# Patient Record
Sex: FEMALE | Race: OTHER | HISPANIC OR LATINO
[De-identification: names, ages, dates, MRNs, and addresses within clinical notes are randomized per-mention and may not be internally consistent; named-entity substitution may affect disease eponyms.]

---

## 2017-01-10 ENCOUNTER — APPOINTMENT (OUTPATIENT)
Dept: OBGYN | Facility: HOSPITAL | Age: 42
End: 2017-01-10

## 2017-01-31 ENCOUNTER — APPOINTMENT (OUTPATIENT)
Dept: INTERNAL MEDICINE | Facility: HOSPITAL | Age: 42
End: 2017-01-31

## 2017-02-15 ENCOUNTER — LABORATORY RESULT (OUTPATIENT)
Age: 42
End: 2017-02-15

## 2017-02-15 ENCOUNTER — APPOINTMENT (OUTPATIENT)
Dept: INTERNAL MEDICINE | Facility: HOSPITAL | Age: 42
End: 2017-02-15

## 2017-02-17 ENCOUNTER — APPOINTMENT (OUTPATIENT)
Dept: DERMATOLOGY | Facility: HOSPITAL | Age: 42
End: 2017-02-17

## 2017-03-01 ENCOUNTER — OUTPATIENT (OUTPATIENT)
Dept: OUTPATIENT SERVICES | Facility: HOSPITAL | Age: 42
LOS: 1 days | Discharge: HOME | End: 2017-03-01

## 2017-03-01 DIAGNOSIS — D64.9 ANEMIA, UNSPECIFIED: ICD-10-CM

## 2017-03-02 ENCOUNTER — APPOINTMENT (OUTPATIENT)
Dept: HEMATOLOGY ONCOLOGY | Facility: CLINIC | Age: 42
End: 2017-03-02

## 2017-03-02 VITALS
OXYGEN SATURATION: 100 % | TEMPERATURE: 98.4 F | RESPIRATION RATE: 16 BRPM | BODY MASS INDEX: 30.27 KG/M2 | WEIGHT: 170.86 LBS | DIASTOLIC BLOOD PRESSURE: 81 MMHG | SYSTOLIC BLOOD PRESSURE: 119 MMHG | HEART RATE: 83 BPM

## 2017-03-03 ENCOUNTER — TRANSCRIPTION ENCOUNTER (OUTPATIENT)
Age: 42
End: 2017-03-03

## 2017-03-07 ENCOUNTER — OUTPATIENT (OUTPATIENT)
Dept: OUTPATIENT SERVICES | Facility: HOSPITAL | Age: 42
LOS: 1 days | End: 2017-03-07

## 2017-03-07 ENCOUNTER — LABORATORY RESULT (OUTPATIENT)
Age: 42
End: 2017-03-07

## 2017-03-07 ENCOUNTER — APPOINTMENT (OUTPATIENT)
Dept: INTERNAL MEDICINE | Facility: HOSPITAL | Age: 42
End: 2017-03-07

## 2017-03-07 VITALS — BODY MASS INDEX: 30.12 KG/M2 | HEIGHT: 63 IN | WEIGHT: 170 LBS

## 2017-03-07 VITALS — DIASTOLIC BLOOD PRESSURE: 68 MMHG | SYSTOLIC BLOOD PRESSURE: 112 MMHG | HEART RATE: 74 BPM

## 2017-03-07 LAB
BASOPHILS # BLD AUTO: 0.04 K/UL — SIGNIFICANT CHANGE UP (ref 0–0.2)
BASOPHILS NFR BLD AUTO: 0.8 % — SIGNIFICANT CHANGE UP (ref 0–2)
EOSINOPHIL # BLD AUTO: 0.18 K/UL — SIGNIFICANT CHANGE UP (ref 0–0.5)
EOSINOPHIL NFR BLD AUTO: 3.5 % — SIGNIFICANT CHANGE UP (ref 0–6)
HCT VFR BLD CALC: 30 % — LOW (ref 34.5–45)
HGB BLD-MCNC: 9 G/DL — LOW (ref 11.5–15.5)
IMM GRANULOCYTES NFR BLD AUTO: 0 % — SIGNIFICANT CHANGE UP (ref 0–1.5)
LYMPHOCYTES # BLD AUTO: 1.94 K/UL — SIGNIFICANT CHANGE UP (ref 1–3.3)
LYMPHOCYTES # BLD AUTO: 37.6 % — SIGNIFICANT CHANGE UP (ref 13–44)
MCHC RBC-ENTMCNC: 20.8 PG — LOW (ref 27–34)
MCHC RBC-ENTMCNC: 30 % — LOW (ref 32–36)
MCV RBC AUTO: 69.4 FL — LOW (ref 80–100)
MONOCYTES # BLD AUTO: 0.38 K/UL — SIGNIFICANT CHANGE UP (ref 0–0.9)
MONOCYTES NFR BLD AUTO: 7.4 % — SIGNIFICANT CHANGE UP (ref 2–14)
NEUTROPHILS # BLD AUTO: 2.62 K/UL — SIGNIFICANT CHANGE UP (ref 1.8–7.4)
NEUTROPHILS NFR BLD AUTO: 50.7 % — SIGNIFICANT CHANGE UP (ref 43–77)
PLATELET # BLD AUTO: 429 K/UL — HIGH (ref 150–400)
PMV BLD: 10.3 FL — SIGNIFICANT CHANGE UP (ref 7–13)
RBC # BLD: 4.32 M/UL — SIGNIFICANT CHANGE UP (ref 3.8–5.2)
RBC # FLD: 19.1 % — HIGH (ref 10.3–14.5)
RETICS #: 63.7 10X3/UL — SIGNIFICANT CHANGE UP (ref 17–73)
RETICS/RBC NFR: 1.5 % — SIGNIFICANT CHANGE UP (ref 0.5–2.5)
WBC # BLD: 5.16 K/UL — SIGNIFICANT CHANGE UP (ref 3.8–10.5)
WBC # FLD AUTO: 5.16 K/UL — SIGNIFICANT CHANGE UP (ref 3.8–10.5)

## 2017-03-09 ENCOUNTER — APPOINTMENT (OUTPATIENT)
Dept: INFUSION THERAPY | Facility: HOSPITAL | Age: 42
End: 2017-03-09

## 2017-03-10 DIAGNOSIS — R11.2 NAUSEA WITH VOMITING, UNSPECIFIED: ICD-10-CM

## 2017-03-10 DIAGNOSIS — E86.0 DEHYDRATION: ICD-10-CM

## 2017-03-10 DIAGNOSIS — C92.90 MYELOID LEUKEMIA, UNSPECIFIED, NOT HAVING ACHIEVED REMISSION: ICD-10-CM

## 2017-03-10 DIAGNOSIS — Z51.11 ENCOUNTER FOR ANTINEOPLASTIC CHEMOTHERAPY: ICD-10-CM

## 2017-03-13 DIAGNOSIS — G56.03 CARPAL TUNNEL SYNDROME, BILATERAL UPPER LIMBS: ICD-10-CM

## 2017-03-16 ENCOUNTER — APPOINTMENT (OUTPATIENT)
Dept: INFUSION THERAPY | Facility: HOSPITAL | Age: 42
End: 2017-03-16

## 2017-03-17 ENCOUNTER — APPOINTMENT (OUTPATIENT)
Dept: DERMATOLOGY | Facility: HOSPITAL | Age: 42
End: 2017-03-17

## 2017-03-17 ENCOUNTER — OUTPATIENT (OUTPATIENT)
Dept: OUTPATIENT SERVICES | Facility: HOSPITAL | Age: 42
LOS: 1 days | End: 2017-03-17

## 2017-03-17 VITALS
HEART RATE: 73 BPM | WEIGHT: 167 LBS | HEIGHT: 63 IN | BODY MASS INDEX: 29.59 KG/M2 | DIASTOLIC BLOOD PRESSURE: 72 MMHG | SYSTOLIC BLOOD PRESSURE: 108 MMHG

## 2017-03-20 ENCOUNTER — APPOINTMENT (OUTPATIENT)
Dept: OBGYN | Facility: HOSPITAL | Age: 42
End: 2017-03-20

## 2017-03-20 ENCOUNTER — OUTPATIENT (OUTPATIENT)
Dept: OUTPATIENT SERVICES | Facility: HOSPITAL | Age: 42
LOS: 1 days | End: 2017-03-20

## 2017-03-20 VITALS
DIASTOLIC BLOOD PRESSURE: 70 MMHG | BODY MASS INDEX: 30.08 KG/M2 | WEIGHT: 169.76 LBS | HEIGHT: 63 IN | HEART RATE: 95 BPM | SYSTOLIC BLOOD PRESSURE: 113 MMHG

## 2017-03-20 DIAGNOSIS — L70.0 ACNE VULGARIS: ICD-10-CM

## 2017-03-20 DIAGNOSIS — B07.9 VIRAL WART, UNSPECIFIED: ICD-10-CM

## 2017-03-20 RX ORDER — ACETAMINOPHEN 325 MG/1
325 TABLET ORAL
Qty: 0 | Refills: 0 | Status: COMPLETED | OUTPATIENT
Start: 2017-03-20

## 2017-03-20 RX ADMIN — GUAIFENESIN 1 MG: 1200 TABLET, EXTENDED RELEASE ORAL at 00:00

## 2017-03-20 RX ADMIN — ACETAMINOPHEN 2 MG: 325 TABLET, FILM COATED ORAL at 00:00

## 2017-03-21 DIAGNOSIS — D50.9 IRON DEFICIENCY ANEMIA, UNSPECIFIED: ICD-10-CM

## 2017-03-21 DIAGNOSIS — K90.9 INTESTINAL MALABSORPTION, UNSPECIFIED: ICD-10-CM

## 2017-03-21 DIAGNOSIS — N92.4 EXCESSIVE BLEEDING IN THE PREMENOPAUSAL PERIOD: ICD-10-CM

## 2017-03-21 DIAGNOSIS — R19.8 OTHER SPECIFIED SYMPTOMS AND SIGNS INVOLVING THE DIGESTIVE SYSTEM AND ABDOMEN: ICD-10-CM

## 2017-03-21 DIAGNOSIS — Z30.430 ENCOUNTER FOR INSERTION OF INTRAUTERINE CONTRACEPTIVE DEVICE: ICD-10-CM

## 2017-04-13 ENCOUNTER — LABORATORY RESULT (OUTPATIENT)
Age: 42
End: 2017-04-13

## 2017-04-13 ENCOUNTER — OUTPATIENT (OUTPATIENT)
Dept: OUTPATIENT SERVICES | Facility: HOSPITAL | Age: 42
LOS: 1 days | End: 2017-04-13

## 2017-04-13 ENCOUNTER — APPOINTMENT (OUTPATIENT)
Dept: INTERNAL MEDICINE | Facility: HOSPITAL | Age: 42
End: 2017-04-13

## 2017-04-13 VITALS — WEIGHT: 170 LBS | BODY MASS INDEX: 30.11 KG/M2

## 2017-04-13 LAB
BASOPHILS # BLD AUTO: 0.02 K/UL — SIGNIFICANT CHANGE UP (ref 0–0.2)
BASOPHILS NFR BLD AUTO: 0.4 % — SIGNIFICANT CHANGE UP (ref 0–2)
EOSINOPHIL # BLD AUTO: 0.13 K/UL — SIGNIFICANT CHANGE UP (ref 0–0.5)
EOSINOPHIL NFR BLD AUTO: 2.5 % — SIGNIFICANT CHANGE UP (ref 0–6)
FERRITIN SERPL-MCNC: 135.6 NG/ML — SIGNIFICANT CHANGE UP (ref 15–150)
HCT VFR BLD CALC: 42.8 % — SIGNIFICANT CHANGE UP (ref 34.5–45)
HGB BLD-MCNC: 13.9 G/DL — SIGNIFICANT CHANGE UP (ref 11.5–15.5)
IMM GRANULOCYTES NFR BLD AUTO: 0.2 % — SIGNIFICANT CHANGE UP (ref 0–1.5)
IRON SATN MFR SERPL: 304 UG/DL — SIGNIFICANT CHANGE UP (ref 140–530)
IRON SATN MFR SERPL: 75 UG/DL — SIGNIFICANT CHANGE UP (ref 30–160)
LYMPHOCYTES # BLD AUTO: 2.12 K/UL — SIGNIFICANT CHANGE UP (ref 1–3.3)
LYMPHOCYTES # BLD AUTO: 40.8 % — SIGNIFICANT CHANGE UP (ref 13–44)
MCHC RBC-ENTMCNC: 27.1 PG — SIGNIFICANT CHANGE UP (ref 27–34)
MCHC RBC-ENTMCNC: 32.5 % — SIGNIFICANT CHANGE UP (ref 32–36)
MCV RBC AUTO: 83.4 FL — SIGNIFICANT CHANGE UP (ref 80–100)
MONOCYTES # BLD AUTO: 0.43 K/UL — SIGNIFICANT CHANGE UP (ref 0–0.9)
MONOCYTES NFR BLD AUTO: 8.3 % — SIGNIFICANT CHANGE UP (ref 2–14)
NEUTROPHILS # BLD AUTO: 2.48 K/UL — SIGNIFICANT CHANGE UP (ref 1.8–7.4)
NEUTROPHILS NFR BLD AUTO: 47.8 % — SIGNIFICANT CHANGE UP (ref 43–77)
PLATELET # BLD AUTO: 251 K/UL — SIGNIFICANT CHANGE UP (ref 150–400)
PMV BLD: SIGNIFICANT CHANGE UP FL (ref 7–13)
RBC # BLD: 5.13 M/UL — SIGNIFICANT CHANGE UP (ref 3.8–5.2)
RBC # FLD: SIGNIFICANT CHANGE UP % (ref 10.3–14.5)
UIBC SERPL-MCNC: 229 UG/DL — SIGNIFICANT CHANGE UP (ref 110–370)
WBC # BLD: 5.19 K/UL — SIGNIFICANT CHANGE UP (ref 3.8–10.5)
WBC # FLD AUTO: 5.19 K/UL — SIGNIFICANT CHANGE UP (ref 3.8–10.5)

## 2017-04-13 RX ORDER — CLINDAMYCIN PHOSPHATE 10 MG/ML
1 LOTION TOPICAL
Qty: 1 | Refills: 3 | Status: DISCONTINUED | COMMUNITY
Start: 2017-03-17 | End: 2017-04-13

## 2017-04-14 LAB — TRANSFERRIN SERPL-MCNC: 254 MG/DL — SIGNIFICANT CHANGE UP (ref 200–360)

## 2017-04-19 DIAGNOSIS — K29.70 GASTRITIS, UNSPECIFIED, WITHOUT BLEEDING: ICD-10-CM

## 2017-04-19 DIAGNOSIS — N92.0 EXCESSIVE AND FREQUENT MENSTRUATION WITH REGULAR CYCLE: ICD-10-CM

## 2017-04-19 DIAGNOSIS — L70.0 ACNE VULGARIS: ICD-10-CM

## 2017-04-19 DIAGNOSIS — R10.13 EPIGASTRIC PAIN: ICD-10-CM

## 2017-04-19 DIAGNOSIS — D50.9 IRON DEFICIENCY ANEMIA, UNSPECIFIED: ICD-10-CM

## 2017-05-04 ENCOUNTER — APPOINTMENT (OUTPATIENT)
Dept: OBGYN | Facility: HOSPITAL | Age: 42
End: 2017-05-04

## 2017-05-04 ENCOUNTER — OUTPATIENT (OUTPATIENT)
Dept: OUTPATIENT SERVICES | Facility: HOSPITAL | Age: 42
LOS: 1 days | Discharge: HOME | End: 2017-05-04

## 2017-05-04 ENCOUNTER — LABORATORY RESULT (OUTPATIENT)
Age: 42
End: 2017-05-04

## 2017-05-04 ENCOUNTER — OUTPATIENT (OUTPATIENT)
Dept: OUTPATIENT SERVICES | Facility: HOSPITAL | Age: 42
LOS: 1 days | End: 2017-05-04

## 2017-05-04 VITALS
DIASTOLIC BLOOD PRESSURE: 88 MMHG | BODY MASS INDEX: 30.47 KG/M2 | HEART RATE: 81 BPM | WEIGHT: 172 LBS | SYSTOLIC BLOOD PRESSURE: 126 MMHG

## 2017-05-04 DIAGNOSIS — D64.9 ANEMIA, UNSPECIFIED: ICD-10-CM

## 2017-05-04 DIAGNOSIS — Z30.431 ENCOUNTER FOR ROUTINE CHECKING OF INTRAUTERINE CONTRACEPTIVE DEVICE: ICD-10-CM

## 2017-05-04 DIAGNOSIS — R42 DIZZINESS AND GIDDINESS: ICD-10-CM

## 2017-05-04 DIAGNOSIS — R30.0 DYSURIA: ICD-10-CM

## 2017-05-04 LAB
HIV1 AG SER QL: SIGNIFICANT CHANGE UP
HIV1+2 AB SPEC QL: SIGNIFICANT CHANGE UP

## 2017-05-05 ENCOUNTER — OUTPATIENT (OUTPATIENT)
Dept: OUTPATIENT SERVICES | Facility: HOSPITAL | Age: 42
LOS: 1 days | End: 2017-05-05

## 2017-05-05 ENCOUNTER — APPOINTMENT (OUTPATIENT)
Dept: DERMATOLOGY | Facility: HOSPITAL | Age: 42
End: 2017-05-05

## 2017-05-05 VITALS
SYSTOLIC BLOOD PRESSURE: 118 MMHG | HEART RATE: 89 BPM | BODY MASS INDEX: 30.65 KG/M2 | DIASTOLIC BLOOD PRESSURE: 73 MMHG | HEIGHT: 63 IN | WEIGHT: 173 LBS

## 2017-05-05 DIAGNOSIS — B07.9 VIRAL WART, UNSPECIFIED: ICD-10-CM

## 2017-05-05 DIAGNOSIS — L70.0 ACNE VULGARIS: ICD-10-CM

## 2017-05-05 LAB — TSH RECEP AB FLD-ACNC: <1 IU/L — SIGNIFICANT CHANGE UP (ref 0–1.75)

## 2017-05-06 ENCOUNTER — EMERGENCY (EMERGENCY)
Facility: HOSPITAL | Age: 42
LOS: 1 days | Discharge: HOME | End: 2017-05-06
Attending: EMERGENCY MEDICINE | Admitting: EMERGENCY MEDICINE
Payer: MEDICAID

## 2017-05-06 VITALS
HEART RATE: 74 BPM | RESPIRATION RATE: 16 BRPM | TEMPERATURE: 98 F | OXYGEN SATURATION: 98 % | DIASTOLIC BLOOD PRESSURE: 67 MMHG | SYSTOLIC BLOOD PRESSURE: 96 MMHG

## 2017-05-06 VITALS
OXYGEN SATURATION: 100 % | DIASTOLIC BLOOD PRESSURE: 94 MMHG | SYSTOLIC BLOOD PRESSURE: 129 MMHG | TEMPERATURE: 98 F | RESPIRATION RATE: 14 BRPM | HEART RATE: 82 BPM

## 2017-05-06 DIAGNOSIS — Z98.891 HISTORY OF UTERINE SCAR FROM PREVIOUS SURGERY: Chronic | ICD-10-CM

## 2017-05-06 LAB
ALBUMIN SERPL ELPH-MCNC: 4 G/DL — SIGNIFICANT CHANGE UP (ref 3.3–5)
ALP SERPL-CCNC: 40 U/L — SIGNIFICANT CHANGE UP (ref 40–120)
ALT FLD-CCNC: 19 U/L — SIGNIFICANT CHANGE UP (ref 4–33)
APPEARANCE UR: CLEAR — SIGNIFICANT CHANGE UP
AST SERPL-CCNC: 36 U/L — HIGH (ref 4–32)
BACTERIA # UR AUTO: SIGNIFICANT CHANGE UP
BACTERIA UR CULT: SIGNIFICANT CHANGE UP
BASOPHILS # BLD AUTO: 0.02 K/UL — SIGNIFICANT CHANGE UP (ref 0–0.2)
BASOPHILS NFR BLD AUTO: 0.2 % — SIGNIFICANT CHANGE UP (ref 0–2)
BASOPHILS NFR SPEC: 1 % — SIGNIFICANT CHANGE UP (ref 0–2)
BILIRUB SERPL-MCNC: 0.2 MG/DL — SIGNIFICANT CHANGE UP (ref 0.2–1.2)
BILIRUB UR-MCNC: NEGATIVE — SIGNIFICANT CHANGE UP
BLOOD UR QL VISUAL: HIGH
BUN SERPL-MCNC: 19 MG/DL — SIGNIFICANT CHANGE UP (ref 7–23)
CALCIUM SERPL-MCNC: 9.2 MG/DL — SIGNIFICANT CHANGE UP (ref 8.4–10.5)
CHLORIDE SERPL-SCNC: 102 MMOL/L — SIGNIFICANT CHANGE UP (ref 98–107)
CO2 SERPL-SCNC: 21 MMOL/L — LOW (ref 22–31)
COLOR SPEC: YELLOW — SIGNIFICANT CHANGE UP
CREAT SERPL-MCNC: 0.87 MG/DL — SIGNIFICANT CHANGE UP (ref 0.5–1.3)
ELLIPTOCYTES BLD QL SMEAR: SLIGHT — SIGNIFICANT CHANGE UP
EOSINOPHIL # BLD AUTO: 0.05 K/UL — SIGNIFICANT CHANGE UP (ref 0–0.5)
EOSINOPHIL NFR BLD AUTO: 0.6 % — SIGNIFICANT CHANGE UP (ref 0–6)
EOSINOPHIL NFR FLD: 2 % — SIGNIFICANT CHANGE UP (ref 0–6)
GLUCOSE SERPL-MCNC: 103 MG/DL — HIGH (ref 70–99)
GLUCOSE UR-MCNC: NEGATIVE — SIGNIFICANT CHANGE UP
HCG UR-SCNC: NEGATIVE — SIGNIFICANT CHANGE UP
HCT VFR BLD CALC: 41.5 % — SIGNIFICANT CHANGE UP (ref 34.5–45)
HGB BLD-MCNC: 13.4 G/DL — SIGNIFICANT CHANGE UP (ref 11.5–15.5)
IMM GRANULOCYTES NFR BLD AUTO: 0.2 % — SIGNIFICANT CHANGE UP (ref 0–1.5)
KETONES UR-MCNC: NEGATIVE — SIGNIFICANT CHANGE UP
LEUKOCYTE ESTERASE UR-ACNC: NEGATIVE — SIGNIFICANT CHANGE UP
LG PLATELETS BLD QL AUTO: SLIGHT — SIGNIFICANT CHANGE UP
LIDOCAIN IGE QN: 46.8 U/L — SIGNIFICANT CHANGE UP (ref 7–60)
LYMPHOCYTES # BLD AUTO: 1.99 K/UL — SIGNIFICANT CHANGE UP (ref 1–3.3)
LYMPHOCYTES # BLD AUTO: 22.8 % — SIGNIFICANT CHANGE UP (ref 13–44)
LYMPHOCYTES NFR SPEC AUTO: 12 % — LOW (ref 13–44)
MACROCYTES BLD QL: SLIGHT — SIGNIFICANT CHANGE UP
MANUAL SMEAR VERIFICATION: SIGNIFICANT CHANGE UP
MCHC RBC-ENTMCNC: 27.7 PG — SIGNIFICANT CHANGE UP (ref 27–34)
MCHC RBC-ENTMCNC: 32.3 % — SIGNIFICANT CHANGE UP (ref 32–36)
MCV RBC AUTO: 85.9 FL — SIGNIFICANT CHANGE UP (ref 80–100)
MICROCYTES BLD QL: SLIGHT — SIGNIFICANT CHANGE UP
MONOCYTES # BLD AUTO: 0.44 K/UL — SIGNIFICANT CHANGE UP (ref 0–0.9)
MONOCYTES NFR BLD AUTO: 5 % — SIGNIFICANT CHANGE UP (ref 2–14)
MONOCYTES NFR BLD: 4 % — SIGNIFICANT CHANGE UP (ref 2–9)
MUCOUS THREADS # UR AUTO: SIGNIFICANT CHANGE UP
NEUTROPHIL AB SER-ACNC: 76 % — SIGNIFICANT CHANGE UP (ref 43–77)
NEUTROPHILS # BLD AUTO: 6.2 K/UL — SIGNIFICANT CHANGE UP (ref 1.8–7.4)
NEUTROPHILS NFR BLD AUTO: 71.2 % — SIGNIFICANT CHANGE UP (ref 43–77)
NEUTS BAND # BLD: 1 % — SIGNIFICANT CHANGE UP (ref 0–6)
NITRITE UR-MCNC: NEGATIVE — SIGNIFICANT CHANGE UP
NON-SQ EPI CELLS # UR AUTO: <1 — SIGNIFICANT CHANGE UP
PH UR: 5.5 — SIGNIFICANT CHANGE UP (ref 4.6–8)
PLATELET # BLD AUTO: 248 K/UL — SIGNIFICANT CHANGE UP (ref 150–400)
PLATELET COUNT - ESTIMATE: NORMAL — SIGNIFICANT CHANGE UP
PMV BLD: SIGNIFICANT CHANGE UP FL (ref 7–13)
POTASSIUM SERPL-MCNC: 4 MMOL/L — SIGNIFICANT CHANGE UP (ref 3.5–5.3)
POTASSIUM SERPL-MCNC: SIGNIFICANT CHANGE UP MMOL/L (ref 3.5–5.3)
POTASSIUM SERPL-SCNC: 4 MMOL/L — SIGNIFICANT CHANGE UP (ref 3.5–5.3)
POTASSIUM SERPL-SCNC: SIGNIFICANT CHANGE UP MMOL/L (ref 3.5–5.3)
PROT SERPL-MCNC: 7.2 G/DL — SIGNIFICANT CHANGE UP (ref 6–8.3)
PROT UR-MCNC: 20 — SIGNIFICANT CHANGE UP
RBC # BLD: 4.83 M/UL — SIGNIFICANT CHANGE UP (ref 3.8–5.2)
RBC # FLD: SIGNIFICANT CHANGE UP % (ref 10.3–14.5)
RBC CASTS # UR COMP ASSIST: SIGNIFICANT CHANGE UP (ref 0–?)
SODIUM SERPL-SCNC: 137 MMOL/L — SIGNIFICANT CHANGE UP (ref 135–145)
SP GR SPEC: 1.03 — HIGH (ref 1–1.03)
SP GR UR: 1.03 — HIGH (ref 1–1.03)
SPECIMEN SOURCE: SIGNIFICANT CHANGE UP
SQUAMOUS # UR AUTO: SIGNIFICANT CHANGE UP
UROBILINOGEN FLD QL: NORMAL E.U. — SIGNIFICANT CHANGE UP (ref 0.1–0.2)
VARIANT LYMPHS # BLD: 4 % — SIGNIFICANT CHANGE UP
WBC # BLD: 8.72 K/UL — SIGNIFICANT CHANGE UP (ref 3.8–10.5)
WBC # FLD AUTO: 8.72 K/UL — SIGNIFICANT CHANGE UP (ref 3.8–10.5)
WBC UR QL: SIGNIFICANT CHANGE UP (ref 0–?)

## 2017-05-06 PROCEDURE — 74177 CT ABD & PELVIS W/CONTRAST: CPT | Mod: 26

## 2017-05-06 PROCEDURE — 71010: CPT | Mod: 26

## 2017-05-06 PROCEDURE — 74010: CPT | Mod: 26

## 2017-05-06 PROCEDURE — 99284 EMERGENCY DEPT VISIT MOD MDM: CPT | Mod: 25

## 2017-05-06 RX ORDER — FAMOTIDINE 10 MG/ML
20 INJECTION INTRAVENOUS ONCE
Qty: 0 | Refills: 0 | Status: COMPLETED | OUTPATIENT
Start: 2017-05-06 | End: 2017-05-06

## 2017-05-06 RX ORDER — ONDANSETRON 8 MG/1
4 TABLET, FILM COATED ORAL ONCE
Qty: 0 | Refills: 0 | Status: COMPLETED | OUTPATIENT
Start: 2017-05-06 | End: 2017-05-06

## 2017-05-06 RX ORDER — SODIUM CHLORIDE 9 MG/ML
1000 INJECTION INTRAMUSCULAR; INTRAVENOUS; SUBCUTANEOUS ONCE
Qty: 0 | Refills: 0 | Status: COMPLETED | OUTPATIENT
Start: 2017-05-06 | End: 2017-05-06

## 2017-05-06 RX ADMIN — Medication 30 MILLILITER(S): at 03:03

## 2017-05-06 RX ADMIN — ONDANSETRON 4 MILLIGRAM(S): 8 TABLET, FILM COATED ORAL at 03:02

## 2017-05-06 RX ADMIN — FAMOTIDINE 20 MILLIGRAM(S): 10 INJECTION INTRAVENOUS at 03:03

## 2017-05-06 RX ADMIN — SODIUM CHLORIDE 1000 MILLILITER(S): 9 INJECTION INTRAMUSCULAR; INTRAVENOUS; SUBCUTANEOUS at 03:02

## 2017-05-06 NOTE — ED PROVIDER NOTE - ATTENDING CONTRIBUTION TO CARE
Dr. Rueda: This H&P has been written by myself in its entirety  Dr. Rueda: I have personally performed a face to face bedside history and physical examination of this patient. I have discussed the history, examination, review of systems, assessment and plan of management with the resident. I have reviewed the electronic medical record and amended it to reflect my history, review of systems, physical exam, assessment and plan.

## 2017-05-06 NOTE — ED ADULT TRIAGE NOTE - CHIEF COMPLAINT QUOTE
Pt arrives to triage c/o Abd pain, nausea/vomiting x1 day. Epigastric rad to LUQ abd, vomited x2 at home then saw blood tinged saliva in emesis so came to ED. Hx Gastritis, diagnosed w/ peptic ulcer x3 years ago but hasn't had pain this severe previously. Takes Omeprazole daily.  Pt guarding abd in triage, resps/even unlabored, appearing uncomfortable but in NAD.

## 2017-05-06 NOTE — ED PROVIDER NOTE - OBJECTIVE STATEMENT
Dr. Rueda: 41F h/o gastric ulcers on TUMS and omeprazole, h/o C sections and cystectomy in the past, p/w LUQ. L flank and epigastric pain at 9:30pm tonight, before eating, with 2 episodes of non bilious emesis with streaks of blood. Diarrhea earlier today, normal flatus. No fevers or chills. Pain is not related to meals. Took TUMS with no relief. States pain is different from usual episodes of gastritis pain. Ate homemade chicken today, which several family members at as well with no sx. No travel, sick contacts, dysuria, hematuria, recent abx. Pt has no GI doc and has not had an EGD. LMP . States father recently  of "kidney problems".

## 2017-05-06 NOTE — ED PROVIDER NOTE - MEDICAL DECISION MAKING DETAILS
pain and nausea control, labs, ucg, possible gastritis however given diffuse nature of pain and surgical hx, will get CT a/p r/o partial sbo.

## 2017-05-06 NOTE — ED PROVIDER NOTE - PROGRESS NOTE DETAILS
Pt mentioned to RN that she recently had IUD placed. Pt in NAD. Labs/CT pending. Pt feels much better, results explained, will f/u with GI.

## 2017-05-06 NOTE — ED ADULT NURSE NOTE - OBJECTIVE STATEMENT
41 year old female, A&OX3, ambulatory c/o upper abdominal pain/epigastric pain that radiates to the left side of abdomen. Admits to nausea, two episodes of vomiting with small amount of blood and nonbloody diarrhea. Reports hx of gastritis but states none of her usual medications have relieved pain. Denies fever, chills, SOB, chest pain, dysuria. Abdomen rigid, nondistended, tender to palpation of LUQ. Respirations even, unlabored. IV placed, labs sent, medicated as ordered.

## 2017-05-08 ENCOUNTER — APPOINTMENT (OUTPATIENT)
Dept: HEMATOLOGY ONCOLOGY | Facility: CLINIC | Age: 42
End: 2017-05-08

## 2017-05-11 ENCOUNTER — APPOINTMENT (OUTPATIENT)
Dept: HEMATOLOGY ONCOLOGY | Facility: CLINIC | Age: 42
End: 2017-05-11

## 2017-05-18 ENCOUNTER — LABORATORY RESULT (OUTPATIENT)
Age: 42
End: 2017-05-18

## 2017-05-18 ENCOUNTER — APPOINTMENT (OUTPATIENT)
Dept: GASTROENTEROLOGY | Facility: HOSPITAL | Age: 42
End: 2017-05-18

## 2017-05-18 ENCOUNTER — APPOINTMENT (OUTPATIENT)
Dept: INTERNAL MEDICINE | Facility: HOSPITAL | Age: 42
End: 2017-05-18

## 2017-05-18 ENCOUNTER — OUTPATIENT (OUTPATIENT)
Dept: OUTPATIENT SERVICES | Facility: HOSPITAL | Age: 42
LOS: 1 days | End: 2017-05-18

## 2017-05-18 VITALS — BODY MASS INDEX: 30.12 KG/M2 | WEIGHT: 170 LBS | HEIGHT: 63 IN

## 2017-05-18 VITALS
SYSTOLIC BLOOD PRESSURE: 125 MMHG | WEIGHT: 170 LBS | HEART RATE: 87 BPM | DIASTOLIC BLOOD PRESSURE: 79 MMHG | BODY MASS INDEX: 30.12 KG/M2 | HEIGHT: 63 IN

## 2017-05-18 DIAGNOSIS — Z83.79 FAMILY HISTORY OF OTHER DISEASES OF THE DIGESTIVE SYSTEM: ICD-10-CM

## 2017-05-18 DIAGNOSIS — Z98.891 HISTORY OF UTERINE SCAR FROM PREVIOUS SURGERY: Chronic | ICD-10-CM

## 2017-05-18 DIAGNOSIS — R10.13 EPIGASTRIC PAIN: ICD-10-CM

## 2017-05-18 DIAGNOSIS — D50.9 IRON DEFICIENCY ANEMIA, UNSPECIFIED: ICD-10-CM

## 2017-05-18 DIAGNOSIS — E66.9 OBESITY, UNSPECIFIED: ICD-10-CM

## 2017-05-18 LAB
ALBUMIN SERPL ELPH-MCNC: 4 G/DL — SIGNIFICANT CHANGE UP (ref 3.3–5)
ALP SERPL-CCNC: 52 U/L — SIGNIFICANT CHANGE UP (ref 40–120)
ALT FLD-CCNC: 15 U/L — SIGNIFICANT CHANGE UP (ref 4–33)
ANISOCYTOSIS BLD QL: SLIGHT — SIGNIFICANT CHANGE UP
AST SERPL-CCNC: 15 U/L — SIGNIFICANT CHANGE UP (ref 4–32)
BASOPHILS # BLD AUTO: 0.02 K/UL — SIGNIFICANT CHANGE UP (ref 0–0.2)
BASOPHILS NFR BLD AUTO: 0.4 % — SIGNIFICANT CHANGE UP (ref 0–2)
BILIRUB SERPL-MCNC: < 0.2 MG/DL — LOW (ref 0.2–1.2)
BUN SERPL-MCNC: 20 MG/DL — SIGNIFICANT CHANGE UP (ref 7–23)
CALCIUM SERPL-MCNC: 9.7 MG/DL — SIGNIFICANT CHANGE UP (ref 8.4–10.5)
CHLORIDE SERPL-SCNC: 101 MMOL/L — SIGNIFICANT CHANGE UP (ref 98–107)
CO2 SERPL-SCNC: 30 MMOL/L — SIGNIFICANT CHANGE UP (ref 22–31)
CREAT SERPL-MCNC: 0.9 MG/DL — SIGNIFICANT CHANGE UP (ref 0.5–1.3)
ELLIPTOCYTES BLD QL SMEAR: SLIGHT — SIGNIFICANT CHANGE UP
EOSINOPHIL # BLD AUTO: 0.04 K/UL — SIGNIFICANT CHANGE UP (ref 0–0.5)
EOSINOPHIL NFR BLD AUTO: 0.9 % — SIGNIFICANT CHANGE UP (ref 0–6)
FERRITIN SERPL-MCNC: 69.22 NG/ML — SIGNIFICANT CHANGE UP (ref 15–150)
GLUCOSE SERPL-MCNC: 82 MG/DL — SIGNIFICANT CHANGE UP (ref 70–99)
HCT VFR BLD CALC: 39.8 % — SIGNIFICANT CHANGE UP (ref 34.5–45)
HGB BLD-MCNC: 12.9 G/DL — SIGNIFICANT CHANGE UP (ref 11.5–15.5)
HYPOCHROMIA BLD QL: SLIGHT — SIGNIFICANT CHANGE UP
IMM GRANULOCYTES NFR BLD AUTO: 0.2 % — SIGNIFICANT CHANGE UP (ref 0–1.5)
LG PLATELETS BLD QL AUTO: SLIGHT — SIGNIFICANT CHANGE UP
LYMPHOCYTES # BLD AUTO: 1.71 K/UL — SIGNIFICANT CHANGE UP (ref 1–3.3)
LYMPHOCYTES # BLD AUTO: 37.3 % — SIGNIFICANT CHANGE UP (ref 13–44)
MACROCYTES BLD QL: SLIGHT — SIGNIFICANT CHANGE UP
MCHC RBC-ENTMCNC: 28.7 PG — SIGNIFICANT CHANGE UP (ref 27–34)
MCHC RBC-ENTMCNC: 32.4 % — SIGNIFICANT CHANGE UP (ref 32–36)
MCV RBC AUTO: 88.4 FL — SIGNIFICANT CHANGE UP (ref 80–100)
MICROCYTES BLD QL: SLIGHT — SIGNIFICANT CHANGE UP
MONOCYTES # BLD AUTO: 0.27 K/UL — SIGNIFICANT CHANGE UP (ref 0–0.9)
MONOCYTES NFR BLD AUTO: 5.9 % — SIGNIFICANT CHANGE UP (ref 2–14)
NEUTROPHILS # BLD AUTO: 2.53 K/UL — SIGNIFICANT CHANGE UP (ref 1.8–7.4)
NEUTROPHILS NFR BLD AUTO: 55.3 % — SIGNIFICANT CHANGE UP (ref 43–77)
PLATELET # BLD AUTO: 275 K/UL — SIGNIFICANT CHANGE UP (ref 150–400)
PLATELET COUNT - ESTIMATE: NORMAL — SIGNIFICANT CHANGE UP
PMV BLD: 11.2 FL — SIGNIFICANT CHANGE UP (ref 7–13)
POTASSIUM SERPL-MCNC: 4.1 MMOL/L — SIGNIFICANT CHANGE UP (ref 3.5–5.3)
POTASSIUM SERPL-SCNC: 4.1 MMOL/L — SIGNIFICANT CHANGE UP (ref 3.5–5.3)
PROT SERPL-MCNC: 7.3 G/DL — SIGNIFICANT CHANGE UP (ref 6–8.3)
RBC # BLD: 4.5 M/UL — SIGNIFICANT CHANGE UP (ref 3.8–5.2)
RBC # FLD: SIGNIFICANT CHANGE UP % (ref 10.3–14.5)
SODIUM SERPL-SCNC: 141 MMOL/L — SIGNIFICANT CHANGE UP (ref 135–145)
TARGETS BLD QL SMEAR: SLIGHT — SIGNIFICANT CHANGE UP
WBC # BLD: 4.58 K/UL — SIGNIFICANT CHANGE UP (ref 3.8–10.5)
WBC # FLD AUTO: 4.58 K/UL — SIGNIFICANT CHANGE UP (ref 3.8–10.5)

## 2017-05-18 RX ORDER — AMOXICILLIN 250 MG/1
250 CAPSULE ORAL
Qty: 1 | Refills: 0 | Status: DISCONTINUED | COMMUNITY
Start: 2017-05-05 | End: 2017-05-18

## 2017-05-18 RX ORDER — CLINDAMYCIN PHOSPHATE 10 MG/ML
1 LOTION TOPICAL TWICE DAILY
Qty: 1 | Refills: 3 | Status: DISCONTINUED | COMMUNITY
Start: 2017-05-05 | End: 2017-05-18

## 2017-05-18 RX ORDER — SPIRONOLACTONE 50 MG/1
50 TABLET ORAL
Qty: 1 | Refills: 0 | Status: DISCONTINUED | COMMUNITY
Start: 2017-04-05 | End: 2017-05-18

## 2017-05-19 DIAGNOSIS — J35.8 OTHER CHRONIC DISEASES OF TONSILS AND ADENOIDS: ICD-10-CM

## 2017-05-23 ENCOUNTER — OTHER (OUTPATIENT)
Age: 42
End: 2017-05-23

## 2017-05-23 ENCOUNTER — OUTPATIENT (OUTPATIENT)
Dept: OUTPATIENT SERVICES | Facility: HOSPITAL | Age: 42
LOS: 1 days | End: 2017-05-23

## 2017-05-23 ENCOUNTER — APPOINTMENT (OUTPATIENT)
Dept: INTERNAL MEDICINE | Facility: HOSPITAL | Age: 42
End: 2017-05-23

## 2017-05-23 VITALS — DIASTOLIC BLOOD PRESSURE: 78 MMHG | SYSTOLIC BLOOD PRESSURE: 120 MMHG | HEART RATE: 72 BPM

## 2017-05-23 VITALS — WEIGHT: 170 LBS | HEIGHT: 63 IN | BODY MASS INDEX: 30.12 KG/M2

## 2017-05-23 DIAGNOSIS — Z98.891 HISTORY OF UTERINE SCAR FROM PREVIOUS SURGERY: Chronic | ICD-10-CM

## 2017-05-23 DIAGNOSIS — L70.9 ACNE, UNSPECIFIED: ICD-10-CM

## 2017-05-24 DIAGNOSIS — L90.5 SCAR CONDITIONS AND FIBROSIS OF SKIN: ICD-10-CM

## 2017-05-24 DIAGNOSIS — Z01.818 ENCOUNTER FOR OTHER PREPROCEDURAL EXAMINATION: ICD-10-CM

## 2017-06-20 ENCOUNTER — LABORATORY RESULT (OUTPATIENT)
Age: 42
End: 2017-06-20

## 2017-06-20 ENCOUNTER — OUTPATIENT (OUTPATIENT)
Dept: OUTPATIENT SERVICES | Facility: HOSPITAL | Age: 42
LOS: 1 days | End: 2017-06-20

## 2017-06-20 ENCOUNTER — APPOINTMENT (OUTPATIENT)
Dept: INTERNAL MEDICINE | Facility: HOSPITAL | Age: 42
End: 2017-06-20

## 2017-06-20 VITALS — BODY MASS INDEX: 30.12 KG/M2 | HEIGHT: 63 IN | WEIGHT: 170 LBS

## 2017-06-20 DIAGNOSIS — Z98.891 HISTORY OF UTERINE SCAR FROM PREVIOUS SURGERY: Chronic | ICD-10-CM

## 2017-06-20 LAB
BASOPHILS # BLD AUTO: 0.02 K/UL — SIGNIFICANT CHANGE UP (ref 0–0.2)
BASOPHILS NFR BLD AUTO: 0.4 % — SIGNIFICANT CHANGE UP (ref 0–2)
EOSINOPHIL # BLD AUTO: 0.08 K/UL — SIGNIFICANT CHANGE UP (ref 0–0.5)
EOSINOPHIL NFR BLD AUTO: 1.7 % — SIGNIFICANT CHANGE UP (ref 0–6)
HCT VFR BLD CALC: 40.1 % — SIGNIFICANT CHANGE UP (ref 34.5–45)
HGB BLD-MCNC: 12.7 G/DL — SIGNIFICANT CHANGE UP (ref 11.5–15.5)
IMM GRANULOCYTES NFR BLD AUTO: 0.2 % — SIGNIFICANT CHANGE UP (ref 0–1.5)
LYMPHOCYTES # BLD AUTO: 1.66 K/UL — SIGNIFICANT CHANGE UP (ref 1–3.3)
LYMPHOCYTES # BLD AUTO: 34.7 % — SIGNIFICANT CHANGE UP (ref 13–44)
MCHC RBC-ENTMCNC: 29.8 PG — SIGNIFICANT CHANGE UP (ref 27–34)
MCHC RBC-ENTMCNC: 31.7 % — LOW (ref 32–36)
MCV RBC AUTO: 94.1 FL — SIGNIFICANT CHANGE UP (ref 80–100)
MONOCYTES # BLD AUTO: 0.33 K/UL — SIGNIFICANT CHANGE UP (ref 0–0.9)
MONOCYTES NFR BLD AUTO: 6.9 % — SIGNIFICANT CHANGE UP (ref 2–14)
NEUTROPHILS # BLD AUTO: 2.69 K/UL — SIGNIFICANT CHANGE UP (ref 1.8–7.4)
NEUTROPHILS NFR BLD AUTO: 56.1 % — SIGNIFICANT CHANGE UP (ref 43–77)
PLATELET # BLD AUTO: 409 K/UL — HIGH (ref 150–400)
PMV BLD: 10.8 FL — SIGNIFICANT CHANGE UP (ref 7–13)
RBC # BLD: 4.26 M/UL — SIGNIFICANT CHANGE UP (ref 3.8–5.2)
RBC # FLD: 15.8 % — HIGH (ref 10.3–14.5)
WBC # BLD: 4.79 K/UL — SIGNIFICANT CHANGE UP (ref 3.8–10.5)
WBC # FLD AUTO: 4.79 K/UL — SIGNIFICANT CHANGE UP (ref 3.8–10.5)

## 2017-06-21 DIAGNOSIS — L70.0 ACNE VULGARIS: ICD-10-CM

## 2017-06-21 DIAGNOSIS — L90.5 SCAR CONDITIONS AND FIBROSIS OF SKIN: ICD-10-CM

## 2017-06-21 DIAGNOSIS — D64.9 ANEMIA, UNSPECIFIED: ICD-10-CM

## 2017-06-21 DIAGNOSIS — N92.0 EXCESSIVE AND FREQUENT MENSTRUATION WITH REGULAR CYCLE: ICD-10-CM

## 2017-06-21 DIAGNOSIS — D50.9 IRON DEFICIENCY ANEMIA, UNSPECIFIED: ICD-10-CM

## 2017-06-21 DIAGNOSIS — J35.8 OTHER CHRONIC DISEASES OF TONSILS AND ADENOIDS: ICD-10-CM

## 2017-06-27 ENCOUNTER — RESULT REVIEW (OUTPATIENT)
Age: 42
End: 2017-06-27

## 2017-06-27 ENCOUNTER — OUTPATIENT (OUTPATIENT)
Dept: OUTPATIENT SERVICES | Facility: HOSPITAL | Age: 42
LOS: 1 days | Discharge: HOME | End: 2017-06-27
Payer: COMMERCIAL

## 2017-06-27 DIAGNOSIS — R10.13 EPIGASTRIC PAIN: ICD-10-CM

## 2017-06-27 DIAGNOSIS — Z98.891 HISTORY OF UTERINE SCAR FROM PREVIOUS SURGERY: Chronic | ICD-10-CM

## 2017-06-27 LAB — HCG UR QL: NEGATIVE — SIGNIFICANT CHANGE UP

## 2017-06-27 PROCEDURE — 88305 TISSUE EXAM BY PATHOLOGIST: CPT | Mod: 26

## 2017-06-27 PROCEDURE — 43239 EGD BIOPSY SINGLE/MULTIPLE: CPT | Mod: GC

## 2017-06-27 PROCEDURE — 88312 SPECIAL STAINS GROUP 1: CPT | Mod: 26

## 2017-06-29 LAB — SURGICAL PATHOLOGY STUDY: SIGNIFICANT CHANGE UP

## 2017-07-09 ENCOUNTER — FORM ENCOUNTER (OUTPATIENT)
Age: 42
End: 2017-07-09

## 2017-07-10 ENCOUNTER — APPOINTMENT (OUTPATIENT)
Dept: ULTRASOUND IMAGING | Facility: IMAGING CENTER | Age: 42
End: 2017-07-10

## 2017-07-10 ENCOUNTER — OUTPATIENT (OUTPATIENT)
Dept: OUTPATIENT SERVICES | Facility: HOSPITAL | Age: 42
LOS: 1 days | End: 2017-07-10
Payer: COMMERCIAL

## 2017-07-10 DIAGNOSIS — R10.13 EPIGASTRIC PAIN: ICD-10-CM

## 2017-07-10 DIAGNOSIS — Z98.891 HISTORY OF UTERINE SCAR FROM PREVIOUS SURGERY: Chronic | ICD-10-CM

## 2017-07-10 PROCEDURE — 76705 ECHO EXAM OF ABDOMEN: CPT

## 2017-07-13 ENCOUNTER — OUTPATIENT (OUTPATIENT)
Dept: OUTPATIENT SERVICES | Facility: HOSPITAL | Age: 42
LOS: 1 days | End: 2017-07-13

## 2017-07-13 ENCOUNTER — APPOINTMENT (OUTPATIENT)
Dept: GASTROENTEROLOGY | Facility: HOSPITAL | Age: 42
End: 2017-07-13

## 2017-07-13 VITALS
SYSTOLIC BLOOD PRESSURE: 121 MMHG | DIASTOLIC BLOOD PRESSURE: 80 MMHG | WEIGHT: 173.1 LBS | BODY MASS INDEX: 30.67 KG/M2 | HEART RATE: 81 BPM | HEIGHT: 63 IN

## 2017-07-13 DIAGNOSIS — Z80.0 FAMILY HISTORY OF MALIGNANT NEOPLASM OF DIGESTIVE ORGANS: ICD-10-CM

## 2017-07-13 DIAGNOSIS — Z98.891 HISTORY OF UTERINE SCAR FROM PREVIOUS SURGERY: Chronic | ICD-10-CM

## 2017-07-13 DIAGNOSIS — R11.2 NAUSEA WITH VOMITING, UNSPECIFIED: ICD-10-CM

## 2017-07-13 DIAGNOSIS — R10.13 EPIGASTRIC PAIN: ICD-10-CM

## 2017-07-13 DIAGNOSIS — E66.9 OBESITY, UNSPECIFIED: ICD-10-CM

## 2017-07-13 DIAGNOSIS — R10.9 UNSPECIFIED ABDOMINAL PAIN: ICD-10-CM

## 2017-07-25 ENCOUNTER — APPOINTMENT (OUTPATIENT)
Dept: NUCLEAR MEDICINE | Facility: HOSPITAL | Age: 42
End: 2017-07-25

## 2017-08-04 ENCOUNTER — APPOINTMENT (OUTPATIENT)
Dept: DERMATOLOGY | Facility: HOSPITAL | Age: 42
End: 2017-08-04
Payer: COMMERCIAL

## 2017-08-04 ENCOUNTER — OUTPATIENT (OUTPATIENT)
Dept: OUTPATIENT SERVICES | Facility: HOSPITAL | Age: 42
LOS: 1 days | End: 2017-08-04

## 2017-08-04 VITALS
BODY MASS INDEX: 30.83 KG/M2 | DIASTOLIC BLOOD PRESSURE: 81 MMHG | WEIGHT: 174 LBS | SYSTOLIC BLOOD PRESSURE: 120 MMHG | HEART RATE: 80 BPM | HEIGHT: 63 IN

## 2017-08-04 DIAGNOSIS — Z98.891 HISTORY OF UTERINE SCAR FROM PREVIOUS SURGERY: Chronic | ICD-10-CM

## 2017-08-04 PROCEDURE — 17110 DESTRUCTION B9 LES UP TO 14: CPT | Mod: GC,NC

## 2017-08-04 PROCEDURE — 99214 OFFICE O/P EST MOD 30 MIN: CPT | Mod: 25,GC,NC

## 2017-08-07 ENCOUNTER — APPOINTMENT (OUTPATIENT)
Dept: OTOLARYNGOLOGY | Facility: CLINIC | Age: 42
End: 2017-08-07
Payer: COMMERCIAL

## 2017-08-07 ENCOUNTER — OUTPATIENT (OUTPATIENT)
Dept: OUTPATIENT SERVICES | Facility: HOSPITAL | Age: 42
LOS: 1 days | Discharge: HOME | End: 2017-08-07

## 2017-08-07 VITALS
BODY MASS INDEX: 30.12 KG/M2 | SYSTOLIC BLOOD PRESSURE: 126 MMHG | WEIGHT: 170 LBS | HEIGHT: 63 IN | HEART RATE: 87 BPM | DIASTOLIC BLOOD PRESSURE: 89 MMHG

## 2017-08-07 DIAGNOSIS — L81.9 DISORDER OF PIGMENTATION, UNSPECIFIED: ICD-10-CM

## 2017-08-07 DIAGNOSIS — B07.9 VIRAL WART, UNSPECIFIED: ICD-10-CM

## 2017-08-07 DIAGNOSIS — Z98.891 HISTORY OF UTERINE SCAR FROM PREVIOUS SURGERY: Chronic | ICD-10-CM

## 2017-08-07 DIAGNOSIS — L70.0 ACNE VULGARIS: ICD-10-CM

## 2017-08-07 PROCEDURE — 31575 DIAGNOSTIC LARYNGOSCOPY: CPT

## 2017-08-07 PROCEDURE — 99204 OFFICE O/P NEW MOD 45 MIN: CPT | Mod: 25

## 2017-08-07 RX ORDER — TRANEXAMIC ACID 650 MG/1
650 TABLET ORAL
Qty: 30 | Refills: 5 | Status: DISCONTINUED | COMMUNITY
Start: 2017-05-18 | End: 2017-08-07

## 2017-08-07 RX ORDER — CLINDAMYCIN PHOSPHATE 10 MG/ML
1 LOTION TOPICAL
Qty: 1 | Refills: 5 | Status: DISCONTINUED | COMMUNITY
Start: 2017-08-04 | End: 2017-08-07

## 2017-08-09 DIAGNOSIS — J35.8 OTHER CHRONIC DISEASES OF TONSILS AND ADENOIDS: ICD-10-CM

## 2017-08-09 DIAGNOSIS — K21.9 GASTRO-ESOPHAGEAL REFLUX DISEASE WITHOUT ESOPHAGITIS: ICD-10-CM

## 2017-08-10 ENCOUNTER — APPOINTMENT (OUTPATIENT)
Dept: GASTROENTEROLOGY | Facility: HOSPITAL | Age: 42
End: 2017-08-10

## 2017-08-16 ENCOUNTER — APPOINTMENT (OUTPATIENT)
Dept: OBGYN | Facility: HOSPITAL | Age: 42
End: 2017-08-16

## 2017-08-24 ENCOUNTER — APPOINTMENT (OUTPATIENT)
Dept: OBGYN | Facility: HOSPITAL | Age: 42
End: 2017-08-24

## 2017-08-24 ENCOUNTER — LABORATORY RESULT (OUTPATIENT)
Age: 42
End: 2017-08-24

## 2017-08-24 ENCOUNTER — OUTPATIENT (OUTPATIENT)
Dept: OUTPATIENT SERVICES | Facility: HOSPITAL | Age: 42
LOS: 1 days | End: 2017-08-24

## 2017-08-24 VITALS
BODY MASS INDEX: 31.35 KG/M2 | DIASTOLIC BLOOD PRESSURE: 80 MMHG | WEIGHT: 177 LBS | SYSTOLIC BLOOD PRESSURE: 120 MMHG | HEART RATE: 86 BPM

## 2017-08-24 DIAGNOSIS — Z98.891 HISTORY OF UTERINE SCAR FROM PREVIOUS SURGERY: Chronic | ICD-10-CM

## 2017-08-24 LAB
BASOPHILS # BLD AUTO: 0.05 K/UL — SIGNIFICANT CHANGE UP (ref 0–0.2)
BASOPHILS NFR BLD AUTO: 1 % — SIGNIFICANT CHANGE UP (ref 0–2)
EOSINOPHIL # BLD AUTO: 0.07 K/UL — SIGNIFICANT CHANGE UP (ref 0–0.5)
EOSINOPHIL NFR BLD AUTO: 1.4 % — SIGNIFICANT CHANGE UP (ref 0–6)
HCT VFR BLD CALC: 35.7 % — SIGNIFICANT CHANGE UP (ref 34.5–45)
HGB BLD-MCNC: 11.6 G/DL — SIGNIFICANT CHANGE UP (ref 11.5–15.5)
IMM GRANULOCYTES # BLD AUTO: 0.01 # — SIGNIFICANT CHANGE UP
IMM GRANULOCYTES NFR BLD AUTO: 0.2 % — SIGNIFICANT CHANGE UP (ref 0–1.5)
LYMPHOCYTES # BLD AUTO: 1.73 K/UL — SIGNIFICANT CHANGE UP (ref 1–3.3)
LYMPHOCYTES # BLD AUTO: 33.7 % — SIGNIFICANT CHANGE UP (ref 13–44)
MCHC RBC-ENTMCNC: 29.2 PG — SIGNIFICANT CHANGE UP (ref 27–34)
MCHC RBC-ENTMCNC: 32.5 % — SIGNIFICANT CHANGE UP (ref 32–36)
MCV RBC AUTO: 89.9 FL — SIGNIFICANT CHANGE UP (ref 80–100)
MONOCYTES # BLD AUTO: 0.45 K/UL — SIGNIFICANT CHANGE UP (ref 0–0.9)
MONOCYTES NFR BLD AUTO: 8.8 % — SIGNIFICANT CHANGE UP (ref 2–14)
NEUTROPHILS # BLD AUTO: 2.83 K/UL — SIGNIFICANT CHANGE UP (ref 1.8–7.4)
NEUTROPHILS NFR BLD AUTO: 54.9 % — SIGNIFICANT CHANGE UP (ref 43–77)
NRBC # FLD: 0 — SIGNIFICANT CHANGE UP
PLATELET # BLD AUTO: 308 K/UL — SIGNIFICANT CHANGE UP (ref 150–400)
PMV BLD: 12.1 FL — SIGNIFICANT CHANGE UP (ref 7–13)
RBC # BLD: 3.97 M/UL — SIGNIFICANT CHANGE UP (ref 3.8–5.2)
RBC # FLD: 13.8 % — SIGNIFICANT CHANGE UP (ref 10.3–14.5)
WBC # BLD: 5.14 K/UL — SIGNIFICANT CHANGE UP (ref 3.8–10.5)
WBC # FLD AUTO: 5.14 K/UL — SIGNIFICANT CHANGE UP (ref 3.8–10.5)

## 2017-08-25 DIAGNOSIS — N94.9 UNSPECIFIED CONDITION ASSOCIATED WITH FEMALE GENITAL ORGANS AND MENSTRUAL CYCLE: ICD-10-CM

## 2017-08-25 DIAGNOSIS — T83.89XA OTHER SPECIFIED COMPLICATION OF GENITOURINARY PROSTHETIC DEVICES, IMPLANTS AND GRAFTS, INITIAL ENCOUNTER: ICD-10-CM

## 2017-08-25 DIAGNOSIS — Z97.5 PRESENCE OF (INTRAUTERINE) CONTRACEPTIVE DEVICE: ICD-10-CM

## 2017-08-25 LAB
CANDIDA AB TITR SER: NOT DETECTED — SIGNIFICANT CHANGE UP
G VAGINALIS DNA SPEC QL NAA+PROBE: DETECTED — SIGNIFICANT CHANGE UP
T VAGINALIS SPEC QL WET PREP: NOT DETECTED — SIGNIFICANT CHANGE UP

## 2017-08-26 LAB
BACTERIA UR CULT: SIGNIFICANT CHANGE UP
C TRACH RRNA SPEC QL NAA+PROBE: SIGNIFICANT CHANGE UP
N GONORRHOEA RRNA SPEC QL NAA+PROBE: SIGNIFICANT CHANGE UP
SPECIMEN SOURCE: SIGNIFICANT CHANGE UP
SPECIMEN SOURCE: SIGNIFICANT CHANGE UP

## 2017-09-13 ENCOUNTER — APPOINTMENT (OUTPATIENT)
Dept: OBGYN | Facility: HOSPITAL | Age: 42
End: 2017-09-13

## 2017-09-14 ENCOUNTER — APPOINTMENT (OUTPATIENT)
Dept: GASTROENTEROLOGY | Facility: HOSPITAL | Age: 42
End: 2017-09-14

## 2017-11-02 ENCOUNTER — APPOINTMENT (OUTPATIENT)
Dept: OBGYN | Facility: HOSPITAL | Age: 42
End: 2017-11-02

## 2017-11-07 ENCOUNTER — LABORATORY RESULT (OUTPATIENT)
Age: 42
End: 2017-11-07

## 2017-11-07 ENCOUNTER — APPOINTMENT (OUTPATIENT)
Dept: OBGYN | Facility: HOSPITAL | Age: 42
End: 2017-11-07

## 2017-11-07 ENCOUNTER — OUTPATIENT (OUTPATIENT)
Dept: OUTPATIENT SERVICES | Facility: HOSPITAL | Age: 42
LOS: 1 days | End: 2017-11-07

## 2017-11-07 VITALS
SYSTOLIC BLOOD PRESSURE: 123 MMHG | WEIGHT: 176 LBS | BODY MASS INDEX: 31.18 KG/M2 | HEIGHT: 63 IN | HEART RATE: 80 BPM | DIASTOLIC BLOOD PRESSURE: 86 MMHG

## 2017-11-07 DIAGNOSIS — Z01.419 ENCOUNTER FOR GYNECOLOGICAL EXAMINATION (GENERAL) (ROUTINE) WITHOUT ABNORMAL FINDINGS: ICD-10-CM

## 2017-11-07 DIAGNOSIS — Z98.891 HISTORY OF UTERINE SCAR FROM PREVIOUS SURGERY: Chronic | ICD-10-CM

## 2017-11-07 DIAGNOSIS — R52 PAIN, UNSPECIFIED: ICD-10-CM

## 2017-11-07 DIAGNOSIS — Z97.5 PRESENCE OF (INTRAUTERINE) CONTRACEPTIVE DEVICE: ICD-10-CM

## 2017-11-07 LAB
ALBUMIN SERPL ELPH-MCNC: 4.1 G/DL — SIGNIFICANT CHANGE UP (ref 3.3–5)
ALP SERPL-CCNC: 54 U/L — SIGNIFICANT CHANGE UP (ref 40–120)
ALT FLD-CCNC: 12 U/L — SIGNIFICANT CHANGE UP (ref 4–33)
AST SERPL-CCNC: 14 U/L — SIGNIFICANT CHANGE UP (ref 4–32)
BASOPHILS # BLD AUTO: 0.05 K/UL — SIGNIFICANT CHANGE UP (ref 0–0.2)
BASOPHILS NFR BLD AUTO: 1 % — SIGNIFICANT CHANGE UP (ref 0–2)
BILIRUB SERPL-MCNC: 0.3 MG/DL — SIGNIFICANT CHANGE UP (ref 0.2–1.2)
BUN SERPL-MCNC: 19 MG/DL — SIGNIFICANT CHANGE UP (ref 7–23)
CALCIUM SERPL-MCNC: 9.2 MG/DL — SIGNIFICANT CHANGE UP (ref 8.4–10.5)
CHLORIDE SERPL-SCNC: 102 MMOL/L — SIGNIFICANT CHANGE UP (ref 98–107)
CO2 SERPL-SCNC: 29 MMOL/L — SIGNIFICANT CHANGE UP (ref 22–31)
CREAT SERPL-MCNC: 0.85 MG/DL — SIGNIFICANT CHANGE UP (ref 0.5–1.3)
EOSINOPHIL # BLD AUTO: 0.07 K/UL — SIGNIFICANT CHANGE UP (ref 0–0.5)
EOSINOPHIL NFR BLD AUTO: 1.4 % — SIGNIFICANT CHANGE UP (ref 0–6)
GLUCOSE SERPL-MCNC: 90 MG/DL — SIGNIFICANT CHANGE UP (ref 70–99)
HBV SURFACE AG SER-ACNC: NONREACTIVE — SIGNIFICANT CHANGE UP
HCT VFR BLD CALC: 34.4 % — LOW (ref 34.5–45)
HCV AB S/CO SERPL IA: 0.77 S/CO — SIGNIFICANT CHANGE UP
HCV AB SERPL-IMP: SIGNIFICANT CHANGE UP
HGB BLD-MCNC: 10.8 G/DL — LOW (ref 11.5–15.5)
IMM GRANULOCYTES # BLD AUTO: 0.01 # — SIGNIFICANT CHANGE UP
IMM GRANULOCYTES NFR BLD AUTO: 0.2 % — SIGNIFICANT CHANGE UP (ref 0–1.5)
LYMPHOCYTES # BLD AUTO: 1.67 K/UL — SIGNIFICANT CHANGE UP (ref 1–3.3)
LYMPHOCYTES # BLD AUTO: 34.4 % — SIGNIFICANT CHANGE UP (ref 13–44)
MCHC RBC-ENTMCNC: 25.4 PG — LOW (ref 27–34)
MCHC RBC-ENTMCNC: 31.4 % — LOW (ref 32–36)
MCV RBC AUTO: 80.9 FL — SIGNIFICANT CHANGE UP (ref 80–100)
MONOCYTES # BLD AUTO: 0.31 K/UL — SIGNIFICANT CHANGE UP (ref 0–0.9)
MONOCYTES NFR BLD AUTO: 6.4 % — SIGNIFICANT CHANGE UP (ref 2–14)
NEUTROPHILS # BLD AUTO: 2.75 K/UL — SIGNIFICANT CHANGE UP (ref 1.8–7.4)
NEUTROPHILS NFR BLD AUTO: 56.6 % — SIGNIFICANT CHANGE UP (ref 43–77)
NRBC # FLD: 0 — SIGNIFICANT CHANGE UP
PLATELET # BLD AUTO: 395 K/UL — SIGNIFICANT CHANGE UP (ref 150–400)
PMV BLD: 11 FL — SIGNIFICANT CHANGE UP (ref 7–13)
POTASSIUM SERPL-MCNC: 4.5 MMOL/L — SIGNIFICANT CHANGE UP (ref 3.5–5.3)
POTASSIUM SERPL-SCNC: 4.5 MMOL/L — SIGNIFICANT CHANGE UP (ref 3.5–5.3)
PROT SERPL-MCNC: 7.2 G/DL — SIGNIFICANT CHANGE UP (ref 6–8.3)
RBC # BLD: 4.25 M/UL — SIGNIFICANT CHANGE UP (ref 3.8–5.2)
RBC # FLD: 15.5 % — HIGH (ref 10.3–14.5)
SODIUM SERPL-SCNC: 139 MMOL/L — SIGNIFICANT CHANGE UP (ref 135–145)
WBC # BLD: 4.86 K/UL — SIGNIFICANT CHANGE UP (ref 3.8–10.5)
WBC # FLD AUTO: 4.86 K/UL — SIGNIFICANT CHANGE UP (ref 3.8–10.5)

## 2017-11-08 LAB
C TRACH RRNA SPEC QL NAA+PROBE: SIGNIFICANT CHANGE UP
CANDIDA AB TITR SER: NOT DETECTED — SIGNIFICANT CHANGE UP
G VAGINALIS DNA SPEC QL NAA+PROBE: DETECTED — SIGNIFICANT CHANGE UP
HIV 1+2 AB+HIV1 P24 AG SERPL QL IA: SIGNIFICANT CHANGE UP
N GONORRHOEA RRNA SPEC QL NAA+PROBE: SIGNIFICANT CHANGE UP
SPECIMEN SOURCE: SIGNIFICANT CHANGE UP
T PALLIDUM AB TITR SER: NEGATIVE — SIGNIFICANT CHANGE UP
T VAGINALIS SPEC QL WET PREP: NOT DETECTED — SIGNIFICANT CHANGE UP

## 2017-11-09 RX ORDER — METRONIDAZOLE 500 MG/1
500 TABLET ORAL TWICE DAILY
Qty: 14 | Refills: 0 | Status: DISCONTINUED | COMMUNITY
Start: 2017-08-28 | End: 2017-11-09

## 2017-11-09 RX ORDER — METRONIDAZOLE 7.5 MG/G
0.75 GEL VAGINAL
Qty: 45 | Refills: 1 | Status: DISCONTINUED | COMMUNITY
Start: 2017-08-31 | End: 2017-11-09

## 2017-11-09 RX ORDER — METRONIDAZOLE 7.5 MG/G
0.75 GEL VAGINAL
Qty: 1 | Refills: 0 | Status: DISCONTINUED | COMMUNITY
Start: 2017-11-09 | End: 2017-11-09

## 2017-11-20 ENCOUNTER — APPOINTMENT (OUTPATIENT)
Dept: OTOLARYNGOLOGY | Facility: CLINIC | Age: 42
End: 2017-11-20
Payer: COMMERCIAL

## 2017-11-20 ENCOUNTER — OUTPATIENT (OUTPATIENT)
Dept: OUTPATIENT SERVICES | Facility: HOSPITAL | Age: 42
LOS: 1 days | Discharge: HOME | End: 2017-11-20

## 2017-11-20 VITALS
HEART RATE: 93 BPM | WEIGHT: 170 LBS | DIASTOLIC BLOOD PRESSURE: 88 MMHG | HEIGHT: 63 IN | SYSTOLIC BLOOD PRESSURE: 122 MMHG | BODY MASS INDEX: 30.12 KG/M2

## 2017-11-20 DIAGNOSIS — Z98.891 HISTORY OF UTERINE SCAR FROM PREVIOUS SURGERY: Chronic | ICD-10-CM

## 2017-11-20 PROCEDURE — 99213 OFFICE O/P EST LOW 20 MIN: CPT | Mod: 25

## 2017-11-21 ENCOUNTER — OUTPATIENT (OUTPATIENT)
Dept: OUTPATIENT SERVICES | Facility: HOSPITAL | Age: 42
LOS: 1 days | End: 2017-11-21

## 2017-11-21 ENCOUNTER — OUTPATIENT (OUTPATIENT)
Dept: OUTPATIENT SERVICES | Facility: HOSPITAL | Age: 42
LOS: 1 days | End: 2017-11-21
Payer: COMMERCIAL

## 2017-11-21 ENCOUNTER — APPOINTMENT (OUTPATIENT)
Dept: ULTRASOUND IMAGING | Facility: IMAGING CENTER | Age: 42
End: 2017-11-21
Payer: COMMERCIAL

## 2017-11-21 ENCOUNTER — APPOINTMENT (OUTPATIENT)
Dept: PLASTIC SURGERY | Facility: HOSPITAL | Age: 42
End: 2017-11-21
Payer: COMMERCIAL

## 2017-11-21 ENCOUNTER — APPOINTMENT (OUTPATIENT)
Dept: MAMMOGRAPHY | Facility: IMAGING CENTER | Age: 42
End: 2017-11-21
Payer: COMMERCIAL

## 2017-11-21 DIAGNOSIS — Z98.891 HISTORY OF UTERINE SCAR FROM PREVIOUS SURGERY: Chronic | ICD-10-CM

## 2017-11-21 DIAGNOSIS — Z00.8 ENCOUNTER FOR OTHER GENERAL EXAMINATION: ICD-10-CM

## 2017-11-21 PROCEDURE — 77063 BREAST TOMOSYNTHESIS BI: CPT | Mod: 26

## 2017-11-21 PROCEDURE — 76830 TRANSVAGINAL US NON-OB: CPT | Mod: 26

## 2017-11-21 PROCEDURE — 77063 BREAST TOMOSYNTHESIS BI: CPT

## 2017-11-21 PROCEDURE — 77067 SCR MAMMO BI INCL CAD: CPT

## 2017-11-21 PROCEDURE — 99202 OFFICE O/P NEW SF 15 MIN: CPT

## 2017-11-21 PROCEDURE — 76830 TRANSVAGINAL US NON-OB: CPT

## 2017-11-21 PROCEDURE — G0202: CPT | Mod: 26

## 2017-11-28 DIAGNOSIS — K21.9 GASTRO-ESOPHAGEAL REFLUX DISEASE WITHOUT ESOPHAGITIS: ICD-10-CM

## 2017-11-28 DIAGNOSIS — R10.13 EPIGASTRIC PAIN: ICD-10-CM

## 2017-12-01 ENCOUNTER — APPOINTMENT (OUTPATIENT)
Dept: DERMATOLOGY | Facility: HOSPITAL | Age: 42
End: 2017-12-01
Payer: MEDICAID

## 2017-12-01 ENCOUNTER — OUTPATIENT (OUTPATIENT)
Dept: OUTPATIENT SERVICES | Facility: HOSPITAL | Age: 42
LOS: 1 days | End: 2017-12-01

## 2017-12-01 VITALS
DIASTOLIC BLOOD PRESSURE: 87 MMHG | SYSTOLIC BLOOD PRESSURE: 132 MMHG | WEIGHT: 179 LBS | BODY MASS INDEX: 31.71 KG/M2 | HEART RATE: 94 BPM | HEIGHT: 63 IN

## 2017-12-01 DIAGNOSIS — L81.9 DISORDER OF PIGMENTATION, UNSPECIFIED: ICD-10-CM

## 2017-12-01 DIAGNOSIS — L81.8 OTHER SPECIFIED DISORDERS OF PIGMENTATION: ICD-10-CM

## 2017-12-01 DIAGNOSIS — Z98.891 HISTORY OF UTERINE SCAR FROM PREVIOUS SURGERY: Chronic | ICD-10-CM

## 2017-12-01 PROCEDURE — 99213 OFFICE O/P EST LOW 20 MIN: CPT | Mod: 25

## 2017-12-01 PROCEDURE — 17110 DESTRUCTION B9 LES UP TO 14: CPT

## 2017-12-05 ENCOUNTER — APPOINTMENT (OUTPATIENT)
Dept: ULTRASOUND IMAGING | Facility: IMAGING CENTER | Age: 42
End: 2017-12-05
Payer: COMMERCIAL

## 2017-12-05 ENCOUNTER — APPOINTMENT (OUTPATIENT)
Dept: MAMMOGRAPHY | Facility: IMAGING CENTER | Age: 42
End: 2017-12-05
Payer: COMMERCIAL

## 2017-12-05 ENCOUNTER — OUTPATIENT (OUTPATIENT)
Dept: OUTPATIENT SERVICES | Facility: HOSPITAL | Age: 42
LOS: 1 days | End: 2017-12-05
Payer: SELF-PAY

## 2017-12-05 DIAGNOSIS — Z00.00 ENCOUNTER FOR GENERAL ADULT MEDICAL EXAMINATION WITHOUT ABNORMAL FINDINGS: ICD-10-CM

## 2017-12-05 DIAGNOSIS — Z98.891 HISTORY OF UTERINE SCAR FROM PREVIOUS SURGERY: Chronic | ICD-10-CM

## 2017-12-05 PROCEDURE — 76641 ULTRASOUND BREAST COMPLETE: CPT | Mod: 26,50

## 2017-12-05 PROCEDURE — G0279: CPT | Mod: 26

## 2017-12-05 PROCEDURE — 76641 ULTRASOUND BREAST COMPLETE: CPT

## 2017-12-05 PROCEDURE — G0279: CPT

## 2017-12-05 PROCEDURE — 77065 DX MAMMO INCL CAD UNI: CPT

## 2017-12-05 PROCEDURE — G0206: CPT | Mod: 26,RT

## 2017-12-07 ENCOUNTER — APPOINTMENT (OUTPATIENT)
Dept: OBGYN | Facility: HOSPITAL | Age: 42
End: 2017-12-07

## 2017-12-07 ENCOUNTER — LABORATORY RESULT (OUTPATIENT)
Age: 42
End: 2017-12-07

## 2017-12-07 ENCOUNTER — APPOINTMENT (OUTPATIENT)
Dept: RADIOLOGY | Facility: HOSPITAL | Age: 42
End: 2017-12-07

## 2017-12-07 ENCOUNTER — OUTPATIENT (OUTPATIENT)
Dept: OUTPATIENT SERVICES | Facility: HOSPITAL | Age: 42
LOS: 1 days | End: 2017-12-07
Payer: COMMERCIAL

## 2017-12-07 VITALS
WEIGHT: 178 LBS | SYSTOLIC BLOOD PRESSURE: 115 MMHG | BODY MASS INDEX: 31.54 KG/M2 | DIASTOLIC BLOOD PRESSURE: 78 MMHG | HEART RATE: 97 BPM | HEIGHT: 63 IN

## 2017-12-07 DIAGNOSIS — L81.9 DISORDER OF PIGMENTATION, UNSPECIFIED: ICD-10-CM

## 2017-12-07 DIAGNOSIS — L81.8 OTHER SPECIFIED DISORDERS OF PIGMENTATION: ICD-10-CM

## 2017-12-07 DIAGNOSIS — L70.0 ACNE VULGARIS: ICD-10-CM

## 2017-12-07 DIAGNOSIS — B07.9 VIRAL WART, UNSPECIFIED: ICD-10-CM

## 2017-12-07 DIAGNOSIS — Z98.891 HISTORY OF UTERINE SCAR FROM PREVIOUS SURGERY: Chronic | ICD-10-CM

## 2017-12-07 PROCEDURE — 74000: CPT | Mod: 26

## 2017-12-08 DIAGNOSIS — T83.89XA OTHER SPECIFIED COMPLICATION OF GENITOURINARY PROSTHETIC DEVICES, IMPLANTS AND GRAFTS, INITIAL ENCOUNTER: ICD-10-CM

## 2017-12-09 LAB
BACTERIA UR CULT: SIGNIFICANT CHANGE UP
SPECIMEN SOURCE: SIGNIFICANT CHANGE UP

## 2018-02-08 ENCOUNTER — APPOINTMENT (OUTPATIENT)
Dept: INTERNAL MEDICINE | Facility: HOSPITAL | Age: 43
End: 2018-02-08

## 2018-02-08 ENCOUNTER — OUTPATIENT (OUTPATIENT)
Dept: OUTPATIENT SERVICES | Facility: HOSPITAL | Age: 43
LOS: 1 days | End: 2018-02-08

## 2018-02-08 VITALS
BODY MASS INDEX: 30.83 KG/M2 | HEIGHT: 63 IN | WEIGHT: 174 LBS | DIASTOLIC BLOOD PRESSURE: 60 MMHG | HEART RATE: 85 BPM | SYSTOLIC BLOOD PRESSURE: 107 MMHG

## 2018-02-08 DIAGNOSIS — Z11.3 ENCOUNTER FOR SCREENING FOR INFECTIONS WITH A PREDOMINANTLY SEXUAL MODE OF TRANSMISSION: ICD-10-CM

## 2018-02-08 DIAGNOSIS — N76.0 ACUTE VAGINITIS: ICD-10-CM

## 2018-02-08 DIAGNOSIS — Z30.431 ENCOUNTER FOR ROUTINE CHECKING OF INTRAUTERINE CONTRACEPTIVE DEVICE: ICD-10-CM

## 2018-02-08 DIAGNOSIS — Z87.898 PERSONAL HISTORY OF OTHER SPECIFIED CONDITIONS: ICD-10-CM

## 2018-02-08 DIAGNOSIS — Z98.891 HISTORY OF UTERINE SCAR FROM PREVIOUS SURGERY: Chronic | ICD-10-CM

## 2018-02-08 DIAGNOSIS — Z86.2 PERSONAL HISTORY OF DISEASES OF THE BLOOD AND BLOOD-FORMING ORGANS AND CERTAIN DISORDERS INVOLVING THE IMMUNE MECHANISM: ICD-10-CM

## 2018-02-08 DIAGNOSIS — Z01.419 ENCOUNTER FOR GYNECOLOGICAL EXAMINATION (GENERAL) (ROUTINE) W/OUT ABNORMAL FINDINGS: ICD-10-CM

## 2018-02-08 DIAGNOSIS — B96.89 ACUTE VAGINITIS: ICD-10-CM

## 2018-02-08 DIAGNOSIS — R42 DIZZINESS AND GIDDINESS: ICD-10-CM

## 2018-02-08 DIAGNOSIS — Z30.430 ENCOUNTER FOR INSERTION OF INTRAUTERINE CONTRACEPTIVE DEVICE: ICD-10-CM

## 2018-02-08 RX ORDER — FLUCONAZOLE 150 MG/1
150 TABLET ORAL
Qty: 1 | Refills: 0 | Status: COMPLETED | OUTPATIENT
Start: 2017-11-07 | End: 2018-02-08

## 2018-02-08 RX ORDER — OMEPRAZOLE 20 MG/1
20 CAPSULE, DELAYED RELEASE ORAL DAILY
Qty: 60 | Refills: 3 | Status: COMPLETED | COMMUNITY
Start: 2017-05-18 | End: 2018-02-08

## 2018-02-08 RX ORDER — TRETINOIN 1 MG/G
CREAM TOPICAL
Refills: 0 | Status: COMPLETED | COMMUNITY
End: 2018-02-08

## 2018-02-08 RX ORDER — METRONIDAZOLE 500 MG/1
500 TABLET ORAL TWICE DAILY
Qty: 14 | Refills: 0 | Status: COMPLETED | COMMUNITY
Start: 2017-11-09 | End: 2018-02-08

## 2018-02-09 DIAGNOSIS — K29.70 GASTRITIS, UNSPECIFIED, WITHOUT BLEEDING: ICD-10-CM

## 2018-02-09 DIAGNOSIS — N92.0 EXCESSIVE AND FREQUENT MENSTRUATION WITH REGULAR CYCLE: ICD-10-CM

## 2018-02-09 DIAGNOSIS — D50.9 IRON DEFICIENCY ANEMIA, UNSPECIFIED: ICD-10-CM

## 2018-02-09 DIAGNOSIS — K21.9 GASTRO-ESOPHAGEAL REFLUX DISEASE WITHOUT ESOPHAGITIS: ICD-10-CM

## 2018-02-09 DIAGNOSIS — M77.11 LATERAL EPICONDYLITIS, RIGHT ELBOW: ICD-10-CM

## 2018-02-14 ENCOUNTER — OUTPATIENT (OUTPATIENT)
Dept: OUTPATIENT SERVICES | Facility: HOSPITAL | Age: 43
LOS: 1 days | End: 2018-02-14

## 2018-02-14 ENCOUNTER — OTHER (OUTPATIENT)
Age: 43
End: 2018-02-14

## 2018-02-14 ENCOUNTER — APPOINTMENT (OUTPATIENT)
Dept: INTERNAL MEDICINE | Facility: HOSPITAL | Age: 43
End: 2018-02-14

## 2018-02-14 DIAGNOSIS — Z23 ENCOUNTER FOR IMMUNIZATION: ICD-10-CM

## 2018-02-14 DIAGNOSIS — Z98.891 HISTORY OF UTERINE SCAR FROM PREVIOUS SURGERY: Chronic | ICD-10-CM

## 2018-02-22 ENCOUNTER — APPOINTMENT (OUTPATIENT)
Dept: INTERNAL MEDICINE | Facility: HOSPITAL | Age: 43
End: 2018-02-22

## 2018-02-27 ENCOUNTER — APPOINTMENT (OUTPATIENT)
Dept: NEUROLOGY | Facility: HOSPITAL | Age: 43
End: 2018-02-27

## 2018-02-27 ENCOUNTER — APPOINTMENT (OUTPATIENT)
Dept: INTERNAL MEDICINE | Facility: HOSPITAL | Age: 43
End: 2018-02-27

## 2018-02-27 ENCOUNTER — OUTPATIENT (OUTPATIENT)
Dept: OUTPATIENT SERVICES | Facility: HOSPITAL | Age: 43
LOS: 1 days | End: 2018-02-27

## 2018-02-27 VITALS
SYSTOLIC BLOOD PRESSURE: 113 MMHG | HEART RATE: 91 BPM | BODY MASS INDEX: 30.83 KG/M2 | HEIGHT: 63 IN | WEIGHT: 174 LBS | DIASTOLIC BLOOD PRESSURE: 80 MMHG

## 2018-02-27 VITALS — HEIGHT: 63 IN | TEMPERATURE: 98.8 F | BODY MASS INDEX: 31.01 KG/M2 | WEIGHT: 175 LBS

## 2018-02-27 VITALS — SYSTOLIC BLOOD PRESSURE: 116 MMHG | DIASTOLIC BLOOD PRESSURE: 68 MMHG

## 2018-02-27 DIAGNOSIS — Z98.891 HISTORY OF UTERINE SCAR FROM PREVIOUS SURGERY: Chronic | ICD-10-CM

## 2018-02-27 DIAGNOSIS — R51 HEADACHE: ICD-10-CM

## 2018-02-28 VITALS — OXYGEN SATURATION: 100 % | HEART RATE: 88 BPM

## 2018-02-28 VITALS — SYSTOLIC BLOOD PRESSURE: 116 MMHG | DIASTOLIC BLOOD PRESSURE: 68 MMHG

## 2018-02-28 DIAGNOSIS — R21 RASH AND OTHER NONSPECIFIC SKIN ERUPTION: ICD-10-CM

## 2018-02-28 DIAGNOSIS — T78.40XA ALLERGY, UNSPECIFIED, INITIAL ENCOUNTER: ICD-10-CM

## 2018-02-28 DIAGNOSIS — R59.1 GENERALIZED ENLARGED LYMPH NODES: ICD-10-CM

## 2018-02-28 DIAGNOSIS — R51 HEADACHE: ICD-10-CM

## 2018-03-01 ENCOUNTER — APPOINTMENT (OUTPATIENT)
Dept: MRI IMAGING | Facility: IMAGING CENTER | Age: 43
End: 2018-03-01
Payer: COMMERCIAL

## 2018-03-01 ENCOUNTER — OUTPATIENT (OUTPATIENT)
Dept: OUTPATIENT SERVICES | Facility: HOSPITAL | Age: 43
LOS: 1 days | End: 2018-03-01
Payer: COMMERCIAL

## 2018-03-01 DIAGNOSIS — Z98.891 HISTORY OF UTERINE SCAR FROM PREVIOUS SURGERY: Chronic | ICD-10-CM

## 2018-03-01 DIAGNOSIS — R51 HEADACHE: ICD-10-CM

## 2018-03-01 PROCEDURE — 70551 MRI BRAIN STEM W/O DYE: CPT

## 2018-03-01 PROCEDURE — 70551 MRI BRAIN STEM W/O DYE: CPT | Mod: 26

## 2018-03-13 ENCOUNTER — OUTPATIENT (OUTPATIENT)
Dept: OUTPATIENT SERVICES | Facility: HOSPITAL | Age: 43
LOS: 1 days | End: 2018-03-13

## 2018-03-13 ENCOUNTER — APPOINTMENT (OUTPATIENT)
Dept: NEUROLOGY | Facility: HOSPITAL | Age: 43
End: 2018-03-13

## 2018-03-13 VITALS
SYSTOLIC BLOOD PRESSURE: 133 MMHG | HEIGHT: 63 IN | WEIGHT: 178 LBS | HEART RATE: 100 BPM | BODY MASS INDEX: 31.54 KG/M2 | DIASTOLIC BLOOD PRESSURE: 90 MMHG

## 2018-03-13 DIAGNOSIS — Z98.891 HISTORY OF UTERINE SCAR FROM PREVIOUS SURGERY: Chronic | ICD-10-CM

## 2018-03-14 ENCOUNTER — APPOINTMENT (OUTPATIENT)
Dept: INTERNAL MEDICINE | Facility: HOSPITAL | Age: 43
End: 2018-03-14
Payer: COMMERCIAL

## 2018-03-14 ENCOUNTER — LABORATORY RESULT (OUTPATIENT)
Age: 43
End: 2018-03-14

## 2018-03-14 ENCOUNTER — OUTPATIENT (OUTPATIENT)
Dept: OUTPATIENT SERVICES | Facility: HOSPITAL | Age: 43
LOS: 1 days | End: 2018-03-14

## 2018-03-14 VITALS
WEIGHT: 177 LBS | HEART RATE: 66 BPM | DIASTOLIC BLOOD PRESSURE: 80 MMHG | SYSTOLIC BLOOD PRESSURE: 125 MMHG | HEIGHT: 63 IN | BODY MASS INDEX: 31.36 KG/M2

## 2018-03-14 DIAGNOSIS — Z98.891 HISTORY OF UTERINE SCAR FROM PREVIOUS SURGERY: Chronic | ICD-10-CM

## 2018-03-14 LAB
HCT VFR BLD CALC: 31.5 % — LOW (ref 34.5–45)
HGB BLD-MCNC: 9.4 G/DL — LOW (ref 11.5–15.5)
MCHC RBC-ENTMCNC: 21 PG — LOW (ref 27–34)
MCHC RBC-ENTMCNC: 29.8 % — LOW (ref 32–36)
MCV RBC AUTO: 70.5 FL — LOW (ref 80–100)
NRBC # FLD: 0 — SIGNIFICANT CHANGE UP
PLATELET # BLD AUTO: 462 K/UL — HIGH (ref 150–400)
PMV BLD: 11.2 FL — SIGNIFICANT CHANGE UP (ref 7–13)
RBC # BLD: 4.47 M/UL — SIGNIFICANT CHANGE UP (ref 3.8–5.2)
RBC # FLD: 19.2 % — HIGH (ref 10.3–14.5)
WBC # BLD: 4.8 K/UL — SIGNIFICANT CHANGE UP (ref 3.8–10.5)
WBC # FLD AUTO: 4.8 K/UL — SIGNIFICANT CHANGE UP (ref 3.8–10.5)

## 2018-03-14 PROCEDURE — 99213 OFFICE O/P EST LOW 20 MIN: CPT | Mod: GE

## 2018-03-16 DIAGNOSIS — G44.329 CHRONIC POST-TRAUMATIC HEADACHE, NOT INTRACTABLE: ICD-10-CM

## 2018-03-16 DIAGNOSIS — R51 HEADACHE: ICD-10-CM

## 2018-03-19 DIAGNOSIS — Z01.818 ENCOUNTER FOR OTHER PREPROCEDURAL EXAMINATION: ICD-10-CM

## 2018-04-13 ENCOUNTER — APPOINTMENT (OUTPATIENT)
Dept: OBGYN | Facility: HOSPITAL | Age: 43
End: 2018-04-13

## 2018-06-04 ENCOUNTER — FORM ENCOUNTER (OUTPATIENT)
Age: 43
End: 2018-06-04

## 2018-06-05 ENCOUNTER — APPOINTMENT (OUTPATIENT)
Dept: RADIOLOGY | Facility: HOSPITAL | Age: 43
End: 2018-06-05

## 2018-06-05 ENCOUNTER — LABORATORY RESULT (OUTPATIENT)
Age: 43
End: 2018-06-05

## 2018-06-05 ENCOUNTER — OUTPATIENT (OUTPATIENT)
Dept: OUTPATIENT SERVICES | Facility: HOSPITAL | Age: 43
LOS: 1 days | End: 2018-06-05
Payer: COMMERCIAL

## 2018-06-05 ENCOUNTER — APPOINTMENT (OUTPATIENT)
Dept: INTERNAL MEDICINE | Facility: HOSPITAL | Age: 43
End: 2018-06-05

## 2018-06-05 VITALS — HEIGHT: 63 IN | WEIGHT: 174 LBS | BODY MASS INDEX: 30.83 KG/M2

## 2018-06-05 DIAGNOSIS — Z98.891 HISTORY OF UTERINE SCAR FROM PREVIOUS SURGERY: Chronic | ICD-10-CM

## 2018-06-05 LAB
ANISOCYTOSIS BLD QL: SLIGHT — SIGNIFICANT CHANGE UP
BASOPHILS # BLD AUTO: 0.04 K/UL — SIGNIFICANT CHANGE UP (ref 0–0.2)
BASOPHILS NFR BLD AUTO: 0.7 % — SIGNIFICANT CHANGE UP (ref 0–2)
BASOPHILS NFR SPEC: 0 % — SIGNIFICANT CHANGE UP (ref 0–2)
CHOLEST SERPL-MCNC: 205 MG/DL — HIGH (ref 120–199)
EOSINOPHIL # BLD AUTO: 0.06 K/UL — SIGNIFICANT CHANGE UP (ref 0–0.5)
EOSINOPHIL NFR BLD AUTO: 1.1 % — SIGNIFICANT CHANGE UP (ref 0–6)
EOSINOPHIL NFR FLD: 3.5 % — SIGNIFICANT CHANGE UP (ref 0–6)
FERRITIN SERPL-MCNC: 5.32 NG/ML — LOW (ref 15–150)
GIANT PLATELETS BLD QL SMEAR: PRESENT — SIGNIFICANT CHANGE UP
HBA1C BLD-MCNC: 5.2 % — SIGNIFICANT CHANGE UP (ref 4–5.6)
HCT VFR BLD CALC: 31.7 % — LOW (ref 34.5–45)
HDLC SERPL-MCNC: 55 MG/DL — SIGNIFICANT CHANGE UP (ref 45–65)
HGB BLD-MCNC: 9.3 G/DL — LOW (ref 11.5–15.5)
HYPOCHROMIA BLD QL: SIGNIFICANT CHANGE UP
IMM GRANULOCYTES # BLD AUTO: 0.01 # — SIGNIFICANT CHANGE UP
IMM GRANULOCYTES NFR BLD AUTO: 0.2 % — SIGNIFICANT CHANGE UP (ref 0–1.5)
LIPID PNL WITH DIRECT LDL SERPL: 142 MG/DL — SIGNIFICANT CHANGE UP
LYMPHOCYTES # BLD AUTO: 2.03 K/UL — SIGNIFICANT CHANGE UP (ref 1–3.3)
LYMPHOCYTES # BLD AUTO: 37.5 % — SIGNIFICANT CHANGE UP (ref 13–44)
LYMPHOCYTES NFR SPEC AUTO: 24.1 % — SIGNIFICANT CHANGE UP (ref 13–44)
MACROCYTES BLD QL: SLIGHT — SIGNIFICANT CHANGE UP
MCHC RBC-ENTMCNC: 20.5 PG — LOW (ref 27–34)
MCHC RBC-ENTMCNC: 29.3 % — LOW (ref 32–36)
MCV RBC AUTO: 69.8 FL — LOW (ref 80–100)
MICROCYTES BLD QL: SIGNIFICANT CHANGE UP
MONOCYTES # BLD AUTO: 0.44 K/UL — SIGNIFICANT CHANGE UP (ref 0–0.9)
MONOCYTES NFR BLD AUTO: 8.1 % — SIGNIFICANT CHANGE UP (ref 2–14)
MONOCYTES NFR BLD: 6.9 % — SIGNIFICANT CHANGE UP (ref 2–9)
NEUTROPHIL AB SER-ACNC: 63.8 % — SIGNIFICANT CHANGE UP (ref 43–77)
NEUTROPHILS # BLD AUTO: 2.84 K/UL — SIGNIFICANT CHANGE UP (ref 1.8–7.4)
NEUTROPHILS NFR BLD AUTO: 52.4 % — SIGNIFICANT CHANGE UP (ref 43–77)
NRBC # FLD: 0 — SIGNIFICANT CHANGE UP
PLATELET # BLD AUTO: 450 K/UL — HIGH (ref 150–400)
PLATELET COUNT - ESTIMATE: NORMAL — SIGNIFICANT CHANGE UP
PMV BLD: 10.7 FL — SIGNIFICANT CHANGE UP (ref 7–13)
POIKILOCYTOSIS BLD QL AUTO: SLIGHT — SIGNIFICANT CHANGE UP
POLYCHROMASIA BLD QL SMEAR: SLIGHT — SIGNIFICANT CHANGE UP
RBC # BLD: 4.54 M/UL — SIGNIFICANT CHANGE UP (ref 3.8–5.2)
RBC # FLD: 18.9 % — HIGH (ref 10.3–14.5)
RETICS #: 76 K/UL — SIGNIFICANT CHANGE UP (ref 25–125)
RETICS/RBC NFR: 1.7 % — SIGNIFICANT CHANGE UP (ref 0.5–2.5)
TRIGL SERPL-MCNC: 126 MG/DL — SIGNIFICANT CHANGE UP (ref 10–149)
URATE SERPL-MCNC: 4.1 MG/DL — SIGNIFICANT CHANGE UP (ref 2.5–7)
VARIANT LYMPHS # BLD: 1.7 % — SIGNIFICANT CHANGE UP
WBC # BLD: 5.42 K/UL — SIGNIFICANT CHANGE UP (ref 3.8–10.5)
WBC # FLD AUTO: 5.42 K/UL — SIGNIFICANT CHANGE UP (ref 3.8–10.5)

## 2018-06-05 PROCEDURE — 73130 X-RAY EXAM OF HAND: CPT | Mod: 26,RT

## 2018-06-05 NOTE — REVIEW OF SYSTEMS
[Joint Pain] : joint pain [Fever] : no fever [Night Sweats] : no night sweats [Chest Pain] : no chest pain [Shortness Of Breath] : no shortness of breath [Muscle Pain] : no muscle pain [Skin Rash] : no skin rash [Headache] : no headache [FreeTextEntry9] : see HPI

## 2018-06-05 NOTE — HISTORY OF PRESENT ILLNESS
[de-identified] : 42 y.o. female PMH iron-deficiency anemia p/w left thumb pain.  She went to an orthopedist a month ago with her entire right thumb red, swollen, and painful.  They did Xrays which showed calcium deposits, and was given a medrol dose elmer, which lasted 6 days.  Her thumb improved with the steroids, but then last week her thumb started bothering her again, this time just the ulnar aspect of the distal interphalangeal joint with slight swelling and erythema.  No numbness, tingling, or fevers.  No other joint involvement currently, but says she occasionally get various joint pain.  She had carpal tunnel syndrome in the past b/l, which was treated with steroid injections.  She had surgery 2 months ago for tennis elbow on the right.\par \par She is also requesting to get her hemoglobin checked again.  She stopped taking iron supplements because they made her vomit.  She is still having heavy periods.

## 2018-06-05 NOTE — ASSESSMENT
[FreeTextEntry1] : 42 y.o. female PMH iron-deficiency anemia p/w left thumb pain.\par \par 1.  Thumb pain.  Pseudogout possible, vs. other inflammatory arthritis.  \par - Uric Acid\par - Xrays today\par - Hand clinic referral\par - tylenol PRN for pain.\par \par 2.  Iron-deficiency anemia.  Hgb in march 9.4, microcytic.\par - CBC\par - Ferritin\par - Retic\par - Ferrous Gluconate today\par \par RTC 2 months.

## 2018-06-05 NOTE — PHYSICAL EXAM
[No Acute Distress] : no acute distress [Well Nourished] : well nourished [Well Developed] : well developed [Well-Appearing] : well-appearing [Normal Sclera/Conjunctiva] : normal sclera/conjunctiva [Normal Outer Ear/Nose] : the outer ears and nose were normal in appearance [Normal Oropharynx] : the oropharynx was normal [Supple] : supple [No Respiratory Distress] : no respiratory distress  [Clear to Auscultation] : lungs were clear to auscultation bilaterally [No Accessory Muscle Use] : no accessory muscle use [Normal Rate] : normal rate  [Regular Rhythm] : with a regular rhythm [Normal S1, S2] : normal S1 and S2 [No Murmur] : no murmur heard [No Edema] : there was no peripheral edema [Soft] : abdomen soft [Non Tender] : non-tender [Non-distended] : non-distended [No HSM] : no HSM [No Rash] : no rash [Normal Gait] : normal gait [de-identified] : Slight erythema of ulnar aspect of right 1st finger DIP with associated erythema and mild tenderness.  Full ROM.  no other joint swelling or tenderness. [de-identified] : slight light touch sensation defect palmar aspect of right 5th finger.  Tapping on ulnar groove of right elbow elicits electric-like pain down wrist.  negative Phalen's and Tinnel's test b/l.

## 2018-06-06 DIAGNOSIS — D50.9 IRON DEFICIENCY ANEMIA, UNSPECIFIED: ICD-10-CM

## 2018-06-06 DIAGNOSIS — M79.644 PAIN IN RIGHT FINGER(S): ICD-10-CM

## 2018-06-25 ENCOUNTER — APPOINTMENT (OUTPATIENT)
Dept: PLASTIC SURGERY | Facility: HOSPITAL | Age: 43
End: 2018-06-25

## 2018-07-02 ENCOUNTER — EMERGENCY (EMERGENCY)
Facility: HOSPITAL | Age: 43
LOS: 1 days | Discharge: HOME | End: 2018-07-02
Attending: EMERGENCY MEDICINE | Admitting: EMERGENCY MEDICINE
Payer: MEDICAID

## 2018-07-02 VITALS
RESPIRATION RATE: 18 BRPM | DIASTOLIC BLOOD PRESSURE: 80 MMHG | OXYGEN SATURATION: 98 % | HEART RATE: 92 BPM | SYSTOLIC BLOOD PRESSURE: 127 MMHG | TEMPERATURE: 98 F

## 2018-07-02 VITALS — DIASTOLIC BLOOD PRESSURE: 75 MMHG | OXYGEN SATURATION: 99 % | HEART RATE: 80 BPM | SYSTOLIC BLOOD PRESSURE: 121 MMHG

## 2018-07-02 DIAGNOSIS — Z98.890 OTHER SPECIFIED POSTPROCEDURAL STATES: Chronic | ICD-10-CM

## 2018-07-02 DIAGNOSIS — Z98.891 HISTORY OF UTERINE SCAR FROM PREVIOUS SURGERY: Chronic | ICD-10-CM

## 2018-07-02 DIAGNOSIS — Z98.51 TUBAL LIGATION STATUS: Chronic | ICD-10-CM

## 2018-07-02 LAB
ALBUMIN SERPL ELPH-MCNC: 4 G/DL — SIGNIFICANT CHANGE UP (ref 3.3–5)
ALP SERPL-CCNC: 53 U/L — SIGNIFICANT CHANGE UP (ref 40–120)
ALT FLD-CCNC: 11 U/L — SIGNIFICANT CHANGE UP (ref 4–33)
ANISOCYTOSIS BLD QL: SLIGHT — SIGNIFICANT CHANGE UP
APPEARANCE UR: SIGNIFICANT CHANGE UP
APTT BLD: 25.5 SEC — LOW (ref 27.5–37.4)
AST SERPL-CCNC: 14 U/L — SIGNIFICANT CHANGE UP (ref 4–32)
BASOPHILS # BLD AUTO: 0.04 K/UL — SIGNIFICANT CHANGE UP (ref 0–0.2)
BASOPHILS NFR BLD AUTO: 0.8 % — SIGNIFICANT CHANGE UP (ref 0–2)
BASOPHILS NFR SPEC: 1.8 % — SIGNIFICANT CHANGE UP (ref 0–2)
BILIRUB SERPL-MCNC: < 0.2 MG/DL — LOW (ref 0.2–1.2)
BILIRUB UR-MCNC: NEGATIVE — SIGNIFICANT CHANGE UP
BLASTS # FLD: 0 % — SIGNIFICANT CHANGE UP (ref 0–0)
BLD GP AB SCN SERPL QL: NEGATIVE — SIGNIFICANT CHANGE UP
BLOOD UR QL VISUAL: HIGH
BUN SERPL-MCNC: 19 MG/DL — SIGNIFICANT CHANGE UP (ref 7–23)
CALCIUM SERPL-MCNC: 9 MG/DL — SIGNIFICANT CHANGE UP (ref 8.4–10.5)
CHLORIDE SERPL-SCNC: 99 MMOL/L — SIGNIFICANT CHANGE UP (ref 98–107)
CO2 SERPL-SCNC: 26 MMOL/L — SIGNIFICANT CHANGE UP (ref 22–31)
COLOR SPEC: YELLOW — SIGNIFICANT CHANGE UP
CREAT SERPL-MCNC: 0.87 MG/DL — SIGNIFICANT CHANGE UP (ref 0.5–1.3)
DACRYOCYTES BLD QL SMEAR: SLIGHT — SIGNIFICANT CHANGE UP
EOSINOPHIL # BLD AUTO: 0.08 K/UL — SIGNIFICANT CHANGE UP (ref 0–0.5)
EOSINOPHIL NFR BLD AUTO: 1.6 % — SIGNIFICANT CHANGE UP (ref 0–6)
EOSINOPHIL NFR FLD: 0.9 % — SIGNIFICANT CHANGE UP (ref 0–6)
GIANT PLATELETS BLD QL SMEAR: PRESENT — SIGNIFICANT CHANGE UP
GLUCOSE SERPL-MCNC: 92 MG/DL — SIGNIFICANT CHANGE UP (ref 70–99)
GLUCOSE UR-MCNC: NEGATIVE — SIGNIFICANT CHANGE UP
HCG SERPL-ACNC: < 5 MIU/ML — SIGNIFICANT CHANGE UP
HCT VFR BLD CALC: 29.8 % — LOW (ref 34.5–45)
HGB BLD-MCNC: 8.6 G/DL — LOW (ref 11.5–15.5)
HYPOCHROMIA BLD QL: SIGNIFICANT CHANGE UP
IMM GRANULOCYTES # BLD AUTO: 0.01 # — SIGNIFICANT CHANGE UP
IMM GRANULOCYTES NFR BLD AUTO: 0.2 % — SIGNIFICANT CHANGE UP (ref 0–1.5)
INR BLD: 0.97 — SIGNIFICANT CHANGE UP (ref 0.88–1.17)
KETONES UR-MCNC: NEGATIVE — SIGNIFICANT CHANGE UP
LEUKOCYTE ESTERASE UR-ACNC: NEGATIVE — SIGNIFICANT CHANGE UP
LYMPHOCYTES # BLD AUTO: 1.71 K/UL — SIGNIFICANT CHANGE UP (ref 1–3.3)
LYMPHOCYTES # BLD AUTO: 34.8 % — SIGNIFICANT CHANGE UP (ref 13–44)
LYMPHOCYTES NFR SPEC AUTO: 19.1 % — SIGNIFICANT CHANGE UP (ref 13–44)
MCHC RBC-ENTMCNC: 20.2 PG — LOW (ref 27–34)
MCHC RBC-ENTMCNC: 28.9 % — LOW (ref 32–36)
MCV RBC AUTO: 70 FL — LOW (ref 80–100)
METAMYELOCYTES # FLD: 0 % — SIGNIFICANT CHANGE UP (ref 0–1)
MICROCYTES BLD QL: SIGNIFICANT CHANGE UP
MONOCYTES # BLD AUTO: 0.32 K/UL — SIGNIFICANT CHANGE UP (ref 0–0.9)
MONOCYTES NFR BLD AUTO: 6.5 % — SIGNIFICANT CHANGE UP (ref 2–14)
MONOCYTES NFR BLD: 1.7 % — LOW (ref 2–9)
MYELOCYTES NFR BLD: 0 % — SIGNIFICANT CHANGE UP (ref 0–0)
NEUTROPHIL AB SER-ACNC: 70.4 % — SIGNIFICANT CHANGE UP (ref 43–77)
NEUTROPHILS # BLD AUTO: 2.76 K/UL — SIGNIFICANT CHANGE UP (ref 1.8–7.4)
NEUTROPHILS NFR BLD AUTO: 56.1 % — SIGNIFICANT CHANGE UP (ref 43–77)
NEUTS BAND # BLD: 0 % — SIGNIFICANT CHANGE UP (ref 0–6)
NITRITE UR-MCNC: NEGATIVE — SIGNIFICANT CHANGE UP
NRBC # FLD: 0 — SIGNIFICANT CHANGE UP
OTHER - HEMATOLOGY %: 0 — SIGNIFICANT CHANGE UP
PH UR: 5.5 — SIGNIFICANT CHANGE UP (ref 4.6–8)
PLATELET # BLD AUTO: 496 K/UL — HIGH (ref 150–400)
PLATELET COUNT - ESTIMATE: NORMAL — SIGNIFICANT CHANGE UP
PMV BLD: 10.9 FL — SIGNIFICANT CHANGE UP (ref 7–13)
POIKILOCYTOSIS BLD QL AUTO: SLIGHT — SIGNIFICANT CHANGE UP
POLYCHROMASIA BLD QL SMEAR: SLIGHT — SIGNIFICANT CHANGE UP
POTASSIUM SERPL-MCNC: 4.4 MMOL/L — SIGNIFICANT CHANGE UP (ref 3.5–5.3)
POTASSIUM SERPL-SCNC: 4.4 MMOL/L — SIGNIFICANT CHANGE UP (ref 3.5–5.3)
PROMYELOCYTES # FLD: 0 % — SIGNIFICANT CHANGE UP (ref 0–0)
PROT SERPL-MCNC: 6.9 G/DL — SIGNIFICANT CHANGE UP (ref 6–8.3)
PROT UR-MCNC: NEGATIVE MG/DL — SIGNIFICANT CHANGE UP
PROTHROM AB SERPL-ACNC: 11.2 SEC — SIGNIFICANT CHANGE UP (ref 9.8–13.1)
RBC # BLD: 4.26 M/UL — SIGNIFICANT CHANGE UP (ref 3.8–5.2)
RBC # FLD: 19.6 % — HIGH (ref 10.3–14.5)
RBC CASTS # UR COMP ASSIST: >50 — HIGH (ref 0–?)
RH IG SCN BLD-IMP: POSITIVE — SIGNIFICANT CHANGE UP
SODIUM SERPL-SCNC: 136 MMOL/L — SIGNIFICANT CHANGE UP (ref 135–145)
SP GR SPEC: 1.01 — SIGNIFICANT CHANGE UP (ref 1–1.04)
SQUAMOUS # UR AUTO: SIGNIFICANT CHANGE UP
TARGETS BLD QL SMEAR: SLIGHT — SIGNIFICANT CHANGE UP
UROBILINOGEN FLD QL: NORMAL MG/DL — SIGNIFICANT CHANGE UP
VARIANT LYMPHS # BLD: 6.1 % — SIGNIFICANT CHANGE UP
WBC # BLD: 4.92 K/UL — SIGNIFICANT CHANGE UP (ref 3.8–10.5)
WBC # FLD AUTO: 4.92 K/UL — SIGNIFICANT CHANGE UP (ref 3.8–10.5)
WBC UR QL: SIGNIFICANT CHANGE UP (ref 0–?)

## 2018-07-02 PROCEDURE — 76830 TRANSVAGINAL US NON-OB: CPT | Mod: 26

## 2018-07-02 PROCEDURE — 99284 EMERGENCY DEPT VISIT MOD MDM: CPT

## 2018-07-02 RX ORDER — SODIUM CHLORIDE 9 MG/ML
1000 INJECTION INTRAMUSCULAR; INTRAVENOUS; SUBCUTANEOUS ONCE
Qty: 0 | Refills: 0 | Status: CANCELLED | OUTPATIENT
Start: 2018-07-02 | End: 2018-07-06

## 2018-07-02 RX ORDER — SODIUM CHLORIDE 9 MG/ML
1000 INJECTION INTRAMUSCULAR; INTRAVENOUS; SUBCUTANEOUS ONCE
Qty: 0 | Refills: 0 | Status: COMPLETED | OUTPATIENT
Start: 2018-07-02 | End: 2018-07-02

## 2018-07-02 RX ADMIN — SODIUM CHLORIDE 1000 MILLILITER(S): 9 INJECTION INTRAMUSCULAR; INTRAVENOUS; SUBCUTANEOUS at 15:49

## 2018-07-02 NOTE — ED SUB INTERN NOTE - MEDICAL DECISION MAKING DETAILS
43 yo female with prior history of vaginal bleeding who presents with heavy period. VSS at this time with no CP or dyspnea. Given prior history of fibroids, this episode likely is related to fibroids. Other possibilities include endometrial cancer, polyps, dysmenorrhea. Will obtain BHcG, vaginal US, CBC.

## 2018-07-02 NOTE — ED PROVIDER NOTE - OBJECTIVE STATEMENT
43 yo female with PMHx of fibroids, tubal ligation, endometrial thickening, cystectomy, polypectomy, anemia (Hb typically 8-9) who presents with vaginal bleeding. Patient reports that period began on Friday and flow has been worse than normal with increased clots. She notes this is when her usual period was expected. Period is associated with pelvic cramping, worse than normal and nonresponsive to Tylenol. Periods normally 4-5 days, typically uses 3-4 pads/day. Has prior history of multiple episodes of vaginal bleeding, which required iron transfusions. No history of blood transfusion. Endorses dizziness and weakness, worse with standing. Denies dyspnea and CP. Sees a [facility] gynecologist (last visit in Feb 2018). Had a Mirena placed last year for heavy vaginal bleeding, but Gyn was unable to locate it during last visit. No urinary complaints. No n/v/d/constipation. FHx of endometrial cancer in cousin.

## 2018-07-02 NOTE — ED SUB INTERN NOTE - PMH
Cyst of left ovary    Endometrial polyp    Iron deficiency anemia, unspecified iron deficiency anemia type    PUD (peptic ulcer disease)    Uterine leiomyoma, unspecified location

## 2018-07-02 NOTE — ED SUB INTERN NOTE - GASTROINTESTINAL NEGATIVE STATEMENT, MLM
no abdominal pain, no bloating, no constipation, no diarrhea, no nausea and no vomiting. no bloating, no constipation, no diarrhea, no nausea and no vomiting.

## 2018-07-02 NOTE — ED ADULT TRIAGE NOTE - CHIEF COMPLAINT QUOTE
p/t c/o of lower abd pain and vag bleed for one month on and off p/t seen by pmd and had Mirena inserted to control the bleeding

## 2018-07-02 NOTE — ED PROVIDER NOTE - PROGRESS NOTE DETAILS
AJM: + fibroids, neg ua. no need or transfusion. pt feeling imporved. dc home with obgyn follow up. All results discussed with the patient, and a copy of results has been provided. Patient is comfortable with dc plan for home. Opportunity for questions was provided and all questions have been adressed.

## 2018-07-02 NOTE — ED SUB INTERN NOTE - CADM PMH OBGYN CONDITIONS
Ovarian cyst(s)/Vaginal bleeding

## 2018-07-02 NOTE — ED SUB INTERN NOTE - OBJECTIVE STATEMENT FT
41 yo female with PMHx of fibroids, tubal ligation, endometrial thickening, cystectomy, polypectomy, anemia (Hb typically 8-9) who presents with vaginal bleeding. Patient reports that period began on Friday and flow has been worse than normal with increased clots. Period is associated with pelvic cramping, worse than normal and nonresponsive to Tylenol. Periods normally 4-5 days, typically uses 3-4 pads/day. Has prior history of multiple episodes of vaginal bleeding, which required iron transfusions. Endorses dizziness and weakness, worse with standing. Denies dyspnea and CP. Sees a [facility] gynecologist (last visit in Feb 2018). Had a Mirena placed last year for heavy vaginal bleeding, but Gyn was unable to locate it during last visit. FHx of endometrial cancer in cousin.

## 2018-07-04 LAB
BACTERIA UR CULT: SIGNIFICANT CHANGE UP
SPECIMEN SOURCE: SIGNIFICANT CHANGE UP

## 2018-07-11 ENCOUNTER — OUTPATIENT (OUTPATIENT)
Dept: OUTPATIENT SERVICES | Facility: HOSPITAL | Age: 43
LOS: 1 days | End: 2018-07-11

## 2018-07-11 ENCOUNTER — APPOINTMENT (OUTPATIENT)
Dept: OBGYN | Facility: HOSPITAL | Age: 43
End: 2018-07-11
Payer: COMMERCIAL

## 2018-07-11 VITALS
BODY MASS INDEX: 30.83 KG/M2 | SYSTOLIC BLOOD PRESSURE: 129 MMHG | HEART RATE: 104 BPM | WEIGHT: 174 LBS | DIASTOLIC BLOOD PRESSURE: 90 MMHG | HEIGHT: 63 IN

## 2018-07-11 DIAGNOSIS — Z98.51 TUBAL LIGATION STATUS: Chronic | ICD-10-CM

## 2018-07-11 DIAGNOSIS — Z98.890 OTHER SPECIFIED POSTPROCEDURAL STATES: Chronic | ICD-10-CM

## 2018-07-11 DIAGNOSIS — Z98.891 HISTORY OF UTERINE SCAR FROM PREVIOUS SURGERY: Chronic | ICD-10-CM

## 2018-07-11 DIAGNOSIS — N80.0 ENDOMETRIOSIS OF UTERUS: ICD-10-CM

## 2018-07-11 PROCEDURE — 99213 OFFICE O/P EST LOW 20 MIN: CPT | Mod: GE

## 2018-07-13 ENCOUNTER — OUTPATIENT (OUTPATIENT)
Dept: OUTPATIENT SERVICES | Facility: HOSPITAL | Age: 43
LOS: 1 days | Discharge: HOME | End: 2018-07-13

## 2018-07-13 DIAGNOSIS — D64.9 ANEMIA, UNSPECIFIED: ICD-10-CM

## 2018-07-13 DIAGNOSIS — Z98.890 OTHER SPECIFIED POSTPROCEDURAL STATES: Chronic | ICD-10-CM

## 2018-07-13 DIAGNOSIS — Z98.891 HISTORY OF UTERINE SCAR FROM PREVIOUS SURGERY: Chronic | ICD-10-CM

## 2018-07-13 DIAGNOSIS — Z98.51 TUBAL LIGATION STATUS: Chronic | ICD-10-CM

## 2018-07-16 ENCOUNTER — RESULT REVIEW (OUTPATIENT)
Age: 43
End: 2018-07-16

## 2018-07-16 ENCOUNTER — APPOINTMENT (OUTPATIENT)
Dept: HEMATOLOGY ONCOLOGY | Facility: CLINIC | Age: 43
End: 2018-07-16

## 2018-07-16 VITALS
HEIGHT: 63.58 IN | TEMPERATURE: 98.9 F | OXYGEN SATURATION: 100 % | SYSTOLIC BLOOD PRESSURE: 117 MMHG | HEART RATE: 102 BPM | WEIGHT: 175.49 LBS | RESPIRATION RATE: 16 BRPM | BODY MASS INDEX: 30.71 KG/M2 | DIASTOLIC BLOOD PRESSURE: 83 MMHG

## 2018-07-16 LAB
HCT VFR BLD CALC: 29 % — LOW (ref 34.5–45)
HGB BLD-MCNC: 9.3 G/DL — LOW (ref 11.5–15.5)
MCHC RBC-ENTMCNC: 22 PG — LOW (ref 27–34)
MCHC RBC-ENTMCNC: 32.2 G/DL — SIGNIFICANT CHANGE UP (ref 32–36)
MCV RBC AUTO: 68.2 FL — LOW (ref 80–100)
PLATELET # BLD AUTO: 343 K/UL — SIGNIFICANT CHANGE UP (ref 150–400)
RBC # BLD: 4.25 M/UL — SIGNIFICANT CHANGE UP (ref 3.8–5.2)
RBC # FLD: 17.5 % — HIGH (ref 10.3–14.5)
WBC # BLD: 4.5 K/UL — SIGNIFICANT CHANGE UP (ref 3.8–10.5)
WBC # FLD AUTO: 4.5 K/UL — SIGNIFICANT CHANGE UP (ref 3.8–10.5)

## 2018-07-16 RX ORDER — TRANEXAMIC ACID 650 MG/1
650 TABLET ORAL EVERY 6 HOURS
Qty: 8 | Refills: 6 | Status: DISCONTINUED | COMMUNITY
Start: 2018-07-11 | End: 2018-07-16

## 2018-07-16 RX ORDER — FERROUS GLUCONATE 324(38)MG
324 (38 FE) TABLET ORAL DAILY
Qty: 90 | Refills: 0 | Status: DISCONTINUED | COMMUNITY
Start: 2018-06-05 | End: 2018-07-16

## 2018-07-16 RX ORDER — FLUCONAZOLE 200 MG/1
200 TABLET ORAL
Qty: 1 | Refills: 0 | Status: DISCONTINUED | COMMUNITY
Start: 2018-07-11 | End: 2018-07-16

## 2018-07-16 RX ORDER — OMEPRAZOLE 20 MG/1
20 CAPSULE, DELAYED RELEASE ORAL DAILY
Qty: 30 | Refills: 3 | Status: DISCONTINUED | COMMUNITY
Start: 2017-08-07 | End: 2018-07-16

## 2018-07-16 RX ORDER — NAPROXEN 500 MG/1
500 TABLET ORAL
Qty: 60 | Refills: 3 | Status: DISCONTINUED | COMMUNITY
Start: 2018-03-13 | End: 2018-07-16

## 2018-07-17 ENCOUNTER — APPOINTMENT (OUTPATIENT)
Dept: INFUSION THERAPY | Facility: HOSPITAL | Age: 43
End: 2018-07-17

## 2018-07-17 LAB
FERRITIN SERPL-MCNC: 5 NG/ML — LOW (ref 15–150)
IRON SATN MFR SERPL: 13 UG/DL — LOW (ref 30–160)
IRON SATN MFR SERPL: 3 % — LOW (ref 14–50)
TIBC SERPL-MCNC: 463 UG/DL — HIGH (ref 220–430)
TRANSFERRIN SERPL-MCNC: 347 MG/DL — SIGNIFICANT CHANGE UP (ref 200–360)
UIBC SERPL-MCNC: 450 UG/DL — HIGH (ref 110–370)

## 2018-07-19 DIAGNOSIS — D50.9 IRON DEFICIENCY ANEMIA, UNSPECIFIED: ICD-10-CM

## 2018-07-26 PROBLEM — Z80.0 FAMILY HISTORY OF COLON CANCER: Status: INACTIVE | Noted: 2017-07-13

## 2018-08-16 PROBLEM — N83.202 UNSPECIFIED OVARIAN CYST, LEFT SIDE: Chronic | Status: ACTIVE | Noted: 2018-07-02

## 2018-08-16 PROBLEM — D25.9 LEIOMYOMA OF UTERUS, UNSPECIFIED: Chronic | Status: ACTIVE | Noted: 2018-07-02

## 2018-08-16 PROBLEM — N84.0 POLYP OF CORPUS UTERI: Chronic | Status: ACTIVE | Noted: 2018-07-02

## 2018-08-16 PROBLEM — K27.9 PEPTIC ULCER, SITE UNSPECIFIED, UNSPECIFIED AS ACUTE OR CHRONIC, WITHOUT HEMORRHAGE OR PERFORATION: Chronic | Status: ACTIVE | Noted: 2018-07-02

## 2018-08-17 ENCOUNTER — OUTPATIENT (OUTPATIENT)
Dept: OUTPATIENT SERVICES | Facility: HOSPITAL | Age: 43
LOS: 1 days | Discharge: HOME | End: 2018-08-17

## 2018-08-17 DIAGNOSIS — D64.9 ANEMIA, UNSPECIFIED: ICD-10-CM

## 2018-08-17 DIAGNOSIS — Z98.51 TUBAL LIGATION STATUS: Chronic | ICD-10-CM

## 2018-08-17 DIAGNOSIS — Z98.891 HISTORY OF UTERINE SCAR FROM PREVIOUS SURGERY: Chronic | ICD-10-CM

## 2018-08-17 DIAGNOSIS — Z98.890 OTHER SPECIFIED POSTPROCEDURAL STATES: Chronic | ICD-10-CM

## 2018-08-20 ENCOUNTER — APPOINTMENT (OUTPATIENT)
Dept: INFUSION THERAPY | Facility: HOSPITAL | Age: 43
End: 2018-08-20

## 2018-08-21 DIAGNOSIS — D50.9 IRON DEFICIENCY ANEMIA, UNSPECIFIED: ICD-10-CM

## 2018-08-27 ENCOUNTER — APPOINTMENT (OUTPATIENT)
Dept: INFUSION THERAPY | Facility: HOSPITAL | Age: 43
End: 2018-08-27

## 2018-09-04 NOTE — ED PROVIDER NOTE - CONDITION AT DISCHARGE:
In bed eyes closed appears to be sleeping will continue to monitor body shifting and breath sounds noted, SAFE/Fall precautions maintained, will encourage fluids with routine am medications Improved

## 2018-11-20 ENCOUNTER — APPOINTMENT (OUTPATIENT)
Dept: OBGYN | Facility: HOSPITAL | Age: 43
End: 2018-11-20

## 2018-12-04 ENCOUNTER — OUTPATIENT (OUTPATIENT)
Dept: OUTPATIENT SERVICES | Facility: HOSPITAL | Age: 43
LOS: 1 days | End: 2018-12-04

## 2018-12-04 ENCOUNTER — LABORATORY RESULT (OUTPATIENT)
Age: 43
End: 2018-12-04

## 2018-12-04 ENCOUNTER — APPOINTMENT (OUTPATIENT)
Dept: OBGYN | Facility: HOSPITAL | Age: 43
End: 2018-12-04
Payer: COMMERCIAL

## 2018-12-04 VITALS
HEART RATE: 89 BPM | BODY MASS INDEX: 29.33 KG/M2 | DIASTOLIC BLOOD PRESSURE: 82 MMHG | WEIGHT: 171.8 LBS | HEIGHT: 64 IN | SYSTOLIC BLOOD PRESSURE: 123 MMHG

## 2018-12-04 DIAGNOSIS — Z98.891 HISTORY OF UTERINE SCAR FROM PREVIOUS SURGERY: Chronic | ICD-10-CM

## 2018-12-04 DIAGNOSIS — Z98.890 OTHER SPECIFIED POSTPROCEDURAL STATES: Chronic | ICD-10-CM

## 2018-12-04 DIAGNOSIS — Z98.51 TUBAL LIGATION STATUS: Chronic | ICD-10-CM

## 2018-12-04 LAB
BASOPHILS # BLD AUTO: 0.03 K/UL — SIGNIFICANT CHANGE UP (ref 0–0.2)
BASOPHILS NFR BLD AUTO: 0.7 % — SIGNIFICANT CHANGE UP (ref 0–2)
EOSINOPHIL # BLD AUTO: 0.08 K/UL — SIGNIFICANT CHANGE UP (ref 0–0.5)
EOSINOPHIL NFR BLD AUTO: 1.8 % — SIGNIFICANT CHANGE UP (ref 0–6)
HCT VFR BLD CALC: 42 % — SIGNIFICANT CHANGE UP (ref 34.5–45)
HGB BLD-MCNC: 13.5 G/DL — SIGNIFICANT CHANGE UP (ref 11.5–15.5)
IMM GRANULOCYTES # BLD AUTO: 0.01 # — SIGNIFICANT CHANGE UP
IMM GRANULOCYTES NFR BLD AUTO: 0.2 % — SIGNIFICANT CHANGE UP (ref 0–1.5)
LYMPHOCYTES # BLD AUTO: 1.73 K/UL — SIGNIFICANT CHANGE UP (ref 1–3.3)
LYMPHOCYTES # BLD AUTO: 38.2 % — SIGNIFICANT CHANGE UP (ref 13–44)
MCHC RBC-ENTMCNC: 29.6 PG — SIGNIFICANT CHANGE UP (ref 27–34)
MCHC RBC-ENTMCNC: 32.1 % — SIGNIFICANT CHANGE UP (ref 32–36)
MCV RBC AUTO: 92.1 FL — SIGNIFICANT CHANGE UP (ref 80–100)
MONOCYTES # BLD AUTO: 0.34 K/UL — SIGNIFICANT CHANGE UP (ref 0–0.9)
MONOCYTES NFR BLD AUTO: 7.5 % — SIGNIFICANT CHANGE UP (ref 2–14)
NEUTROPHILS # BLD AUTO: 2.34 K/UL — SIGNIFICANT CHANGE UP (ref 1.8–7.4)
NEUTROPHILS NFR BLD AUTO: 51.6 % — SIGNIFICANT CHANGE UP (ref 43–77)
NRBC # FLD: 0 — SIGNIFICANT CHANGE UP
PLATELET # BLD AUTO: 285 K/UL — SIGNIFICANT CHANGE UP (ref 150–400)
PMV BLD: 11.1 FL — SIGNIFICANT CHANGE UP (ref 7–13)
RBC # BLD: 4.56 M/UL — SIGNIFICANT CHANGE UP (ref 3.8–5.2)
RBC # FLD: 13.4 % — SIGNIFICANT CHANGE UP (ref 10.3–14.5)
WBC # BLD: 4.53 K/UL — SIGNIFICANT CHANGE UP (ref 3.8–10.5)
WBC # FLD AUTO: 4.53 K/UL — SIGNIFICANT CHANGE UP (ref 3.8–10.5)

## 2018-12-04 PROCEDURE — 99213 OFFICE O/P EST LOW 20 MIN: CPT | Mod: GE

## 2018-12-05 DIAGNOSIS — N80.0 ENDOMETRIOSIS OF UTERUS: ICD-10-CM

## 2018-12-05 DIAGNOSIS — K64.9 UNSPECIFIED HEMORRHOIDS: ICD-10-CM

## 2018-12-05 DIAGNOSIS — Z01.419 ENCOUNTER FOR GYNECOLOGICAL EXAMINATION (GENERAL) (ROUTINE) WITHOUT ABNORMAL FINDINGS: ICD-10-CM

## 2018-12-05 LAB
C TRACH RRNA SPEC QL NAA+PROBE: SIGNIFICANT CHANGE UP
CANDIDA AB TITR SER: NOT DETECTED — SIGNIFICANT CHANGE UP
G VAGINALIS DNA SPEC QL NAA+PROBE: NOT DETECTED — SIGNIFICANT CHANGE UP
N GONORRHOEA RRNA SPEC QL NAA+PROBE: SIGNIFICANT CHANGE UP
SPECIMEN SOURCE: SIGNIFICANT CHANGE UP
T VAGINALIS SPEC QL WET PREP: NOT DETECTED — SIGNIFICANT CHANGE UP

## 2018-12-20 ENCOUNTER — APPOINTMENT (OUTPATIENT)
Dept: INTERNAL MEDICINE | Facility: HOSPITAL | Age: 43
End: 2018-12-20

## 2019-01-04 ENCOUNTER — APPOINTMENT (OUTPATIENT)
Dept: DERMATOLOGY | Facility: HOSPITAL | Age: 44
End: 2019-01-04

## 2019-01-08 ENCOUNTER — OUTPATIENT (OUTPATIENT)
Dept: OUTPATIENT SERVICES | Facility: HOSPITAL | Age: 44
LOS: 1 days | End: 2019-01-08
Payer: COMMERCIAL

## 2019-01-08 ENCOUNTER — APPOINTMENT (OUTPATIENT)
Dept: SURGERY | Facility: HOSPITAL | Age: 44
End: 2019-01-08
Payer: SELF-PAY

## 2019-01-08 VITALS
SYSTOLIC BLOOD PRESSURE: 130 MMHG | BODY MASS INDEX: 29.77 KG/M2 | HEART RATE: 88 BPM | DIASTOLIC BLOOD PRESSURE: 88 MMHG | HEIGHT: 64 IN | WEIGHT: 174.38 LBS

## 2019-01-08 DIAGNOSIS — Z98.890 OTHER SPECIFIED POSTPROCEDURAL STATES: Chronic | ICD-10-CM

## 2019-01-08 DIAGNOSIS — Z98.891 HISTORY OF UTERINE SCAR FROM PREVIOUS SURGERY: Chronic | ICD-10-CM

## 2019-01-08 DIAGNOSIS — K64.4 RESIDUAL HEMORRHOIDAL SKIN TAGS: ICD-10-CM

## 2019-01-08 DIAGNOSIS — Z98.51 TUBAL LIGATION STATUS: Chronic | ICD-10-CM

## 2019-01-08 PROCEDURE — 99202 OFFICE O/P NEW SF 15 MIN: CPT

## 2019-02-08 ENCOUNTER — APPOINTMENT (OUTPATIENT)
Dept: MAMMOGRAPHY | Facility: IMAGING CENTER | Age: 44
End: 2019-02-08

## 2019-02-08 ENCOUNTER — APPOINTMENT (OUTPATIENT)
Dept: ULTRASOUND IMAGING | Facility: IMAGING CENTER | Age: 44
End: 2019-02-08

## 2019-02-11 ENCOUNTER — OUTPATIENT (OUTPATIENT)
Dept: OUTPATIENT SERVICES | Facility: HOSPITAL | Age: 44
LOS: 1 days | End: 2019-02-11
Payer: COMMERCIAL

## 2019-02-11 ENCOUNTER — APPOINTMENT (OUTPATIENT)
Dept: MAMMOGRAPHY | Facility: IMAGING CENTER | Age: 44
End: 2019-02-11

## 2019-02-11 DIAGNOSIS — Z98.51 TUBAL LIGATION STATUS: Chronic | ICD-10-CM

## 2019-02-11 DIAGNOSIS — Z98.891 HISTORY OF UTERINE SCAR FROM PREVIOUS SURGERY: Chronic | ICD-10-CM

## 2019-02-11 DIAGNOSIS — Z98.890 OTHER SPECIFIED POSTPROCEDURAL STATES: Chronic | ICD-10-CM

## 2019-02-11 DIAGNOSIS — Z01.419 ENCOUNTER FOR GYNECOLOGICAL EXAMINATION (GENERAL) (ROUTINE) WITHOUT ABNORMAL FINDINGS: ICD-10-CM

## 2019-02-11 PROCEDURE — 77067 SCR MAMMO BI INCL CAD: CPT

## 2019-02-11 PROCEDURE — 77063 BREAST TOMOSYNTHESIS BI: CPT

## 2019-02-11 PROCEDURE — 77067 SCR MAMMO BI INCL CAD: CPT | Mod: 26

## 2019-02-11 PROCEDURE — 77063 BREAST TOMOSYNTHESIS BI: CPT | Mod: 26

## 2019-02-15 ENCOUNTER — LABORATORY RESULT (OUTPATIENT)
Age: 44
End: 2019-02-15

## 2019-02-15 ENCOUNTER — APPOINTMENT (OUTPATIENT)
Dept: DERMATOLOGY | Facility: HOSPITAL | Age: 44
End: 2019-02-15
Payer: COMMERCIAL

## 2019-02-15 ENCOUNTER — OUTPATIENT (OUTPATIENT)
Dept: OUTPATIENT SERVICES | Facility: HOSPITAL | Age: 44
LOS: 1 days | End: 2019-02-15

## 2019-02-15 DIAGNOSIS — L21.9 SEBORRHEIC DERMATITIS, UNSPECIFIED: ICD-10-CM

## 2019-02-15 DIAGNOSIS — Z98.51 TUBAL LIGATION STATUS: Chronic | ICD-10-CM

## 2019-02-15 DIAGNOSIS — Z98.891 HISTORY OF UTERINE SCAR FROM PREVIOUS SURGERY: Chronic | ICD-10-CM

## 2019-02-15 DIAGNOSIS — Z98.890 OTHER SPECIFIED POSTPROCEDURAL STATES: Chronic | ICD-10-CM

## 2019-02-15 LAB
FERRITIN SERPL-MCNC: 7.66 NG/ML — LOW (ref 15–150)
TSH SERPL-MCNC: 3.77 UIU/ML — SIGNIFICANT CHANGE UP (ref 0.27–4.2)

## 2019-02-15 PROCEDURE — 99213 OFFICE O/P EST LOW 20 MIN: CPT | Mod: GC

## 2019-02-19 NOTE — PHYSICAL EXAM
[Alert] : alert [Oriented x 3] : ~L oriented x 3 [Well Nourished] : well nourished [Conjunctiva Non-injected] : conjunctiva non-injected [No Visual Lymphadenopathy] : no visual  lymphadenopathy [No Clubbing] : no clubbing [No Edema] : no edema [No Bromhidrosis] : no bromhidrosis [No Chromhidrosis] : no chromhidrosis [FreeTextEntry3] : open comedones and several scattered inflammatory pink papules on the face\par \par scalp with mild erythema and scale, +hair pull test positive

## 2019-02-19 NOTE — ASSESSMENT
[FreeTextEntry1] : 1. Acne vulgaris \par -Start spironolactone 50mg QHS, SED\par -Will have blood pressure checked with PMD at next appt in 1 mo \par -F/u 6 wks \par \par 2. Seborrheic dermatitis\par -Start ketoconazole shampoo BIW\par -Start Lidex soln to itchy areas on scalp BID PRN for itch, SED\par \par 3. Hair loss, may be due to iron deficiency 2/2 heavy menses\par -Will check TFTs, ferritin level today

## 2019-02-19 NOTE — HISTORY OF PRESENT ILLNESS
[FreeTextEntry1] : FUV: acne, VV [de-identified] : 43 yo F here for f/u acne. Currently using  Differin gel daily but it is drying out her skin.  Notes that acne has been flaring recently with menstrual cycle.  Per patient, periods are regular.  Not using anything else on her skin.\par \par Also c/o scalp itch and hair loss for the past few months.  Notes that she is anemic because her periods are heavy.  Gets iron infusions.\par \par HX: \par She saw a dermatologist in July 2016, who prescribed Minocycline and Tretinoin. She takes the Minocycline for about 5 days once a month when she has a flare and uses the Tretinoin cream once daily where she has acne bumps. Also prescribed on doxycycline, which she d/c'd due to gastritis. Also given spironolactone, which she states did not help. She states that she did not have acne as a teenager, and developed it ~4-5 yrs ago around the same time that she was diagnosed with an ovarian cyst, which was subsequently removed. She states that her acne flares 1-2 weeks before menstruation. Has the Mirena IUD, which was placed in 2017 for menorrhagia. \par \par

## 2019-02-20 DIAGNOSIS — L21.9 SEBORRHEIC DERMATITIS, UNSPECIFIED: ICD-10-CM

## 2019-02-20 DIAGNOSIS — L65.9 NONSCARRING HAIR LOSS, UNSPECIFIED: ICD-10-CM

## 2019-02-20 DIAGNOSIS — L70.0 ACNE VULGARIS: ICD-10-CM

## 2019-02-25 ENCOUNTER — OUTPATIENT (OUTPATIENT)
Dept: OUTPATIENT SERVICES | Facility: HOSPITAL | Age: 44
LOS: 1 days | Discharge: HOME | End: 2019-02-25

## 2019-02-25 DIAGNOSIS — Z98.891 HISTORY OF UTERINE SCAR FROM PREVIOUS SURGERY: Chronic | ICD-10-CM

## 2019-02-25 DIAGNOSIS — D64.9 ANEMIA, UNSPECIFIED: ICD-10-CM

## 2019-02-25 DIAGNOSIS — Z98.51 TUBAL LIGATION STATUS: Chronic | ICD-10-CM

## 2019-02-25 DIAGNOSIS — Z98.890 OTHER SPECIFIED POSTPROCEDURAL STATES: Chronic | ICD-10-CM

## 2019-02-28 ENCOUNTER — RESULT REVIEW (OUTPATIENT)
Age: 44
End: 2019-02-28

## 2019-02-28 ENCOUNTER — APPOINTMENT (OUTPATIENT)
Dept: HEMATOLOGY ONCOLOGY | Facility: CLINIC | Age: 44
End: 2019-02-28

## 2019-02-28 VITALS
RESPIRATION RATE: 16 BRPM | HEART RATE: 76 BPM | SYSTOLIC BLOOD PRESSURE: 105 MMHG | DIASTOLIC BLOOD PRESSURE: 70 MMHG | WEIGHT: 176.48 LBS | BODY MASS INDEX: 30.29 KG/M2 | TEMPERATURE: 98.6 F | OXYGEN SATURATION: 100 %

## 2019-02-28 LAB
BASOPHILS # BLD AUTO: 0.1 K/UL — SIGNIFICANT CHANGE UP (ref 0–0.2)
BASOPHILS NFR BLD AUTO: 1.6 % — SIGNIFICANT CHANGE UP (ref 0–2)
EOSINOPHIL # BLD AUTO: 0.1 K/UL — SIGNIFICANT CHANGE UP (ref 0–0.5)
EOSINOPHIL NFR BLD AUTO: 1.3 % — SIGNIFICANT CHANGE UP (ref 0–6)
HCT VFR BLD CALC: 34.2 % — LOW (ref 34.5–45)
HGB BLD-MCNC: 10.6 G/DL — LOW (ref 11.5–15.5)
LYMPHOCYTES # BLD AUTO: 1.6 K/UL — SIGNIFICANT CHANGE UP (ref 1–3.3)
LYMPHOCYTES # BLD AUTO: 38.9 % — SIGNIFICANT CHANGE UP (ref 13–44)
MCHC RBC-ENTMCNC: 25.4 PG — LOW (ref 27–34)
MCHC RBC-ENTMCNC: 31 G/DL — LOW (ref 32–36)
MCV RBC AUTO: 81.9 FL — SIGNIFICANT CHANGE UP (ref 80–100)
MONOCYTES # BLD AUTO: 0.3 K/UL — SIGNIFICANT CHANGE UP (ref 0–0.9)
MONOCYTES NFR BLD AUTO: 6.7 % — SIGNIFICANT CHANGE UP (ref 2–14)
NEUTROPHILS # BLD AUTO: 2.2 K/UL — SIGNIFICANT CHANGE UP (ref 1.8–7.4)
NEUTROPHILS NFR BLD AUTO: 51.5 % — SIGNIFICANT CHANGE UP (ref 43–77)
PLATELET # BLD AUTO: 318 K/UL — SIGNIFICANT CHANGE UP (ref 150–400)
RBC # BLD: 4.17 M/UL — SIGNIFICANT CHANGE UP (ref 3.8–5.2)
RBC # FLD: 13.5 % — SIGNIFICANT CHANGE UP (ref 10.3–14.5)
WBC # BLD: 4.2 K/UL — SIGNIFICANT CHANGE UP (ref 3.8–10.5)
WBC # FLD AUTO: 4.2 K/UL — SIGNIFICANT CHANGE UP (ref 3.8–10.5)

## 2019-02-28 NOTE — REVIEW OF SYSTEMS
[Fatigue] : fatigue [Dysmenorrhea/Abn Vaginal Bleeding] : dysmenorrhea/abnormal vaginal bleeding [Dizziness] : dizziness [Fever] : no fever [Chills] : no chills [Vision Problems] : no vision problems [Chest Pain] : no chest pain [Palpitations] : no palpitations [Leg Claudication] : no intermittent leg claudication [Lower Ext Edema] : no lower extremity edema [Shortness Of Breath] : no shortness of breath [Wheezing] : no wheezing [Cough] : no cough [SOB on Exertion] : no shortness of breath during exertion [Abdominal Pain] : no abdominal pain [Vomiting] : no vomiting [Constipation] : no constipation [Diarrhea] : no diarrhea [Joint Stiffness] : no joint stiffness [Muscle Pain] : no muscle pain [Muscle Weakness] : no muscle weakness [Skin Rash] : no skin rash [Confused] : no confusion [Fainting] : no fainting [Easy Bleeding] : no tendency for easy bleeding [Easy Bruising] : no tendency for easy bruising

## 2019-02-28 NOTE — PHYSICAL EXAM
[Fully active, able to carry on all pre-disease performance without restriction] : Status 0 - Fully active, able to carry on all pre-disease performance without restriction [Normal] : affect appropriate [de-identified] : Right anterior cervical tender lymph node x 1 [de-identified] : as above

## 2019-02-28 NOTE — ASSESSMENT
[FreeTextEntry1] : 43 year old female with Iron deficiency anemia due to chronic blood loss from menorrhagia. Treated with IV injectafer in August 2018 with adequate response. Unable to tolerate oral iron therapy.

## 2019-02-28 NOTE — HISTORY OF PRESENT ILLNESS
[de-identified] : 44 yo female with PMHx of gastritis, left benign ovarian cyst (s/p ovarian conservation resection), adenomyosis, uterine fibroids, menorrhagia presents for evaluation of chronic blood loss anemia. Patient has had heavy menses for years with intolerance to oral iron (nausea, vomiting, constipation) undergoing IV iron formulation.  She first had IV iron 2013 in [place] with appropriate Hb response. Additionally she presented in 2017 again for IV iron with Hb ~7 that responded to 13.9.  LMP July 1, 2018. On July 2 2018 she had severe menstrual bleeding that caused her to be dizzy and presented to [facility] ER. There her Hb was 8.6, down from 9.3 in June. She did not receive any iron or blood transfusion in the ED. Denies adverse reactions to iron infusion.\par \par Has close follow up with her GYN and failed multiple OCPs and IUD in an attempt control the bleeding. OCPs gave her acne so she does not want to try that again. Currently, goes through 6-7 pads a day for the first 3 days, and period lasts for about 6 days. She was offered a hysterectomy, currently patient is considering.\par \par Visit Hx:\par 7/16/19: Patient presents today for follow-up from ED visit for menstrual cycle related chronic blood loss anemia with symptoms of fatigue, intermittent dizziness and mild SOB on exertion. She denies chest pain on exertion, or other sources of bleeding including GI. She denies issues with bleeding when she gets cuts and scrapes, and denies bruising.\par She is requesting IV iron as she is unable to tolerate oral supplementation. She is planning a trip to [place] tomorrow for 2 weeks with her daughters that she cannot change. She is requesting IV Iron today.  [de-identified] : 2/28/2019: Patient presents after about 7 months since her IV iron infusion. She continues to have heavy periods and has been experiencing weakness and dizziness again with intermittent headaches similar to when she was anemic in the past. She denies SOB with exertion. She did not want a hysterectomy at this time as she may be looking into fertility options since she was recently remarried.

## 2019-03-01 ENCOUNTER — APPOINTMENT (OUTPATIENT)
Dept: INFUSION THERAPY | Facility: HOSPITAL | Age: 44
End: 2019-03-01

## 2019-03-01 ENCOUNTER — OTHER (OUTPATIENT)
Age: 44
End: 2019-03-01

## 2019-03-01 DIAGNOSIS — D50.9 IRON DEFICIENCY ANEMIA, UNSPECIFIED: ICD-10-CM

## 2019-03-08 ENCOUNTER — APPOINTMENT (OUTPATIENT)
Dept: INFUSION THERAPY | Facility: HOSPITAL | Age: 44
End: 2019-03-08

## 2019-03-12 ENCOUNTER — OUTPATIENT (OUTPATIENT)
Dept: OUTPATIENT SERVICES | Facility: HOSPITAL | Age: 44
LOS: 1 days | End: 2019-03-12

## 2019-03-12 ENCOUNTER — LABORATORY RESULT (OUTPATIENT)
Age: 44
End: 2019-03-12

## 2019-03-12 ENCOUNTER — APPOINTMENT (OUTPATIENT)
Dept: INTERNAL MEDICINE | Facility: HOSPITAL | Age: 44
End: 2019-03-12

## 2019-03-12 VITALS — HEIGHT: 64 IN | BODY MASS INDEX: 29.88 KG/M2 | WEIGHT: 175 LBS

## 2019-03-12 DIAGNOSIS — M79.644 PAIN IN RIGHT FINGER(S): ICD-10-CM

## 2019-03-12 DIAGNOSIS — G47.30 SLEEP APNEA, UNSPECIFIED: ICD-10-CM

## 2019-03-12 DIAGNOSIS — D50.9 IRON DEFICIENCY ANEMIA, UNSPECIFIED: ICD-10-CM

## 2019-03-12 DIAGNOSIS — Z98.890 OTHER SPECIFIED POSTPROCEDURAL STATES: Chronic | ICD-10-CM

## 2019-03-12 DIAGNOSIS — Z86.19 PERSONAL HISTORY OF OTHER INFECTIOUS AND PARASITIC DISEASES: ICD-10-CM

## 2019-03-12 DIAGNOSIS — Z98.891 HISTORY OF UTERINE SCAR FROM PREVIOUS SURGERY: Chronic | ICD-10-CM

## 2019-03-12 DIAGNOSIS — L90.5 PAIN, UNSPECIFIED: ICD-10-CM

## 2019-03-12 DIAGNOSIS — R19.8 OTHER SPECIFIED SYMPTOMS AND SIGNS INVOLVING THE DIGESTIVE SYSTEM AND ABDOMEN: ICD-10-CM

## 2019-03-12 DIAGNOSIS — R52 PAIN, UNSPECIFIED: ICD-10-CM

## 2019-03-12 DIAGNOSIS — R10.13 EPIGASTRIC PAIN: ICD-10-CM

## 2019-03-12 DIAGNOSIS — Z98.51 TUBAL LIGATION STATUS: Chronic | ICD-10-CM

## 2019-03-12 DIAGNOSIS — G56.03 CARPAL TUNNEL SYNDROM,BILATERAL UPPER LIMBS: ICD-10-CM

## 2019-03-12 LAB
ANISOCYTOSIS BLD QL: SLIGHT — SIGNIFICANT CHANGE UP
BASOPHILS # BLD AUTO: 0.03 K/UL — SIGNIFICANT CHANGE UP (ref 0–0.2)
BASOPHILS NFR BLD AUTO: 0.6 % — SIGNIFICANT CHANGE UP (ref 0–2)
BASOPHILS NFR SPEC: 1.7 % — SIGNIFICANT CHANGE UP (ref 0–2)
BLASTS # FLD: 0 % — SIGNIFICANT CHANGE UP (ref 0–0)
EOSINOPHIL # BLD AUTO: 0.15 K/UL — SIGNIFICANT CHANGE UP (ref 0–0.5)
EOSINOPHIL NFR BLD AUTO: 3 % — SIGNIFICANT CHANGE UP (ref 0–6)
EOSINOPHIL NFR FLD: 0.9 % — SIGNIFICANT CHANGE UP (ref 0–6)
FERRITIN SERPL-MCNC: 729.3 NG/ML — HIGH (ref 15–150)
GIANT PLATELETS BLD QL SMEAR: PRESENT — SIGNIFICANT CHANGE UP
HCT VFR BLD CALC: 39.8 % — SIGNIFICANT CHANGE UP (ref 34.5–45)
HGB BLD-MCNC: 12.1 G/DL — SIGNIFICANT CHANGE UP (ref 11.5–15.5)
IMM GRANULOCYTES NFR BLD AUTO: 0.2 % — SIGNIFICANT CHANGE UP (ref 0–1.5)
LYMPHOCYTES # BLD AUTO: 1.36 K/UL — SIGNIFICANT CHANGE UP (ref 1–3.3)
LYMPHOCYTES # BLD AUTO: 27.3 % — SIGNIFICANT CHANGE UP (ref 13–44)
LYMPHOCYTES NFR SPEC AUTO: 16.5 % — SIGNIFICANT CHANGE UP (ref 13–44)
MACROCYTES BLD QL: SLIGHT — SIGNIFICANT CHANGE UP
MCHC RBC-ENTMCNC: 27.2 PG — SIGNIFICANT CHANGE UP (ref 27–34)
MCHC RBC-ENTMCNC: 30.4 % — LOW (ref 32–36)
MCV RBC AUTO: 89.4 FL — SIGNIFICANT CHANGE UP (ref 80–100)
METAMYELOCYTES # FLD: 0 % — SIGNIFICANT CHANGE UP (ref 0–1)
MONOCYTES # BLD AUTO: 0.42 K/UL — SIGNIFICANT CHANGE UP (ref 0–0.9)
MONOCYTES NFR BLD AUTO: 8.4 % — SIGNIFICANT CHANGE UP (ref 2–14)
MONOCYTES NFR BLD: 0.9 % — LOW (ref 2–9)
MYELOCYTES NFR BLD: 0 % — SIGNIFICANT CHANGE UP (ref 0–0)
NEUTROPHIL AB SER-ACNC: 76.5 % — SIGNIFICANT CHANGE UP (ref 43–77)
NEUTROPHILS # BLD AUTO: 3.02 K/UL — SIGNIFICANT CHANGE UP (ref 1.8–7.4)
NEUTROPHILS NFR BLD AUTO: 60.5 % — SIGNIFICANT CHANGE UP (ref 43–77)
NEUTS BAND # BLD: 0 % — SIGNIFICANT CHANGE UP (ref 0–6)
NRBC # FLD: 0 K/UL — LOW (ref 25–125)
OTHER - HEMATOLOGY %: 0 — SIGNIFICANT CHANGE UP
PLATELET # BLD AUTO: 294 K/UL — SIGNIFICANT CHANGE UP (ref 150–400)
PLATELET COUNT - ESTIMATE: NORMAL — SIGNIFICANT CHANGE UP
PMV BLD: 11 FL — SIGNIFICANT CHANGE UP (ref 7–13)
POLYCHROMASIA BLD QL SMEAR: SLIGHT — SIGNIFICANT CHANGE UP
PROMYELOCYTES # FLD: 0 % — SIGNIFICANT CHANGE UP (ref 0–0)
RBC # BLD: 4.45 M/UL — SIGNIFICANT CHANGE UP (ref 3.8–5.2)
RBC # FLD: 21.2 % — HIGH (ref 10.3–14.5)
RETICS #: 195 K/UL — HIGH (ref 25–125)
RETICS/RBC NFR: 4.4 % — HIGH (ref 0.5–2.5)
SMUDGE CELLS # BLD: PRESENT — SIGNIFICANT CHANGE UP
VARIANT LYMPHS # BLD: 3.5 % — SIGNIFICANT CHANGE UP
WBC # BLD: 4.99 K/UL — SIGNIFICANT CHANGE UP (ref 3.8–10.5)
WBC # FLD AUTO: 4.99 K/UL — SIGNIFICANT CHANGE UP (ref 3.8–10.5)

## 2019-03-12 RX ORDER — SPIRONOLACTONE 50 MG/1
50 TABLET ORAL
Qty: 30 | Refills: 0 | Status: DISCONTINUED | COMMUNITY
Start: 2019-02-15 | End: 2019-03-12

## 2019-03-15 VITALS — SYSTOLIC BLOOD PRESSURE: 125 MMHG | DIASTOLIC BLOOD PRESSURE: 82 MMHG | HEART RATE: 97 BPM

## 2019-03-15 PROBLEM — R52 PAIN IN SCAR: Status: RESOLVED | Noted: 2017-11-07 | Resolved: 2019-03-15

## 2019-03-15 PROBLEM — M79.644 PAIN OF RIGHT THUMB: Status: RESOLVED | Noted: 2018-06-05 | Resolved: 2019-03-15

## 2019-03-15 PROBLEM — Z86.19 HISTORY OF CANDIDIASIS: Status: RESOLVED | Noted: 2018-07-11 | Resolved: 2019-03-15

## 2019-03-15 PROBLEM — R10.13 DYSPEPSIA: Status: RESOLVED | Noted: 2017-04-13 | Resolved: 2019-03-15

## 2019-03-15 PROBLEM — G47.30 SLEEP DISORDER BREATHING: Status: RESOLVED | Noted: 2017-11-20 | Resolved: 2019-03-15

## 2019-03-15 NOTE — HISTORY OF PRESENT ILLNESS
[FreeTextEntry1] : f/u w/ anemia, acne, and dysmenorrhea  [de-identified] : 43F pmh adenomyosis (c/b dysmenorrhea and iron deficiency anemia), L benign ovarian cyst (s/p surgical resection), acne coming in for follow up of anemia and worsening acne.\par \par #Iron Def Anemia 2/2 dysmenorrhea in setting of adenomyosis\par Pt reports that her bleeding continues and that after speaking with GYN, they recommended that she have a hysterectomy performed. Pt expressed concern about not being able to have children after the procedure and is requesting to speak to Fertility clinic. Pt reports that she felt substantially better after her last IV iron infusion on 2/2019 (managed by hematology).\par \par #Acne \par Pt reports having bad acne which worsens with each menstruation ever since she had her ovarian cyst removed. She reports that she once received Minocycline for this, however, dermatology here just put her on Spirolactone, which has not helped her. \par \par #Hemorrhoids\par Pt reports that she has been having chronic hemorrhoids for a while now. She visited surgery who referred her to Colorectal Surgery, however, has not had that chance to pass by.

## 2019-03-15 NOTE — ASSESSMENT
[FreeTextEntry1] : 43F pmh adenomyosis (c/b dysmenorrhea and iron deficiency anemia), L benign ovarian cyst (s/p surgical resection), acne coming in for follow up of anemia and worsening acne.\par \par #Iron Def Anemia 2/2 dysmenorrhea in setting of adenomyosis\par Pt periodically receiving iron infusions by heme, pt last given Injectafer on visit to [facility] in 2/2019. \par -CBC, Ferritin, and Retic count today showing increased Ferritin, appropriately elevated Retic count, and normal hemoglobin with elevated RBC distribution indicative of appropriate RBC generation\par -pt will to follow up with heme and GYN for further recommendations\par \par #Acne\par Pt w/ acne treated multiple times in the past by dermatology with multiple abx and tretinoin. Pt reports that she no longer has any treatments left and that Spironolactone did not help. She requested Minocycline for treatment. It was explained to pt that long term use of abx is detrimental to health, which she understood. She wanted short term treatment as making an appointment with dermatology would take a very long time. \par -Minocycline was prescribed for 1 month to allow pt to get appointment with derm, pt will not be given more Minocycline by IM\par -pt to follow up with Derm\par \par #Hemorrhoids\par Pt w/ hst of internal hemorrhoids. No active bleed at this time. Pt informed to please follow up with colorectal surgery. \par -f/u colorectal surgery\par \par #HCM:\par -pt permanently deferred flu vaccination, up to date on other vaccines\par -pt regularly scheduled for GYN visits, no need for mammograms at this time\par \par \par Pt discussed with Dr Villegas. All risks and benefits were discussed with pt. Pt and attending has agreed with above assessment and plan. \par \par Ariadne Rivas MD PGY2\par Internal Medicine\par [facility]\par [phone] \par

## 2019-03-15 NOTE — PHYSICAL EXAM

## 2019-03-15 NOTE — REVIEW OF SYSTEMS
[Fatigue] : fatigue [Negative] : Heme/Lymph [Fever] : no fever [Chills] : no chills [Hot Flashes] : no hot flashes [Night Sweats] : no night sweats [Recent Change In Weight] : ~T no recent weight change [FreeTextEntry2] : Pt with fatigue that has improved after infusion of iron. [FreeTextEntry7] : Hemorrhoids as per HPI [FreeTextEntry8] : Vaginal bleed as per HPI [de-identified] : Acne as per HPI

## 2019-03-15 NOTE — COUNSELING
[Weight management counseling provided] : Weight management [Healthy eating counseling provided] : healthy eating [Good understanding] : Patient has a good understanding of disease, goals and obesity follow-up plan

## 2019-03-22 DIAGNOSIS — L70.0 ACNE VULGARIS: ICD-10-CM

## 2019-03-22 DIAGNOSIS — D50.0 IRON DEFICIENCY ANEMIA SECONDARY TO BLOOD LOSS (CHRONIC): ICD-10-CM

## 2019-03-22 DIAGNOSIS — R51 HEADACHE: ICD-10-CM

## 2019-03-22 DIAGNOSIS — N80.0 ENDOMETRIOSIS OF UTERUS: ICD-10-CM

## 2019-03-22 DIAGNOSIS — K64.9 UNSPECIFIED HEMORRHOIDS: ICD-10-CM

## 2019-04-01 ENCOUNTER — OUTPATIENT (OUTPATIENT)
Dept: OUTPATIENT SERVICES | Facility: HOSPITAL | Age: 44
LOS: 1 days | Discharge: HOME | End: 2019-04-01

## 2019-04-01 DIAGNOSIS — Z98.51 TUBAL LIGATION STATUS: Chronic | ICD-10-CM

## 2019-04-01 DIAGNOSIS — D64.9 ANEMIA, UNSPECIFIED: ICD-10-CM

## 2019-04-01 DIAGNOSIS — Z98.891 HISTORY OF UTERINE SCAR FROM PREVIOUS SURGERY: Chronic | ICD-10-CM

## 2019-04-01 DIAGNOSIS — Z98.890 OTHER SPECIFIED POSTPROCEDURAL STATES: Chronic | ICD-10-CM

## 2019-04-05 ENCOUNTER — APPOINTMENT (OUTPATIENT)
Dept: DERMATOLOGY | Facility: HOSPITAL | Age: 44
End: 2019-04-05

## 2019-04-11 ENCOUNTER — RESULT REVIEW (OUTPATIENT)
Age: 44
End: 2019-04-11

## 2019-04-11 ENCOUNTER — APPOINTMENT (OUTPATIENT)
Dept: HEMATOLOGY ONCOLOGY | Facility: CLINIC | Age: 44
End: 2019-04-11

## 2019-04-11 VITALS
TEMPERATURE: 99.2 F | HEART RATE: 78 BPM | DIASTOLIC BLOOD PRESSURE: 74 MMHG | SYSTOLIC BLOOD PRESSURE: 106 MMHG | WEIGHT: 171.96 LBS | BODY MASS INDEX: 29.52 KG/M2 | RESPIRATION RATE: 16 BRPM | OXYGEN SATURATION: 98 %

## 2019-04-11 LAB
FERRITIN SERPL-MCNC: 136 NG/ML — SIGNIFICANT CHANGE UP (ref 15–150)
HCT VFR BLD CALC: 40.7 % — SIGNIFICANT CHANGE UP (ref 34.5–45)
HGB BLD-MCNC: 13.8 G/DL — SIGNIFICANT CHANGE UP (ref 11.5–15.5)
MCHC RBC-ENTMCNC: 30.7 PG — SIGNIFICANT CHANGE UP (ref 27–34)
MCHC RBC-ENTMCNC: 33.9 G/DL — SIGNIFICANT CHANGE UP (ref 32–36)
MCV RBC AUTO: 90.5 FL — SIGNIFICANT CHANGE UP (ref 80–100)
PLATELET # BLD AUTO: 254 K/UL — SIGNIFICANT CHANGE UP (ref 150–400)
RBC # BLD: 4.5 M/UL — SIGNIFICANT CHANGE UP (ref 3.8–5.2)
RBC # FLD: 20.3 % — HIGH (ref 10.3–14.5)
WBC # BLD: 3.7 K/UL — LOW (ref 3.8–10.5)
WBC # FLD AUTO: 3.7 K/UL — LOW (ref 3.8–10.5)

## 2019-04-12 NOTE — PHYSICAL EXAM
[Fully active, able to carry on all pre-disease performance without restriction] : Status 0 - Fully active, able to carry on all pre-disease performance without restriction [Normal] : affect appropriate [de-identified] : as above

## 2019-04-12 NOTE — HISTORY OF PRESENT ILLNESS
[de-identified] : 44 yo female with PMHx of gastritis, left benign ovarian cyst (s/p ovarian conservation resection), adenomyosis, uterine fibroids, menorrhagia presents for evaluation of chronic blood loss anemia. Patient has had heavy menses for years with intolerance to oral iron (nausea, vomiting, constipation) undergoing IV iron formulation.  She first had IV iron 2013 in [place] with appropriate Hb response. Additionally she presented in 2017 again for IV iron with Hb ~7 that responded to 13.9.  LMP July 1, 2018. On July 2 2018 she had severe menstrual bleeding that caused her to be dizzy and presented to [facility] ER. There her Hb was 8.6, down from 9.3 in June. She did not receive any iron or blood transfusion in the ED. Denies adverse reactions to iron infusion.\par \par Has close follow up with her GYN and failed multiple OCPs and IUD in an attempt control the bleeding. OCPs gave her acne so she does not want to try that again. Currently, goes through 6-7 pads a day for the first 3 days, and period lasts for about 6 days. She was offered a hysterectomy, currently patient is considering.\par \par Visit Hx:\par 7/16/19: Patient presents today for follow-up from ED visit for menstrual cycle related chronic blood loss anemia with symptoms of fatigue, intermittent dizziness and mild SOB on exertion. She denies chest pain on exertion, or other sources of bleeding including GI. She denies issues with bleeding when she gets cuts and scrapes, and denies bruising.\par She is requesting IV iron as she is unable to tolerate oral supplementation. She is planning a trip to [place] tomorrow for 2 weeks with her daughters that she cannot change. She is requesting IV Iron today. \par \par 2/28/2019: Patient presents after about 7 months since her IV iron infusion. She continues to have heavy periods and has been experiencing weakness and dizziness again with intermittent headaches similar to when she was anemic in the past. She denies SOB with exertion. She did not want a hysterectomy at this time as she may be looking into fertility options since she was recently remarried.\par \par 4/11/19: She is feeling well today and back to her healthy baseline. Her counts have fully recovered. She continues to have heavy periods and will consider further gyn eval and possible hysterectomy in the future.

## 2019-04-12 NOTE — ASSESSMENT
[FreeTextEntry1] : 43 year old female with Iron deficiency anemia due to chronic blood loss from menorrhagia. Treated with IV injectafer in August 2018 and Feb 2019 with adequate response. Unable to tolerate oral iron therapy. Recommended women multivitamin with iron as a supplement.

## 2019-04-15 DIAGNOSIS — D50.0 IRON DEFICIENCY ANEMIA SECONDARY TO BLOOD LOSS (CHRONIC): ICD-10-CM

## 2019-06-24 ENCOUNTER — APPOINTMENT (OUTPATIENT)
Dept: PULMONOLOGY | Facility: CLINIC | Age: 44
End: 2019-06-24
Payer: COMMERCIAL

## 2019-06-24 VITALS
WEIGHT: 174.13 LBS | SYSTOLIC BLOOD PRESSURE: 117 MMHG | BODY MASS INDEX: 31.24 KG/M2 | HEIGHT: 62.56 IN | DIASTOLIC BLOOD PRESSURE: 78 MMHG | TEMPERATURE: 98.3 F | OXYGEN SATURATION: 96 % | HEART RATE: 82 BPM | RESPIRATION RATE: 16 BRPM

## 2019-06-24 DIAGNOSIS — Z78.9 OTHER SPECIFIED HEALTH STATUS: ICD-10-CM

## 2019-06-24 PROCEDURE — 99204 OFFICE O/P NEW MOD 45 MIN: CPT

## 2019-06-27 NOTE — HISTORY OF PRESENT ILLNESS
[Snoring] : snoring [Witnessed Apneas] : witnessed sleep apnea [Frequent Nocturnal Awakening] : frequent nocturnal awakening [ESS: ___] : ESS score [unfilled] [Awakes Unrefreshed] : awakening unrefreshed [Awakes with Headache] : headache upon awakening [Awakening With Dry Mouth] : awakening with dry mouth [Recent  Weight Gain] : recent weight gain [DIS] : DIS [Arises: ___] : arises at [unfilled] [To Bed: ___] : ~he/she~ goes to bed at [unfilled] [Sleep Onset Latency: ___ minutes] : sleep onset latency of [unfilled] minutes reported [Nocturnal Awakenings: ___] : ~he/she~ typically has [unfilled] nocturnal awakenings [WASO: ___] : Wake time after sleep onset is [unfilled] [TST: ___] : Total sleep time is [unfilled] [Daytime Sleep: ___] : daytime sleep: [unfilled] [FreeTextEntry1] : This 43 year old female presents with chief complaint of fatigue and difficulty falling asleep for one year.\par \par COMORBID:  Obesity, iron deficiency anemia, gastric ulcer.\par \par The patient reports fatigue, hypersomnolence, frequent awakenings for unclear reasons, nonrestorative sleep, awakening with headaches and dry mouth, witnessed apneas, nocturnal gasping, and snoring.  She has a son on CPAP for DWIGHT.\par \par She experiences drowsy driving, but has not fallen asleep driving.  She denies head injury or prolonged viral illness at onset of hypersomnia.  No narcolepsy symptoms.  She gained 6 lbs weight over 3 months.  She consumes 1 coffee and 1-2 iced teas daily. \par \par She typically sleeps 6.5 hours between 10:30PM-8:30AM, taking 2 hours to fall asleep, awakening 3 times for nocturia, without daytime sleep.  She works as  9AM-2PM.  [Unintentional Sleep while Active] : no unintentional sleep while active [Unintentional Sleep While Inactive] : no unintentional sleep while inactive [DMS] : no DMS [Cataplexy] : no cataplexy [Unusual Sleep Behavior] : no unusual sleep behavior [Sleep Paralysis] : no sleep paralysis [Hypnopompic Hallucinations] : no hallucinations when awakening [Hypnagogic Hallucinations] : no hallucinations when falling asleep [Lower Extremity Discomfort] : no lower extremity discomfort in evening or at bedtime

## 2019-06-27 NOTE — PHYSICAL EXAM
[Normal Appearance] : normal appearance [General Appearance - In No Acute Distress] : no acute distress [Well Groomed] : well groomed [Normal Conjunctiva] : the conjunctiva exhibited no abnormalities [I] : I [Neck Appearance] : the appearance of the neck was normal [Heart Rate And Rhythm] : heart rate was normal and rhythm regular [Murmurs] : no murmurs [] : no respiratory distress [Auscultation Breath Sounds / Voice Sounds] : lungs were clear to auscultation bilaterally [Involuntary Movements] : no involuntary movements were seen [1+ Pitting] : 1+  pitting [Affect] : the affect was normal [No Focal Deficits] : no focal deficits [Mood] : the mood was normal [Oriented To Time, Place, And Person] : oriented to person, place, and time [Skin Color & Pigmentation] : normal skin color and pigmentation [FreeTextEntry2] : ankles

## 2019-06-27 NOTE — REVIEW OF SYSTEMS
[EDS: ESS=____] : daytime somnolence: ESS=[unfilled] [Fatigue] : fatigue [Recent Wt Gain (___ Lbs)] : recent [unfilled] ~Ulb weight gain [Edema] : ~T edema was present [Obesity] : obesity [History of Iron Deficiency] : history of iron deficiency [Nocturia] : nocturia [Arthralgias] : arthralgias [Negative] : Neurologic [Nasal Congestion] : no nasal congestion [Shortness Of Breath] : no shortness of breath [Chest Pain] : no chest pain [Postnasal Drip] : no postnasal drip [Palpitations] : no palpitations [Thyroid Disease] : no thyroid disease [Diabetes] : no diabetes  [Depression] : no depression [Heartburn] : no heartburn [Anxious] : not anxious [Late day/ Evening symptoms] : no late day/evening symptoms [Unusual Sleep Behavior] : no unusual sleep behavior [Difficulty Initiating Sleep] : no difficulty falling asleep [de-identified] : menorrhagia with uterine myoma [Cataplexy] :  no cataplexy

## 2019-06-27 NOTE — CONSULT LETTER
[Dear  ___] : Dear  [unfilled], [Please see my note below.] : Please see my note below. [Consult Letter:] : I had the pleasure of evaluating your patient, [unfilled]. [Consult Closing:] : Thank you very much for allowing me to participate in the care of this patient.  If you have any questions, please do not hesitate to contact me. [Sincerely,] : Sincerely, [FreeTextEntry3] : Alesha Montenegro MD

## 2019-06-27 NOTE — ASSESSMENT
[FreeTextEntry1] : This 43 year old female presents with chief complaint of fatigue and difficulty falling asleep for one year.\par \par COMORBID:  Obesity, iron deficiency anemia, gastric ulcer.\par \par The patient reports fatigue, hypersomnolence, frequent awakenings for unclear reasons, nonrestorative sleep, awakening with headaches and dry mouth, witnessed apneas, nocturnal gasping, and snoring.  She has a son on CPAP for DWIGHT.\par \par She experiences drowsy driving, but has not fallen asleep driving.  She denies head injury or prolonged viral illness at onset of hypersomnia.  No narcolepsy symptoms.  She gained 6 lbs weight over 3 months.  She consumes 1 coffee and 1-2 iced teas daily. \par \par She typically sleeps 6.5 hours between 10:30PM-8:30AM, taking 2 hours to fall asleep, awakening 3 times for nocturia, without daytime sleep.  She works as  9AM-2PM. \par \par Will order diagnostic PSG\par I explained the rationale for treatment of DWIGHT -- to improve quality of life, daytime function and to decrease the cardiometabolic and other medical risks that are associated with untreated DWIGHT. The patient verbalized understanding.\par I also explained that the patient can expect a follow up call once results of the above study becomes available.\par I explained the relationship between obesity and obstructive sleep apnea and the role of weight loss in improving severity of DWIGHT.\par

## 2019-08-15 ENCOUNTER — LABORATORY RESULT (OUTPATIENT)
Age: 44
End: 2019-08-15

## 2019-08-15 ENCOUNTER — OUTPATIENT (OUTPATIENT)
Dept: OUTPATIENT SERVICES | Facility: HOSPITAL | Age: 44
LOS: 1 days | End: 2019-08-15

## 2019-08-15 ENCOUNTER — APPOINTMENT (OUTPATIENT)
Dept: OBGYN | Facility: HOSPITAL | Age: 44
End: 2019-08-15
Payer: COMMERCIAL

## 2019-08-15 VITALS
DIASTOLIC BLOOD PRESSURE: 74 MMHG | BODY MASS INDEX: 32.2 KG/M2 | SYSTOLIC BLOOD PRESSURE: 116 MMHG | HEART RATE: 77 BPM | HEIGHT: 62 IN | WEIGHT: 175 LBS

## 2019-08-15 DIAGNOSIS — Z98.891 HISTORY OF UTERINE SCAR FROM PREVIOUS SURGERY: Chronic | ICD-10-CM

## 2019-08-15 DIAGNOSIS — Z98.51 TUBAL LIGATION STATUS: Chronic | ICD-10-CM

## 2019-08-15 DIAGNOSIS — Z98.890 OTHER SPECIFIED POSTPROCEDURAL STATES: Chronic | ICD-10-CM

## 2019-08-15 LAB
BASOPHILS # BLD AUTO: 0.05 K/UL — SIGNIFICANT CHANGE UP (ref 0–0.2)
BASOPHILS NFR BLD AUTO: 1 % — SIGNIFICANT CHANGE UP (ref 0–2)
EOSINOPHIL # BLD AUTO: 0.07 K/UL — SIGNIFICANT CHANGE UP (ref 0–0.5)
EOSINOPHIL NFR BLD AUTO: 1.4 % — SIGNIFICANT CHANGE UP (ref 0–6)
HBV SURFACE AG SER-ACNC: NEGATIVE — SIGNIFICANT CHANGE UP
HCT VFR BLD CALC: 34 % — LOW (ref 34.5–45)
HGB BLD-MCNC: 10.7 G/DL — LOW (ref 11.5–15.5)
IMM GRANULOCYTES NFR BLD AUTO: 0.2 % — SIGNIFICANT CHANGE UP (ref 0–1.5)
LYMPHOCYTES # BLD AUTO: 2.17 K/UL — SIGNIFICANT CHANGE UP (ref 1–3.3)
LYMPHOCYTES # BLD AUTO: 42.5 % — SIGNIFICANT CHANGE UP (ref 13–44)
MCHC RBC-ENTMCNC: 26.7 PG — LOW (ref 27–34)
MCHC RBC-ENTMCNC: 31.5 % — LOW (ref 32–36)
MCV RBC AUTO: 84.8 FL — SIGNIFICANT CHANGE UP (ref 80–100)
MONOCYTES # BLD AUTO: 0.37 K/UL — SIGNIFICANT CHANGE UP (ref 0–0.9)
MONOCYTES NFR BLD AUTO: 7.2 % — SIGNIFICANT CHANGE UP (ref 2–14)
NEUTROPHILS # BLD AUTO: 2.44 K/UL — SIGNIFICANT CHANGE UP (ref 1.8–7.4)
NEUTROPHILS NFR BLD AUTO: 47.7 % — SIGNIFICANT CHANGE UP (ref 43–77)
NRBC # FLD: 0 K/UL — SIGNIFICANT CHANGE UP (ref 0–0)
PLATELET # BLD AUTO: 382 K/UL — SIGNIFICANT CHANGE UP (ref 150–400)
PMV BLD: 10.9 FL — SIGNIFICANT CHANGE UP (ref 7–13)
RBC # BLD: 4.01 M/UL — SIGNIFICANT CHANGE UP (ref 3.8–5.2)
RBC # FLD: 13.9 % — SIGNIFICANT CHANGE UP (ref 10.3–14.5)
WBC # BLD: 5.11 K/UL — SIGNIFICANT CHANGE UP (ref 3.8–10.5)
WBC # FLD AUTO: 5.11 K/UL — SIGNIFICANT CHANGE UP (ref 3.8–10.5)

## 2019-08-15 PROCEDURE — 99213 OFFICE O/P EST LOW 20 MIN: CPT | Mod: GC

## 2019-08-16 DIAGNOSIS — N80.9 ENDOMETRIOSIS, UNSPECIFIED: ICD-10-CM

## 2019-08-16 DIAGNOSIS — L70.0 ACNE VULGARIS: ICD-10-CM

## 2019-08-16 LAB
C TRACH RRNA SPEC QL NAA+PROBE: SIGNIFICANT CHANGE UP
N GONORRHOEA RRNA SPEC QL NAA+PROBE: SIGNIFICANT CHANGE UP
SPECIMEN SOURCE: SIGNIFICANT CHANGE UP
T PALLIDUM AB TITR SER: NEGATIVE — SIGNIFICANT CHANGE UP

## 2019-08-16 NOTE — PHYSICAL EXAM
[Awake] : awake [Alert] : alert [Acute Distress] : acute distress [Soft] : soft [Oriented x3] : oriented to person, place, and time [No Lesions] : no genitalia lesions [Labia Majora] : labia major [Labia Minora] : labia minora [Normal] : clitoris [Pink Rugae] : pink rugae [No Bleeding] : there was no active vaginal bleeding [Anteversion] : anteverted [Enlarged ___ wks] : enlarged [unfilled] ~Uweeks [Tender] : non tender [Labia Majora Erythema] : no erythema of the labia majora [H/Smegaly] : no hepatosplenomegaly [Distended] : not distended [Labia Minora Erythema] : no erythema of the labia minora

## 2019-08-20 ENCOUNTER — OUTPATIENT (OUTPATIENT)
Dept: OUTPATIENT SERVICES | Facility: HOSPITAL | Age: 44
LOS: 1 days | End: 2019-08-20
Payer: COMMERCIAL

## 2019-08-20 ENCOUNTER — APPOINTMENT (OUTPATIENT)
Dept: ULTRASOUND IMAGING | Facility: CLINIC | Age: 44
End: 2019-08-20
Payer: SELF-PAY

## 2019-08-20 DIAGNOSIS — Z98.890 OTHER SPECIFIED POSTPROCEDURAL STATES: Chronic | ICD-10-CM

## 2019-08-20 DIAGNOSIS — N80.9 ENDOMETRIOSIS, UNSPECIFIED: ICD-10-CM

## 2019-08-20 DIAGNOSIS — Z98.891 HISTORY OF UTERINE SCAR FROM PREVIOUS SURGERY: Chronic | ICD-10-CM

## 2019-08-20 DIAGNOSIS — Z98.51 TUBAL LIGATION STATUS: Chronic | ICD-10-CM

## 2019-08-20 PROCEDURE — 76856 US EXAM PELVIC COMPLETE: CPT

## 2019-08-20 PROCEDURE — 76830 TRANSVAGINAL US NON-OB: CPT | Mod: 26

## 2019-08-20 PROCEDURE — 76856 US EXAM PELVIC COMPLETE: CPT | Mod: 26

## 2019-08-20 PROCEDURE — 76830 TRANSVAGINAL US NON-OB: CPT

## 2019-08-21 ENCOUNTER — OUTPATIENT (OUTPATIENT)
Dept: OUTPATIENT SERVICES | Facility: HOSPITAL | Age: 44
LOS: 1 days | Discharge: HOME | End: 2019-08-21

## 2019-08-21 DIAGNOSIS — D50.0 IRON DEFICIENCY ANEMIA SECONDARY TO BLOOD LOSS (CHRONIC): ICD-10-CM

## 2019-08-21 DIAGNOSIS — Z98.890 OTHER SPECIFIED POSTPROCEDURAL STATES: Chronic | ICD-10-CM

## 2019-08-21 DIAGNOSIS — Z98.891 HISTORY OF UTERINE SCAR FROM PREVIOUS SURGERY: Chronic | ICD-10-CM

## 2019-08-21 DIAGNOSIS — Z98.51 TUBAL LIGATION STATUS: Chronic | ICD-10-CM

## 2019-08-22 ENCOUNTER — RESULT REVIEW (OUTPATIENT)
Age: 44
End: 2019-08-22

## 2019-08-22 ENCOUNTER — APPOINTMENT (OUTPATIENT)
Dept: HEMATOLOGY ONCOLOGY | Facility: CLINIC | Age: 44
End: 2019-08-22

## 2019-08-22 VITALS
BODY MASS INDEX: 32.26 KG/M2 | HEART RATE: 90 BPM | OXYGEN SATURATION: 99 % | RESPIRATION RATE: 18 BRPM | WEIGHT: 176.37 LBS | DIASTOLIC BLOOD PRESSURE: 75 MMHG | SYSTOLIC BLOOD PRESSURE: 110 MMHG | TEMPERATURE: 98.8 F

## 2019-08-22 LAB
ALBUMIN SERPL ELPH-MCNC: 4.1 G/DL — SIGNIFICANT CHANGE UP (ref 3.3–5)
ALP SERPL-CCNC: 54 U/L — SIGNIFICANT CHANGE UP (ref 40–120)
ALT FLD-CCNC: 12 U/L — SIGNIFICANT CHANGE UP (ref 10–45)
ANION GAP SERPL CALC-SCNC: 10 MMOL/L — SIGNIFICANT CHANGE UP (ref 5–17)
AST SERPL-CCNC: 14 U/L — SIGNIFICANT CHANGE UP (ref 10–40)
BILIRUB SERPL-MCNC: 0.4 MG/DL — SIGNIFICANT CHANGE UP (ref 0.2–1.2)
BUN SERPL-MCNC: 18 MG/DL — SIGNIFICANT CHANGE UP (ref 7–23)
CALCIUM SERPL-MCNC: 9.5 MG/DL — SIGNIFICANT CHANGE UP (ref 8.4–10.5)
CHLORIDE SERPL-SCNC: 101 MMOL/L — SIGNIFICANT CHANGE UP (ref 96–108)
CO2 SERPL-SCNC: 25 MMOL/L — SIGNIFICANT CHANGE UP (ref 22–31)
CREAT SERPL-MCNC: 0.88 MG/DL — SIGNIFICANT CHANGE UP (ref 0.5–1.3)
FERRITIN SERPL-MCNC: 6 NG/ML — LOW (ref 15–150)
GLUCOSE SERPL-MCNC: 87 MG/DL — SIGNIFICANT CHANGE UP (ref 70–99)
HCT VFR BLD CALC: 35.5 % — SIGNIFICANT CHANGE UP (ref 34.5–45)
HGB BLD-MCNC: 11.4 G/DL — LOW (ref 11.5–15.5)
IRON SATN MFR SERPL: 26 UG/DL — LOW (ref 30–160)
IRON SATN MFR SERPL: 6 % — LOW (ref 14–50)
MCHC RBC-ENTMCNC: 27.1 PG — SIGNIFICANT CHANGE UP (ref 27–34)
MCHC RBC-ENTMCNC: 32 G/DL — SIGNIFICANT CHANGE UP (ref 32–36)
MCV RBC AUTO: 84.7 FL — SIGNIFICANT CHANGE UP (ref 80–100)
PLATELET # BLD AUTO: 353 K/UL — SIGNIFICANT CHANGE UP (ref 150–400)
POTASSIUM SERPL-MCNC: 4.4 MMOL/L — SIGNIFICANT CHANGE UP (ref 3.5–5.3)
POTASSIUM SERPL-SCNC: 4.4 MMOL/L — SIGNIFICANT CHANGE UP (ref 3.5–5.3)
PROT SERPL-MCNC: 6.5 G/DL — SIGNIFICANT CHANGE UP (ref 6–8.3)
RBC # BLD: 4.19 M/UL — SIGNIFICANT CHANGE UP (ref 3.8–5.2)
RBC # FLD: 14.3 % — SIGNIFICANT CHANGE UP (ref 10.3–14.5)
SODIUM SERPL-SCNC: 136 MMOL/L — SIGNIFICANT CHANGE UP (ref 135–145)
TIBC SERPL-MCNC: 416 UG/DL — SIGNIFICANT CHANGE UP (ref 220–430)
UIBC SERPL-MCNC: 390 UG/DL — HIGH (ref 110–370)
WBC # BLD: 6.1 K/UL — SIGNIFICANT CHANGE UP (ref 3.8–10.5)
WBC # FLD AUTO: 6.1 K/UL — SIGNIFICANT CHANGE UP (ref 3.8–10.5)

## 2019-08-22 RX ORDER — FLUOCINONIDE 0.5 MG/ML
0.05 SOLUTION TOPICAL
Qty: 1 | Refills: 2 | Status: DISCONTINUED | COMMUNITY
Start: 2019-02-15 | End: 2019-08-22

## 2019-08-22 RX ORDER — MINOCYCLINE HYDROCHLORIDE 50 MG/1
50 TABLET ORAL TWICE DAILY
Qty: 60 | Refills: 0 | Status: DISCONTINUED | COMMUNITY
Start: 2019-03-12 | End: 2019-08-22

## 2019-08-22 RX ORDER — KETOCONAZOLE 20.5 MG/ML
2 SHAMPOO, SUSPENSION TOPICAL
Qty: 1 | Refills: 11 | Status: DISCONTINUED | COMMUNITY
Start: 2019-02-15 | End: 2019-08-22

## 2019-08-27 NOTE — HISTORY OF PRESENT ILLNESS
[de-identified] : 43 yo female with PMHx of gastritis, left benign ovarian cyst (s/p ovarian conservation resection), adenomyosis, uterine fibroids, menorrhagia presents for evaluation of chronic blood loss anemia. Patient has had heavy menses for years with intolerance to oral iron (nausea, vomiting, constipation) undergoing IV iron formulation.  She first had IV iron 2013 in [place] with appropriate Hgb response. Additionally she presented in 2017 again for IV iron with Hgb ~7 that responded to 13.9.  LMP July 1, 2018. On July 2 2018 she had severe menstrual bleeding that caused her to be dizzy and presented to [facility] ER. There her Hgb was 8.6, down from 9.3 in June. She did not receive any iron or blood transfusion in the ED. Denies adverse reactions to iron infusion.\par \par Has close follow up with her GYN and failed multiple OCPs and IUD in an attempt control the bleeding. OCPs gave her acne so she does not want to try that again. Currently, goes through 6-7 pads a day for the first 3 days, and period lasts for about 6 days. She was offered a hysterectomy, currently patient is considering.\par \par Visit Hx:\par 7/16/19: Patient presents today for follow-up from ED visit for menstrual cycle related chronic blood loss anemia with symptoms of fatigue, intermittent dizziness and mild SOB on exertion. She denies chest pain on exertion, or other sources of bleeding including GI. She denies issues with bleeding when she gets cuts and scrapes, and denies bruising.\par She is requesting IV iron as she is unable to tolerate oral supplementation. She is planning a trip to [place] tomorrow for 2 weeks with her daughters that she cannot change. She is requesting IV Iron today. \par \par 2/28/2019: Patient presents after about 7 months since her IV iron infusion. She continues to have heavy periods and has been experiencing weakness and dizziness again with intermittent headaches similar to when she was anemic in the past. She denies SOB with exertion. She did not want a hysterectomy at this time as she may be looking into fertility options since she was recently remarried.\par \par 4/11/19: She is feeling well today and back to her healthy baseline. Her counts have fully recovered. She continues to have heavy periods and will consider further gyn eval and possible hysterectomy in the future.\par \par 8/22/2019: Patient complains of recurrent dizziness and losing her hair again. She also reports fatigue / weakness. She denies pica or shortness of breath. LMP 8/11/19 (heavy bleeding experienced x 5 days). She also complains of tenderness in her right neck where she feels a right swollen node. This was present last visit and has not significantly changed in size. Patient will be evaluated for an additional treatment with Injectafer. She is also being evaluated for endometrial thickening and myomatous uterus and hopes to have a D&C procedure in the near future. She is still discussion fertility options and has not yet decided on hysterectomy.

## 2019-08-27 NOTE — ASSESSMENT
[Palliative Care Plan] : not applicable at this time [Curative] : Goals of care discussed with patient: Curative [FreeTextEntry1] : 44 year old female with Iron deficiency anemia due to chronic blood loss from menorrhagia. Treated with IV Injectafer in August 2018 and Feb 2019 with adequate response. Unable to tolerate oral iron therapy. Presenting today with repeat DIANA symptoms.\par \par Plan:\par - Counts previously recovered with improvement in ferritin > 100 last visit in May 2019, however patient now with fatigue and hair loss again. Similar Sx with prior anemia presentations.\par - Planning a D&C gyn follow-up for continued menorrhagia\par - CBC today: Hgb 11.4, Ferritin 6\par - Will setup for Injectafer treatment x 2 (first dose Aug 29)\par - Will follow-up in 7 weeks / after Injectafer treatment\par \par Discussed with Dr Condon

## 2019-08-27 NOTE — PHYSICAL EXAM
[Fully active, able to carry on all pre-disease performance without restriction] : Status 0 - Fully active, able to carry on all pre-disease performance without restriction [Normal] : grossly intact [de-identified] : Posterior to anterior cervical lymph node (1 node) measuring about 5 mm, freely mobile, tender [de-identified] : No axillary or inguinal adenopathy

## 2019-08-27 NOTE — REVIEW OF SYSTEMS
[Fatigue] : fatigue [Dysmenorrhea/Abn Vaginal Bleeding] : dysmenorrhea/abnormal vaginal bleeding [Swollen Glands] : swollen glands [Chills] : no chills [Fever] : no fever [Night Sweats] : no night sweats [Dysphagia] : no dysphagia [Nosebleeds] : no nosebleeds [Odynophagia] : no odynophagia [Chest Pain] : no chest pain [Palpitations] : no palpitations [Shortness Of Breath] : no shortness of breath [Wheezing] : no wheezing [SOB on Exertion] : no shortness of breath during exertion [Cough] : no cough [Abdominal Pain] : no abdominal pain [Constipation] : no constipation [Vomiting] : no vomiting [Dysuria] : no dysuria [Diarrhea] : no diarrhea [Incontinence] : no incontinence [Vaginal Discharge] : no vaginal discharge [Joint Pain] : no joint pain [Muscle Pain] : no muscle pain [Skin Rash] : no skin rash [Depression] : no depression [Insomnia] : no insomnia [Easy Bruising] : no tendency for easy bruising [Easy Bleeding] : no tendency for easy bleeding [FreeTextEntry4] : hairloss

## 2019-08-29 ENCOUNTER — APPOINTMENT (OUTPATIENT)
Dept: INFUSION THERAPY | Facility: HOSPITAL | Age: 44
End: 2019-08-29

## 2019-09-05 ENCOUNTER — APPOINTMENT (OUTPATIENT)
Dept: INFUSION THERAPY | Facility: HOSPITAL | Age: 44
End: 2019-09-05

## 2019-09-10 ENCOUNTER — APPOINTMENT (OUTPATIENT)
Dept: INFUSION THERAPY | Facility: HOSPITAL | Age: 44
End: 2019-09-10

## 2019-09-11 ENCOUNTER — APPOINTMENT (OUTPATIENT)
Dept: OBGYN | Facility: HOSPITAL | Age: 44
End: 2019-09-11
Payer: COMMERCIAL

## 2019-09-11 ENCOUNTER — OUTPATIENT (OUTPATIENT)
Dept: OUTPATIENT SERVICES | Facility: HOSPITAL | Age: 44
LOS: 1 days | End: 2019-09-11

## 2019-09-11 VITALS
HEART RATE: 100 BPM | BODY MASS INDEX: 32.74 KG/M2 | SYSTOLIC BLOOD PRESSURE: 127 MMHG | DIASTOLIC BLOOD PRESSURE: 82 MMHG | WEIGHT: 179 LBS

## 2019-09-11 DIAGNOSIS — Z98.890 OTHER SPECIFIED POSTPROCEDURAL STATES: Chronic | ICD-10-CM

## 2019-09-11 DIAGNOSIS — Z98.51 TUBAL LIGATION STATUS: Chronic | ICD-10-CM

## 2019-09-11 DIAGNOSIS — Z98.891 HISTORY OF UTERINE SCAR FROM PREVIOUS SURGERY: Chronic | ICD-10-CM

## 2019-09-11 PROCEDURE — 99213 OFFICE O/P EST LOW 20 MIN: CPT | Mod: GC

## 2019-09-12 DIAGNOSIS — Z01.818 ENCOUNTER FOR OTHER PREPROCEDURAL EXAMINATION: ICD-10-CM

## 2019-09-13 ENCOUNTER — APPOINTMENT (OUTPATIENT)
Dept: PULMONOLOGY | Facility: CLINIC | Age: 44
End: 2019-09-13
Payer: COMMERCIAL

## 2019-09-13 PROCEDURE — 94010 BREATHING CAPACITY TEST: CPT

## 2019-09-13 PROCEDURE — 94726 PLETHYSMOGRAPHY LUNG VOLUMES: CPT

## 2019-09-13 PROCEDURE — 94729 DIFFUSING CAPACITY: CPT

## 2019-09-16 NOTE — END OF VISIT
[] : Resident [FreeTextEntry3] : Patient seen at bedside with resident, known adenomyosis with AUB and Sono showing potiential endometrial polyps, patient does not wish for hysterectomy and prefers to have D&c hysteroscopy polypectomy, explained risks of procedure at length including but not limited to uterine perforation, patient with hx of CPAP/ does not use a machine ,will need pulm clearance before surgery can be scheduled, patient aware and all questions answerd\par \par \christine Burns MD

## 2019-09-17 ENCOUNTER — APPOINTMENT (OUTPATIENT)
Dept: INFUSION THERAPY | Facility: HOSPITAL | Age: 44
End: 2019-09-17

## 2019-09-18 ENCOUNTER — APPOINTMENT (OUTPATIENT)
Dept: PULMONOLOGY | Facility: CLINIC | Age: 44
End: 2019-09-18
Payer: COMMERCIAL

## 2019-09-18 VITALS
OXYGEN SATURATION: 99 % | BODY MASS INDEX: 32.2 KG/M2 | SYSTOLIC BLOOD PRESSURE: 122 MMHG | HEART RATE: 89 BPM | HEIGHT: 62 IN | TEMPERATURE: 97.9 F | RESPIRATION RATE: 18 BRPM | WEIGHT: 175 LBS | DIASTOLIC BLOOD PRESSURE: 84 MMHG

## 2019-09-18 PROCEDURE — 99214 OFFICE O/P EST MOD 30 MIN: CPT

## 2019-09-18 NOTE — END OF VISIT
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 I agree with his history, physical and assessment.\par

## 2019-09-18 NOTE — ASSESSMENT
[FreeTextEntry1] : 1. Surgical clearance: Patient is cleared from a pulmonary standpoint for proposed gynecologic procedure. For patient’s upcoming surgery, the anesthesiologist should be made aware of her history of suspected ?obstructive sleep apnea, and take appropriate perioperative sleep apnea-related precautions.  These include extubation only when fully awake and alert and in a non-supine position with close post-operative respiratory monitoring until effects of medications with respiratory depressant properties are resolved.  The patient should use CPAP perioperatively as clinically indicated.  With these precautions the patient should tolerate the proposed surgery from a sleep apnea perspective. \par \par 2. Patient will schedule sleep study and f/u with Dr. Montenegro as directed in June.

## 2019-09-18 NOTE — HISTORY OF PRESENT ILLNESS
[FreeTextEntry1] : 43yo F with PMH ?DWIGHT presents for pulmonary clearance for proposed D&C.\par \par She denies history of pulmonary disease. Denies fevers, chills, dyspnea, chest pain/tightness, cough, wheeze. Was seen in our office by Dr. Montenegro for DWIGHT consultation in June, sent for sleep study but was not performed due to insurance difficulties.\par \par Patient does snore, but denies hypersomnolence, waking with headaches or dry mouth. Mallampati score of 2.

## 2019-09-18 NOTE — PHYSICAL EXAM
[General Appearance - Well Developed] : well developed [General Appearance - Well Nourished] : well nourished [Well Groomed] : well groomed [Normal Appearance] : normal appearance [No Deformities] : no deformities [General Appearance - In No Acute Distress] : no acute distress [Normal Conjunctiva] : the conjunctiva exhibited no abnormalities [Eyelids - No Xanthelasma] : the eyelids demonstrated no xanthelasmas [Normal Oropharynx] : normal oropharynx [Neck Appearance] : the appearance of the neck was normal [Thyroid Diffuse Enlargement] : the thyroid was not enlarged [Jugular Venous Distention Increased] : there was no jugular-venous distention [Neck Cervical Mass (___cm)] : no neck mass was observed [Heart Rate And Rhythm] : heart rate and rhythm were normal [Thyroid Nodule] : there were no palpable thyroid nodules [Heart Sounds] : normal S1 and S2 [Murmurs] : no murmurs present [Respiration, Rhythm And Depth] : normal respiratory rhythm and effort [Auscultation Breath Sounds / Voice Sounds] : lungs were clear to auscultation bilaterally [Exaggerated Use Of Accessory Muscles For Inspiration] : no accessory muscle use [II] : II [Abnormal Walk] : normal gait [Nail Clubbing] : no clubbing of the fingernails [Gait - Sufficient For Exercise Testing] : the gait was sufficient for exercise testing [Petechial Hemorrhages (___cm)] : no petechial hemorrhages [Cyanosis, Localized] : no localized cyanosis [Skin Color & Pigmentation] : normal skin color and pigmentation [] : no rash [No Venous Stasis] : no venous stasis [Skin Lesions] : no skin lesions [No Skin Ulcers] : no skin ulcer [No Xanthoma] : no  xanthoma was observed [Deep Tendon Reflexes (DTR)] : deep tendon reflexes were 2+ and symmetric [Sensation] : the sensory exam was normal to light touch and pinprick [No Focal Deficits] : no focal deficits [Oriented To Time, Place, And Person] : oriented to person, place, and time [Impaired Insight] : insight and judgment were intact [Affect] : the affect was normal

## 2019-09-24 ENCOUNTER — APPOINTMENT (OUTPATIENT)
Dept: INFUSION THERAPY | Facility: HOSPITAL | Age: 44
End: 2019-09-24

## 2019-09-24 ENCOUNTER — OUTPATIENT (OUTPATIENT)
Dept: OUTPATIENT SERVICES | Facility: HOSPITAL | Age: 44
LOS: 1 days | Discharge: HOME | End: 2019-09-24

## 2019-09-24 DIAGNOSIS — Z98.891 HISTORY OF UTERINE SCAR FROM PREVIOUS SURGERY: Chronic | ICD-10-CM

## 2019-09-24 DIAGNOSIS — Z98.51 TUBAL LIGATION STATUS: Chronic | ICD-10-CM

## 2019-09-24 DIAGNOSIS — Z98.890 OTHER SPECIFIED POSTPROCEDURAL STATES: Chronic | ICD-10-CM

## 2019-09-24 DIAGNOSIS — D50.0 IRON DEFICIENCY ANEMIA SECONDARY TO BLOOD LOSS (CHRONIC): ICD-10-CM

## 2019-09-25 ENCOUNTER — OUTPATIENT (OUTPATIENT)
Dept: OUTPATIENT SERVICES | Facility: HOSPITAL | Age: 44
LOS: 1 days | End: 2019-09-25

## 2019-09-25 VITALS
HEART RATE: 89 BPM | HEIGHT: 64 IN | OXYGEN SATURATION: 99 % | SYSTOLIC BLOOD PRESSURE: 110 MMHG | RESPIRATION RATE: 16 BRPM | DIASTOLIC BLOOD PRESSURE: 80 MMHG | WEIGHT: 175.93 LBS | TEMPERATURE: 99 F

## 2019-09-25 DIAGNOSIS — Z98.891 HISTORY OF UTERINE SCAR FROM PREVIOUS SURGERY: Chronic | ICD-10-CM

## 2019-09-25 DIAGNOSIS — Z98.890 OTHER SPECIFIED POSTPROCEDURAL STATES: Chronic | ICD-10-CM

## 2019-09-25 DIAGNOSIS — N93.9 ABNORMAL UTERINE AND VAGINAL BLEEDING, UNSPECIFIED: ICD-10-CM

## 2019-09-25 DIAGNOSIS — Z98.51 TUBAL LIGATION STATUS: Chronic | ICD-10-CM

## 2019-09-25 LAB
HCG SERPL-ACNC: < 5 MIU/ML — SIGNIFICANT CHANGE UP
HCT VFR BLD CALC: 36 % — SIGNIFICANT CHANGE UP (ref 34.5–45)
HGB BLD-MCNC: 11.1 G/DL — LOW (ref 11.5–15.5)
MCHC RBC-ENTMCNC: 25.6 PG — LOW (ref 27–34)
MCHC RBC-ENTMCNC: 30.8 % — LOW (ref 32–36)
MCV RBC AUTO: 83.1 FL — SIGNIFICANT CHANGE UP (ref 80–100)
NRBC # FLD: 0 K/UL — SIGNIFICANT CHANGE UP (ref 0–0)
PLATELET # BLD AUTO: 362 K/UL — SIGNIFICANT CHANGE UP (ref 150–400)
PMV BLD: 12.7 FL — SIGNIFICANT CHANGE UP (ref 7–13)
RBC # BLD: 4.33 M/UL — SIGNIFICANT CHANGE UP (ref 3.8–5.2)
RBC # FLD: 19.4 % — HIGH (ref 10.3–14.5)
WBC # BLD: 6.35 K/UL — SIGNIFICANT CHANGE UP (ref 3.8–10.5)
WBC # FLD AUTO: 6.35 K/UL — SIGNIFICANT CHANGE UP (ref 3.8–10.5)

## 2019-09-25 NOTE — H&P PST ADULT - HISTORY OF PRESENT ILLNESS
45 y/o  female presents to PST for pre op evaluation with long term hx of cervical polyp. Now scheduled for Dilation Curettage

## 2019-09-25 NOTE — H&P PST ADULT - MEDICATION HERBAL TYPE, PROFILE
After Visit Summary   3/29/2018    Supriya Herr    MRN: [id number]           Patient Information     Date Of Birth          1947        Visit Information        Provider Department      3/29/2018 11:00 AM Alicia Canada, KAITLYNN [facility] Solid Organ Transplant        Today's Diagnoses     Organ transplant candidate    -  1       Follow-ups after your visit        Your next 10 appointments already scheduled     Apr 03, 2018 12:30 PM CDT   (Arrive by 12:15 PM)   NEW FOOT/ANKLE with Alvaro Gautam MD   [facility] Orthopaedic Clinic ([facility])    [address]  4th Floor  [facility] [address]   [phone]            Apr 11, 2018  9:45 AM CDT   Lab with UC LAB   [facility] Lab [facility])    [address] [place] [address]  1st Floor  [facility] [address]   [phone]            Apr 11, 2018 10:45 AM CDT   (Arrive by 10:15 AM)   Return Visit with Kathryn Basilio MD   [facility] Nephrology ([facility])    [address] [place] [address]  [address]  [facility] [address]   [phone]            Apr 23, 2018  8:30 AM CDT   US AORTIC IMAGING with UCUSV2   [facility] Imaging Center US ([facility])    [address] [place] [address]  1st Floor  [facility] [address]   [phone]           Please bring a list of your medicines (including vitamins, minerals and over-the-counter drugs). Also, tell your doctor about any allergies you may have. Wear comfortable clothes and leave your valuables at home.  Adults: No eating or drinking for 8 hours before the exam. You may take medicine with a small sip of water.  Children: - Children 6+ years: No food or drink for 6 hours before exam. - Children 1-5 years: No food or drink for 4 hours before exam. - Infants, breast-fed: may have breast milk up to 2 hours before exam. - Infants, formula: may have bottle until 4 hours before exam.  Please call the Imaging  Department at your exam site with any questions.            Apr 23, 2018  9:00 AM CDT   US AORTA/IVC/ILIAC DUPLEX COMPLETE with UCUSV2   [facility] Imaging Center US ([facility])    [address] [id number] [phone]           Please bring a list of your medicines (including vitamins, minerals and over-the-counter drugs). Also, tell your doctor about any allergies you may have. Wear comfortable clothes and leave your valuables at home.  Adults: No eating or drinking for 8 hours before the exam. You may take medicine with a small sip of water.  Children: - Children 6+ years: No food or drink for 6 hours before exam. - Children 1-5 years: No food or drink for 4 hours before exam. - Infants, breast-fed: may have breast milk up to 2 hours before exam. - Infants, formula: may have bottle until 4 hours before exam.  Please call the Imaging Department at your exam site with any questions.            Apr 23, 2018 10:30 AM CDT   FULL PULMONARY FUNCTION with  PFL D   [facility] Pulmonary Function Testing ([facility])    [address] [id number] [phone]            Apr 23, 2018  2:30 PM CDT   (Arrive by 2:15 PM)   NEW PANCREAS/KIDNEY TRANSPLANT WORK-UP with Milton Guadarrama MD   [facility] Heart Care ([facility])    [address] [place] [address]  [address] [place] [id number] [phone]            Jun 27, 2018  2:30 PM CDT   (Arrive by 2:15 PM)   New Patient Visit with ABIMAEL Shepard MD   [facility] Dermatology ([facility])    [address] [place] [id number] [phone]              Future tests that were ordered for you today     Open Future Orders        Priority Expected Expires Ordered    MA SCREENING DIGITAL BILAT - Future  (s+30) Routine  3/30/2019 3/30/2018            Who to contact     If you have questions or need  "follow up information about today's clinic visit or your schedule please contact [facility] directly at [phone].  Normal or non-critical lab and imaging results will be communicated to you by [employer], letter or phone within 4 business days after the clinic has received the results. If you do not hear from us within 7 days, please contact the clinic through [employer] or phone. If you have a critical or abnormal lab result, we will notify you by phone as soon as possible.  Submit refill requests through [employer] or call your pharmacy and they will forward the refill request to us. Please allow 3 business days for your refill to be completed.          Additional Information About Your Visit        [employer] Information     [employer] lets you send messages to your doctor, view your test results, renew your prescriptions, schedule appointments and more. To sign up, go to [email or web address] . Click on \"Log in\" on the left side of the screen, which will take you to the Welcome page. Then click on \"Sign up Now\" on the right side of the page.     You will be asked to enter the access code listed below, as well as some personal information. Please follow the directions to create your username and password.     Your access code is: [email or web address]  Expires: 2018  4:04 PM     Your access code will  in 90 days. If you need help or a new code, please call your [facility] clinic or [phone].        Care EveryWhere ID     This is your Care EveryWhere ID. This could be used by other organizations to access your [facility] medical records  [id number]         Blood Pressure from Last 3 Encounters:   18 136/48   18 159/52   18 137/70    Weight from Last 3 Encounters:   18 85.3 kg (188 lb)   18 84.4 kg (186 lb)   18 86.6 kg (191 lb)              Today, you had the following     No orders found for display       Primary Care Provider Office Phone # Fax #    Noam " Luis Jackson [phone]       [address]  [facility] [address]        Equal Access to Services     KALYANI WARREN : Hadraya danisha jacobson xin Mancusoali, waulyssesda deshawnqaxel, lowellta kamarcusda ailin, madeline bond gristere marin lajosé antonioheather chaudhari. So [facility] [phone].    ATENCIÓN: Si habla español, tiene a santoro disposición servicios gratuitos de asistencia lingüística. Llame al [phone].    We comply with applicable federal civil rights laws and [place] laws. We do not discriminate on the basis of race, color, national origin, age, disability, sex, sexual orientation, or gender identity.            Thank you!     Thank you for choosing [facility]  for your care. Our goal is always to provide you with excellent care. Hearing back from our patients is one way we can continue to improve our services. Please take a few minutes to complete the written survey that you may receive in the mail after your visit with us. Thank you!             Your Updated Medication List - Protect others around you: Learn how to safely use, store and throw away your medicines at www.disposemymeds.org.          This list is accurate as of 3/29/18 11:59 PM.  Always use your most recent med list.                   Brand Name Dispense Instructions for use Diagnosis    albuterol 108 (90 BASE) MCG/ACT Inhaler    PROAIR HFA/PROVENTIL HFA/VENTOLIN HFA    3 Inhaler    Inhale 2 puffs into the lungs every 6 hours as needed for shortness of breath / dyspnea or wheezing    Cough       ASPIRIN NOT PRESCRIBED    INTENTIONAL    0 each    1 each continuous prn Antiplatelet medication not prescribed intentionally due to current nosebleeds    Anemia due to blood loss, acute       atorvastatin 20 MG tablet    LIPITOR    90 tablet    Take 1 tablet (20 mg) by mouth daily    Hyperlipidemia LDL goal <100       * blood glucose monitoring test strip    ONETOUCH ULTRA    200 strip    Use to test blood sugars 2 times daily or  as directed.    Type 2 diabetes mellitus with stage 3 chronic kidney disease, without long-term current use of insulin (H)       * blood glucose monitoring test strip    ONETOUCH ULTRA    200 strip    Test your blood sugar 3-4 times per day.    Type 2 diabetes mellitus with hyperglycemia, with long-term current use of insulin (H)       CALCITRIOL PO      Take by mouth daily Unsure of dosage        carvedilol 12.5 MG tablet    COREG    60 tablet    Take 1 tablet (12.5 mg) by mouth 2 times daily (with meals)    Essential hypertension with goal blood pressure less than 140/90       ferrous sulfate 325 (65 FE) MG tablet    IRON    180 tablet    Take 1 tablet (325 mg) by mouth 2 times daily    Iron deficiency       * furosemide 40 MG tablet    LASIX    30 tablet    Take 1 tablet (40 mg) by mouth daily    Lymphedema of both lower extremities       * furosemide 20 MG tablet    LASIX    30 tablet    Take 1 tablet (20 mg) by mouth At Bedtime    Lymphedema of both lower extremities, Essential hypertension with goal blood pressure less than 140/90       insulin glargine 100 UNIT/ML injection    LANTUS    3 mL    Inject 20 Units Subcutaneous At Bedtime    Type 2 diabetes mellitus with diabetic neuropathy, with long-term current use of insulin (H)       insulin pen needle 31G X 6 MM    ULTICARE MINI    100 each    Use daily or as directed.    Type 2 diabetes, HbA1c goal < 7% (H)       lactobacillus rhamnosus (GG) capsule     60 capsule    Take 1 capsule by mouth 2 times daily    Cellulitis of right leg       miconazole 2 % powder    MICATIN; MICRO GUARD    71 g    Apply topically 2 times daily    Fungal dermatitis       MULTIVITAMIN PO      1 tablet by mouth daily        NovoLOG FLEXPEN 100 UNIT/ML injection   Generic drug:  insulin aspart     15 mL    Inject 4 units SQ with breakfast, lunch and dinner.    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    Edema of lower extremity       Urea 20 % Crea cream     85 g    Apply topically daily    Xerosis cutis, Tyloma       * Notice:  This list has 7 medication(s) that are the same as other medications prescribed for you. Read the directions carefully, and ask your doctor or other care provider to review them with you.       Chamomile tea

## 2019-09-25 NOTE — H&P PST ADULT - NEGATIVE OPHTHALMOLOGIC SYMPTOMS
no discharge L/no diplopia/no photophobia/no lacrimation L/no lacrimation R/no blurred vision R/no discharge R/no pain L/no blurred vision L/no pain R/no irritation L/no irritation R/no loss of vision L/no loss of vision R

## 2019-09-25 NOTE — H&P PST ADULT - NEGATIVE CARDIOVASCULAR SYMPTOMS
no palpitations/no dyspnea on exertion/no claudication/no chest pain/no orthopnea/no peripheral edema/no paroxysmal nocturnal dyspnea

## 2019-09-25 NOTE — H&P PST ADULT - NSICDXPASTSURGICALHX_GEN_ALL_CORE_FT
PAST SURGICAL HISTORY:  H/O ovarian cystectomy     History of bilateral tubal ligation     S/P  PAST SURGICAL HISTORY:  H/O abdominoplasty     H/O ovarian cystectomy     History of bilateral tubal ligation     History of elbow surgery right; 2018    S/P      S/P dilation and curettage

## 2019-09-25 NOTE — H&P PST ADULT - NSICDXPASTMEDICALHX_GEN_ALL_CORE_FT
PAST MEDICAL HISTORY:  Cyst of left ovary     Endometrial polyp     Iron deficiency anemia, unspecified iron deficiency anemia type     PUD (peptic ulcer disease)     Uterine leiomyoma, unspecified location PAST MEDICAL HISTORY:  Carpal tunnel syndrome, unspecified laterality     Cyst of left ovary     Endometrial polyp     Iron deficiency anemia, unspecified iron deficiency anemia type     PUD (peptic ulcer disease)     Uterine leiomyoma, unspecified location

## 2019-09-25 NOTE — H&P PST ADULT - NEGATIVE RESPIRATORY AND THORAX SYMPTOMS
no hemoptysis/no wheezing/no dyspnea/no pleuritic chest pain no wheezing/no dyspnea/no cough/no pleuritic chest pain/no hemoptysis

## 2019-09-25 NOTE — H&P PST ADULT - NSANTHOSAYNRD_GEN_A_CORE
No. DWIGHT screening performed.  STOP BANG Legend: 0-2 = LOW Risk; 3-4 = INTERMEDIATE Risk; 5-8 = HIGH Risk

## 2019-09-25 NOTE — H&P PST ADULT - SKIN/BREAST COMMENTS
" breast tenderness, muscle pain especially with bending" - pt instructed to f/u with PCP-  denies chest pain, palpitations or SOB

## 2019-09-25 NOTE — H&P PST ADULT - NSICDXPROBLEM_GEN_ALL_CORE_FT
PROBLEM DIAGNOSES  Problem: Abnormal uterine and vaginal bleeding, unspecified  Assessment and Plan: Scheduled for Dilation Curettage hysteroscopy with Symphion, polypectomy on 09/30/19. Pre op instructions, famotidine given and explained. Pt verbalized understanding.

## 2019-09-25 NOTE — H&P PST ADULT - NEGATIVE SKIN SYMPTOMS
no itching/no dryness/no brittle nails/no change in size/color of mole/no hair loss/no rash/no tumor

## 2019-09-25 NOTE — H&P PST ADULT - NEGATIVE GASTROINTESTINAL SYMPTOMS
no change in bowel habits/no abdominal pain/no melena/no jaundice/no nausea/no vomiting/no hiccoughs/no constipation/no diarrhea

## 2019-09-25 NOTE — H&P PST ADULT - NEGATIVE GENERAL GENITOURINARY SYMPTOMS
no renal colic/no hematuria/no flank pain R/no urine discoloration/normal urinary frequency/no flank pain L

## 2019-09-26 ENCOUNTER — APPOINTMENT (OUTPATIENT)
Dept: HEMATOLOGY ONCOLOGY | Facility: CLINIC | Age: 44
End: 2019-09-26

## 2019-09-26 ENCOUNTER — APPOINTMENT (OUTPATIENT)
Dept: INTERNAL MEDICINE | Facility: CLINIC | Age: 44
End: 2019-09-26

## 2019-09-26 VITALS
SYSTOLIC BLOOD PRESSURE: 110 MMHG | TEMPERATURE: 97.9 F | OXYGEN SATURATION: 98 % | RESPIRATION RATE: 16 BRPM | BODY MASS INDEX: 32.46 KG/M2 | HEART RATE: 74 BPM | WEIGHT: 177.47 LBS | DIASTOLIC BLOOD PRESSURE: 79 MMHG

## 2019-09-29 NOTE — ASU PATIENT PROFILE, ADULT - SITE
as per pt needs medicl clearance- unable to do so needs to be rescheduled -told pt to call clinic and I told gyn on call this info.

## 2019-09-29 NOTE — ASU PATIENT PROFILE, ADULT - NS PREOP MEDICATION GIVEN
call from gyn @1042- they have pulmonary clearance and will call pt to come in for scheduled OR as per gyn pt will try to get clearance tomorrow am and if not will have or rescheduled Agree with above H& P. She was stable after arrival in ER. In discussion with cardiology, she will follow up with them as outpatient. No

## 2019-09-30 PROBLEM — G56.00 CARPAL TUNNEL SYNDROME, UNSPECIFIED UPPER LIMB: Chronic | Status: ACTIVE | Noted: 2019-09-25

## 2019-10-01 ENCOUNTER — APPOINTMENT (OUTPATIENT)
Dept: INTERNAL MEDICINE | Facility: CLINIC | Age: 44
End: 2019-10-01

## 2019-10-01 ENCOUNTER — NON-APPOINTMENT (OUTPATIENT)
Age: 44
End: 2019-10-01

## 2019-10-01 ENCOUNTER — OUTPATIENT (OUTPATIENT)
Dept: OUTPATIENT SERVICES | Facility: HOSPITAL | Age: 44
LOS: 1 days | End: 2019-10-01

## 2019-10-01 VITALS
BODY MASS INDEX: 33.13 KG/M2 | DIASTOLIC BLOOD PRESSURE: 72 MMHG | HEART RATE: 108 BPM | WEIGHT: 180 LBS | HEIGHT: 62 IN | OXYGEN SATURATION: 99 % | SYSTOLIC BLOOD PRESSURE: 108 MMHG

## 2019-10-01 DIAGNOSIS — Z98.890 OTHER SPECIFIED POSTPROCEDURAL STATES: Chronic | ICD-10-CM

## 2019-10-01 DIAGNOSIS — Z98.51 TUBAL LIGATION STATUS: Chronic | ICD-10-CM

## 2019-10-01 DIAGNOSIS — Z98.891 HISTORY OF UTERINE SCAR FROM PREVIOUS SURGERY: Chronic | ICD-10-CM

## 2019-10-01 NOTE — REVIEW OF SYSTEMS
[Hoarseness] : hoarseness [Postnasal Drip] : postnasal drip [Muscle Pain] : muscle pain [Negative] : Heme/Lymph

## 2019-10-02 NOTE — PLAN
[FreeTextEntry1] : 44F with history of iron deficiency anemia 2/2 abnormal uterine bleeding here for pre-operative clearance\par \par #Pre-Op: pt is optimized for surgery\par - pulmonology also cleared pt\par \par #LAD- right sided post cerv LAD \par - check ultrasound of neck and pre-auricular region \par - likely benign in origin\par - instructed pt to return if worsens\par \par # Hemorrhoids\par - pt requesting colorectal referral, given\par \par # Iron deficiency anemia\par - follow up with gyn and heme \par \par # HCM\par - pt deferred flu shot\par \par pt seen and d/w Dr. Barrett\par \par Shi Kilpatrick MD PGY4 EMIM

## 2019-10-02 NOTE — HISTORY OF PRESENT ILLNESS
[No Pertinent Cardiac History] : no history of aortic stenosis, atrial fibrillation, coronary artery disease, recent myocardial infarction, or implantable device/pacemaker [Sleep Apnea] : sleep apnea [(Patient denies any chest pain, claudication, dyspnea on exertion, orthopnea, palpitations or syncope)] : Patient denies any chest pain, claudication, dyspnea on exertion, orthopnea, palpitations or syncope [Good (7-10 METs)] : Good (7-10 METs) [Aortic Stenosis] : no aortic stenosis [Atrial Fibrillation] : no atrial fibrillation [Coronary Artery Disease] : no coronary artery disease [Recent Myocardial Infarction] : no recent myocardial infarction [Implantable Device/Pacemaker] : no implantable device/pacemaker [Asthma] : no asthma [COPD] : no COPD [Smoker] : not a smoker [Family Member] : no family member with adverse anesthesia reaction/sudden death [Self] : no previous adverse anesthesia reaction [Chronic Anticoagulation] : no chronic anticoagulation [Chronic Kidney Disease] : no chronic kidney disease [Diabetes] : no diabetes [FreeTextEntry1] : D&C and hysteroscopy [FreeTextEntry2] : 10/2019 [FreeTextEntry3] : Dr. Chung/GYN [FreeTextEntry4] : Pt is 44F with history of iron deficiency anemia 2/2 abnormal uterine bleeding. Pt is here today for pre-op clearance. Per notes pt had an episode of costochondritis following IV iron administration and gyn requested pre-operative clearance prior to procedure.\par \par Pt reports that chest wall pain is improving. There is no associated shortness of breath, dyspnea on exertion, LE edema. \par \par Pt also had pulmonary clearance for procedure, pt cleared for surgery from pulm perspective. \par Additionally pt also reports right sided lymph node swelling for approx past 7 months. Pt noted it and since then feels that it is slightly larger. Heme evaluated nodule and notes it is 0.5-1cm in size. Recommended that pt have ultrasound as directed by our office. \par Pt also complaining of voice hoarseness and throat pain. No associated difficulty swallowing or sensation of throat swelling.\par \par Denies URI, fevers, chills. [FreeTextEntry7] : N/A

## 2019-10-02 NOTE — END OF VISIT
[] : Resident [FreeTextEntry3] : Here for preop clearance for D+C for abnormal uterine bleeding.\par Has chest pain only when presses on her chest, bends forward. Appears to be consistent with costochondritis. Does not have exertional dyspnea or other concerning cardiac symptoms.\par Saw Pulm on 9/18 for clearance as well due to suspected DWIGHT.\par Pt low risk for low risk procedure.\par Agree with plan as per Dr. Paiz.

## 2019-10-02 NOTE — PHYSICAL EXAM
[No Acute Distress] : no acute distress [Well Nourished] : well nourished [Well Developed] : well developed [Well-Appearing] : well-appearing [PERRL] : pupils equal round and reactive to light [Normal Sclera/Conjunctiva] : normal sclera/conjunctiva [Normal Outer Ear/Nose] : the outer ears and nose were normal in appearance [EOMI] : extraocular movements intact [No JVD] : no jugular venous distention [Supple] : supple [No Respiratory Distress] : no respiratory distress  [Thyroid Normal, No Nodules] : the thyroid was normal and there were no nodules present [Clear to Auscultation] : lungs were clear to auscultation bilaterally [No Accessory Muscle Use] : no accessory muscle use [Normal Rate] : normal rate  [Normal S1, S2] : normal S1 and S2 [Regular Rhythm] : with a regular rhythm [No Varicosities] : no varicosities [No Murmur] : no murmur heard [Pedal Pulses Present] : the pedal pulses are present [No Edema] : there was no peripheral edema [No Extremity Clubbing/Cyanosis] : no extremity clubbing/cyanosis [Non Tender] : non-tender [Soft] : abdomen soft [Non-distended] : non-distended [No Masses] : no abdominal mass palpated [No HSM] : no HSM [Normal Bowel Sounds] : normal bowel sounds [Normal Supraclavicular Nodes] : no supraclavicular lymphadenopathy [Normal Axillary Nodes] : no axillary lymphadenopathy [Normal Inguinal Nodes] : no inguinal lymphadenopathy [Normal Anterior Cervical Nodes] : no anterior cervical lymphadenopathy [Normal Femoral Nodes] : no femoral lymphadenopathy [No CVA Tenderness] : no CVA  tenderness [No Spinal Tenderness] : no spinal tenderness [No Joint Swelling] : no joint swelling [Grossly Normal Strength/Tone] : grossly normal strength/tone [No Rash] : no rash [Coordination Grossly Intact] : coordination grossly intact [No Focal Deficits] : no focal deficits [Normal Affect] : the affect was normal [Normal Gait] : normal gait [Normal Insight/Judgement] : insight and judgment were intact [de-identified] : oropharyngeal cobblestoning [de-identified] : right posterior cervical lymph nodes,1cm and 0.5 cm, non-tender, mobile, soft, right pre-auricular lymph node

## 2019-10-02 NOTE — ASSESSMENT
[Patient Optimized for Surgery] : Patient optimized for surgery [ECG] : ECG [As per surgery] : as per surgery [FreeTextEntry4] : Pt is low risk for low risk procedure

## 2019-10-03 DIAGNOSIS — D50.0 IRON DEFICIENCY ANEMIA SECONDARY TO BLOOD LOSS (CHRONIC): ICD-10-CM

## 2019-10-03 DIAGNOSIS — Z01.818 ENCOUNTER FOR OTHER PREPROCEDURAL EXAMINATION: ICD-10-CM

## 2019-10-03 DIAGNOSIS — N92.0 EXCESSIVE AND FREQUENT MENSTRUATION WITH REGULAR CYCLE: ICD-10-CM

## 2019-10-06 ENCOUNTER — FORM ENCOUNTER (OUTPATIENT)
Age: 44
End: 2019-10-06

## 2019-10-07 ENCOUNTER — APPOINTMENT (OUTPATIENT)
Dept: ULTRASOUND IMAGING | Facility: CLINIC | Age: 44
End: 2019-10-07
Payer: COMMERCIAL

## 2019-10-07 ENCOUNTER — OUTPATIENT (OUTPATIENT)
Dept: OUTPATIENT SERVICES | Facility: HOSPITAL | Age: 44
LOS: 1 days | End: 2019-10-07
Payer: COMMERCIAL

## 2019-10-07 DIAGNOSIS — Z98.890 OTHER SPECIFIED POSTPROCEDURAL STATES: Chronic | ICD-10-CM

## 2019-10-07 DIAGNOSIS — R59.0 LOCALIZED ENLARGED LYMPH NODES: ICD-10-CM

## 2019-10-07 DIAGNOSIS — Z98.891 HISTORY OF UTERINE SCAR FROM PREVIOUS SURGERY: Chronic | ICD-10-CM

## 2019-10-07 DIAGNOSIS — Z98.51 TUBAL LIGATION STATUS: Chronic | ICD-10-CM

## 2019-10-07 PROCEDURE — 76536 US EXAM OF HEAD AND NECK: CPT | Mod: 26

## 2019-10-07 PROCEDURE — 76536 US EXAM OF HEAD AND NECK: CPT

## 2019-10-13 ENCOUNTER — RESULT CHARGE (OUTPATIENT)
Age: 44
End: 2019-10-13

## 2019-10-14 ENCOUNTER — APPOINTMENT (OUTPATIENT)
Dept: INTERNAL MEDICINE | Facility: CLINIC | Age: 44
End: 2019-10-14

## 2019-10-14 ENCOUNTER — OUTPATIENT (OUTPATIENT)
Dept: OUTPATIENT SERVICES | Facility: HOSPITAL | Age: 44
LOS: 1 days | End: 2019-10-14

## 2019-10-14 VITALS
OXYGEN SATURATION: 98 % | RESPIRATION RATE: 16 BRPM | HEIGHT: 60 IN | TEMPERATURE: 99.4 F | WEIGHT: 175.13 LBS | HEART RATE: 111 BPM | BODY MASS INDEX: 34.38 KG/M2

## 2019-10-14 DIAGNOSIS — Z98.890 OTHER SPECIFIED POSTPROCEDURAL STATES: Chronic | ICD-10-CM

## 2019-10-14 DIAGNOSIS — Z98.51 TUBAL LIGATION STATUS: Chronic | ICD-10-CM

## 2019-10-14 DIAGNOSIS — Z98.891 HISTORY OF UTERINE SCAR FROM PREVIOUS SURGERY: Chronic | ICD-10-CM

## 2019-10-15 NOTE — HISTORY OF PRESENT ILLNESS
[FreeTextEntry8] : Ms. Falcon is a 44 year old female with a history of uterine fibroids, DIANA 2/2 AUB with planned D&C + hysteroscopy tomorrow. \par \par Medical clearance obtained for this low risk procedure over the last several weeks (patient had chest pain following IV iron infusion thought to be chostochondritis). Explains that yesterday she began to have a headache, throat pain, throat swelling, pain with swallowing, runny nose. Endorsed chills, no measured fevers at home but was using Tylenol for pain control. \par \par No known sick contacts. No eye redness or discharge, no ear pain. One episode of diarrhea, some nausea but no vomiting. Reduced appetite but able to keep down fluids. Denies chest pain, SOB. Also endorsed joint pain, specifically of the hands and feet. \par Today patient's temperature 99.4 F. Took tylenol prior to today's office visit.

## 2019-10-15 NOTE — ASSESSMENT
[FreeTextEntry1] : Ms. Falcon is a 44 year old female with a history of uterine fibroids, DIANA 2/2 AUB with planned D&C + hysteroscopy tomorrow. \par \par #URI: likely viral pharyngitis\par - Centor Criteria 2-3 (subjective fevers)\par - Rapid Strep negative today in the office\par - Counseled patient on symptom management with lozenges, soups, ibuprofen/tylenol, etc\par - Patient states she will be postponing her planned D&C/hysteroscopy until she feels better\par \par Patient seen and discussed with Dr. Harris,\par \par Carlos Askew, PGY-1\par

## 2019-10-15 NOTE — REVIEW OF SYSTEMS
[Fatigue] : fatigue [Chills] : chills [Sore Throat] : sore throat [Nasal Discharge] : nasal discharge [Postnasal Drip] : postnasal drip [Nausea] : nausea [Joint Pain] : joint pain [Headache] : headache [Fever] : no fever [Itching] : no itching [Pain] : no pain [Discharge] : no discharge [Hearing Loss] : no hearing loss [Earache] : no earache [Palpitations] : no palpitations [Chest Pain] : no chest pain [Orthopnea] : no orthopnea [Shortness Of Breath] : no shortness of breath [Wheezing] : no wheezing [Cough] : no cough [Abdominal Pain] : no abdominal pain [Constipation] : no constipation [Vomiting] : no vomiting [Heartburn] : no heartburn [Incontinence] : no incontinence [Dysuria] : no dysuria [Hematuria] : no hematuria [Muscle Pain] : no muscle pain [Joint Stiffness] : no joint stiffness [Frequency] : no frequency [Skin Rash] : no skin rash [Back Pain] : no back pain [Fainting] : no fainting [Easy Bleeding] : no easy bleeding [Dizziness] : no dizziness [Easy Bruising] : no easy bruising

## 2019-10-16 DIAGNOSIS — J06.9 ACUTE UPPER RESPIRATORY INFECTION, UNSPECIFIED: ICD-10-CM

## 2019-10-16 LAB — S PYO AG SPEC QL IA: NEGATIVE

## 2019-10-22 ENCOUNTER — APPOINTMENT (OUTPATIENT)
Dept: SURGERY | Facility: HOSPITAL | Age: 44
End: 2019-10-22
Payer: COMMERCIAL

## 2019-10-22 ENCOUNTER — OUTPATIENT (OUTPATIENT)
Dept: OUTPATIENT SERVICES | Facility: HOSPITAL | Age: 44
LOS: 1 days | End: 2019-10-22

## 2019-10-22 VITALS
SYSTOLIC BLOOD PRESSURE: 115 MMHG | HEIGHT: 60 IN | WEIGHT: 178 LBS | HEART RATE: 79 BPM | BODY MASS INDEX: 34.95 KG/M2 | DIASTOLIC BLOOD PRESSURE: 70 MMHG

## 2019-10-22 DIAGNOSIS — Z98.890 OTHER SPECIFIED POSTPROCEDURAL STATES: Chronic | ICD-10-CM

## 2019-10-22 DIAGNOSIS — Z98.51 TUBAL LIGATION STATUS: Chronic | ICD-10-CM

## 2019-10-22 DIAGNOSIS — Z98.891 HISTORY OF UTERINE SCAR FROM PREVIOUS SURGERY: Chronic | ICD-10-CM

## 2019-10-22 PROCEDURE — 99214 OFFICE O/P EST MOD 30 MIN: CPT

## 2019-10-23 DIAGNOSIS — K64.9 UNSPECIFIED HEMORRHOIDS: ICD-10-CM

## 2019-11-01 ENCOUNTER — RESULT REVIEW (OUTPATIENT)
Age: 44
End: 2019-11-01

## 2019-11-05 ENCOUNTER — APPOINTMENT (OUTPATIENT)
Dept: COLORECTAL SURGERY | Facility: CLINIC | Age: 44
End: 2019-11-05
Payer: SELF-PAY

## 2019-11-05 ENCOUNTER — OUTPATIENT (OUTPATIENT)
Dept: OUTPATIENT SERVICES | Facility: HOSPITAL | Age: 44
LOS: 1 days | End: 2019-11-05

## 2019-11-05 VITALS
TEMPERATURE: 99 F | RESPIRATION RATE: 16 BRPM | SYSTOLIC BLOOD PRESSURE: 128 MMHG | HEIGHT: 62.5 IN | DIASTOLIC BLOOD PRESSURE: 72 MMHG | WEIGHT: 177.03 LBS | HEART RATE: 90 BPM | OXYGEN SATURATION: 99 %

## 2019-11-05 VITALS
OXYGEN SATURATION: 97 % | HEART RATE: 96 BPM | RESPIRATION RATE: 15 BRPM | DIASTOLIC BLOOD PRESSURE: 79 MMHG | WEIGHT: 172 LBS | HEIGHT: 63 IN | BODY MASS INDEX: 30.48 KG/M2 | SYSTOLIC BLOOD PRESSURE: 113 MMHG

## 2019-11-05 DIAGNOSIS — N93.9 ABNORMAL UTERINE AND VAGINAL BLEEDING, UNSPECIFIED: ICD-10-CM

## 2019-11-05 DIAGNOSIS — Z98.51 TUBAL LIGATION STATUS: Chronic | ICD-10-CM

## 2019-11-05 DIAGNOSIS — Z98.890 OTHER SPECIFIED POSTPROCEDURAL STATES: Chronic | ICD-10-CM

## 2019-11-05 DIAGNOSIS — Z98.891 HISTORY OF UTERINE SCAR FROM PREVIOUS SURGERY: Chronic | ICD-10-CM

## 2019-11-05 LAB
ANION GAP SERPL CALC-SCNC: 13 MMO/L — SIGNIFICANT CHANGE UP (ref 7–14)
BUN SERPL-MCNC: 20 MG/DL — SIGNIFICANT CHANGE UP (ref 7–23)
CALCIUM SERPL-MCNC: 9.3 MG/DL — SIGNIFICANT CHANGE UP (ref 8.4–10.5)
CHLORIDE SERPL-SCNC: 99 MMOL/L — SIGNIFICANT CHANGE UP (ref 98–107)
CO2 SERPL-SCNC: 26 MMOL/L — SIGNIFICANT CHANGE UP (ref 22–31)
CREAT SERPL-MCNC: 0.84 MG/DL — SIGNIFICANT CHANGE UP (ref 0.5–1.3)
GLUCOSE SERPL-MCNC: 149 MG/DL — HIGH (ref 70–99)
HCT VFR BLD CALC: 31.7 % — LOW (ref 34.5–45)
HGB BLD-MCNC: 9.5 G/DL — LOW (ref 11.5–15.5)
MCHC RBC-ENTMCNC: 24.2 PG — LOW (ref 27–34)
MCHC RBC-ENTMCNC: 30 % — LOW (ref 32–36)
MCV RBC AUTO: 80.7 FL — SIGNIFICANT CHANGE UP (ref 80–100)
NRBC # FLD: 0 K/UL — SIGNIFICANT CHANGE UP (ref 0–0)
PLATELET # BLD AUTO: 477 K/UL — HIGH (ref 150–400)
PMV BLD: 11.2 FL — SIGNIFICANT CHANGE UP (ref 7–13)
POTASSIUM SERPL-MCNC: 3.5 MMOL/L — SIGNIFICANT CHANGE UP (ref 3.5–5.3)
POTASSIUM SERPL-SCNC: 3.5 MMOL/L — SIGNIFICANT CHANGE UP (ref 3.5–5.3)
RBC # BLD: 3.93 M/UL — SIGNIFICANT CHANGE UP (ref 3.8–5.2)
RBC # FLD: 19.1 % — HIGH (ref 10.3–14.5)
SODIUM SERPL-SCNC: 138 MMOL/L — SIGNIFICANT CHANGE UP (ref 135–145)
WBC # BLD: 5.45 K/UL — SIGNIFICANT CHANGE UP (ref 3.8–10.5)
WBC # FLD AUTO: 5.45 K/UL — SIGNIFICANT CHANGE UP (ref 3.8–10.5)

## 2019-11-05 PROCEDURE — 46600 DIAGNOSTIC ANOSCOPY SPX: CPT

## 2019-11-05 PROCEDURE — 99244 OFF/OP CNSLTJ NEW/EST MOD 40: CPT | Mod: 25

## 2019-11-05 NOTE — H&P PST ADULT - ATTENDING COMMENTS
45 yo female presenting today for external hemorrhoidectomy/skin tag removal  -Pt had the H&P performed for removal of uterine polyps which was performed last week.  This procedure was within 1 month of PST evaluation.  -There is no change to the above H&P except that the uterine polyps have now been removed.  -Pt is otherwise stable to proceed w/ procedure from colon and rectal surgery standpoint

## 2019-11-05 NOTE — H&P PST ADULT - NSICDXPASTSURGICALHX_GEN_ALL_CORE_FT
PAST SURGICAL HISTORY:  H/O abdominoplasty     H/O ovarian cystectomy     History of bilateral tubal ligation     History of elbow surgery right; 2018    S/P      S/P dilation and curettage PAST SURGICAL HISTORY:  H/O abdominoplasty     H/O ovarian cystectomy     History of bilateral tubal ligation     History of D&C Polypectomy    History of elbow surgery right; 2018    S/P  x2    S/P dilation and curettage

## 2019-11-05 NOTE — H&P PST ADULT - HISTORY OF PRESENT ILLNESS
45 y/o  female presents to PST for pre op evaluation with long term hx of cervical polyp. Now scheduled for Dilation Curettage Pt is a 45 y/o  female; pt reports h/o heavy menses ; pt s/p GYN evaluation ' there are polyps " Pt is  scheduled for Dilation Curettage Hysteroscopy Polypectomy with symphion    Pt was seen 9/25/19 for PST ; surgery was cancelled secondary to FLU Pt is a 45 y/o  female; pt reports h/o heavy menses ; pt s/p GYN evaluation ' there are polyps " Pt is  scheduled for Dilation Curettage Hysteroscopy Polypectomy with symphion    surgery was previously scheduled 9/19 canceled for clearance ; obtained 10/01/19    Pt was seen 9/25/19 for PST ; surgery was cancelled secondary to FLU Pt is a 45 y/o  female; pt reports h/o heavy menses ; pt s/p GYN evaluation ' there are polyps " Pt is  scheduled for Dilation Curettage Hysteroscopy Polypectomy with symphion    Surgery was previously scheduled 9/19 canceled for cardiac  clearance ; obtained 10/01/19    Pt was seen 9/25/19 for PST ; surgery was cancelled secondary to FLU

## 2019-11-05 NOTE — H&P PST ADULT - NEGATIVE GENERAL GENITOURINARY SYMPTOMS
no flank pain R/no urine discoloration/no renal colic/no hematuria/normal urinary frequency/no flank pain L

## 2019-11-05 NOTE — HISTORY OF PRESENT ILLNESS
[FreeTextEntry1] : Patient is a 43 yo female here with complaints of external skin (?polyp) around the anus.  Patient reports having intermittent hemorrhoidal symptoms for the past 4-5 years. Over the last year however, she is noted increased swelling in the perianal region. It largely is asymptomatic. Denies pain. Some bleeding noted with wiping of the perianal region that she attributes to irritation of this external hemorrhoid. No other aggravating factors. Patient does report difficulty with hygiene in the area. No fevers or chills. Bowel movements every 1-2 days. Denies straining. Patient has a history of uterine polyps for which she is having them excised. She has had excision in the past. Patient also had an ovarian cyst removed in the subsequent hernia repair port. Denies family history of colon cancer or inflammatory bowel disease

## 2019-11-05 NOTE — ASSESSMENT
[FreeTextEntry1] : Large external hemorrhoid/skin tag\par -We discussed treatment options at length. This included surgical excision versus observation. The patient expressed growing frustration with hygiene in the area and occasional irritation with wiping.\par -Patient wishes to have the lesion removed\par -Risks and benefits were discussed\par -All questions were answered\par -Patient will schedule at her earliest convenience.

## 2019-11-05 NOTE — H&P PST ADULT - NSICDXPROBLEM_GEN_ALL_CORE_FT
PROBLEM DIAGNOSES  Problem: Abnormal uterine and vaginal bleeding, unspecified  Assessment and Plan: Scheduled for Dilation Curettage hysteroscopy with Symphion, polypectomy on 09/30/19. Pre op instructions, famotidine given and explained. Pt verbalized understanding. PROBLEM DIAGNOSES  Problem: Abnormal uterine and vaginal bleeding  Assessment and Plan: Dilation Curettage Hysteroscopy , Polypectomy with Symphion  Pre op instructions reviewed with pt ;pt verbalized good understanding of pre op instructions   Pre op evaluation pending ;11/06/19  [facility]

## 2019-11-05 NOTE — H&P PST ADULT - NEGATIVE SKIN SYMPTOMS
no itching/no change in size/color of mole/no dryness/no rash/no brittle nails/no hair loss/no tumor

## 2019-11-05 NOTE — REVIEW OF SYSTEMS
[Cough] : cough [Joint Pain] : joint pain [Dizziness] : dizziness [Negative] : Heme/Lymph [FreeTextEntry7] : Daily DM to every other day [FreeTextEntry8] : urinary frequency [FreeTextEntry9] : back pain

## 2019-11-05 NOTE — H&P PST ADULT - SKIN/BREAST COMMENTS
" breast tenderness, muscle pain especially with bending" - pt instructed to f/u with PCP-  denies chest pain, palpitations or SOB 9/19 h/o  muscle pain " of chest especially with bending" - pts/p medical evaluation pcp 9/19-  denies chest pain, palpitations or SOB

## 2019-11-05 NOTE — H&P PST ADULT - ANESTHESIA, PREVIOUS REACTION, PROFILE
"I feel numb in different area on the face"/nausea/vomiting "I feel numb in different area on the face" pt unclear/nausea/vomiting

## 2019-11-05 NOTE — H&P PST ADULT - NSICDXPASTMEDICALHX_GEN_ALL_CORE_FT
PAST MEDICAL HISTORY:  Carpal tunnel syndrome, unspecified laterality     Cyst of left ovary     Endometrial polyp     Iron deficiency anemia, unspecified iron deficiency anemia type     PUD (peptic ulcer disease)     Uterine leiomyoma, unspecified location

## 2019-11-05 NOTE — CONSULT LETTER
[Dear  ___] : Dear  [unfilled], [Consult Letter:] : I had the pleasure of evaluating your patient, [unfilled]. [Please see my note below.] : Please see my note below. [Sincerely,] : Sincerely, [FreeTextEntry2] : Mario Parnell [FreeTextEntry3] : Jeffrey Nichols MD FACS\par Chief Colon and Rectal Surgery\par [facility]

## 2019-11-05 NOTE — H&P PST ADULT - NEGATIVE GASTROINTESTINAL SYMPTOMS
no nausea/no diarrhea/no change in bowel habits/no constipation/no abdominal pain/no melena/no vomiting/no jaundice/no hiccoughs

## 2019-11-05 NOTE — PHYSICAL EXAM
[Normal Breath Sounds] : Normal breath sounds [Normal Heart Sounds] : normal heart sounds [Normal Rate and Rhythm] : normal rate and rhythm [No Rash or Lesion] : No rash or lesion [Alert] : alert [Oriented to Person] : oriented to person [Oriented to Place] : oriented to place [Oriented to Time] : oriented to time [Calm] : calm [de-identified] : round, NT/ND, +BS [de-identified] : well nourished female [de-identified] : NC/AT [de-identified] : ELADIO/+ROM [de-identified] : Intact

## 2019-11-05 NOTE — H&P PST ADULT - NEGATIVE OPHTHALMOLOGIC SYMPTOMS
no diplopia/no pain L/no blurred vision R/no discharge R/no lacrimation L/no irritation R/no lacrimation R/no discharge L/no irritation L/no loss of vision R/no photophobia/no blurred vision L/no pain R/no loss of vision L

## 2019-11-06 LAB
BLD GP AB SCN SERPL QL: NEGATIVE — SIGNIFICANT CHANGE UP
RH IG SCN BLD-IMP: POSITIVE — SIGNIFICANT CHANGE UP

## 2019-11-07 ENCOUNTER — TRANSCRIPTION ENCOUNTER (OUTPATIENT)
Age: 44
End: 2019-11-07

## 2019-11-07 NOTE — ASU PATIENT PROFILE, ADULT - PSH
H/O abdominoplasty    H/O ovarian cystectomy    History of bilateral tubal ligation    History of D&C  Polypectomy  History of elbow surgery  right; 2018  S/P   x2  S/P dilation and curettage

## 2019-11-07 NOTE — ASU PATIENT PROFILE, ADULT - CENTRAL VENOUS CATHETER
"  History     Chief Complaint   Patient presents with     Rule out Urinary Tract Infection     HPI    History obtained from mother    Angelita is a 8 month old female with history of febrile UTI who presents at  3:29 PM with fever.  Patient has had intermittent fever, febrile 4/10-4/12 with T-max 101.7, afebrile on 4/13 and 4/14, febrile again today with T-max 101.4.  Family was planning to go on vacation today, so was seen in urgent care yesterday prior to fevers returning.  At that time there was concern for \"clearing up\" ear infection, prescription given for cefdinir but told not to start unless patient getting worse.  She has not been eating as much but still drinking, still making good wet diapers.  Today there was concern she had pain while urinating.  Patient has a history of febrile UTI with admission for pyelonephritis at age 3 months.  Had a renal ultrasound that showed mild enlargement of kidneys consistent with infection, no other concerns.  No vomiting, diarrhea, cough, runny nose, rash.    PMHx:  Past Medical History:   Diagnosis Date     Urinary tract infection      History reviewed. No pertinent surgical history.  These were reviewed with the patient/family.    MEDICATIONS were reviewed and are as follows:   No current facility-administered medications for this encounter.      No current outpatient prescriptions on file.     ALLERGIES:  Review of patient's allergies indicates no known allergies.    IMMUNIZATIONS:  UTD by report.    SOCIAL HISTORY: Angelita lives with mother, father, brother.  She does not attend .      I have reviewed the Medications, Allergies, Past Medical and Surgical History, and Social History in the Epic system.    Review of Systems  Please see HPI for pertinent positives and negatives.  All other systems reviewed and found to be negative.        Physical Exam   Pulse: 132  Temp: 97.7  F (36.5  C)  Resp: 26  Weight: 8.37 kg (18 lb 7.2 oz)  SpO2: 100 %    Physical Exam  The " infant was not examined fully undressed.  Appearance: Alert and age appropriate, well developed, nontoxic, with moist mucous membranes, fussy on exam, easily consolable by mother.  HEENT: Head: Normocephalic and atraumatic. Anterior fontanelle open, soft, and flat. Eyes: PERRL, EOM grossly intact, conjunctivae and sclerae clear.  Ears: Tympanic membranes clear bilaterally, without inflammation or effusion. Nose: Nares clear with no active discharge. Mouth/Throat: No oral lesions, pharynx clear with no erythema or exudate. No visible oral injuries.  Neck: Supple, no masses, no meningismus. No significant cervical lymphadenopathy.  Pulmonary: No grunting, flaring, retractions or stridor. Good air entry, clear to auscultation bilaterally with no rales, rhonchi, or wheezing.  Cardiovascular: Regular rate and rhythm, normal S1 and S2, with no murmurs. Normal symmetric femoral pulses and brisk cap refill.  Abdominal: Normal bowel sounds, soft, nontender, mild distension, with no masses and no hepatosplenomegaly.  Neurologic: Alert and interactive, cranial nerves II-XII grossly intact, age appropriate strength and tone, moving all extremities equally.  Extremities/Back: No deformity. No swelling, erythema, warmth or tenderness.  Skin: No rashes, ecchymoses, or lacerations.  Genitourinary: Normal external female genitalia, shahid 1, with no discharge, erythema or lesions.  Rectal: Deferred    ED Course     ED Course     Procedures    No results found for this or any previous visit (from the past 24 hour(s)).    Medications - No data to display    Old chart from [facility] reviewed, supported history as above.  Labs reviewed and revealed no signs of UTI on UA, UCx pending.  The patient was rechecked before leaving the Emergency Department, repeat exam is benign.    Critical care time:  none       Assessments & Plan (with Medical Decision Making)   8-month-old female with history of febrile UTI presents to the emergency  department with fever without a clear source.  Due to concern for another UTI, urine was collected.  Initial results from urinalysis without signs of infection, urine culture pending.  Will call family if culture returns positive.  Is well-appearing, low likelihood of another serious bacterial infection.  Most likely is a viral illness, perhaps multiple illnesses sequentially given her previous 3 days of fever  by a couple days of no fever.  Discussed with mom reasons to return to the emergency department.    I have reviewed the nursing notes.    I have reviewed the findings, diagnosis, plan and need for follow up with the patient.  New Prescriptions    No medications on file       Final diagnoses:   None     Patient seen and discussed with Dr. Fajardo.    Armen Frank MD MA  Pediatrics, PL-3  Pager [phone]      4/15/2018   [facility] EMERGENCY DEPARTMENT  This data was collected with the resident physician working in the Emergency Department.  I saw and evaluated the patient and repeated the key portions of the history and physical exam.  The plan of care has been discussed with the patient and family by me or by the resident under my supervision.  I have read and edited the entire note.  MD Tana Han, Judd Rodgers MD  04/16/18 [id number]     no

## 2019-11-07 NOTE — ASU PATIENT PROFILE, ADULT - PMH
Carpal tunnel syndrome, unspecified laterality    Cyst of left ovary    Endometrial polyp    Iron deficiency anemia, unspecified iron deficiency anemia type    PUD (peptic ulcer disease)    Uterine leiomyoma, unspecified location

## 2019-11-08 ENCOUNTER — OUTPATIENT (OUTPATIENT)
Dept: OUTPATIENT SERVICES | Facility: HOSPITAL | Age: 44
LOS: 1 days | Discharge: HOME | End: 2019-11-08
Payer: SUBSIDIZED

## 2019-11-08 ENCOUNTER — RESULT REVIEW (OUTPATIENT)
Age: 44
End: 2019-11-08

## 2019-11-08 VITALS
SYSTOLIC BLOOD PRESSURE: 113 MMHG | HEIGHT: 62.5 IN | HEART RATE: 89 BPM | TEMPERATURE: 98 F | WEIGHT: 177.03 LBS | DIASTOLIC BLOOD PRESSURE: 76 MMHG | RESPIRATION RATE: 14 BRPM | OXYGEN SATURATION: 98 %

## 2019-11-08 VITALS
DIASTOLIC BLOOD PRESSURE: 70 MMHG | RESPIRATION RATE: 15 BRPM | HEART RATE: 80 BPM | OXYGEN SATURATION: 100 % | SYSTOLIC BLOOD PRESSURE: 114 MMHG

## 2019-11-08 DIAGNOSIS — Z98.890 OTHER SPECIFIED POSTPROCEDURAL STATES: Chronic | ICD-10-CM

## 2019-11-08 DIAGNOSIS — N93.9 ABNORMAL UTERINE AND VAGINAL BLEEDING, UNSPECIFIED: ICD-10-CM

## 2019-11-08 DIAGNOSIS — Z98.891 HISTORY OF UTERINE SCAR FROM PREVIOUS SURGERY: Chronic | ICD-10-CM

## 2019-11-08 DIAGNOSIS — Z98.51 TUBAL LIGATION STATUS: Chronic | ICD-10-CM

## 2019-11-08 PROCEDURE — 58561 HYSTEROSCOPY REMOVE MYOMA: CPT

## 2019-11-08 PROCEDURE — 88305 TISSUE EXAM BY PATHOLOGIST: CPT | Mod: 26

## 2019-11-08 RX ORDER — SODIUM CHLORIDE 9 MG/ML
1000 INJECTION, SOLUTION INTRAVENOUS
Refills: 0 | Status: DISCONTINUED | OUTPATIENT
Start: 2019-11-08 | End: 2019-11-28

## 2019-11-08 NOTE — ASU DISCHARGE PLAN (ADULT/PEDIATRIC) - CARE PROVIDER_API CALL
[facility],   [facility]  Oncology building, 3rd floor  Phone: [phone]  Fax: (   )    -  Follow Up Time:

## 2019-11-08 NOTE — ASU DISCHARGE PLAN (ADULT/PEDIATRIC) - CALL YOUR DOCTOR IF YOU HAVE ANY OF THE FOLLOWING:
Bleeding that does not stop/Pain not relieved by Medications/Nausea and vomiting that does not stop/Inability to tolerate liquids or foods/Fever greater than (need to indicate Fahrenheit or Celsius)/Wound/Surgical Site with redness, or foul smelling discharge or pus

## 2019-11-08 NOTE — BRIEF OPERATIVE NOTE - NSICDXBRIEFPROCEDURE_GEN_ALL_CORE_FT
PROCEDURES:  Hysteroscopy with dilation and curettage of uterus and use of resectoscope 08-Nov-2019 12:23:03  Jackie Augustine

## 2019-11-08 NOTE — ASU DISCHARGE PLAN (ADULT/PEDIATRIC) - PROVIDER TOKENS
FREE:[LAST:[[facility]],PHONE:[phone]],FAX:[(   )    -],ADDRESS:[[facility]  Oncology building, 3rd floor]]

## 2019-11-08 NOTE — BRIEF OPERATIVE NOTE - OPERATION/FINDINGS
EUA revealed 11cm retroverted uterus with palpable fibroid. Adnexa nonpalpable b/l. External genitalia nl. Endometrial cavity noted to be smooth with small anterior endometrial polyp. No fibroids noted. Ostia nl b/l. No fluid deficit. EUA revealed 11cm retroverted uterus with palpable fibroid. Adnexa nonpalpable b/l. External genitalia nl. Endometrial cavity noted to be smooth with small anterior endometrial polyp. No fibroids noted. Ostia nl b/l. No fluid deficit.    Dictation #[id number]

## 2019-11-08 NOTE — ASU PREOP CHECKLIST - HAIR REMOVAL
[facility] Progress Note    Patient Name: Irish Post   YOB: 1937   Medical Record Number: [id number]   CSN: [id number]   Attending Physician: Lesia Cruz M.D.    Referring Physician: Haim Lee MD      Date of Visit: 6/26/2017 nebulization every 6 (six) hours as needed for Wheezing., Disp: 1 Box, Rfl: 3  •  torsemide 20 MG Oral Tab, Take 40 mg by mouth on Mondays, Wednesdays and Fridays, take 20 mg on all other days, Disp: 120 tablet, Rfl: 3  •  Febuxostat 80 MG Oral Tab, Take 1 Wheezing., Disp: 1 Inhaler, Rfl: 6    Allergies:    Amoxicillin             Rash  Contrast Dye [Gadol*    Other (See Comments)    Comment:PROTECTING KIDNEYS, NO ACTUAL REACTION  Cymbalta [Duloxetin*    Rash  Pcn [Bicillin L-A]      Hives     Review of Syst + LAT CHEST (CPT=71020), 3/21/2017, 17:02. INDICATIONS:  C34.82 Malignant neoplasm of overlapping sites of left bronchus and lung     TECHNIQUE:  CT images were created without intravenous contrast material. Dose reduction techniques were used.  Dose in peritracheal lymph nodes noted. 3. Multiple nonenlarged retroperitoneal lymph nodes are stable.            Dictated by: Keya Celestin MD on 6/08/2017 at 9:41       Approved by: Keya Celestin MD         PROCEDURE:  PET/CT SCAN - SKULL TO THIGH Pacemaker leads noted. ABDOMEN PELVIS: No abnormal FDG activity. SKELETON: No abnormal FDG activity.        =====  CONCLUSION:    1.  Compared to the previous CT PET scan of 8/4/16, there is development of a new abnormal right upper paratracheal lym right frontal lobe, not significantly changed since 6/4/16. There is persistent mass effect on the subjacent sulci. There is no evident fracture. The visualized paranasal sinuses and mastoid air cells are unremarkable.     =====  CONCLUSION:    1.  No These results are still pending. We also sent for next gen testing [facility]) which is still pending. I am hopeful we will have his PDL-1 testing back within the next few days.  If not back by next week (which I think is unlikely) I told them it would be hair removal not indicated

## 2019-11-11 ENCOUNTER — APPOINTMENT (OUTPATIENT)
Dept: INTERNAL MEDICINE | Facility: CLINIC | Age: 44
End: 2019-11-11

## 2019-11-11 ENCOUNTER — OUTPATIENT (OUTPATIENT)
Dept: OUTPATIENT SERVICES | Facility: HOSPITAL | Age: 44
LOS: 1 days | End: 2019-11-11

## 2019-11-11 VITALS
WEIGHT: 190 LBS | HEART RATE: 99 BPM | DIASTOLIC BLOOD PRESSURE: 64 MMHG | SYSTOLIC BLOOD PRESSURE: 108 MMHG | BODY MASS INDEX: 33.66 KG/M2 | HEIGHT: 63 IN | OXYGEN SATURATION: 89 %

## 2019-11-11 DIAGNOSIS — Z01.818 ENCOUNTER FOR OTHER PREPROCEDURAL EXAMINATION: ICD-10-CM

## 2019-11-11 DIAGNOSIS — Z98.890 OTHER SPECIFIED POSTPROCEDURAL STATES: Chronic | ICD-10-CM

## 2019-11-11 DIAGNOSIS — T83.89XA OTHER SPECIFIED COMPLICATION OF GENITOURINARY PROSTHETIC DEVICES, IMPLANTS AND GRAFTS, INITIAL ENCOUNTER: ICD-10-CM

## 2019-11-11 DIAGNOSIS — Z98.891 HISTORY OF UTERINE SCAR FROM PREVIOUS SURGERY: Chronic | ICD-10-CM

## 2019-11-11 DIAGNOSIS — J06.9 ACUTE UPPER RESPIRATORY INFECTION, UNSPECIFIED: ICD-10-CM

## 2019-11-11 DIAGNOSIS — G47.33 OBSTRUCTIVE SLEEP APNEA (ADULT) (PEDIATRIC): ICD-10-CM

## 2019-11-11 DIAGNOSIS — D50.0 IRON DEFICIENCY ANEMIA SECONDARY TO BLOOD LOSS (CHRONIC): ICD-10-CM

## 2019-11-11 DIAGNOSIS — Z98.51 TUBAL LIGATION STATUS: Chronic | ICD-10-CM

## 2019-11-11 DIAGNOSIS — Z87.39 PERSONAL HISTORY OF OTHER DISEASES OF THE MUSCULOSKELETAL SYSTEM AND CONNECTIVE TISSUE: ICD-10-CM

## 2019-11-11 DIAGNOSIS — K64.4 RESIDUAL HEMORRHOIDAL SKIN TAGS: ICD-10-CM

## 2019-11-11 DIAGNOSIS — E66.9 OBESITY, UNSPECIFIED: ICD-10-CM

## 2019-11-11 DIAGNOSIS — G44.329 CHRONIC POST-TRAUMATIC HEADACHE, NOT INTRACTABLE: ICD-10-CM

## 2019-11-11 NOTE — REVIEW OF SYSTEMS
[Chills] : no chills [Fatigue] : no fatigue [Fever] : no fever [Discharge] : no discharge [Pain] : no pain [Earache] : no earache [Postnasal Drip] : no postnasal drip [Hearing Loss] : no hearing loss [Palpitations] : no palpitations [Chest Pain] : no chest pain [Paroxysmal Nocturnal Dyspnea] : no paroxysmal nocturnal dyspnea [Shortness Of Breath] : no shortness of breath [Orthopnea] : no orthopnea [Wheezing] : no wheezing [Cough] : no cough [Abdominal Pain] : no abdominal pain [Nausea] : no nausea [Constipation] : no constipation [Heartburn] : no heartburn [Melena] : no melena [Dysuria] : no dysuria [Incontinence] : no incontinence [Nocturia] : no nocturia [Hematuria] : no hematuria [Frequency] : no frequency

## 2019-11-11 NOTE — ASSESSMENT
[High Risk Surgery - Intraperitoneal, Intrathoracic or Supringuinal Vascular Procedures] : High Risk Surgery - Intraperitoneal, Intrathoracic or Supringuinal Vascular Procedures - No (0) [Ischemic Heart Disease] : Ischemic Heart Disease - No (0) [Prior Cerebrovascular Accident or TIA] : Prior Cerebrovascular Accident or TIA - No (0) [Congestive Heart Failure] : Congestive Heart Failure - No (0) [Creatinine >= 2mg/dL (1 Point)] : Creatinine >= 2mg/dL - No (0) [Insulin-dependent Diabetic (1 Point)] : Insulin-dependent Diabetic - No (0) [1] : 1 , RCRI Class: II, Risk of Post-Op Cardiac Complications: 0.9%, Procedure Risk: Low-Risk [Patient Optimized for Surgery] : Patient optimized for surgery [No Further Testing Recommended] : no further testing recommended [As per surgery] : as per surgery

## 2019-11-11 NOTE — PHYSICAL EXAM
[Postauricular Lymph Nodes Enlarged Bilaterally] : nodes not enlarged [Preauricular Lymph Nodes Enlarged Bilaterally] : nodes not enlarged [Submandibular Lymph Nodes Enlarged Bilaterally] : nodes not enlarged [Submental Lymph Nodes Enlarged] : nodes not enlarged [Suboccipital Lymph Nodes Enlarged Bilaterally] : enlarged nodes bilaterally [Cervical Lymph Nodes Enlarged Posterior Right] : enlarged node(s) on the right [Cervical Lymph Nodes Enlarged Posterior Left] : nodes not enlarged on the left [Cervical Lymph Nodes Enlarged Anterior Right] : enlarged node(s) on the right [Cervical Lymph Nodes Enlarged Anterior Left] : nodes not enlarged on the left [Normal] : affect was normal and insight and judgment were intact

## 2019-11-11 NOTE — HISTORY OF PRESENT ILLNESS
[No Pertinent Cardiac History] : no history of aortic stenosis, atrial fibrillation, coronary artery disease, recent myocardial infarction, or implantable device/pacemaker [Sleep Apnea] : sleep apnea [No Adverse Anesthesia Reaction] : no adverse anesthesia reaction in self or family member [Self] : previous adverse anesthesia reaction [(Patient denies any chest pain, claudication, dyspnea on exertion, orthopnea, palpitations or syncope)] : Patient denies any chest pain, claudication, dyspnea on exertion, orthopnea, palpitations or syncope [Excellent (>10 METs)] : Excellent (>10 METs) [COPD] : no COPD [Asthma] : no asthma [Family Member] : no family member with adverse anesthesia reaction/sudden death [Smoker] : not a smoker [Chronic Anticoagulation] : no chronic anticoagulation [Chronic Kidney Disease] : no chronic kidney disease [Diabetes] : no diabetes [FreeTextEntry1] : external hemorroid/skin tag removal  [FreeTextEntry2] : 11/18/19 [FreeTextEntry3] : Dr. Nichols [FreeTextEntry4] : BRENDA FISCHER is a 44 year old female with iron deficiency anemia, hemorrhoids, gastritis, chronic cervical lymphadenopathy, DWIGHT (not on CPAP) here for pre-op evaluation. \par She had a D&C last week without any complications. She has no new concerns.

## 2019-11-11 NOTE — RESULTS/DATA
[] : results reviewed [ECG Reviewed] : reviewed [Normal] : The 12 - lead ECG is normal [de-identified] : normal except anemia Hb 9.5 [de-identified] : wnl

## 2019-11-12 LAB — SURGICAL PATHOLOGY STUDY: SIGNIFICANT CHANGE UP

## 2019-11-17 ENCOUNTER — TRANSCRIPTION ENCOUNTER (OUTPATIENT)
Age: 44
End: 2019-11-17

## 2019-11-18 ENCOUNTER — OUTPATIENT (OUTPATIENT)
Dept: OUTPATIENT SERVICES | Facility: HOSPITAL | Age: 44
LOS: 1 days | Discharge: HOME | End: 2019-11-18
Payer: COMMERCIAL

## 2019-11-18 ENCOUNTER — TRANSCRIPTION ENCOUNTER (OUTPATIENT)
Age: 44
End: 2019-11-18

## 2019-11-18 ENCOUNTER — RESULT REVIEW (OUTPATIENT)
Age: 44
End: 2019-11-18

## 2019-11-18 ENCOUNTER — APPOINTMENT (OUTPATIENT)
Dept: COLORECTAL SURGERY | Facility: HOSPITAL | Age: 44
End: 2019-11-18

## 2019-11-18 VITALS
WEIGHT: 177.03 LBS | DIASTOLIC BLOOD PRESSURE: 72 MMHG | TEMPERATURE: 99 F | RESPIRATION RATE: 16 BRPM | SYSTOLIC BLOOD PRESSURE: 128 MMHG | OXYGEN SATURATION: 99 % | HEART RATE: 90 BPM | HEIGHT: 62.5 IN

## 2019-11-18 VITALS
HEART RATE: 79 BPM | SYSTOLIC BLOOD PRESSURE: 110 MMHG | DIASTOLIC BLOOD PRESSURE: 71 MMHG | RESPIRATION RATE: 16 BRPM | TEMPERATURE: 98 F | OXYGEN SATURATION: 100 %

## 2019-11-18 DIAGNOSIS — Z98.890 OTHER SPECIFIED POSTPROCEDURAL STATES: Chronic | ICD-10-CM

## 2019-11-18 DIAGNOSIS — K64.9 UNSPECIFIED HEMORRHOIDS: ICD-10-CM

## 2019-11-18 DIAGNOSIS — Z98.51 TUBAL LIGATION STATUS: Chronic | ICD-10-CM

## 2019-11-18 DIAGNOSIS — Z98.891 HISTORY OF UTERINE SCAR FROM PREVIOUS SURGERY: Chronic | ICD-10-CM

## 2019-11-18 PROCEDURE — 46220 EXCISE ANAL EXT TAG/PAPILLA: CPT

## 2019-11-18 PROCEDURE — 88305 TISSUE EXAM BY PATHOLOGIST: CPT | Mod: 26

## 2019-11-18 RX ORDER — SODIUM CHLORIDE 9 MG/ML
1000 INJECTION, SOLUTION INTRAVENOUS
Refills: 0 | Status: DISCONTINUED | OUTPATIENT
Start: 2019-11-18 | End: 2019-12-30

## 2019-11-18 NOTE — ASU DISCHARGE PLAN (ADULT/PEDIATRIC) - BATHING
Do not submerge in water Sitz bath (specify frequency)/Do not submerge in water 3-4 cups/cans per day

## 2019-11-18 NOTE — ASU DISCHARGE PLAN (ADULT/PEDIATRIC) - ASU DC SPECIAL INSTRUCTIONSFT
Please follow up in 10-14 days   Allow rectal packing to fall out with bowel movement   Take Tylenol and Motrin for pain (alternating every 6 hrs)  You may shower 24 hours after surgery, allow soap and water to run over the incision, do no scrub. Pat dry. Please follow up in 10-14 days.  Allow rectal packing to fall out with bowel movement.  Alternate Tylenol and Motrin for pain.  You may shower 24 hours after surgery, allow soap and water to run over the incision, do no scrub. Pat dry.

## 2019-11-18 NOTE — BRIEF OPERATIVE NOTE - NSICDXBRIEFPROCEDURE_GEN_ALL_CORE_FT
PROCEDURES:  Exam under anesthesia, rectum, with fistulotomy, hemorrhoidectomy, or anal sphincterotomy, or any combination if indicated 18-Nov-2019 19:26:16  Sherice Gutierrez

## 2019-11-18 NOTE — ASU DISCHARGE PLAN (ADULT/PEDIATRIC) - CALL YOUR DOCTOR IF YOU HAVE ANY OF THE FOLLOWING:
Wound/Surgical Site with redness, or foul smelling discharge or pus/Swelling that gets worse/Pain not relieved by Medications/Fever greater than (need to indicate Fahrenheit or Celsius)/Bleeding that does not stop

## 2019-11-18 NOTE — ASU DISCHARGE PLAN (ADULT/PEDIATRIC) - CARE PROVIDER_API CALL
Jeffrey Nichols)  ColonRectal Surgery; Surgery  [facility], [address] [place] [address]  Phone: [phone]  Fax: [phone]  Follow Up Time: 2 weeks

## 2019-11-20 ENCOUNTER — OUTPATIENT (OUTPATIENT)
Dept: OUTPATIENT SERVICES | Facility: HOSPITAL | Age: 44
LOS: 1 days | End: 2019-11-20

## 2019-11-20 ENCOUNTER — APPOINTMENT (OUTPATIENT)
Dept: OBGYN | Facility: HOSPITAL | Age: 44
End: 2019-11-20
Payer: COMMERCIAL

## 2019-11-20 VITALS
BODY MASS INDEX: 31.71 KG/M2 | DIASTOLIC BLOOD PRESSURE: 73 MMHG | HEART RATE: 98 BPM | WEIGHT: 179 LBS | SYSTOLIC BLOOD PRESSURE: 121 MMHG

## 2019-11-20 DIAGNOSIS — Z98.890 OTHER SPECIFIED POSTPROCEDURAL STATES: Chronic | ICD-10-CM

## 2019-11-20 DIAGNOSIS — Z98.51 TUBAL LIGATION STATUS: Chronic | ICD-10-CM

## 2019-11-20 DIAGNOSIS — Z98.891 HISTORY OF UTERINE SCAR FROM PREVIOUS SURGERY: Chronic | ICD-10-CM

## 2019-11-20 PROCEDURE — ZZZZZ: CPT | Mod: GE

## 2019-11-20 NOTE — HISTORY OF PRESENT ILLNESS
[0/10] : no pain reported [Fever] : no fever [Chills] : no chills [Nausea] : no nausea [Vomiting] : no vomiting [Doing Well] : is doing well [No Sign of Infection] : is showing no signs of infection [Excellent Pain Control] : has excellent pain control [None] : None [de-identified] : Non-tender abdomen, pt refused pelvic exam today due to discomfort anal skin tag removal yesterday

## 2019-11-21 DIAGNOSIS — Z98.890 OTHER SPECIFIED POSTPROCEDURAL STATES: ICD-10-CM

## 2019-11-22 LAB — SURGICAL PATHOLOGY STUDY: SIGNIFICANT CHANGE UP

## 2019-11-26 ENCOUNTER — OUTPATIENT (OUTPATIENT)
Dept: OUTPATIENT SERVICES | Facility: HOSPITAL | Age: 44
LOS: 1 days | End: 2019-11-26

## 2019-11-26 ENCOUNTER — APPOINTMENT (OUTPATIENT)
Dept: SURGERY | Facility: HOSPITAL | Age: 44
End: 2019-11-26

## 2019-11-26 VITALS
HEIGHT: 63 IN | SYSTOLIC BLOOD PRESSURE: 129 MMHG | WEIGHT: 183 LBS | DIASTOLIC BLOOD PRESSURE: 78 MMHG | HEART RATE: 117 BPM | BODY MASS INDEX: 32.43 KG/M2

## 2019-11-26 DIAGNOSIS — Z98.890 OTHER SPECIFIED POSTPROCEDURAL STATES: Chronic | ICD-10-CM

## 2019-11-26 DIAGNOSIS — Z84.1 FAMILY HISTORY OF DISORDERS OF KIDNEY AND URETER: ICD-10-CM

## 2019-11-26 DIAGNOSIS — Z98.51 TUBAL LIGATION STATUS: Chronic | ICD-10-CM

## 2019-11-26 DIAGNOSIS — Z98.891 HISTORY OF UTERINE SCAR FROM PREVIOUS SURGERY: Chronic | ICD-10-CM

## 2019-11-26 DIAGNOSIS — Z80.49 FAMILY HISTORY OF MALIGNANT NEOPLASM OF OTHER GENITAL ORGANS: ICD-10-CM

## 2019-11-26 DIAGNOSIS — Z87.19 PERSONAL HISTORY OF OTHER DISEASES OF THE DIGESTIVE SYSTEM: ICD-10-CM

## 2019-11-27 DIAGNOSIS — Z87.19 PERSONAL HISTORY OF OTHER DISEASES OF THE DIGESTIVE SYSTEM: ICD-10-CM

## 2019-11-27 DIAGNOSIS — Z86.2 PERSONAL HISTORY OF DISEASES OF THE BLOOD AND BLOOD-FORMING ORGANS AND CERTAIN DISORDERS INVOLVING THE IMMUNE MECHANISM: ICD-10-CM

## 2019-11-27 DIAGNOSIS — Z80.49 FAMILY HISTORY OF MALIGNANT NEOPLASM OF OTHER GENITAL ORGANS: ICD-10-CM

## 2019-11-27 DIAGNOSIS — Z84.1 FAMILY HISTORY OF DISORDERS OF KIDNEY AND URETER: ICD-10-CM

## 2019-11-27 DIAGNOSIS — R59.1 GENERALIZED ENLARGED LYMPH NODES: ICD-10-CM

## 2019-12-02 ENCOUNTER — RESULT REVIEW (OUTPATIENT)
Age: 44
End: 2019-12-02

## 2019-12-02 ENCOUNTER — APPOINTMENT (OUTPATIENT)
Dept: HEMATOLOGY ONCOLOGY | Facility: CLINIC | Age: 44
End: 2019-12-02
Payer: COMMERCIAL

## 2019-12-02 ENCOUNTER — OUTPATIENT (OUTPATIENT)
Dept: OUTPATIENT SERVICES | Facility: HOSPITAL | Age: 44
LOS: 1 days | Discharge: HOME | End: 2019-12-02

## 2019-12-02 VITALS
HEIGHT: 63.11 IN | DIASTOLIC BLOOD PRESSURE: 85 MMHG | RESPIRATION RATE: 14 BRPM | OXYGEN SATURATION: 99 % | HEART RATE: 109 BPM | WEIGHT: 184.31 LBS | SYSTOLIC BLOOD PRESSURE: 125 MMHG | BODY MASS INDEX: 32.66 KG/M2

## 2019-12-02 DIAGNOSIS — Z98.890 OTHER SPECIFIED POSTPROCEDURAL STATES: Chronic | ICD-10-CM

## 2019-12-02 DIAGNOSIS — Z98.51 TUBAL LIGATION STATUS: Chronic | ICD-10-CM

## 2019-12-02 DIAGNOSIS — D50.0 IRON DEFICIENCY ANEMIA SECONDARY TO BLOOD LOSS (CHRONIC): ICD-10-CM

## 2019-12-02 DIAGNOSIS — Z98.891 HISTORY OF UTERINE SCAR FROM PREVIOUS SURGERY: Chronic | ICD-10-CM

## 2019-12-02 LAB
BASOPHILS # BLD AUTO: 0 K/UL — SIGNIFICANT CHANGE UP (ref 0–0.2)
BASOPHILS NFR BLD AUTO: 1 % — SIGNIFICANT CHANGE UP (ref 0–2)
EOSINOPHIL # BLD AUTO: 0.2 K/UL — SIGNIFICANT CHANGE UP (ref 0–0.5)
EOSINOPHIL NFR BLD AUTO: 4.4 % — SIGNIFICANT CHANGE UP (ref 0–6)
HCT VFR BLD CALC: 32.5 % — LOW (ref 34.5–45)
HGB BLD-MCNC: 10.4 G/DL — LOW (ref 11.5–15.5)
LYMPHOCYTES # BLD AUTO: 1.8 K/UL — SIGNIFICANT CHANGE UP (ref 1–3.3)
LYMPHOCYTES # BLD AUTO: 38.3 % — SIGNIFICANT CHANGE UP (ref 13–44)
MCHC RBC-ENTMCNC: 24.7 PG — LOW (ref 27–34)
MCHC RBC-ENTMCNC: 31.8 G/DL — LOW (ref 32–36)
MCV RBC AUTO: 77.6 FL — LOW (ref 80–100)
MONOCYTES # BLD AUTO: 0.3 K/UL — SIGNIFICANT CHANGE UP (ref 0–0.9)
MONOCYTES NFR BLD AUTO: 7.3 % — SIGNIFICANT CHANGE UP (ref 2–14)
NEUTROPHILS # BLD AUTO: 2.3 K/UL — SIGNIFICANT CHANGE UP (ref 1.8–7.4)
NEUTROPHILS NFR BLD AUTO: 49 % — SIGNIFICANT CHANGE UP (ref 43–77)
PLATELET # BLD AUTO: 442 K/UL — HIGH (ref 150–400)
RBC # BLD: 4.19 M/UL — SIGNIFICANT CHANGE UP (ref 3.8–5.2)
RBC # FLD: 16.8 % — HIGH (ref 10.3–14.5)
RETICS #: 105 K/UL — SIGNIFICANT CHANGE UP (ref 25–125)
RETICS/RBC NFR: 2.5 % — SIGNIFICANT CHANGE UP (ref 0.5–2.5)
WBC # BLD: 4.7 K/UL — SIGNIFICANT CHANGE UP (ref 3.8–10.5)
WBC # FLD AUTO: 4.7 K/UL — SIGNIFICANT CHANGE UP (ref 3.8–10.5)

## 2019-12-02 PROCEDURE — 99215 OFFICE O/P EST HI 40 MIN: CPT

## 2019-12-02 NOTE — REVIEW OF SYSTEMS
[Fever] : no fever [Chills] : no chills [Fatigue] : fatigue [Night Sweats] : no night sweats [Recent Change In Weight] : ~T no recent weight change [Vision Problems] : no vision problems [Eye Pain] : no eye pain [Palpitations] : no palpitations [Dysphagia] : no dysphagia [Chest Pain] : no chest pain [Shortness Of Breath] : no shortness of breath [Lower Ext Edema] : no lower extremity edema [Wheezing] : no wheezing [SOB on Exertion] : no shortness of breath during exertion [Abdominal Pain] : no abdominal pain [Joint Pain] : no joint pain [Vomiting] : no vomiting [Muscle Pain] : muscle pain [Muscle Weakness] : no muscle weakness [Skin Rash] : no skin rash [Confused] : no confusion [Skin Wound] : no skin wound [Easy Bleeding] : no tendency for easy bleeding [Fainting] : no fainting [Easy Bruising] : no tendency for easy bruising

## 2019-12-02 NOTE — ASSESSMENT
[FreeTextEntry1] : 44 year old female with Iron deficiency anemia due to chronic blood loss from menorrhagia. Treated with IV Injectafer in August 2018 and Feb 2019 with adequate response. Unable to tolerate oral iron therapy. Again presented in August 2019 with sx concerning for DIANA, shown to have low iron stores and set-up for venofer (unable to get injectafer again due to cost / no insurance). She completed 2 doses, missed her 3rd appointment and now wants to reassess prior to next therapy as she is paying out of pocket for each treatment at this time. Costochondritis likely related exercise and sleeping position, now resolved. Right cervical LN etiology unclear: inflammatory vs infectious vs malignant. No other Sx concerning for malignancy at this time.\par \par Plan:\par - Patient now with fatigue. Similar Sx with prior anemia presentations.\par - s/p definitive therapy with Gyn and awaiting to see if menorrhagia improves.\par - Will recheck CBC and ferritin, TIBC, iron\par -She had insurance issues in the past and could not get injectafer, only took 2 x venover and was due for an additional 3 more treatments but refused.\par - Pending IR evaluation for possible biopsy of LN, although does not seem alarming on exam, it is freely mobile. \par -RTC in 3 months

## 2019-12-02 NOTE — PHYSICAL EXAM
[Fully active, able to carry on all pre-disease performance without restriction] : Status 0 - Fully active, able to carry on all pre-disease performance without restriction [Normal] : affect appropriate [de-identified] : Posterior to anterior solitary cervical lymph node measuring about 8-10 mm, freely mobile, non-tender [de-identified] : No axillary or inguinal adenopathy [de-identified] : some mild pharyngeal erythema, no exudate

## 2019-12-02 NOTE — HISTORY OF PRESENT ILLNESS
[de-identified] : 12/2/19 - Patient new to me. She reports that the lymphadenopathy in her neck feels like it is not necessarily "growing" but it is "joining together". She reports that she feels fatigued but no shortness of breath or anything like that. No pain in the area of the lymph nodes. She feels frustrated that she has continued menstrual bleeding and she had a procedure recently. She cannot tolerate the PO iron according to her and she has been on IV iron in the past. Pending IR appointment Wednesday to evaluate for possible biopsy of lymph node. Of note, recently she overcame the flu but still has mild residual cough. She denies any weight loss, she said she is getting some mild night sweats.  [de-identified] : 45 yo female with PMHx of gastritis, left benign ovarian cyst (s/p ovarian conservation resection), adenomyosis, uterine fibroids, menorrhagia presents for evaluation of chronic blood loss anemia. Patient has had heavy menses for years with intolerance to oral iron (nausea, vomiting, constipation) undergoing IV iron formulation.  She first had IV iron 2013 in [place] with appropriate Hb response. Additionally she presented in 2017 again for IV iron with Hb ~7 that responded to 13.9.  LMP July 1, 2018. On July 2 2018 she had severe menstrual bleeding that caused her to be dizzy and presented to [facility] ER. There her Hb was 8.6, down from 9.3 in June. She did not receive any iron or blood transfusion in the ED. Denies adverse reactions to iron infusion.\par \par Has close follow up with her GYN and failed multiple OCPs and IUD in an attempt control the bleeding. OCPs gave her acne so she does not want to try that again. Currently, goes through 6-7 pads a day for the first 3 days, and period lasts for about 6 days. She was offered a hysterectomy, currently patient is considering.\par \par Visit Hx:\par 7/16/19: Patient presents today for follow-up from ED visit for menstrual cycle related chronic blood loss anemia with symptoms of fatigue, intermittent dizziness and mild SOB on exertion. She denies chest pain on exertion, or other sources of bleeding including GI. She denies issues with bleeding when she gets cuts and scrapes, and denies bruising.\par She is requesting IV iron as she is unable to tolerate oral supplementation. She is planning a trip to [place] tomorrow for 2 weeks with her daughters that she cannot change. She is requesting IV Iron today. \par \par 2/28/2019: Patient presents after about 7 months since her IV iron infusion. She continues to have heavy periods and has been experiencing weakness and dizziness again with intermittent headaches similar to when she was anemic in the past. She denies SOB with exertion. She did not want a hysterectomy at this time as she may be looking into fertility options since she was recently remarried.\par \par 4/11/19: She is feeling well today and back to her healthy baseline. Her counts have fully recovered. She continues to have heavy periods and will consider further gyn eval and possible hysterectomy in the future.\par \par 8/22/2019: Patient complains of recurrent dizziness and losing her hair again. She also reports fatigue / weakness. She denies pica or shortness of breath. LMP 8/11/19 (heavy bleeding experienced x 5 days). She also complains of tenderness in her right neck where she feels a right swollen node. This was present last visit and has not significantly changed in size. Patient will be evaluated for an additional treatment with Injectafer. She is also being evaluated for endometrial thickening and myomatous uterus and hopes to have a D&C procedure in the near future. She is still discussion fertility options and has not yet decided on hysterectomy.\par \par 9/26/2019: Ms Santos presents for follow-up today. She has completed 2/3 doses of venofer. Injectafer was too expensive due to insurance lapse so she agreed to venofer. After the 1st dose of venofer she had vomiting and retching for 24 hours after the infusion. After the 2nd dose (on 9/17/19) of venofer she started developing chest pain 7/10 around her sternum that was worse with touch. She did not call a doctor, felt it was not internal pain and has been feeling daily improvement of this pain, which she states is felt with bending over and touching her sternum now at 2/10. She skipped her 3rd dose. Today she denies nausea, and complains of enlargement of her right neck lump that was examined last visit. She is planning to go for endometrial polyp removal (hysteroscopy and D&C) after medical clearance, she has already seen pulm for clearance. LMP 9/7, lasted 5 days.

## 2019-12-03 ENCOUNTER — APPOINTMENT (OUTPATIENT)
Dept: COLORECTAL SURGERY | Facility: CLINIC | Age: 44
End: 2019-12-03
Payer: SELF-PAY

## 2019-12-03 VITALS — TEMPERATURE: 97.9 F

## 2019-12-03 PROCEDURE — 99024 POSTOP FOLLOW-UP VISIT: CPT

## 2019-12-03 NOTE — ASSESSMENT
[FreeTextEntry1] : Status post excision of anal skin tag\par -Pathology consistent with hidradenoma papilliferum\par -Patient to followup in 6 weeks for reevaluation\par -We'll perform serial physical exams to monitor skin tag removal site given pathology.

## 2019-12-03 NOTE — HISTORY OF PRESENT ILLNESS
[FreeTextEntry1] : 44-year-old female status post resection of anal skin tag. Patient reports significant pain immediate Postoperatively that has since resolved. Tolerating diet. Denies bleeding denies pain.

## 2019-12-04 ENCOUNTER — OUTPATIENT (OUTPATIENT)
Dept: OUTPATIENT SERVICES | Facility: HOSPITAL | Age: 44
LOS: 1 days | End: 2019-12-04
Payer: COMMERCIAL

## 2019-12-04 ENCOUNTER — APPOINTMENT (OUTPATIENT)
Dept: ULTRASOUND IMAGING | Facility: HOSPITAL | Age: 44
End: 2019-12-04
Payer: COMMERCIAL

## 2019-12-04 DIAGNOSIS — Z98.891 HISTORY OF UTERINE SCAR FROM PREVIOUS SURGERY: Chronic | ICD-10-CM

## 2019-12-04 DIAGNOSIS — Z98.51 TUBAL LIGATION STATUS: Chronic | ICD-10-CM

## 2019-12-04 DIAGNOSIS — Z98.890 OTHER SPECIFIED POSTPROCEDURAL STATES: Chronic | ICD-10-CM

## 2019-12-04 DIAGNOSIS — Z80.49 FAMILY HISTORY OF MALIGNANT NEOPLASM OF OTHER GENITAL ORGANS: ICD-10-CM

## 2019-12-04 DIAGNOSIS — Z00.00 ENCOUNTER FOR GENERAL ADULT MEDICAL EXAMINATION WITHOUT ABNORMAL FINDINGS: ICD-10-CM

## 2019-12-04 LAB
ALBUMIN MFR SERPL ELPH: 58.3 %
ALBUMIN SERPL ELPH-MCNC: 4.1 G/DL
ALBUMIN SERPL-MCNC: 4.1 G/DL
ALBUMIN/GLOB SERPL: 1.4 RATIO
ALP BLD-CCNC: 59 U/L
ALPHA1 GLOB MFR SERPL ELPH: 3.8 %
ALPHA1 GLOB SERPL ELPH-MCNC: 0.3 G/DL
ALPHA2 GLOB MFR SERPL ELPH: 9.1 %
ALPHA2 GLOB SERPL ELPH-MCNC: 0.6 G/DL
ALT SERPL-CCNC: 21 U/L
ANION GAP SERPL CALC-SCNC: 14 MMOL/L
AST SERPL-CCNC: 14 U/L
B-GLOBULIN MFR SERPL ELPH: 11.6 %
B-GLOBULIN SERPL ELPH-MCNC: 0.8 G/DL
BILIRUB SERPL-MCNC: <0.2 MG/DL
BUN SERPL-MCNC: 17 MG/DL
CALCIUM SERPL-MCNC: 9.5 MG/DL
CHLORIDE SERPL-SCNC: 101 MMOL/L
CO2 SERPL-SCNC: 26 MMOL/L
CREAT SERPL-MCNC: 0.76 MG/DL
DEPRECATED KAPPA LC FREE/LAMBDA SER: 0.84 RATIO
FERRITIN SERPL-MCNC: 4 NG/ML
GAMMA GLOB FLD ELPH-MCNC: 1.2 G/DL
GAMMA GLOB MFR SERPL ELPH: 17.2 %
GLUCOSE SERPL-MCNC: 105 MG/DL
IGA SER QL IEP: 154 MG/DL
IGG SER QL IEP: 1333 MG/DL
IGM SER QL IEP: 156 MG/DL
INTERPRETATION SERPL IEP-IMP: NORMAL
IRON SATN MFR SERPL: 4 %
IRON SERPL-MCNC: 17 UG/DL
KAPPA LC CSF-MCNC: 1.67 MG/DL
KAPPA LC SERPL-MCNC: 1.4 MG/DL
LDH SERPL-CCNC: 144 U/L
M PROTEIN SPEC IFE-MCNC: NORMAL
POTASSIUM SERPL-SCNC: 4.1 MMOL/L
PROT SERPL-MCNC: 7 G/DL
SODIUM SERPL-SCNC: 141 MMOL/L
TIBC SERPL-MCNC: 451 UG/DL
UIBC SERPL-MCNC: 434 UG/DL

## 2019-12-04 PROCEDURE — 76536 US EXAM OF HEAD AND NECK: CPT | Mod: 26

## 2019-12-04 PROCEDURE — 76536 US EXAM OF HEAD AND NECK: CPT

## 2020-01-21 ENCOUNTER — LABORATORY RESULT (OUTPATIENT)
Age: 45
End: 2020-01-21

## 2020-01-21 ENCOUNTER — APPOINTMENT (OUTPATIENT)
Dept: INTERNAL MEDICINE | Facility: CLINIC | Age: 45
End: 2020-01-21
Payer: COMMERCIAL

## 2020-01-21 ENCOUNTER — OUTPATIENT (OUTPATIENT)
Dept: OUTPATIENT SERVICES | Facility: HOSPITAL | Age: 45
LOS: 1 days | End: 2020-01-21

## 2020-01-21 VITALS
BODY MASS INDEX: 32.6 KG/M2 | SYSTOLIC BLOOD PRESSURE: 122 MMHG | DIASTOLIC BLOOD PRESSURE: 70 MMHG | WEIGHT: 184 LBS | HEIGHT: 63.11 IN | HEART RATE: 97 BPM | OXYGEN SATURATION: 99 %

## 2020-01-21 DIAGNOSIS — N92.0 OTHER SPECIFIED COMPLICATION OF GENITOURINARY PROSTHETIC DEVICES, IMPLANTS AND GRAFTS, INITIAL ENCOUNTER: ICD-10-CM

## 2020-01-21 DIAGNOSIS — Z98.890 OTHER SPECIFIED POSTPROCEDURAL STATES: ICD-10-CM

## 2020-01-21 DIAGNOSIS — T83.89XA OTHER SPECIFIED COMPLICATION OF GENITOURINARY PROSTHETIC DEVICES, IMPLANTS AND GRAFTS, INITIAL ENCOUNTER: ICD-10-CM

## 2020-01-21 DIAGNOSIS — Z98.890 OTHER SPECIFIED POSTPROCEDURAL STATES: Chronic | ICD-10-CM

## 2020-01-21 DIAGNOSIS — N80.0 ENDOMETRIOSIS OF UTERUS: ICD-10-CM

## 2020-01-21 DIAGNOSIS — Z98.51 TUBAL LIGATION STATUS: Chronic | ICD-10-CM

## 2020-01-21 DIAGNOSIS — Z98.891 HISTORY OF UTERINE SCAR FROM PREVIOUS SURGERY: Chronic | ICD-10-CM

## 2020-01-21 DIAGNOSIS — Z01.818 ENCOUNTER FOR OTHER PREPROCEDURAL EXAMINATION: ICD-10-CM

## 2020-01-21 DIAGNOSIS — Z87.898 PERSONAL HISTORY OF OTHER SPECIFIED CONDITIONS: ICD-10-CM

## 2020-01-21 DIAGNOSIS — L90.5 SCAR CONDITIONS AND FIBROSIS OF SKIN: ICD-10-CM

## 2020-01-21 LAB
ALBUMIN SERPL ELPH-MCNC: 4.4 G/DL — SIGNIFICANT CHANGE UP (ref 3.3–5)
ALP SERPL-CCNC: 50 U/L — SIGNIFICANT CHANGE UP (ref 40–120)
ALT FLD-CCNC: 17 U/L — SIGNIFICANT CHANGE UP (ref 4–33)
ANION GAP SERPL CALC-SCNC: 8 MMO/L — SIGNIFICANT CHANGE UP (ref 7–14)
AST SERPL-CCNC: 16 U/L — SIGNIFICANT CHANGE UP (ref 4–32)
BASOPHILS # BLD AUTO: 0.03 K/UL — SIGNIFICANT CHANGE UP (ref 0–0.2)
BASOPHILS NFR BLD AUTO: 0.7 % — SIGNIFICANT CHANGE UP (ref 0–2)
BILIRUB SERPL-MCNC: 0.3 MG/DL — SIGNIFICANT CHANGE UP (ref 0.2–1.2)
BUN SERPL-MCNC: 20 MG/DL — SIGNIFICANT CHANGE UP (ref 7–23)
CALCIUM SERPL-MCNC: 9.4 MG/DL — SIGNIFICANT CHANGE UP (ref 8.4–10.5)
CHLORIDE SERPL-SCNC: 103 MMOL/L — SIGNIFICANT CHANGE UP (ref 98–107)
CHOLEST SERPL-MCNC: 203 MG/DL — HIGH (ref 120–199)
CO2 SERPL-SCNC: 30 MMOL/L — SIGNIFICANT CHANGE UP (ref 22–31)
CREAT SERPL-MCNC: 0.87 MG/DL — SIGNIFICANT CHANGE UP (ref 0.5–1.3)
EOSINOPHIL # BLD AUTO: 0.09 K/UL — SIGNIFICANT CHANGE UP (ref 0–0.5)
EOSINOPHIL NFR BLD AUTO: 2 % — SIGNIFICANT CHANGE UP (ref 0–6)
GLUCOSE SERPL-MCNC: 94 MG/DL — SIGNIFICANT CHANGE UP (ref 70–99)
HCT VFR BLD CALC: 31 % — LOW (ref 34.5–45)
HDLC SERPL-MCNC: 48 MG/DL — SIGNIFICANT CHANGE UP (ref 45–65)
HGB BLD-MCNC: 9.1 G/DL — LOW (ref 11.5–15.5)
IMM GRANULOCYTES NFR BLD AUTO: 0 % — SIGNIFICANT CHANGE UP (ref 0–1.5)
LIPID PNL WITH DIRECT LDL SERPL: 136 MG/DL — SIGNIFICANT CHANGE UP
LYMPHOCYTES # BLD AUTO: 1.56 K/UL — SIGNIFICANT CHANGE UP (ref 1–3.3)
LYMPHOCYTES # BLD AUTO: 35.1 % — SIGNIFICANT CHANGE UP (ref 13–44)
MCHC RBC-ENTMCNC: 22.2 PG — LOW (ref 27–34)
MCHC RBC-ENTMCNC: 29.4 % — LOW (ref 32–36)
MCV RBC AUTO: 75.6 FL — LOW (ref 80–100)
MONOCYTES # BLD AUTO: 0.34 K/UL — SIGNIFICANT CHANGE UP (ref 0–0.9)
MONOCYTES NFR BLD AUTO: 7.6 % — SIGNIFICANT CHANGE UP (ref 2–14)
NEUTROPHILS # BLD AUTO: 2.43 K/UL — SIGNIFICANT CHANGE UP (ref 1.8–7.4)
NEUTROPHILS NFR BLD AUTO: 54.6 % — SIGNIFICANT CHANGE UP (ref 43–77)
NRBC # FLD: 0 K/UL — SIGNIFICANT CHANGE UP (ref 0–0)
PLATELET # BLD AUTO: 483 K/UL — HIGH (ref 150–400)
PMV BLD: 10.8 FL — SIGNIFICANT CHANGE UP (ref 7–13)
POTASSIUM SERPL-MCNC: 4.3 MMOL/L — SIGNIFICANT CHANGE UP (ref 3.5–5.3)
POTASSIUM SERPL-SCNC: 4.3 MMOL/L — SIGNIFICANT CHANGE UP (ref 3.5–5.3)
PROT SERPL-MCNC: 7.1 G/DL — SIGNIFICANT CHANGE UP (ref 6–8.3)
RBC # BLD: 4.1 M/UL — SIGNIFICANT CHANGE UP (ref 3.8–5.2)
RBC # FLD: 18.3 % — HIGH (ref 10.3–14.5)
SODIUM SERPL-SCNC: 141 MMOL/L — SIGNIFICANT CHANGE UP (ref 135–145)
T4 FREE SERPL-MCNC: 1.12 NG/DL — SIGNIFICANT CHANGE UP (ref 0.9–1.8)
TRIGL SERPL-MCNC: 118 MG/DL — SIGNIFICANT CHANGE UP (ref 10–149)
TSH SERPL-MCNC: 2.72 UIU/ML — SIGNIFICANT CHANGE UP (ref 0.27–4.2)
WBC # BLD: 4.45 K/UL — SIGNIFICANT CHANGE UP (ref 3.8–10.5)
WBC # FLD AUTO: 4.45 K/UL — SIGNIFICANT CHANGE UP (ref 3.8–10.5)

## 2020-01-22 VITALS — DIASTOLIC BLOOD PRESSURE: 75 MMHG | SYSTOLIC BLOOD PRESSURE: 125 MMHG

## 2020-01-22 DIAGNOSIS — D50.0 IRON DEFICIENCY ANEMIA SECONDARY TO BLOOD LOSS (CHRONIC): ICD-10-CM

## 2020-01-22 DIAGNOSIS — R59.0 LOCALIZED ENLARGED LYMPH NODES: ICD-10-CM

## 2020-01-22 DIAGNOSIS — K29.70 GASTRITIS, UNSPECIFIED, WITHOUT BLEEDING: ICD-10-CM

## 2020-01-22 DIAGNOSIS — K21.9 GASTRO-ESOPHAGEAL REFLUX DISEASE WITHOUT ESOPHAGITIS: ICD-10-CM

## 2020-01-22 DIAGNOSIS — R49.0 DYSPHONIA: ICD-10-CM

## 2020-01-22 DIAGNOSIS — L70.0 ACNE VULGARIS: ICD-10-CM

## 2020-01-22 PROBLEM — T83.89XA HEAVY MENSES DUE TO IUD: Status: RESOLVED | Noted: 2017-08-24 | Resolved: 2020-01-22

## 2020-01-22 PROBLEM — Z87.898 HISTORY OF LYMPHADENOPATHY: Status: RESOLVED | Noted: 2019-11-26 | Resolved: 2020-01-22

## 2020-01-22 PROBLEM — N80.0 ADENOMYOSIS: Status: RESOLVED | Noted: 2018-07-29 | Resolved: 2020-01-22

## 2020-01-22 PROBLEM — L90.5 SCAR TISSUE: Status: RESOLVED | Noted: 2017-05-23 | Resolved: 2020-01-22

## 2020-01-22 PROBLEM — Z98.890 POSTOPERATIVE STATE: Status: RESOLVED | Noted: 2019-11-20 | Resolved: 2020-01-22

## 2020-01-22 PROBLEM — Z01.818 PREOPERATIVE EXAMINATION: Status: RESOLVED | Noted: 2017-05-23 | Resolved: 2020-01-22

## 2020-01-22 RX ORDER — FERROUS GLUCONATE 324(38)MG
324 (38 FE) TABLET ORAL TWICE DAILY
Qty: 60 | Refills: 2 | Status: DISCONTINUED | COMMUNITY
Start: 2019-12-02 | End: 2020-01-22

## 2020-01-22 NOTE — HISTORY OF PRESENT ILLNESS
[FreeTextEntry1] : lymphadenopathy and anemia [de-identified] : 44F pmh iron def anemia (2/2 uterine bleeding), gastritis, ?DWIGHT (never received formal sleep study) , obesity presents to the clinic for anemia and lymphadenopathy. \par \par #Anemia\par Pt reports that since her fibroid removals, bleeding has improved, however, she is having heavy menstrual bleeding. Pt has a history of anemia. She failed 2 different oral treatments (ferrous sulfate and ferrous gluconate). Pt was successfully treated with Injectafer, however, it was too costly for her SS. Heme attempted Venofer, however, she had chest pain both times she received it and was not able to tolerate further dosing. Pt has not been able to make it back to heme for further infusion. Last Ferritin was 4.\par \par #Lymphadenopathy/Voice Hoarseness \par Pt reports that she has been having lymphadenopathy for over 1 year now. She denies any acute illnesses in this time. She denies weight loss, night sweats, or any other symptoms aside from those of her anemia. Pt went to heme/onc who recommended a biopsy by IR, however, IR told pt that lymph nodes were too small. Of note, pt now reports that she has developed hoarseness and a strange feeling when swallowing (which she attributes to the lymph nodes).\par

## 2020-01-22 NOTE — PHYSICAL EXAM
[No Acute Distress] : no acute distress [Well Nourished] : well nourished [Well Developed] : well developed [Normal Sclera/Conjunctiva] : normal sclera/conjunctiva [PERRL] : pupils equal round and reactive to light [EOMI] : extraocular movements intact [Normal Outer Ear/Nose] : the outer ears and nose were normal in appearance [Normal Oropharynx] : the oropharynx was normal [No JVD] : no jugular venous distention [No Lymphadenopathy] : no lymphadenopathy [Supple] : supple [No Respiratory Distress] : no respiratory distress  [No Accessory Muscle Use] : no accessory muscle use [Regular Rhythm] : with a regular rhythm [Clear to Auscultation] : lungs were clear to auscultation bilaterally [Normal Rate] : normal rate  [No Murmur] : no murmur heard [Normal S1, S2] : normal S1 and S2 [No Carotid Bruits] : no carotid bruits [No Abdominal Bruit] : a ~M bruit was not heard ~T in the abdomen [No Varicosities] : no varicosities [Pedal Pulses Present] : the pedal pulses are present [No Edema] : there was no peripheral edema [No Palpable Aorta] : no palpable aorta [No Extremity Clubbing/Cyanosis] : no extremity clubbing/cyanosis [Soft] : abdomen soft [Non-distended] : non-distended [Non Tender] : non-tender [No Masses] : no abdominal mass palpated [No HSM] : no HSM [Normal Posterior Cervical Nodes] : no posterior cervical lymphadenopathy [Normal Bowel Sounds] : normal bowel sounds [Normal Anterior Cervical Nodes] : no anterior cervical lymphadenopathy [No CVA Tenderness] : no CVA  tenderness [No Spinal Tenderness] : no spinal tenderness [No Joint Swelling] : no joint swelling [Grossly Normal Strength/Tone] : grossly normal strength/tone [No Rash] : no rash [Coordination Grossly Intact] : coordination grossly intact [No Focal Deficits] : no focal deficits [Normal Gait] : normal gait [Deep Tendon Reflexes (DTR)] : deep tendon reflexes were 2+ and symmetric [Normal Affect] : the affect was normal [Normal Insight/Judgement] : insight and judgment were intact [de-identified] : pale appearing

## 2020-01-22 NOTE — REVIEW OF SYSTEMS
[Fatigue] : fatigue [Negative] : Heme/Lymph [Fever] : no fever [Chills] : no chills [Hot Flashes] : no hot flashes [Night Sweats] : no night sweats [Recent Change In Weight] : ~T no recent weight change [FreeTextEntry8] : Vaginal bleed. heavy menses.  [de-identified] : Acne

## 2020-01-22 NOTE — ASSESSMENT
[FreeTextEntry1] : #HCM\par -pt refusing flu vaccine\par -pt up to date on age appropriate cancer screenings\par -pt to return to clinic in 10 weeks\par \par Pt discussed with Dr Villegas. All risks and benefits were discussed with pt. Pt and attending has agreed with above assessment and plan. \par \par Ariadne Rivas MD PGY3\par Internal Medicine\par [facility]\par [phone]

## 2020-01-27 ENCOUNTER — OUTPATIENT (OUTPATIENT)
Dept: OUTPATIENT SERVICES | Facility: HOSPITAL | Age: 45
LOS: 1 days | Discharge: HOME | End: 2020-01-27

## 2020-01-27 ENCOUNTER — APPOINTMENT (OUTPATIENT)
Dept: HEMATOLOGY ONCOLOGY | Facility: CLINIC | Age: 45
End: 2020-01-27
Payer: SELF-PAY

## 2020-01-27 VITALS
RESPIRATION RATE: 16 BRPM | TEMPERATURE: 99.2 F | BODY MASS INDEX: 32.88 KG/M2 | WEIGHT: 186.29 LBS | HEART RATE: 96 BPM | OXYGEN SATURATION: 97 % | DIASTOLIC BLOOD PRESSURE: 79 MMHG | SYSTOLIC BLOOD PRESSURE: 120 MMHG

## 2020-01-27 DIAGNOSIS — Z98.890 OTHER SPECIFIED POSTPROCEDURAL STATES: Chronic | ICD-10-CM

## 2020-01-27 DIAGNOSIS — Z98.891 HISTORY OF UTERINE SCAR FROM PREVIOUS SURGERY: Chronic | ICD-10-CM

## 2020-01-27 DIAGNOSIS — Z98.51 TUBAL LIGATION STATUS: Chronic | ICD-10-CM

## 2020-01-27 DIAGNOSIS — D50.0 IRON DEFICIENCY ANEMIA SECONDARY TO BLOOD LOSS (CHRONIC): ICD-10-CM

## 2020-01-27 PROCEDURE — 99214 OFFICE O/P EST MOD 30 MIN: CPT

## 2020-01-27 NOTE — HISTORY OF PRESENT ILLNESS
[de-identified] : 43 yo female with PMHx of gastritis, left benign ovarian cyst (s/p ovarian conservation resection), adenomyosis, uterine fibroids, menorrhagia presents for evaluation of chronic blood loss anemia. Patient has had heavy menses for years with intolerance to oral iron (nausea, vomiting, constipation) undergoing IV iron formulation.  She first had IV iron 2013 in [place] with appropriate Hb response. Additionally she presented in 2017 again for IV iron with Hb ~7 that responded to 13.9.  LMP July 1, 2018. On July 2 2018 she had severe menstrual bleeding that caused her to be dizzy and presented to [facility] ER. There her Hb was 8.6, down from 9.3 in June. She did not receive any iron or blood transfusion in the ED. Denies adverse reactions to iron infusion.\par \par Has close follow up with her GYN and failed multiple OCPs and IUD in an attempt control the bleeding. OCPs gave her acne so she does not want to try that again. Currently, goes through 6-7 pads a day for the first 3 days, and period lasts for about 6 days. She was offered a hysterectomy, currently patient is considering.\par \par Visit Hx:\par 7/16/19: Patient presents today for follow-up from ED visit for menstrual cycle related chronic blood loss anemia with symptoms of fatigue, intermittent dizziness and mild SOB on exertion. She denies chest pain on exertion, or other sources of bleeding including GI. She denies issues with bleeding when she gets cuts and scrapes, and denies bruising.\par She is requesting IV iron as she is unable to tolerate oral supplementation. She is planning a trip to [place] tomorrow for 2 weeks with her daughters that she cannot change. She is requesting IV Iron today. \par \par 2/28/2019: Patient presents after about 7 months since her IV iron infusion. She continues to have heavy periods and has been experiencing weakness and dizziness again with intermittent headaches similar to when she was anemic in the past. She denies SOB with exertion. She did not want a hysterectomy at this time as she may be looking into fertility options since she was recently remarried.\par \par 4/11/19: She is feeling well today and back to her healthy baseline. Her counts have fully recovered. She continues to have heavy periods and will consider further gyn eval and possible hysterectomy in the future.\par \par 8/22/2019: Patient complains of recurrent dizziness and losing her hair again. She also reports fatigue / weakness. She denies pica or shortness of breath. LMP 8/11/19 (heavy bleeding experienced x 5 days). She also complains of tenderness in her right neck where she feels a right swollen node. This was present last visit and has not significantly changed in size. Patient will be evaluated for an additional treatment with Injectafer. She is also being evaluated for endometrial thickening and myomatous uterus and hopes to have a D&C procedure in the near future. She is still discussion fertility options and has not yet decided on hysterectomy.\par \par 9/26/2019: Ms Santos presents for follow-up today. She has completed 2/3 doses of venofer. Injectafer was too expensive due to insurance lapse so she agreed to venofer. After the 1st dose of venofer she had vomiting and retching for 24 hours after the infusion. After the 2nd dose (on 9/17/19) of venofer she started developing chest pain 7/10 around her sternum that was worse with touch. She did not call a doctor, felt it was not internal pain and has been feeling daily improvement of this pain, which she states is felt with bending over and touching her sternum now at 2/10. She skipped her 3rd dose. Today she denies nausea, and complains of enlargement of her right neck lump that was examined last visit. She is planning to go for endometrial polyp removal (hysteroscopy and D&C) after medical clearance, she has already seen pulm for clearance. LMP 9/7, lasted 5 days.\par \par On 12/2/19 patient was new to me. She reports that the lymphadenopathy in her neck feels like it is not necessarily "growing" but it is "joining together". She reports that she feels fatigued but no shortness of breath or anything like that. No pain in the area of the lymph nodes. She feels frustrated that she has continued menstrual bleeding and she had a procedure recently. She cannot tolerate the PO iron according to her and she has been on IV iron in the past. Pending IR appointment Wednesday to evaluate for possible biopsy of lymph node. Of note, recently she overcame the flu but still has mild residual cough. She denied any weight loss, she said she was getting some mild night sweats.  [de-identified] : Patient was not able to have LN biopsy as they were too small. She reports she is feeling dizzy and lightheaded, and she feels fatigued. She went to her PMD who told her that he iron is low and she needs to have an iron infusion. Did not take the PO iron more than 1 day after seeing me last time as she said she has gastritis and can't tolerate it. She reports that she had polyps removed from her endometrium and the bleeding has certainly improved but it is not completely resolved and they are likely going to end up performing hysterectomy. She has developed hoarseness which she associates with her cervical lymph nodes and she is pending ENT evaluation.

## 2020-01-27 NOTE — REVIEW OF SYSTEMS
[Fever] : no fever [Chills] : no chills [Night Sweats] : no night sweats [Fatigue] : fatigue [Recent Change In Weight] : ~T no recent weight change [Eye Pain] : no eye pain [Vision Problems] : no vision problems [Dysphagia] : no dysphagia [Hoarseness] : hoarseness [Chest Pain] : no chest pain [Palpitations] : no palpitations [Lower Ext Edema] : no lower extremity edema [Shortness Of Breath] : no shortness of breath [Wheezing] : no wheezing [SOB on Exertion] : no shortness of breath during exertion [Abdominal Pain] : no abdominal pain [Vomiting] : no vomiting [Joint Pain] : no joint pain [Muscle Pain] : muscle pain [Muscle Weakness] : no muscle weakness [Skin Rash] : no skin rash [Skin Wound] : no skin wound [Confused] : no confusion [Dizziness] : dizziness [Fainting] : no fainting [Easy Bleeding] : no tendency for easy bleeding [Easy Bruising] : no tendency for easy bruising

## 2020-01-27 NOTE — ASSESSMENT
[FreeTextEntry1] : 44 year old female with Iron deficiency anemia due to chronic blood loss from menorrhagia. Treated with IV Injectafer in August 2018 and Feb 2019 with adequate response. Unable to tolerate oral iron therapy. Again presented in August 2019 with sx concerning for DIANA, shown to have low iron stores and set-up for venofer (unable to get injectafer at that point due to cost / no insurance). She completed 2 doses, missed her 3rd appointment and now wants to reassess prior to next therapy as she is paying out of pocket for each treatment at this time. Costochondritis likely related exercise and sleeping position, now resolved. Right cervical LN etiology unclear, now with hoarsenss, not able to be biopsied. Pending ENT eval. \par \par Plan:\par - Patient now with fatigue/dizziness and unable to tolerate PO iron. \par - s/p definitive therapy with Gyn with slight improvement of menorrhagia, but likely will require hysterectomy. \par -IR unable to biopsy LNs, pending ENT eval for hoarseness. \par -Will treat with Injectafer 750 mg IV x 2. \par -Patient previously consented. \par -RTC in 3 months.

## 2020-01-27 NOTE — PHYSICAL EXAM
[Fully active, able to carry on all pre-disease performance without restriction] : Status 0 - Fully active, able to carry on all pre-disease performance without restriction [Normal] : affect appropriate [de-identified] : pale conjunctiva [de-identified] : Posterior to anterior solitary cervical lymph node measuring about 8-10 mm, freely mobile, non-tender NO CHANGE [de-identified] : No axillary or inguinal adenopathy

## 2020-01-28 ENCOUNTER — OUTPATIENT (OUTPATIENT)
Dept: OUTPATIENT SERVICES | Facility: HOSPITAL | Age: 45
LOS: 1 days | Discharge: HOME | End: 2020-01-28

## 2020-01-28 ENCOUNTER — APPOINTMENT (OUTPATIENT)
Dept: COLORECTAL SURGERY | Facility: CLINIC | Age: 45
End: 2020-01-28
Payer: SELF-PAY

## 2020-01-28 ENCOUNTER — APPOINTMENT (OUTPATIENT)
Dept: OTOLARYNGOLOGY | Facility: CLINIC | Age: 45
End: 2020-01-28
Payer: COMMERCIAL

## 2020-01-28 VITALS
SYSTOLIC BLOOD PRESSURE: 112 MMHG | HEART RATE: 80 BPM | WEIGHT: 186.31 LBS | BODY MASS INDEX: 33.01 KG/M2 | DIASTOLIC BLOOD PRESSURE: 76 MMHG | HEIGHT: 63.11 IN

## 2020-01-28 DIAGNOSIS — Z98.890 OTHER SPECIFIED POSTPROCEDURAL STATES: Chronic | ICD-10-CM

## 2020-01-28 DIAGNOSIS — Z98.891 HISTORY OF UTERINE SCAR FROM PREVIOUS SURGERY: Chronic | ICD-10-CM

## 2020-01-28 DIAGNOSIS — Z98.51 TUBAL LIGATION STATUS: Chronic | ICD-10-CM

## 2020-01-28 PROCEDURE — 99212 OFFICE O/P EST SF 10 MIN: CPT

## 2020-01-28 PROCEDURE — 31575 DIAGNOSTIC LARYNGOSCOPY: CPT

## 2020-01-28 PROCEDURE — 99214 OFFICE O/P EST MOD 30 MIN: CPT | Mod: 25

## 2020-01-28 NOTE — HISTORY OF PRESENT ILLNESS
[de-identified] : 44 year old female follow up for voice hoarseness and dysphagia.  Reports also having symptoms of globus sensation, even with swallowing saliva.  States symptoms present for the past 2 months.  Denies sore throat, odynophagia, mucus production, bleeding, hemoptysis, dyspnea, fevers and throat infections in the past year.  Tolerating eating and drinking with no choking, coughing or aspiration.  History of sleep apnea and swollen lymph node on Right side of neck, for the past year.

## 2020-01-28 NOTE — PROCEDURE
[de-identified] : Fiberoptic Laryngoscopy (30839)\par \par Procedure performed: Fiberoptic Laryngeal Endoscopy - Diagnostic\par Pre-op/post op indication: Dysphagia\par Patient was unable to cooperate with mirror. After informed verbal consent is obtained, the fiberoptic nasal endoscope   is passed via the right  nasal cavity. \par Findings: base of tongue and vallecula clear, hypopharynx normal without pooled secretions, crisp epiglottis, no evidence of laryngomalacia, bilateral vocal fold mobility full and symmetric. There was  some arytenoids edema and  erythema seen also on the vocal folds, without  mass or lesions..\par

## 2020-01-28 NOTE — REASON FOR VISIT
[Subsequent Evaluation] : a subsequent evaluation for [Family Member] : family member [FreeTextEntry2] : follow up for voice hoarseness and dysphagia.

## 2020-01-28 NOTE — HISTORY OF PRESENT ILLNESS
[FreeTextEntry1] : Status post excision of hidradenoma papilliferum.  Patient currently without complaint. Denies pain. No fevers or chills. Occasional spotting of blood which draining. Patient does report constipation. Bowel movement every other day with occasional straining.

## 2020-01-28 NOTE — ASSESSMENT
[FreeTextEntry1] : Status post skin tag removal consistent with hidradenoma papiliferum\par -Recommended patient initiated fiber supplementation\par -Follow up in 6 weeks for reevaluation\par -Will continue performing physical exams due to pathology

## 2020-01-28 NOTE — PHYSICAL EXAM
[de-identified] : small post cervical node rt side [Normal] : cardiovascular peripheral examination is normal [de-identified] : post cervical nodes , soft and small

## 2020-02-03 ENCOUNTER — APPOINTMENT (OUTPATIENT)
Dept: INFUSION THERAPY | Facility: HOSPITAL | Age: 45
End: 2020-02-03

## 2020-02-10 ENCOUNTER — APPOINTMENT (OUTPATIENT)
Dept: INFUSION THERAPY | Facility: HOSPITAL | Age: 45
End: 2020-02-10

## 2020-02-13 DIAGNOSIS — K29.70 GASTRITIS, UNSPECIFIED, WITHOUT BLEEDING: ICD-10-CM

## 2020-02-13 DIAGNOSIS — R49.0 DYSPHONIA: ICD-10-CM

## 2020-02-13 DIAGNOSIS — K21.9 GASTRO-ESOPHAGEAL REFLUX DISEASE WITHOUT ESOPHAGITIS: ICD-10-CM

## 2020-03-04 ENCOUNTER — FORM ENCOUNTER (OUTPATIENT)
Age: 45
End: 2020-03-04

## 2020-03-05 ENCOUNTER — APPOINTMENT (OUTPATIENT)
Dept: RADIOLOGY | Facility: HOSPITAL | Age: 45
End: 2020-03-05

## 2020-03-05 ENCOUNTER — OUTPATIENT (OUTPATIENT)
Dept: OUTPATIENT SERVICES | Facility: HOSPITAL | Age: 45
LOS: 1 days | End: 2020-03-05

## 2020-03-05 ENCOUNTER — APPOINTMENT (OUTPATIENT)
Dept: OBGYN | Facility: HOSPITAL | Age: 45
End: 2020-03-05
Payer: COMMERCIAL

## 2020-03-05 VITALS
DIASTOLIC BLOOD PRESSURE: 79 MMHG | WEIGHT: 185 LBS | SYSTOLIC BLOOD PRESSURE: 122 MMHG | BODY MASS INDEX: 32.78 KG/M2 | HEIGHT: 63 IN | HEART RATE: 78 BPM

## 2020-03-05 DIAGNOSIS — Z98.890 OTHER SPECIFIED POSTPROCEDURAL STATES: Chronic | ICD-10-CM

## 2020-03-05 DIAGNOSIS — Z98.891 HISTORY OF UTERINE SCAR FROM PREVIOUS SURGERY: Chronic | ICD-10-CM

## 2020-03-05 DIAGNOSIS — Z98.51 TUBAL LIGATION STATUS: Chronic | ICD-10-CM

## 2020-03-05 PROCEDURE — 99213 OFFICE O/P EST LOW 20 MIN: CPT | Mod: GC

## 2020-03-05 PROCEDURE — 71046 X-RAY EXAM CHEST 2 VIEWS: CPT | Mod: 26

## 2020-03-07 NOTE — REVIEW OF SYSTEMS
[Fever] : no fever [Chills] : no chills [Dec Hearing] : no hearing loss [Chest Pain] : no chest pain [Cough] : no cough [Abdominal Pain] : no abdominal pain [Vomiting] : no vomiting [Dysuria] : no dysuria [Pelvic Pain] : no pelvic pain [Frequency] : no frequency [Urgency] : no urgency [Dizziness] : no dizziness [Headache] : no headache

## 2020-03-10 DIAGNOSIS — N92.0 EXCESSIVE AND FREQUENT MENSTRUATION WITH REGULAR CYCLE: ICD-10-CM

## 2020-03-13 ENCOUNTER — OUTPATIENT (OUTPATIENT)
Dept: OUTPATIENT SERVICES | Facility: HOSPITAL | Age: 45
LOS: 1 days | Discharge: HOME | End: 2020-03-13

## 2020-03-13 DIAGNOSIS — Z98.890 OTHER SPECIFIED POSTPROCEDURAL STATES: Chronic | ICD-10-CM

## 2020-03-13 DIAGNOSIS — D50.0 IRON DEFICIENCY ANEMIA SECONDARY TO BLOOD LOSS (CHRONIC): ICD-10-CM

## 2020-03-13 DIAGNOSIS — Z98.51 TUBAL LIGATION STATUS: Chronic | ICD-10-CM

## 2020-03-13 DIAGNOSIS — Z98.891 HISTORY OF UTERINE SCAR FROM PREVIOUS SURGERY: Chronic | ICD-10-CM

## 2020-03-16 ENCOUNTER — APPOINTMENT (OUTPATIENT)
Dept: INFUSION THERAPY | Facility: HOSPITAL | Age: 45
End: 2020-03-16

## 2020-03-17 ENCOUNTER — APPOINTMENT (OUTPATIENT)
Dept: COLORECTAL SURGERY | Facility: CLINIC | Age: 45
End: 2020-03-17

## 2020-03-31 ENCOUNTER — APPOINTMENT (OUTPATIENT)
Dept: INTERNAL MEDICINE | Facility: CLINIC | Age: 45
End: 2020-03-31

## 2020-04-13 ENCOUNTER — OUTPATIENT (OUTPATIENT)
Dept: OUTPATIENT SERVICES | Facility: HOSPITAL | Age: 45
LOS: 1 days | End: 2020-04-13

## 2020-04-13 DIAGNOSIS — Z98.890 OTHER SPECIFIED POSTPROCEDURAL STATES: Chronic | ICD-10-CM

## 2020-04-13 DIAGNOSIS — Z98.51 TUBAL LIGATION STATUS: Chronic | ICD-10-CM

## 2020-04-13 DIAGNOSIS — R59.0 LOCALIZED ENLARGED LYMPH NODES: ICD-10-CM

## 2020-04-13 DIAGNOSIS — Z98.891 HISTORY OF UTERINE SCAR FROM PREVIOUS SURGERY: Chronic | ICD-10-CM

## 2020-04-14 ENCOUNTER — OUTPATIENT (OUTPATIENT)
Dept: OUTPATIENT SERVICES | Facility: HOSPITAL | Age: 45
LOS: 1 days | End: 2020-04-14
Payer: COMMERCIAL

## 2020-04-14 ENCOUNTER — APPOINTMENT (OUTPATIENT)
Dept: ULTRASOUND IMAGING | Facility: IMAGING CENTER | Age: 45
End: 2020-04-14
Payer: COMMERCIAL

## 2020-04-14 ENCOUNTER — APPOINTMENT (OUTPATIENT)
Dept: CT IMAGING | Facility: IMAGING CENTER | Age: 45
End: 2020-04-14
Payer: COMMERCIAL

## 2020-04-14 DIAGNOSIS — Z98.890 OTHER SPECIFIED POSTPROCEDURAL STATES: Chronic | ICD-10-CM

## 2020-04-14 DIAGNOSIS — Z98.51 TUBAL LIGATION STATUS: Chronic | ICD-10-CM

## 2020-04-14 DIAGNOSIS — R59.0 LOCALIZED ENLARGED LYMPH NODES: ICD-10-CM

## 2020-04-14 DIAGNOSIS — Z98.891 HISTORY OF UTERINE SCAR FROM PREVIOUS SURGERY: Chronic | ICD-10-CM

## 2020-04-14 DIAGNOSIS — N94.6 DYSMENORRHEA, UNSPECIFIED: ICD-10-CM

## 2020-04-14 PROCEDURE — 70490 CT SOFT TISSUE NECK W/O DYE: CPT | Mod: 26

## 2020-04-14 PROCEDURE — 76830 TRANSVAGINAL US NON-OB: CPT | Mod: 26

## 2020-04-14 PROCEDURE — 70490 CT SOFT TISSUE NECK W/O DYE: CPT

## 2020-04-14 PROCEDURE — 76830 TRANSVAGINAL US NON-OB: CPT

## 2020-04-21 ENCOUNTER — OUTPATIENT (OUTPATIENT)
Dept: OUTPATIENT SERVICES | Facility: HOSPITAL | Age: 45
LOS: 1 days | Discharge: HOME | End: 2020-04-21

## 2020-04-21 DIAGNOSIS — Z98.890 OTHER SPECIFIED POSTPROCEDURAL STATES: Chronic | ICD-10-CM

## 2020-04-21 DIAGNOSIS — Z98.51 TUBAL LIGATION STATUS: Chronic | ICD-10-CM

## 2020-04-21 DIAGNOSIS — Z98.891 HISTORY OF UTERINE SCAR FROM PREVIOUS SURGERY: Chronic | ICD-10-CM

## 2020-04-21 DIAGNOSIS — D50.0 IRON DEFICIENCY ANEMIA SECONDARY TO BLOOD LOSS (CHRONIC): ICD-10-CM

## 2020-04-28 ENCOUNTER — APPOINTMENT (OUTPATIENT)
Dept: HEMATOLOGY ONCOLOGY | Facility: CLINIC | Age: 45
End: 2020-04-28

## 2020-05-05 ENCOUNTER — APPOINTMENT (OUTPATIENT)
Dept: HEMATOLOGY ONCOLOGY | Facility: CLINIC | Age: 45
End: 2020-05-05
Payer: SELF-PAY

## 2020-05-05 PROCEDURE — 99214 OFFICE O/P EST MOD 30 MIN: CPT | Mod: 95

## 2020-05-06 NOTE — RESULTS/DATA
[FreeTextEntry1] : 4/14/20 CT Neck / soft tissues\par \par FINDINGS: \par \par AERODIGESTIVE TRACT: Mildly prominent palatine and lingual tonsils, aryepiglottic folds, piriform sinuses, false and true cords are unremarkable without evidence for any new masslike or nodular enhancement\par \par LYMPH NODES:\par \par There are multiple slightly prominent lymph nodes, all less than 1.5 cm short axis and too small to characterize on CT including and not limited to:\par \par Bilateral Ia nodes (4:62) measuring approximately 9 x 9 mm on the right, and 10 x 6 mm on the left.\par Small Ib nodes, (4:57) measuring approximately 9 x 3 mm on the right, and 10 x 4 mm on the left.\par Small IIa nodes, (4:57) measuring 9 x 6 mm on the right, and 7 x 8 mm on the left.\par Right V node, (4:66), measuring 7 x 10 mm\par \par PAROTID GLANDS:  Multiple 6 mm soft tissue density in the bilateral parotid glands, likely periparotid lymph nodes, (4:51)\par SUBMANDIBULAR GLANDS:  Unremarkable.\par THYROID GLAND: Unremarkable in size, slightly heterogeneous, with possible small nodules (4:70, ultrasound may better characterize,.\par VISUALIZED PARANASAL SINUSES: Minimal mucosal thickening, no air-fluid levels.\par \par VISUALIZED TYMPANOMASTOID CAVITIES: Unremarkable.\par BONES: Dental implants streak artifact, limits oral cavity assessment, torus mandibulari and torus palatini (601:39),  periapical lucency surrounding the teeth in the maxilla and mandibular molar teeth, left side greater than right with bony resorption at the left mandibular body, (3:186), correlate with dental inspection. Straightening of the cervical lordosis may reflect positioning or muscle spasm.\par \par \par MISCELLANEOUS:\par \par Intracranially, mild volume loss and microvascular disease, no acute hemorrhage or midline shift. Incidental posterior fossa arachnoid cyst. Partially empty sella. Orbits are unremarkable. Limited assessment of the chest demonstrates small calcified perihilar lymph nodes, (3: 4 75-cc dictated report of the chest for chest findings.\par \par \par IMPRESSION:\par \par Small lymph nodes in the bilateral cervical chains including right V node measuring 7 x 10 mm, and periparotid lymph nodes, all nodes are less than 1.5 cm short axis, too small to characterize on CT.\par \par

## 2020-05-06 NOTE — REVIEW OF SYSTEMS
[Hoarseness] : hoarseness [Muscle Pain] : muscle pain [Dizziness] : dizziness [Fever] : no fever [Chills] : no chills [Night Sweats] : no night sweats [Fatigue] : no fatigue [Recent Change In Weight] : ~T no recent weight change [Eye Pain] : no eye pain [Vision Problems] : no vision problems [Dysphagia] : no dysphagia [Chest Pain] : no chest pain [Palpitations] : no palpitations [Lower Ext Edema] : no lower extremity edema [Shortness Of Breath] : no shortness of breath [Wheezing] : no wheezing [SOB on Exertion] : no shortness of breath during exertion [Abdominal Pain] : no abdominal pain [Vomiting] : no vomiting [Joint Pain] : no joint pain [Muscle Weakness] : no muscle weakness [Skin Rash] : no skin rash [Skin Wound] : no skin wound [Confused] : no confusion [Fainting] : no fainting [Easy Bleeding] : no tendency for easy bleeding [Easy Bruising] : no tendency for easy bruising [FreeTextEntry4] : lump like feeling when she swallow, voice hoarsenss persistent.  [de-identified] : lymph nodes in neck that she can feel are there and she feels growing.

## 2020-05-06 NOTE — HISTORY OF PRESENT ILLNESS
[de-identified] : 43 yo female with PMHx of gastritis, left benign ovarian cyst (s/p ovarian conservation resection), adenomyosis, uterine fibroids, menorrhagia presents for evaluation of chronic blood loss anemia. Patient has had heavy menses for years with intolerance to oral iron (nausea, vomiting, constipation) undergoing IV iron formulation.  She first had IV iron 2013 in [place] with appropriate Hb response. Additionally she presented in 2017 again for IV iron with Hb ~7 that responded to 13.9.  LMP July 1, 2018. On July 2 2018 she had severe menstrual bleeding that caused her to be dizzy and presented to [facility] ER. There her Hb was 8.6, down from 9.3 in June. She did not receive any iron or blood transfusion in the ED. Denies adverse reactions to iron infusion.\par \par Has close follow up with her GYN and failed multiple OCPs and IUD in an attempt control the bleeding. OCPs gave her acne so she does not want to try that again. Currently, goes through 6-7 pads a day for the first 3 days, and period lasts for about 6 days. She was offered a hysterectomy, currently patient is considering.\par \par Visit Hx:\par 7/16/19: Patient presents today for follow-up from ED visit for menstrual cycle related chronic blood loss anemia with symptoms of fatigue, intermittent dizziness and mild SOB on exertion. She denies chest pain on exertion, or other sources of bleeding including GI. She denies issues with bleeding when she gets cuts and scrapes, and denies bruising.\par She is requesting IV iron as she is unable to tolerate oral supplementation. She is planning a trip to [place] tomorrow for 2 weeks with her daughters that she cannot change. She is requesting IV Iron today. \par \par 2/28/2019: Patient presents after about 7 months since her IV iron infusion. She continues to have heavy periods and has been experiencing weakness and dizziness again with intermittent headaches similar to when she was anemic in the past. She denies SOB with exertion. She did not want a hysterectomy at this time as she may be looking into fertility options since she was recently remarried.\par \par 4/11/19: She is feeling well today and back to her healthy baseline. Her counts have fully recovered. She continues to have heavy periods and will consider further gyn eval and possible hysterectomy in the future.\par \par 8/22/2019: Patient complains of recurrent dizziness and losing her hair again. She also reports fatigue / weakness. She denies pica or shortness of breath. LMP 8/11/19 (heavy bleeding experienced x 5 days). She also complains of tenderness in her right neck where she feels a right swollen node. This was present last visit and has not significantly changed in size. Patient will be evaluated for an additional treatment with Injectafer. She is also being evaluated for endometrial thickening and myomatous uterus and hopes to have a D&C procedure in the near future. She is still discussion fertility options and has not yet decided on hysterectomy.\par \par 9/26/2019: Ms Santos presents for follow-up today. She has completed 2/3 doses of venofer. Injectafer was too expensive due to insurance lapse so she agreed to venofer. After the 1st dose of venofer she had vomiting and retching for 24 hours after the infusion. After the 2nd dose (on 9/17/19) of venofer she started developing chest pain 7/10 around her sternum that was worse with touch. She did not call a doctor, felt it was not internal pain and has been feeling daily improvement of this pain, which she states is felt with bending over and touching her sternum now at 2/10. She skipped her 3rd dose. Today she denies nausea, and complains of enlargement of her right neck lump that was examined last visit. She is planning to go for endometrial polyp removal (hysteroscopy and D&C) after medical clearance, she has already seen pulm for clearance. LMP 9/7, lasted 5 days.\par \par On 12/2/19 patient was new to me. She reports that the lymphadenopathy in her neck feels like it is not necessarily "growing" but it is "joining together". She reports that she feels fatigued but no shortness of breath or anything like that. No pain in the area of the lymph nodes. She feels frustrated that she has continued menstrual bleeding and she had a procedure recently. She cannot tolerate the PO iron according to her and she has been on IV iron in the past. Pending IR appointment Wednesday to evaluate for possible biopsy of lymph node. Of note, recently she overcame the flu but still has mild residual cough. She denied any weight loss, she said she was getting some mild night sweats.  [de-identified] : Patient reports continued concerns regarding her cervical lymphadenopathy. She now reports she feels like it is growing and she has a lump like sensation when she swallows. On review of recent imaging there does seem to be more lymphadenopathy however small and bilateral. On imaging there was also evidence of prominent tonsils and parotid infiltration. She reports no fevers or chills, no weight loss or night sweats. She feels less fatigued from previous. She otherwise feels OK she is just very anxious about this and wants it worked up further, as she has not been able to get a biopsy yet. On further questioning, she does report dry mouth often, but no joint aches. Of note, she had recent dental implants.

## 2020-05-06 NOTE — ASSESSMENT
[FreeTextEntry1] : 44 year old female with Iron deficiency anemia due to chronic blood loss from menorrhagia. Treated with IV Injectafer in August 2018 and Feb 2019 with adequate response. Unable to tolerate oral iron therapy. Again presented in August 2019 with sx concerning for DIANA, shown to have low iron stores and set-up for venofer (unable to get injectafer at that point due to cost / no insurance). She is now s/p 2 treatments with Injectafer. Right cervical LN etiology unclear, now with hoarsenss, not able to be biopsied previously and evaluated by ENT, now bilateral. \par \par Plan:\par - Symptoms of DIANA improved s/p IV iron, will follow up repeat CBC with iron/TIBC/ferritin levels. \par - s/p definitive therapy with Gyn with slight improvement of menorrhagia, but likely will require hysterectomy. \par -IR unable to biopsy LNs in the past, ENT evaluation for hoarseness in January, has been on acid blockade for possible GERD involvement given findings on laryngoscope. \par -Given worsening lymphadenopathy and symptoms, will check full panel of bloodwork including LDH, uric acid, ESR, CRP, KRISTIN, RF, Sjogren's antibodies. More suspicious for inflammatory etiology given bilateral nature and bothersome symptoms but no systemic manifestation. If unremarkable work up and not resolving may need PET scan / biopsy. \par -No symptoms of pain or irritation at site of dental implant, unknown relation. \par -Will follow closely. \par \par The visit was completed via tele-medicine visit due to the restrictions of the COVID-19 pandemic. All issues as above were discussed and addressed but no physical exam was performed. If it was felt that the patient should be evaluated in the clinic then they were directed there. The patient verbally consented to visit.\par

## 2020-05-06 NOTE — PHYSICAL EXAM
[Fully active, able to carry on all pre-disease performance without restriction] : Status 0 - Fully active, able to carry on all pre-disease performance without restriction [Normal] : affect appropriate [de-identified] : pale conjunctiva [de-identified] : Posterior to anterior solitary cervical lymph node measuring about 8-10 mm, freely mobile, non-tender NO CHANGE [de-identified] : No axillary or inguinal adenopathy [de-identified] : Cannot perform physical exam due to nature of telemedicine visit, however on telemedicine patient appears to not be in any acute distress

## 2020-05-11 ENCOUNTER — APPOINTMENT (OUTPATIENT)
Dept: CT IMAGING | Facility: IMAGING CENTER | Age: 45
End: 2020-05-11

## 2020-05-13 LAB
ALBUMIN SERPL ELPH-MCNC: 4.2 G/DL
ALP BLD-CCNC: 57 U/L
ALT SERPL-CCNC: 27 U/L
ANA SER IF-ACNC: NEGATIVE
ANION GAP SERPL CALC-SCNC: 11 MMOL/L
AST SERPL-CCNC: 20 U/L
BASOPHILS # BLD AUTO: 0.05 K/UL
BASOPHILS NFR BLD AUTO: 1.2 %
BILIRUB SERPL-MCNC: <0.2 MG/DL
BUN SERPL-MCNC: 17 MG/DL
CALCIUM SERPL-MCNC: 9.5 MG/DL
CHLORIDE SERPL-SCNC: 104 MMOL/L
CMV IGG SERPL QL: 1.3 U/ML
CMV IGG SERPL-IMP: POSITIVE
CMV IGM SERPL QL: 11.4 AU/ML
CMV IGM SERPL QL: NEGATIVE
CO2 SERPL-SCNC: 25 MMOL/L
CREAT SERPL-MCNC: 1.18 MG/DL
CRP SERPL-MCNC: 0.12 MG/DL
EBV EA AB SER IA-ACNC: <5 U/ML
EBV EA AB TITR SER IF: ABNORMAL
EBV EA IGG SER QL IA: 19.9 U/ML
EBV EA IGG SER-ACNC: NEGATIVE
EBV EA IGM SER IA-ACNC: NEGATIVE
EBV PATRN SPEC IB-IMP: NORMAL
EBV VCA IGG SER IA-ACNC: 133 U/ML
EBV VCA IGM SER QL IA: <10 U/ML
ENA SS-A AB SER IA-ACNC: <0.2 AL
ENA SS-B AB SER IA-ACNC: <0.2 AL
EOSINOPHIL # BLD AUTO: 0.1 K/UL
EOSINOPHIL NFR BLD AUTO: 2.5 %
EPSTEIN-BARR VIRUS CAPSID ANTIGEN IGG: POSITIVE
ERYTHROCYTE [SEDIMENTATION RATE] IN BLOOD BY WESTERGREN METHOD: 28 MM/HR
FERRITIN SERPL-MCNC: 20 NG/ML
GLUCOSE SERPL-MCNC: 101 MG/DL
HCT VFR BLD CALC: 39.4 %
HGB BLD-MCNC: 12.6 G/DL
IMM GRANULOCYTES NFR BLD AUTO: 0 %
IRON SATN MFR SERPL: 13 %
IRON SERPL-MCNC: 47 UG/DL
LDH SERPL-CCNC: 137 U/L
LYMPHOCYTES # BLD AUTO: 1.35 K/UL
LYMPHOCYTES NFR BLD AUTO: 33.4 %
MAN DIFF?: NORMAL
MCHC RBC-ENTMCNC: 29 PG
MCHC RBC-ENTMCNC: 32 GM/DL
MCV RBC AUTO: 90.6 FL
MONOCYTES # BLD AUTO: 0.4 K/UL
MONOCYTES NFR BLD AUTO: 9.9 %
NEUTROPHILS # BLD AUTO: 2.14 K/UL
NEUTROPHILS NFR BLD AUTO: 53 %
PLATELET # BLD AUTO: 289 K/UL
POTASSIUM SERPL-SCNC: 4.2 MMOL/L
PROT SERPL-MCNC: 6.8 G/DL
RBC # BLD: 4.35 M/UL
RBC # FLD: 17.5 %
RHEUMATOID FACT SER QL: <10 IU/ML
SODIUM SERPL-SCNC: 140 MMOL/L
TIBC SERPL-MCNC: 374 UG/DL
UIBC SERPL-MCNC: 327 UG/DL
URATE SERPL-MCNC: 4.8 MG/DL
WBC # FLD AUTO: 4.04 K/UL

## 2020-05-14 ENCOUNTER — RESULT REVIEW (OUTPATIENT)
Age: 45
End: 2020-05-14

## 2020-05-14 ENCOUNTER — OUTPATIENT (OUTPATIENT)
Dept: OUTPATIENT SERVICES | Facility: HOSPITAL | Age: 45
LOS: 1 days | End: 2020-05-14
Payer: COMMERCIAL

## 2020-05-14 ENCOUNTER — APPOINTMENT (OUTPATIENT)
Dept: ULTRASOUND IMAGING | Facility: IMAGING CENTER | Age: 45
End: 2020-05-14
Payer: SELF-PAY

## 2020-05-14 DIAGNOSIS — Z98.890 OTHER SPECIFIED POSTPROCEDURAL STATES: Chronic | ICD-10-CM

## 2020-05-14 DIAGNOSIS — Z98.51 TUBAL LIGATION STATUS: Chronic | ICD-10-CM

## 2020-05-14 DIAGNOSIS — R59.0 LOCALIZED ENLARGED LYMPH NODES: ICD-10-CM

## 2020-05-14 DIAGNOSIS — Z98.891 HISTORY OF UTERINE SCAR FROM PREVIOUS SURGERY: Chronic | ICD-10-CM

## 2020-05-14 PROCEDURE — 88305 TISSUE EXAM BY PATHOLOGIST: CPT | Mod: 26

## 2020-05-14 PROCEDURE — 88173 CYTOPATH EVAL FNA REPORT: CPT

## 2020-05-14 PROCEDURE — 88184 FLOWCYTOMETRY/ TC 1 MARKER: CPT

## 2020-05-14 PROCEDURE — 10005 FNA BX W/US GDN 1ST LES: CPT

## 2020-05-14 PROCEDURE — 88189 FLOWCYTOMETRY/READ 16 & >: CPT

## 2020-05-14 PROCEDURE — 88185 FLOWCYTOMETRY/TC ADD-ON: CPT

## 2020-05-14 PROCEDURE — 88172 CYTP DX EVAL FNA 1ST EA SITE: CPT

## 2020-05-14 PROCEDURE — 88305 TISSUE EXAM BY PATHOLOGIST: CPT

## 2020-05-14 PROCEDURE — 87205 SMEAR GRAM STAIN: CPT

## 2020-05-14 PROCEDURE — 88173 CYTOPATH EVAL FNA REPORT: CPT | Mod: 26

## 2020-05-15 LAB
NON-GYNECOLOGICAL CYTOLOGY STUDY: SIGNIFICANT CHANGE UP
TM INTERPRETATION: SIGNIFICANT CHANGE UP

## 2020-05-18 ENCOUNTER — OUTPATIENT (OUTPATIENT)
Dept: OUTPATIENT SERVICES | Facility: HOSPITAL | Age: 45
LOS: 1 days | End: 2020-05-18

## 2020-05-18 ENCOUNTER — APPOINTMENT (OUTPATIENT)
Dept: OBGYN | Facility: HOSPITAL | Age: 45
End: 2020-05-18
Payer: COMMERCIAL

## 2020-05-18 ENCOUNTER — RESULT REVIEW (OUTPATIENT)
Age: 45
End: 2020-05-18

## 2020-05-18 ENCOUNTER — LABORATORY RESULT (OUTPATIENT)
Age: 45
End: 2020-05-18

## 2020-05-18 VITALS
SYSTOLIC BLOOD PRESSURE: 144 MMHG | DIASTOLIC BLOOD PRESSURE: 90 MMHG | TEMPERATURE: 99 F | HEIGHT: 63 IN | WEIGHT: 193 LBS | BODY MASS INDEX: 34.2 KG/M2 | HEART RATE: 110 BPM

## 2020-05-18 DIAGNOSIS — Z01.419 ENCOUNTER FOR GYNECOLOGICAL EXAMINATION (GENERAL) (ROUTINE) W/OUT ABNORMAL FINDINGS: ICD-10-CM

## 2020-05-18 DIAGNOSIS — Z98.890 OTHER SPECIFIED POSTPROCEDURAL STATES: Chronic | ICD-10-CM

## 2020-05-18 DIAGNOSIS — Z98.51 TUBAL LIGATION STATUS: Chronic | ICD-10-CM

## 2020-05-18 DIAGNOSIS — Z98.891 HISTORY OF UTERINE SCAR FROM PREVIOUS SURGERY: Chronic | ICD-10-CM

## 2020-05-18 PROCEDURE — 99396 PREV VISIT EST AGE 40-64: CPT | Mod: GC

## 2020-05-18 NOTE — REVIEW OF SYSTEMS
[Abn Vag Bleeding] : abnormal vaginal bleeding [Swollen Glands] : swollen glands [Swollen Glands In The Neck] : swollen glands in the neck [Nl] : Musculoskeletal

## 2020-05-19 DIAGNOSIS — Z00.00 ENCOUNTER FOR GENERAL ADULT MEDICAL EXAMINATION WITHOUT ABNORMAL FINDINGS: ICD-10-CM

## 2020-05-19 DIAGNOSIS — Z01.419 ENCOUNTER FOR GYNECOLOGICAL EXAMINATION (GENERAL) (ROUTINE) WITHOUT ABNORMAL FINDINGS: ICD-10-CM

## 2020-05-20 LAB
C TRACH RRNA SPEC QL NAA+PROBE: SIGNIFICANT CHANGE UP
HPV HIGH+LOW RISK DNA PNL CVX: SIGNIFICANT CHANGE UP
N GONORRHOEA RRNA SPEC QL NAA+PROBE: SIGNIFICANT CHANGE UP
SPECIMEN SOURCE: SIGNIFICANT CHANGE UP

## 2020-05-21 LAB — CYTOLOGY SPEC DOC CYTO: SIGNIFICANT CHANGE UP

## 2020-05-27 ENCOUNTER — APPOINTMENT (OUTPATIENT)
Dept: OBGYN | Facility: HOSPITAL | Age: 45
End: 2020-05-27

## 2020-06-01 ENCOUNTER — APPOINTMENT (OUTPATIENT)
Dept: OTOLARYNGOLOGY | Facility: CLINIC | Age: 45
End: 2020-06-01
Payer: MEDICAID

## 2020-06-01 ENCOUNTER — APPOINTMENT (OUTPATIENT)
Dept: OTOLARYNGOLOGY | Facility: CLINIC | Age: 45
End: 2020-06-01

## 2020-06-01 VITALS
WEIGHT: 180 LBS | HEIGHT: 63 IN | TEMPERATURE: 97.3 F | BODY MASS INDEX: 31.89 KG/M2 | SYSTOLIC BLOOD PRESSURE: 126 MMHG | DIASTOLIC BLOOD PRESSURE: 70 MMHG

## 2020-06-01 PROCEDURE — 99215 OFFICE O/P EST HI 40 MIN: CPT

## 2020-06-01 NOTE — REVIEW OF SYSTEMS
[Seasonal Allergies] : seasonal allergies [Sneezing] : sneezing [Problem Snoring] : problem snoring [Hoarseness] : hoarseness [Throat Clearing] : throat clearing [Throat Pain] : throat pain [Swelling Neck] : swelling neck [Swelling Face] : face swelling [Swollen Glands] : swollen glands [Heartburn] : heartburn [Easy Bleeding] : a tendency for easy bleeding [Negative] : Heme/Lymph [FreeTextEntry6] : noisy breathing [FreeTextEntry9] : joint aches [FreeTextEntry7] : difficulty swallowing  [FreeTextEntry1] : fatigue, weight gain, daytime sleepiness [de-identified] : s [de-identified] : headache, hair falling off

## 2020-06-01 NOTE — PHYSICAL EXAM
[de-identified] : Barely palpable nodes in R level V neck and submental region.   [Midline] : trachea located in midline position [Normal] : cranial nerves 2-12 intact

## 2020-06-01 NOTE — HISTORY OF PRESENT ILLNESS
[de-identified] : 43 y/o F with a h/o adenomyosis of the uterus, which causes heavy menses.  She sees Hematology 2 x/year for iron infusions.  On her most recent visit she was identified as having cervical adenopathy.

## 2020-06-01 NOTE — ASSESSMENT
[FreeTextEntry1] : Based on exam, sono and path it does not appear that any further intervention is required re: her cervical nodes at this time.  Pt advised to f/u in 3 months for a recheck.  Will obtain a repeat sono at that time.  Pt to come in sooner if she notes any change in the size of the nodes.

## 2020-06-01 NOTE — DATA REVIEWED
[de-identified] : FNA R neck path - Favor reactive node [de-identified] : Neck (1/27/20) - Subcentimeter R level V nodes

## 2020-06-08 ENCOUNTER — APPOINTMENT (OUTPATIENT)
Dept: NEUROSURGERY | Facility: CLINIC | Age: 45
End: 2020-06-08
Payer: SELF-PAY

## 2020-06-08 PROCEDURE — 99203 OFFICE O/P NEW LOW 30 MIN: CPT | Mod: 95

## 2020-06-17 NOTE — HISTORY OF PRESENT ILLNESS
[FreeTextEntry1] : headache [de-identified] : This visit was conducted via two-way telehealth video conference platform. Consent was obtained. The patient was at home and the neurosurgeon, Dr. Sierra Moreno was in a [facility] office, [address] [place] [address]. The patient and Dr. Moreno were present for the entire visit. No physical exam was performed given the virtual nature of the visit.\par \par 45 yo female with PMH gastritis, left benign ovarian cyst (s/p ovarian conservation resection), adenomyosis, uterine fibroids, menorrhagia presents for headaches. Her CT of both the neck and head are unremarkable. She has no evidence of any neurological deficits. She complains primarily of lymphadenopathy which I assured her was not related to her headaches. Unclear why she is seeking neurosurgical consultation.

## 2020-06-17 NOTE — REASON FOR VISIT
[New Patient Visit] : a new patient visit [Follow-Up Visit] : a follow-up visit for [FreeTextEntry2] : DIANA

## 2020-06-17 NOTE — ASSESSMENT
[FreeTextEntry1] : 44 year old female with multiple medical problems and significant anxiety who presents with complaints of headache. Headaches of unclear etiology. All imaging is unremarkable. Patient is neurologically intact. Recommend routine follow up with PCP and heme/onc.

## 2020-06-17 NOTE — REVIEW OF SYSTEMS
[Hoarseness] : hoarseness [Muscle Pain] : muscle pain [Dizziness] : dizziness [Fever] : no fever [Night Sweats] : no night sweats [Chills] : no chills [Fatigue] : no fatigue [Recent Change In Weight] : ~T no recent weight change [Eye Pain] : no eye pain [Vision Problems] : no vision problems [Dysphagia] : no dysphagia [Chest Pain] : no chest pain [Palpitations] : no palpitations [Lower Ext Edema] : no lower extremity edema [Shortness Of Breath] : no shortness of breath [Wheezing] : no wheezing [SOB on Exertion] : no shortness of breath during exertion [Abdominal Pain] : no abdominal pain [Vomiting] : no vomiting [Joint Pain] : no joint pain [Muscle Weakness] : no muscle weakness [Skin Rash] : no skin rash [Skin Wound] : no skin wound [Confused] : no confusion [Fainting] : no fainting [Easy Bleeding] : no tendency for easy bleeding [Easy Bruising] : no tendency for easy bruising [de-identified] : lymph nodes in neck that she can feel are there and she feels growing.  [FreeTextEntry4] : lump like feeling when she swallow, voice hoarsenss persistent.

## 2020-08-12 ENCOUNTER — APPOINTMENT (OUTPATIENT)
Dept: OBGYN | Facility: HOSPITAL | Age: 45
End: 2020-08-12
Payer: COMMERCIAL

## 2020-08-12 ENCOUNTER — OUTPATIENT (OUTPATIENT)
Dept: OUTPATIENT SERVICES | Facility: HOSPITAL | Age: 45
LOS: 1 days | End: 2020-08-12
Payer: COMMERCIAL

## 2020-08-12 ENCOUNTER — RESULT REVIEW (OUTPATIENT)
Age: 45
End: 2020-08-12

## 2020-08-12 VITALS
HEIGHT: 63 IN | SYSTOLIC BLOOD PRESSURE: 118 MMHG | WEIGHT: 187 LBS | HEART RATE: 95 BPM | TEMPERATURE: 98 F | DIASTOLIC BLOOD PRESSURE: 78 MMHG | BODY MASS INDEX: 33.13 KG/M2

## 2020-08-12 DIAGNOSIS — Z98.890 OTHER SPECIFIED POSTPROCEDURAL STATES: Chronic | ICD-10-CM

## 2020-08-12 DIAGNOSIS — Z98.891 HISTORY OF UTERINE SCAR FROM PREVIOUS SURGERY: Chronic | ICD-10-CM

## 2020-08-12 DIAGNOSIS — Z98.51 TUBAL LIGATION STATUS: Chronic | ICD-10-CM

## 2020-08-12 PROCEDURE — ZZZZZ: CPT

## 2020-08-12 PROCEDURE — 88305 TISSUE EXAM BY PATHOLOGIST: CPT | Mod: 26

## 2020-08-12 NOTE — PROCEDURE
[Endometrial Biopsy] : Endometrial biopsy [Risks] : risks [Irregular Bleeding] : irregular uterine bleeding [Benefits] : benefits [Alternatives] : alternatives [Patient] : patient [Infection] : infection [Bleeding] : bleeding [Allergic Reaction] : allergic reaction [Uterine Perforation] : uterine perforation [Pain] : pain [Neg Pregnancy Test] : a pregnancy test was negative [Betadine] : Betadine [None] : none [Tenaculum] : a single toothed tenaculum [Easy Passage] : allowed easy passage of a uterine sound without dilation [Moderate] : a moderate [Sent to Histology] : the specimen was place in buffered formalin and sent for pathlogy [Pipelle] : a Pipelle endometrial suction curette [Tolerated Well] : the patient tolerated the procedure well [No Complications] : there were no complications

## 2020-08-14 LAB — SURGICAL PATHOLOGY STUDY: SIGNIFICANT CHANGE UP

## 2020-08-15 NOTE — END OF VISIT
[FreeTextEntry3] : 46 yo P2 w/symptomatic ut myomas.\par Pt w/HMB now interested in definitive surgical intervention.\par Past hx significant for  x 2, Xlap, and abdominoplasty.\par Pt interested in [place].\par Plan as above. [] : Resident

## 2020-08-22 ENCOUNTER — APPOINTMENT (OUTPATIENT)
Dept: ULTRASOUND IMAGING | Facility: HOSPITAL | Age: 45
End: 2020-08-22

## 2020-08-22 ENCOUNTER — APPOINTMENT (OUTPATIENT)
Dept: MRI IMAGING | Facility: HOSPITAL | Age: 45
End: 2020-08-22

## 2020-08-27 DIAGNOSIS — N92.0 EXCESSIVE AND FREQUENT MENSTRUATION WITH REGULAR CYCLE: ICD-10-CM

## 2020-08-31 ENCOUNTER — LABORATORY RESULT (OUTPATIENT)
Age: 45
End: 2020-08-31

## 2020-08-31 ENCOUNTER — APPOINTMENT (OUTPATIENT)
Dept: INTERNAL MEDICINE | Facility: CLINIC | Age: 45
End: 2020-08-31

## 2020-08-31 ENCOUNTER — OUTPATIENT (OUTPATIENT)
Dept: OUTPATIENT SERVICES | Facility: HOSPITAL | Age: 45
LOS: 1 days | End: 2020-08-31

## 2020-08-31 VITALS
OXYGEN SATURATION: 97 % | DIASTOLIC BLOOD PRESSURE: 80 MMHG | HEIGHT: 63 IN | SYSTOLIC BLOOD PRESSURE: 120 MMHG | BODY MASS INDEX: 31.01 KG/M2 | HEART RATE: 80 BPM | WEIGHT: 175 LBS

## 2020-08-31 DIAGNOSIS — D50.0 IRON DEFICIENCY ANEMIA SECONDARY TO BLOOD LOSS (CHRONIC): ICD-10-CM

## 2020-08-31 DIAGNOSIS — M17.10 UNILATERAL PRIMARY OSTEOARTHRITIS, UNSPECIFIED KNEE: ICD-10-CM

## 2020-08-31 DIAGNOSIS — Z98.890 OTHER SPECIFIED POSTPROCEDURAL STATES: Chronic | ICD-10-CM

## 2020-08-31 DIAGNOSIS — R59.0 LOCALIZED ENLARGED LYMPH NODES: ICD-10-CM

## 2020-08-31 DIAGNOSIS — Z98.891 HISTORY OF UTERINE SCAR FROM PREVIOUS SURGERY: Chronic | ICD-10-CM

## 2020-08-31 DIAGNOSIS — Z98.51 TUBAL LIGATION STATUS: Chronic | ICD-10-CM

## 2020-08-31 DIAGNOSIS — M51.26 OTHER INTERVERTEBRAL DISC DISPLACEMENT, LUMBAR REGION: ICD-10-CM

## 2020-08-31 LAB
BASOPHILS # BLD AUTO: 0.03 K/UL — SIGNIFICANT CHANGE UP (ref 0–0.2)
BASOPHILS NFR BLD AUTO: 0.7 % — SIGNIFICANT CHANGE UP (ref 0–2)
EOSINOPHIL # BLD AUTO: 0.1 K/UL — SIGNIFICANT CHANGE UP (ref 0–0.5)
EOSINOPHIL NFR BLD AUTO: 2.5 % — SIGNIFICANT CHANGE UP (ref 0–6)
FERRITIN SERPL-MCNC: 3.35 NG/ML — LOW (ref 15–150)
HCT VFR BLD CALC: 30 % — LOW (ref 34.5–45)
HGB BLD-MCNC: 8.8 G/DL — LOW (ref 11.5–15.5)
IMM GRANULOCYTES NFR BLD AUTO: 0 % — SIGNIFICANT CHANGE UP (ref 0–1.5)
LYMPHOCYTES # BLD AUTO: 1.49 K/UL — SIGNIFICANT CHANGE UP (ref 1–3.3)
LYMPHOCYTES # BLD AUTO: 36.7 % — SIGNIFICANT CHANGE UP (ref 13–44)
MCHC RBC-ENTMCNC: 21.7 PG — LOW (ref 27–34)
MCHC RBC-ENTMCNC: 29.3 % — LOW (ref 32–36)
MCV RBC AUTO: 73.9 FL — LOW (ref 80–100)
MONOCYTES # BLD AUTO: 0.33 K/UL — SIGNIFICANT CHANGE UP (ref 0–0.9)
MONOCYTES NFR BLD AUTO: 8.1 % — SIGNIFICANT CHANGE UP (ref 2–14)
NEUTROPHILS # BLD AUTO: 2.11 K/UL — SIGNIFICANT CHANGE UP (ref 1.8–7.4)
NEUTROPHILS NFR BLD AUTO: 52 % — SIGNIFICANT CHANGE UP (ref 43–77)
NRBC # FLD: 0 K/UL — SIGNIFICANT CHANGE UP (ref 0–0)
PLATELET # BLD AUTO: 508 K/UL — HIGH (ref 150–400)
PMV BLD: 10.7 FL — SIGNIFICANT CHANGE UP (ref 7–13)
RBC # BLD: 4.06 M/UL — SIGNIFICANT CHANGE UP (ref 3.8–5.2)
RBC # FLD: 17.4 % — HIGH (ref 10.3–14.5)
WBC # BLD: 4.06 K/UL — SIGNIFICANT CHANGE UP (ref 3.8–10.5)
WBC # FLD AUTO: 4.06 K/UL — SIGNIFICANT CHANGE UP (ref 3.8–10.5)

## 2020-08-31 NOTE — PHYSICAL EXAM
[Postauricular Lymph Nodes Enlarged Bilaterally] : nodes not enlarged [Normal] : normal rate, regular rhythm, normal S1 and S2 and no murmur heard [Postauricular Lymph Nodes Enlarged On The Left] : nodes not enlarged on the left [Preauricular Lymph Nodes Enlarged Bilaterally] : nodes not enlarged [Submandibular Lymph Nodes Enlarged Bilaterally] : nodes not enlarged [Suboccipital Lymph Nodes Enlarged Bilaterally] : nodes not enlarged [Submental Lymph Nodes Enlarged] : nodes not enlarged [Cervical Lymph Nodes Enlarged Posterior Left] : nodes not enlarged on the left [Cervical Lymph Nodes Enlarged Posterior Right] : enlarged node(s) on the right [Cervical Lymph Nodes Enlarged Anterior Left] : nodes not enlarged on the left [Cervical Lymph Nodes Enlarged Anterior Right] : nodes not enlarged on the right [Supraclavicular Lymph Nodes Enlarged On The Right] : nodes not enlarged on the right [Supraclavicular Lymph Nodes Enlarged On The Left] : nodes not enlarged on the left [Axillary Lymph Nodes Enlarged Bilaterally] : nodes not enlarged [No Joint Swelling] : no joint swelling [No CVA Tenderness] : no CVA  tenderness [Grossly Normal Strength/Tone] : grossly normal strength/tone [de-identified] : neg st leg raising test. TTP paraspinal lumbar region [de-identified] : bilateral knee crepitus. FROM. neg ant/post drawers, leesa, valgus/varus

## 2020-08-31 NOTE — REVIEW OF SYSTEMS
[Joint Pain] : joint pain [Joint Swelling] : no joint swelling [Joint Stiffness] : joint stiffness [Muscle Weakness] : no muscle weakness [Muscle Pain] : no muscle pain [Back Pain] : back pain [Negative] : Respiratory

## 2020-08-31 NOTE — HISTORY OF PRESENT ILLNESS
[FreeTextEntry1] : neck swelling, back and knee pain  [de-identified] : BRENDA FISCHER is a 45 year old female with iron def anemia, h/o cervical lymphadenopathy, herniated lumbar disk here for:\par #continued symptoms on R sided neck swelling: s/p US and biopsy which was benign. Seen by ENT. rec US recheck in 3 months. Pt feels swelling has improved but still present. \par #chronic low back pain: states that she was diagnosed with lumbar disk herniations. Sharp pain lumbar spine wirh radiation down both lower extremities. Takes tylenol without relief. can not nsaids due to gasritis. would like to see ortho\par #bilateral knee pain: new complaints, last 5-6 months. family h/o arthritis. states joints creek when she moves. \par #DIANA: received iron infusions from heme, would like labs check before she makes an appointment with hematology.

## 2020-09-01 ENCOUNTER — OUTPATIENT (OUTPATIENT)
Dept: OUTPATIENT SERVICES | Facility: HOSPITAL | Age: 45
LOS: 1 days | End: 2020-09-01
Payer: COMMERCIAL

## 2020-09-01 ENCOUNTER — RESULT REVIEW (OUTPATIENT)
Age: 45
End: 2020-09-01

## 2020-09-01 ENCOUNTER — APPOINTMENT (OUTPATIENT)
Dept: MRI IMAGING | Facility: CLINIC | Age: 45
End: 2020-09-01
Payer: COMMERCIAL

## 2020-09-01 DIAGNOSIS — N92.0 EXCESSIVE AND FREQUENT MENSTRUATION WITH REGULAR CYCLE: ICD-10-CM

## 2020-09-01 DIAGNOSIS — Z98.890 OTHER SPECIFIED POSTPROCEDURAL STATES: Chronic | ICD-10-CM

## 2020-09-01 DIAGNOSIS — Z98.891 HISTORY OF UTERINE SCAR FROM PREVIOUS SURGERY: Chronic | ICD-10-CM

## 2020-09-01 DIAGNOSIS — Z98.51 TUBAL LIGATION STATUS: Chronic | ICD-10-CM

## 2020-09-01 PROCEDURE — 72197 MRI PELVIS W/O & W/DYE: CPT

## 2020-09-01 PROCEDURE — 72197 MRI PELVIS W/O & W/DYE: CPT | Mod: 26

## 2020-09-01 PROCEDURE — A9585: CPT

## 2020-09-08 ENCOUNTER — APPOINTMENT (OUTPATIENT)
Dept: INTERVENTIONAL RADIOLOGY/VASCULAR | Facility: CLINIC | Age: 45
End: 2020-09-08
Payer: COMMERCIAL

## 2020-09-08 VITALS — HEIGHT: 64 IN | BODY MASS INDEX: 31.24 KG/M2 | WEIGHT: 183 LBS

## 2020-09-08 DIAGNOSIS — B07.8 OTHER VIRAL WARTS: ICD-10-CM

## 2020-09-08 DIAGNOSIS — L29.0 PRURITUS ANI: ICD-10-CM

## 2020-09-08 DIAGNOSIS — G47.33 OBSTRUCTIVE SLEEP APNEA (ADULT) (PEDIATRIC): ICD-10-CM

## 2020-09-08 DIAGNOSIS — Z63.5 DISRUPTION OF FAMILY BY SEPARATION AND DIVORCE: ICD-10-CM

## 2020-09-08 DIAGNOSIS — Z87.898 PERSONAL HISTORY OF OTHER SPECIFIED CONDITIONS: ICD-10-CM

## 2020-09-08 PROCEDURE — 99423 OL DIG E/M SVC 21+ MIN: CPT

## 2020-09-08 RX ORDER — FAMOTIDINE 20 MG/1
20 TABLET, FILM COATED ORAL
Qty: 180 | Refills: 0 | Status: DISCONTINUED | COMMUNITY
Start: 2020-01-21 | End: 2020-09-08

## 2020-09-08 SDOH — SOCIAL STABILITY - SOCIAL INSECURITY: DISRUPTION OF FAMILY BY SEPARATION AND DIVORCE: Z63.5

## 2020-09-10 ENCOUNTER — OUTPATIENT (OUTPATIENT)
Dept: OUTPATIENT SERVICES | Facility: HOSPITAL | Age: 45
LOS: 1 days | End: 2020-09-10
Payer: COMMERCIAL

## 2020-09-10 ENCOUNTER — APPOINTMENT (OUTPATIENT)
Dept: HEMATOLOGY ONCOLOGY | Facility: CLINIC | Age: 45
End: 2020-09-10
Payer: COMMERCIAL

## 2020-09-10 ENCOUNTER — APPOINTMENT (OUTPATIENT)
Dept: ULTRASOUND IMAGING | Facility: IMAGING CENTER | Age: 45
End: 2020-09-10
Payer: COMMERCIAL

## 2020-09-10 ENCOUNTER — OUTPATIENT (OUTPATIENT)
Dept: OUTPATIENT SERVICES | Facility: HOSPITAL | Age: 45
LOS: 1 days | Discharge: HOME | End: 2020-09-10

## 2020-09-10 VITALS
WEIGHT: 185.19 LBS | DIASTOLIC BLOOD PRESSURE: 79 MMHG | BODY MASS INDEX: 33.23 KG/M2 | SYSTOLIC BLOOD PRESSURE: 115 MMHG | HEART RATE: 75 BPM | OXYGEN SATURATION: 100 % | HEIGHT: 62.6 IN | RESPIRATION RATE: 16 BRPM | TEMPERATURE: 99.4 F

## 2020-09-10 DIAGNOSIS — Z98.51 TUBAL LIGATION STATUS: Chronic | ICD-10-CM

## 2020-09-10 DIAGNOSIS — Z98.890 OTHER SPECIFIED POSTPROCEDURAL STATES: Chronic | ICD-10-CM

## 2020-09-10 DIAGNOSIS — Z98.891 HISTORY OF UTERINE SCAR FROM PREVIOUS SURGERY: Chronic | ICD-10-CM

## 2020-09-10 DIAGNOSIS — D50.0 IRON DEFICIENCY ANEMIA SECONDARY TO BLOOD LOSS (CHRONIC): ICD-10-CM

## 2020-09-10 DIAGNOSIS — R59.0 LOCALIZED ENLARGED LYMPH NODES: ICD-10-CM

## 2020-09-10 PROCEDURE — 99213 OFFICE O/P EST LOW 20 MIN: CPT

## 2020-09-10 PROCEDURE — 76536 US EXAM OF HEAD AND NECK: CPT

## 2020-09-10 PROCEDURE — 76536 US EXAM OF HEAD AND NECK: CPT | Mod: 26

## 2020-09-11 NOTE — ASSESSMENT
[FreeTextEntry1] : 44 year old female with Iron deficiency anemia due to chronic blood loss from menorrhagia. Treated with IV Injectafer in August 2018 and Feb 2019 with adequate response. Unable to tolerate oral iron therapy. Again presented in August 2019 with sx concerning for DIANA, shown to have low iron stores and set-up for venofer (unable to get injectafer at that point due to cost / no insurance). She is now s/p 2 treatments with Injectafer March 2020 . Right cervical LN etiology unclear, reactive on biopsy. Now with worsening iron deficiency and anemia again. \par \par Plan:\par - s/p therapy with Gyn with slight improvement of menorrhagia, but worse again now and likely will require hysterectomy or embolization. \par -Previous rheum workup unremarkable, LN stable. Unclear etiology, continue follow up with ENT. \par -Given worsening DIANA again will plan for two treatments with Feraheme. Patient signed consent today in office (previously had injectafer).

## 2020-09-11 NOTE — REVIEW OF SYSTEMS
[Hoarseness] : hoarseness [Muscle Pain] : muscle pain [Dizziness] : dizziness [Fever] : no fever [Chills] : no chills [Night Sweats] : no night sweats [Fatigue] : no fatigue [Recent Change In Weight] : ~T no recent weight change [Eye Pain] : no eye pain [Vision Problems] : no vision problems [Dysphagia] : no dysphagia [Chest Pain] : no chest pain [Palpitations] : no palpitations [Lower Ext Edema] : no lower extremity edema [Shortness Of Breath] : no shortness of breath [Wheezing] : no wheezing [SOB on Exertion] : no shortness of breath during exertion [Abdominal Pain] : no abdominal pain [Vomiting] : no vomiting [Joint Pain] : no joint pain [Muscle Weakness] : no muscle weakness [Skin Rash] : no skin rash [Skin Wound] : no skin wound [Confused] : no confusion [Fainting] : no fainting [Easy Bleeding] : no tendency for easy bleeding [Easy Bruising] : no tendency for easy bruising [FreeTextEntry4] : voice improved but still with swollen lymph nodes, stable.  [de-identified] : lymph nodes in neck stable.

## 2020-09-11 NOTE — PHYSICAL EXAM
[Fully active, able to carry on all pre-disease performance without restriction] : Status 0 - Fully active, able to carry on all pre-disease performance without restriction [Normal] : affect appropriate [de-identified] : pale appearing [de-identified] : pale conjunctiva [de-identified] : Posterior to anterior solitary cervical lymph node measuring about 8-10 mm, freely mobile, non-tender NO CHANGE [de-identified] : No axillary or inguinal adenopathy

## 2020-09-14 ENCOUNTER — APPOINTMENT (OUTPATIENT)
Dept: INTERNAL MEDICINE | Facility: CLINIC | Age: 45
End: 2020-09-14

## 2020-09-14 ENCOUNTER — OUTPATIENT (OUTPATIENT)
Dept: OUTPATIENT SERVICES | Facility: HOSPITAL | Age: 45
LOS: 1 days | End: 2020-09-14

## 2020-09-14 VITALS — BODY MASS INDEX: 31.58 KG/M2 | WEIGHT: 185 LBS | HEIGHT: 64 IN

## 2020-09-14 DIAGNOSIS — Z98.890 OTHER SPECIFIED POSTPROCEDURAL STATES: Chronic | ICD-10-CM

## 2020-09-14 DIAGNOSIS — Z98.51 TUBAL LIGATION STATUS: Chronic | ICD-10-CM

## 2020-09-14 DIAGNOSIS — Z98.891 HISTORY OF UTERINE SCAR FROM PREVIOUS SURGERY: Chronic | ICD-10-CM

## 2020-09-14 NOTE — PHYSICAL EXAM
[No Acute Distress] : no acute distress [Well Developed] : well developed [Well-Appearing] : well-appearing [Well Nourished] : well nourished

## 2020-09-15 ENCOUNTER — APPOINTMENT (OUTPATIENT)
Dept: OTOLARYNGOLOGY | Facility: CLINIC | Age: 45
End: 2020-09-15
Payer: COMMERCIAL

## 2020-09-15 ENCOUNTER — OUTPATIENT (OUTPATIENT)
Dept: OUTPATIENT SERVICES | Facility: HOSPITAL | Age: 45
LOS: 1 days | Discharge: HOME | End: 2020-09-15

## 2020-09-15 VITALS
HEIGHT: 64 IN | BODY MASS INDEX: 31.58 KG/M2 | HEART RATE: 89 BPM | TEMPERATURE: 97.9 F | SYSTOLIC BLOOD PRESSURE: 128 MMHG | DIASTOLIC BLOOD PRESSURE: 82 MMHG | WEIGHT: 185 LBS

## 2020-09-15 DIAGNOSIS — Z98.51 TUBAL LIGATION STATUS: Chronic | ICD-10-CM

## 2020-09-15 DIAGNOSIS — Z98.890 OTHER SPECIFIED POSTPROCEDURAL STATES: Chronic | ICD-10-CM

## 2020-09-15 DIAGNOSIS — Z98.891 HISTORY OF UTERINE SCAR FROM PREVIOUS SURGERY: Chronic | ICD-10-CM

## 2020-09-15 PROCEDURE — 99214 OFFICE O/P EST MOD 30 MIN: CPT

## 2020-09-15 NOTE — REASON FOR VISIT
[Subsequent Evaluation] : a subsequent evaluation for [FreeTextEntry2] : f/u for swollen glands and lymph nodes, ultrasound results for lymph nodes done last Wednesday

## 2020-09-15 NOTE — CONSULT LETTER
[Dear  ___] : Dear  [unfilled], [Courtesy Letter:] : I had the pleasure of seeing your patient, [unfilled], in my office today. [Please see my note below.] : Please see my note below. [Consult Closing:] : Thank you very much for allowing me to participate in the care of this patient.  If you have any questions, please do not hesitate to contact me. [Sincerely,] : Sincerely, [FreeTextEntry3] : Edson Reinoso MD, FACS\par Chief of Otolaryngology [facility]\par  - Dept. of Otolaryngology\par [employer] [facility] [employer]\par \par  [FreeTextEntry2] : Yovana Baldwin MD ([facility] physician)\par

## 2020-09-15 NOTE — HISTORY OF PRESENT ILLNESS
[de-identified] : 46 y/o F with a h/o adenomyosis of the uterus, which causes heavy menses. She sees Hematology 2 x/year for iron infusions. On her most recent visit she was identified as having cervical adenopathy.   Since her last visit in June she has had a dental implant removed.  She is still able to feel the right neck adenopathy.\par  \par

## 2020-09-15 NOTE — PHYSICAL EXAM
[de-identified] : Palpable nodes in R level V neck and submental region.   [Midline] : trachea located in midline position [Normal] : no rashes

## 2020-09-15 NOTE — ASSESSMENT
[FreeTextEntry1] : Options for management d/w pt, including observation with serial sonograms vs. excisional biopsy.  Pt to schedule 6 month f/u appt and sonogram.

## 2020-09-16 ENCOUNTER — APPOINTMENT (OUTPATIENT)
Dept: MAMMOGRAPHY | Facility: HOSPITAL | Age: 45
End: 2020-09-16
Payer: COMMERCIAL

## 2020-09-16 ENCOUNTER — OUTPATIENT (OUTPATIENT)
Dept: OUTPATIENT SERVICES | Facility: HOSPITAL | Age: 45
LOS: 1 days | End: 2020-09-16
Payer: SELF-PAY

## 2020-09-16 DIAGNOSIS — Z98.890 OTHER SPECIFIED POSTPROCEDURAL STATES: Chronic | ICD-10-CM

## 2020-09-16 DIAGNOSIS — Z98.51 TUBAL LIGATION STATUS: Chronic | ICD-10-CM

## 2020-09-16 DIAGNOSIS — Z00.8 ENCOUNTER FOR OTHER GENERAL EXAMINATION: ICD-10-CM

## 2020-09-16 DIAGNOSIS — Z98.891 HISTORY OF UTERINE SCAR FROM PREVIOUS SURGERY: Chronic | ICD-10-CM

## 2020-09-16 PROCEDURE — 77067 SCR MAMMO BI INCL CAD: CPT | Mod: 26

## 2020-09-16 PROCEDURE — 77067 SCR MAMMO BI INCL CAD: CPT

## 2020-09-16 PROCEDURE — 77063 BREAST TOMOSYNTHESIS BI: CPT | Mod: 26

## 2020-09-16 PROCEDURE — 77063 BREAST TOMOSYNTHESIS BI: CPT

## 2020-09-17 ENCOUNTER — APPOINTMENT (OUTPATIENT)
Dept: HEMATOLOGY ONCOLOGY | Facility: CLINIC | Age: 45
End: 2020-09-17
Payer: COMMERCIAL

## 2020-09-17 ENCOUNTER — APPOINTMENT (OUTPATIENT)
Dept: INFUSION THERAPY | Facility: HOSPITAL | Age: 45
End: 2020-09-17

## 2020-09-17 PROCEDURE — 99214 OFFICE O/P EST MOD 30 MIN: CPT

## 2020-09-19 LAB
HCG UR QL: NEGATIVE
QUALITY CONTROL: YES

## 2020-09-21 DIAGNOSIS — R59.0 LOCALIZED ENLARGED LYMPH NODES: ICD-10-CM

## 2020-09-22 NOTE — HISTORY OF PRESENT ILLNESS
[Home] : at home, [unfilled] , at the time of the visit. [Medical Office: ([facility])___] : at the medical office located in  [Patient Declined  Services] : - None: Patient declined  services [FreeTextEntry3] : pt declined  services because she states that she can speak fluent english [FreeTextEntry1] : results follow up  [de-identified] : BRENDA FISCHER is a 45 year old female with iron def anemia, h/o cervical lymphadenopathy, herniated lumbar disk here for results follow up. \par #continued symptoms on R sided neck swelling: s/p US and biopsy which was benign. Seen by ENT in past and has an upcming appintment. repeat US shows persistant lymphadenopathy- unchanged in size. \par #DIANA: received iron infusions from heme,repeat labs show low Hb 8.8. already seen by heme last week who ordered iron infusions.

## 2020-09-22 NOTE — REVIEW OF SYSTEMS
[Dysmenorrhea] : dysmenorrhea [Swollen Glands] : swollen glands [Negative] : ENT [Melena] : no melena [Vaginal Discharge] : no vaginal discharge [Easy Bleeding] : no easy bleeding [Easy Bruising] : no easy bruising

## 2020-09-23 ENCOUNTER — RESULT REVIEW (OUTPATIENT)
Age: 45
End: 2020-09-23

## 2020-09-23 ENCOUNTER — OUTPATIENT (OUTPATIENT)
Dept: OUTPATIENT SERVICES | Facility: HOSPITAL | Age: 45
LOS: 1 days | End: 2020-09-23

## 2020-09-23 ENCOUNTER — APPOINTMENT (OUTPATIENT)
Dept: OBGYN | Facility: HOSPITAL | Age: 45
End: 2020-09-23
Payer: COMMERCIAL

## 2020-09-23 ENCOUNTER — OUTPATIENT (OUTPATIENT)
Dept: OUTPATIENT SERVICES | Facility: HOSPITAL | Age: 45
LOS: 1 days | End: 2020-09-23
Payer: COMMERCIAL

## 2020-09-23 ENCOUNTER — APPOINTMENT (OUTPATIENT)
Dept: ORTHOPEDIC SURGERY | Facility: HOSPITAL | Age: 45
End: 2020-09-23
Payer: COMMERCIAL

## 2020-09-23 ENCOUNTER — APPOINTMENT (OUTPATIENT)
Dept: RADIOLOGY | Facility: HOSPITAL | Age: 45
End: 2020-09-23

## 2020-09-23 VITALS
BODY MASS INDEX: 31.41 KG/M2 | HEIGHT: 64 IN | TEMPERATURE: 98.1 F | WEIGHT: 184 LBS | DIASTOLIC BLOOD PRESSURE: 81 MMHG | SYSTOLIC BLOOD PRESSURE: 121 MMHG | HEART RATE: 82 BPM

## 2020-09-23 VITALS
BODY MASS INDEX: 31.41 KG/M2 | DIASTOLIC BLOOD PRESSURE: 74 MMHG | HEART RATE: 90 BPM | TEMPERATURE: 98.4 F | HEIGHT: 64 IN | SYSTOLIC BLOOD PRESSURE: 121 MMHG | WEIGHT: 184 LBS

## 2020-09-23 DIAGNOSIS — Z98.890 OTHER SPECIFIED POSTPROCEDURAL STATES: Chronic | ICD-10-CM

## 2020-09-23 DIAGNOSIS — N93.9 ABNORMAL UTERINE AND VAGINAL BLEEDING, UNSPECIFIED: ICD-10-CM

## 2020-09-23 DIAGNOSIS — Z98.51 TUBAL LIGATION STATUS: Chronic | ICD-10-CM

## 2020-09-23 DIAGNOSIS — Z98.891 HISTORY OF UTERINE SCAR FROM PREVIOUS SURGERY: Chronic | ICD-10-CM

## 2020-09-23 PROCEDURE — 73565 X-RAY EXAM OF KNEES: CPT | Mod: 26

## 2020-09-23 PROCEDURE — 99213 OFFICE O/P EST LOW 20 MIN: CPT | Mod: GE

## 2020-09-24 ENCOUNTER — APPOINTMENT (OUTPATIENT)
Dept: INFUSION THERAPY | Facility: HOSPITAL | Age: 45
End: 2020-09-24

## 2020-09-25 ENCOUNTER — APPOINTMENT (OUTPATIENT)
Dept: HEMATOLOGY ONCOLOGY | Facility: CLINIC | Age: 45
End: 2020-09-25
Payer: COMMERCIAL

## 2020-09-25 PROCEDURE — 99214 OFFICE O/P EST MOD 30 MIN: CPT | Mod: 95

## 2020-09-25 NOTE — HISTORY OF PRESENT ILLNESS
[Home] : at home, [unfilled] , at the time of the visit. [Medical Office: ([facility])___] : at the medical office located in  [Verbal consent obtained from patient] : the patient, [unfilled] [de-identified] : 43 yo female with PMHx of gastritis, left benign ovarian cyst (s/p ovarian conservation resection), adenomyosis, uterine fibroids, menorrhagia presents for evaluation of chronic blood loss anemia. Patient has had heavy menses for years with intolerance to oral iron (nausea, vomiting, constipation) undergoing IV iron formulation.  She first had IV iron 2013 in [place] with appropriate Hb response. Additionally she presented in 2017 again for IV iron with Hb ~7 that responded to 13.9.  LMP July 1, 2018. On July 2 2018 she had severe menstrual bleeding that caused her to be dizzy and presented to [facility] ER. There her Hb was 8.6, down from 9.3 in June. She did not receive any iron or blood transfusion in the ED. Denies adverse reactions to iron infusion.\par \par Has close follow up with her GYN and failed multiple OCPs and IUD in an attempt control the bleeding. OCPs gave her acne so she does not want to try that again. Currently, goes through 6-7 pads a day for the first 3 days, and period lasts for about 6 days. She was offered a hysterectomy, currently patient is considering.\par \par Visit Hx:\par 7/16/19: Patient presents today for follow-up from ED visit for menstrual cycle related chronic blood loss anemia with symptoms of fatigue, intermittent dizziness and mild SOB on exertion. She denies chest pain on exertion, or other sources of bleeding including GI. She denies issues with bleeding when she gets cuts and scrapes, and denies bruising.\par She is requesting IV iron as she is unable to tolerate oral supplementation. She is planning a trip to [place] tomorrow for 2 weeks with her daughters that she cannot change. She is requesting IV Iron today. \par \par 2/28/2019: Patient presents after about 7 months since her IV iron infusion. She continues to have heavy periods and has been experiencing weakness and dizziness again with intermittent headaches similar to when she was anemic in the past. She denies SOB with exertion. She did not want a hysterectomy at this time as she may be looking into fertility options since she was recently remarried.\par \par 4/11/19: She is feeling well today and back to her healthy baseline. Her counts have fully recovered. She continues to have heavy periods and will consider further gyn eval and possible hysterectomy in the future.\par \par 8/22/2019: Patient complains of recurrent dizziness and losing her hair again. She also reports fatigue / weakness. She denies pica or shortness of breath. LMP 8/11/19 (heavy bleeding experienced x 5 days). She also complains of tenderness in her right neck where she feels a right swollen node. This was present last visit and has not significantly changed in size. Patient will be evaluated for an additional treatment with Injectafer. She is also being evaluated for endometrial thickening and myomatous uterus and hopes to have a D&C procedure in the near future. She is still discussion fertility options and has not yet decided on hysterectomy.\par \par 9/26/2019: Ms Santos presents for follow-up today. She has completed 2/3 doses of venofer. Injectafer was too expensive due to insurance lapse so she agreed to venofer. After the 1st dose of venofer she had vomiting and retching for 24 hours after the infusion. After the 2nd dose (on 9/17/19) of venofer she started developing chest pain 7/10 around her sternum that was worse with touch. She did not call a doctor, felt it was not internal pain and has been feeling daily improvement of this pain, which she states is felt with bending over and touching her sternum now at 2/10. She skipped her 3rd dose. Today she denies nausea, and complains of enlargement of her right neck lump that was examined last visit. She is planning to go for endometrial polyp removal (hysteroscopy and D&C) after medical clearance, she has already seen pulm for clearance. LMP 9/7, lasted 5 days.\par \par On 12/2/19 patient was new to me. She reports that the lymphadenopathy in her neck feels like it is not necessarily "growing" but it is "joining together". She reports that she feels fatigued but no shortness of breath or anything like that. No pain in the area of the lymph nodes. She feels frustrated that she has continued menstrual bleeding and she had a procedure recently. She cannot tolerate the PO iron according to her and she has been on IV iron in the past. Pending IR appointment Wednesday to evaluate for possible biopsy of lymph node. Of note, recently she overcame the flu but still has mild residual cough. She denied any weight loss, she said she was getting some mild night sweats. \par \par Due to continued iron deificiency and menstrual blood loss, patient had Injectafer x 2 in March 2020. Biopsy of lymph node showed changes consistent with a reactive lymph node on May 14, 2020.\par \par Patient with continued menstrual losses and had second round of IV iron on 9/17/2020 and 9/24/2020.  [de-identified] : Patient had one allergic reaction to the first infusion of Feraheme on 9/17 - it resolved with benadryl, famotidine and hydrocortisone. She felt better with the pre-meds for second infusion although she did report a feeling that her muscles were "jumping" after the benadryl. She reports that she did feel better as far as energy goes after the first infusion. She denies any chest pain, dyspnea or palpitations.

## 2020-09-25 NOTE — REVIEW OF SYSTEMS
[Hoarseness] : hoarseness [Muscle Pain] : muscle pain [Fever] : no fever [Chills] : no chills [Night Sweats] : no night sweats [Fatigue] : no fatigue [Recent Change In Weight] : ~T no recent weight change [Eye Pain] : no eye pain [Vision Problems] : no vision problems [Dysphagia] : no dysphagia [Chest Pain] : no chest pain [Palpitations] : no palpitations [Lower Ext Edema] : no lower extremity edema [Shortness Of Breath] : no shortness of breath [Wheezing] : no wheezing [SOB on Exertion] : no shortness of breath during exertion [Abdominal Pain] : no abdominal pain [Vomiting] : no vomiting [Joint Pain] : no joint pain [Muscle Weakness] : no muscle weakness [Skin Rash] : no skin rash [Skin Wound] : no skin wound [Confused] : no confusion [Dizziness] : no dizziness [Fainting] : no fainting [Easy Bleeding] : no tendency for easy bleeding [Easy Bruising] : no tendency for easy bruising [FreeTextEntry4] : voice improved but still with swollen lymph nodes, stable.  [de-identified] : lymph nodes in neck stable.

## 2020-09-25 NOTE — PHYSICAL EXAM
[Fully active, able to carry on all pre-disease performance without restriction] : Status 0 - Fully active, able to carry on all pre-disease performance without restriction [Normal] : affect appropriate [de-identified] : pale appearing [de-identified] : pale conjunctiva [de-identified] : Posterior to anterior solitary cervical lymph node measuring about 8-10 mm, freely mobile, non-tender NO CHANGE [de-identified] : No axillary or inguinal adenopathy

## 2020-09-25 NOTE — ASSESSMENT
[FreeTextEntry1] : 44 year old female with Iron deficiency anemia due to chronic blood loss from menorrhagia. Treated with IV Injectafer in August 2018 and Feb 2019 with adequate response. Unable to tolerate oral iron therapy. Again presented in August 2019 with sx concerning for DIANA, shown to have low iron stores and set-up for venofer (unable to get injectafer at that point due to cost / no insurance). She is now s/p 2 treatments with Injectafer March 2020 . Right cervical LN etiology unclear, reactive on biopsy. Now with worsening iron deficiency and anemia again s/p Feraheme x 2 in Sep 2020. \par \par Plan:\par - s/p therapy with Gyn with slight improvement of menorrhagia, but worse again now and likely will require hysterectomy or embolization eventually. \par -Previous rheum workup unremarkable, LN stable. Unclear etiology, continue follow up with ENT. \par -Recheck iron studies with CBC in 3 months.

## 2020-09-29 ENCOUNTER — APPOINTMENT (OUTPATIENT)
Dept: COLORECTAL SURGERY | Facility: CLINIC | Age: 45
End: 2020-09-29
Payer: SELF-PAY

## 2020-09-29 DIAGNOSIS — K64.4 RESIDUAL HEMORRHOIDAL SKIN TAGS: ICD-10-CM

## 2020-09-29 PROCEDURE — 46600 DIAGNOSTIC ANOSCOPY SPX: CPT

## 2020-09-29 PROCEDURE — 99213 OFFICE O/P EST LOW 20 MIN: CPT | Mod: 25

## 2020-09-29 PROCEDURE — 17250 CHEM CAUT OF GRANLTJ TISSUE: CPT

## 2020-09-29 NOTE — ASSESSMENT
[FreeTextEntry1] :  hidradenoma papilliferum With recent anal bleeding\par -Bleeding appears to be secondary to irritated skin from some trauma consistent with aging or wiping\par -Area was treated with silver nitrate as described above. Patient followup in 3 weeks for reevaluation\par -No obvious recurrence of anal polyp\par -Will continue to survey Area

## 2020-09-29 NOTE — HISTORY OF PRESENT ILLNESS
[FreeTextEntry1] : Status post excision of hidradenoma papilliferum.  Patient currently without complaint. Denies pain. No fevers or chills. Occasional spotting of blood which draining. Patient does report constipation. Bowel movement every other day with occasional straining.\par \par September 29, 2020-patient is status post excision of hidradenoma papilliferum.  Currently reports having a hard bowel movement 2 weeks ago and noticing small blood in the area. She has noticed blood 2 times in total. Denies pain. No mass or lesion no fevers or chills no change in bowel habits. Denies straining no aggravating factors. Presents for evaluation

## 2020-10-02 DIAGNOSIS — N92.0 EXCESSIVE AND FREQUENT MENSTRUATION WITH REGULAR CYCLE: ICD-10-CM

## 2020-10-02 DIAGNOSIS — M25.562 PAIN IN LEFT KNEE: ICD-10-CM

## 2020-10-10 NOTE — END OF VISIT
[] : Resident [FreeTextEntry3] : 46 yo P2 here for discussion regarding [place] vs Hysterectomy.\par Pt still uncertain about her plans for future fertility.

## 2020-10-12 ENCOUNTER — OUTPATIENT (OUTPATIENT)
Dept: OUTPATIENT SERVICES | Facility: HOSPITAL | Age: 45
LOS: 1 days | End: 2020-10-12

## 2020-10-12 ENCOUNTER — APPOINTMENT (OUTPATIENT)
Dept: INTERNAL MEDICINE | Facility: CLINIC | Age: 45
End: 2020-10-12

## 2020-10-12 VITALS
BODY MASS INDEX: 31.24 KG/M2 | OXYGEN SATURATION: 99 % | HEIGHT: 64 IN | DIASTOLIC BLOOD PRESSURE: 80 MMHG | SYSTOLIC BLOOD PRESSURE: 121 MMHG | HEART RATE: 79 BPM | WEIGHT: 183 LBS

## 2020-10-12 DIAGNOSIS — Z98.890 OTHER SPECIFIED POSTPROCEDURAL STATES: Chronic | ICD-10-CM

## 2020-10-12 DIAGNOSIS — Z98.51 TUBAL LIGATION STATUS: Chronic | ICD-10-CM

## 2020-10-12 DIAGNOSIS — Z98.891 HISTORY OF UTERINE SCAR FROM PREVIOUS SURGERY: Chronic | ICD-10-CM

## 2020-10-13 DIAGNOSIS — M79.641 PAIN IN RIGHT HAND: ICD-10-CM

## 2020-10-13 NOTE — REVIEW OF SYSTEMS
[Fever] : no fever [Chills] : no chills [Fatigue] : no fatigue [Discharge] : no discharge [Pain] : no pain [Earache] : no earache [Chest Pain] : no chest pain [Lower Ext Edema] : no lower extremity edema [Shortness Of Breath] : no shortness of breath [Wheezing] : no wheezing [Cough] : no cough [Dyspnea on Exertion] : no dyspnea on exertion [Abdominal Pain] : no abdominal pain [Nausea] : no nausea [Constipation] : no constipation [Diarrhea] : diarrhea [Vomiting] : no vomiting [Joint Pain] : no joint pain [Headache] : no headache

## 2020-10-13 NOTE — PLAN
[FreeTextEntry1] : Patient discussed with Dr. Baldwin.\par \par F/u per previous schedule with Dr Henriquez, firm 3.  Isai for telehealth.

## 2020-10-13 NOTE — PHYSICAL EXAM
[No Acute Distress] : no acute distress [Well Nourished] : well nourished [Well Developed] : well developed [Well-Appearing] : well-appearing [Normal Sclera/Conjunctiva] : normal sclera/conjunctiva [EOMI] : extraocular movements intact [Normal Outer Ear/Nose] : the outer ears and nose were normal in appearance [Normal Oropharynx] : the oropharynx was normal [Supple] : supple [No Respiratory Distress] : no respiratory distress  [No Accessory Muscle Use] : no accessory muscle use [Normal Rate] : normal rate  [Regular Rhythm] : with a regular rhythm [Normal S1, S2] : normal S1 and S2 [No Murmur] : no murmur heard [Soft] : abdomen soft [Non Tender] : non-tender [Non-distended] : non-distended [Normal Axillary Nodes] : no axillary lymphadenopathy [No Joint Swelling] : no joint swelling [Grossly Normal Strength/Tone] : grossly normal strength/tone [No Rash] : no rash [Coordination Grossly Intact] : coordination grossly intact [No Focal Deficits] : no focal deficits [Normal Gait] : normal gait [Normal Affect] : the affect was normal [Normal Insight/Judgement] : insight and judgment were intact [de-identified] : +cervical lymphadenopathy [de-identified] : trace edema to ankles b/l  [de-identified] : +palpable submental LN and right cervical LN

## 2020-10-13 NOTE — HISTORY OF PRESENT ILLNESS
[FreeTextEntry8] : Patient is a 45F with PMH iron deficiency anemia, adenomyosis, herniated lumbar disc, hidradenoma papilliferum, and cervical lymphadenopathy who presents with 2 weeks of right hand pain.  Patient noticed a small mobile lump underneath thumb joint of right hand, associated with mild to moderate pain.  Has been growing in size over past 2 weeks.  Feels like firm BB under skin.  No foreign body she is aware of.  Continues to have full ROM of thumb and fingers.\par \par She saw ortho surgeon recently for her back, who recommended she see a hand specialist.

## 2020-10-13 NOTE — ASSESSMENT
[FreeTextEntry1] : #HCM\par  - Patient declined flu shot today\par  - UTD on mammogram, pap smear UTD\par  - HCV and HIV UTD\par  - A1c last 2018, lipids 2020\par  - TdaP 2016

## 2020-10-14 ENCOUNTER — OUTPATIENT (OUTPATIENT)
Dept: OUTPATIENT SERVICES | Facility: HOSPITAL | Age: 45
LOS: 1 days | End: 2020-10-14
Payer: COMMERCIAL

## 2020-10-14 ENCOUNTER — APPOINTMENT (OUTPATIENT)
Dept: ORTHOPEDIC SURGERY | Facility: HOSPITAL | Age: 45
End: 2020-10-14

## 2020-10-14 VITALS
BODY MASS INDEX: 31.58 KG/M2 | TEMPERATURE: 99.5 F | HEART RATE: 85 BPM | WEIGHT: 185 LBS | HEIGHT: 64 IN | DIASTOLIC BLOOD PRESSURE: 68 MMHG | SYSTOLIC BLOOD PRESSURE: 116 MMHG

## 2020-10-14 DIAGNOSIS — Z98.890 OTHER SPECIFIED POSTPROCEDURAL STATES: Chronic | ICD-10-CM

## 2020-10-14 DIAGNOSIS — Z98.891 HISTORY OF UTERINE SCAR FROM PREVIOUS SURGERY: Chronic | ICD-10-CM

## 2020-10-14 DIAGNOSIS — Z98.51 TUBAL LIGATION STATUS: Chronic | ICD-10-CM

## 2020-10-19 ENCOUNTER — OUTPATIENT (OUTPATIENT)
Dept: OUTPATIENT SERVICES | Facility: HOSPITAL | Age: 45
LOS: 1 days | Discharge: HOME | End: 2020-10-19

## 2020-10-19 DIAGNOSIS — Z98.890 OTHER SPECIFIED POSTPROCEDURAL STATES: Chronic | ICD-10-CM

## 2020-10-19 DIAGNOSIS — Z98.891 HISTORY OF UTERINE SCAR FROM PREVIOUS SURGERY: Chronic | ICD-10-CM

## 2020-10-19 DIAGNOSIS — Z98.51 TUBAL LIGATION STATUS: Chronic | ICD-10-CM

## 2020-10-19 DIAGNOSIS — D50.0 IRON DEFICIENCY ANEMIA SECONDARY TO BLOOD LOSS (CHRONIC): ICD-10-CM

## 2020-10-19 DIAGNOSIS — M54.5 LOW BACK PAIN: ICD-10-CM

## 2020-10-20 ENCOUNTER — RESULT REVIEW (OUTPATIENT)
Age: 45
End: 2020-10-20

## 2020-10-20 ENCOUNTER — APPOINTMENT (OUTPATIENT)
Dept: HEMATOLOGY ONCOLOGY | Facility: CLINIC | Age: 45
End: 2020-10-20

## 2020-10-20 LAB
BASOPHILS # BLD AUTO: 0.04 K/UL — SIGNIFICANT CHANGE UP (ref 0–0.2)
BASOPHILS NFR BLD AUTO: 1 % — SIGNIFICANT CHANGE UP (ref 0–2)
EOSINOPHIL # BLD AUTO: 0.06 K/UL — SIGNIFICANT CHANGE UP (ref 0–0.5)
EOSINOPHIL NFR BLD AUTO: 1.5 % — SIGNIFICANT CHANGE UP (ref 0–6)
HCT VFR BLD CALC: 32.4 % — LOW (ref 34.5–45)
HGB BLD-MCNC: 10.3 G/DL — LOW (ref 11.5–15.5)
IMM GRANULOCYTES NFR BLD AUTO: 0.3 % — SIGNIFICANT CHANGE UP (ref 0–1.5)
LYMPHOCYTES # BLD AUTO: 1.4 K/UL — SIGNIFICANT CHANGE UP (ref 1–3.3)
LYMPHOCYTES # BLD AUTO: 35.6 % — SIGNIFICANT CHANGE UP (ref 13–44)
MCHC RBC-ENTMCNC: 26.9 PG — LOW (ref 27–34)
MCHC RBC-ENTMCNC: 31.8 G/DL — LOW (ref 32–36)
MCV RBC AUTO: 84.6 FL — SIGNIFICANT CHANGE UP (ref 80–100)
MONOCYTES # BLD AUTO: 0.22 K/UL — SIGNIFICANT CHANGE UP (ref 0–0.9)
MONOCYTES NFR BLD AUTO: 5.6 % — SIGNIFICANT CHANGE UP (ref 2–14)
NEUTROPHILS # BLD AUTO: 2.2 K/UL — SIGNIFICANT CHANGE UP (ref 1.8–7.4)
NEUTROPHILS NFR BLD AUTO: 56 % — SIGNIFICANT CHANGE UP (ref 43–77)
NRBC # BLD: 0 /100 WBCS — SIGNIFICANT CHANGE UP (ref 0–0)
PLATELET # BLD AUTO: 289 K/UL — SIGNIFICANT CHANGE UP (ref 150–400)
RBC # BLD: 3.83 M/UL — SIGNIFICANT CHANGE UP (ref 3.8–5.2)
RBC # FLD: SIGNIFICANT CHANGE UP (ref 10.3–14.5)
WBC # BLD: 3.93 K/UL — SIGNIFICANT CHANGE UP (ref 3.8–10.5)
WBC # FLD AUTO: 3.93 K/UL — SIGNIFICANT CHANGE UP (ref 3.8–10.5)

## 2020-10-22 LAB
FERRITIN SERPL-MCNC: 41 NG/ML
IRON SATN MFR SERPL: 12 %
IRON SERPL-MCNC: 38 UG/DL
TIBC SERPL-MCNC: 310 UG/DL
UIBC SERPL-MCNC: 273 UG/DL

## 2020-11-09 ENCOUNTER — RESULT REVIEW (OUTPATIENT)
Age: 45
End: 2020-11-09

## 2020-11-09 ENCOUNTER — APPOINTMENT (OUTPATIENT)
Dept: RADIOLOGY | Facility: HOSPITAL | Age: 45
End: 2020-11-09

## 2020-11-09 PROCEDURE — 72100 X-RAY EXAM L-S SPINE 2/3 VWS: CPT | Mod: 26

## 2020-11-28 ENCOUNTER — RESULT REVIEW (OUTPATIENT)
Age: 45
End: 2020-11-28

## 2020-12-01 ENCOUNTER — LABORATORY RESULT (OUTPATIENT)
Age: 45
End: 2020-12-01

## 2020-12-01 ENCOUNTER — APPOINTMENT (OUTPATIENT)
Dept: INTERNAL MEDICINE | Facility: CLINIC | Age: 45
End: 2020-12-01

## 2020-12-01 ENCOUNTER — OUTPATIENT (OUTPATIENT)
Dept: OUTPATIENT SERVICES | Facility: HOSPITAL | Age: 45
LOS: 1 days | End: 2020-12-01

## 2020-12-01 VITALS
HEIGHT: 64 IN | DIASTOLIC BLOOD PRESSURE: 70 MMHG | OXYGEN SATURATION: 99 % | BODY MASS INDEX: 31.92 KG/M2 | HEART RATE: 97 BPM | SYSTOLIC BLOOD PRESSURE: 120 MMHG | WEIGHT: 187 LBS

## 2020-12-01 VITALS — TEMPERATURE: 97.5 F

## 2020-12-01 DIAGNOSIS — Z98.890 OTHER SPECIFIED POSTPROCEDURAL STATES: Chronic | ICD-10-CM

## 2020-12-01 DIAGNOSIS — Z98.51 TUBAL LIGATION STATUS: Chronic | ICD-10-CM

## 2020-12-01 DIAGNOSIS — Z98.891 HISTORY OF UTERINE SCAR FROM PREVIOUS SURGERY: Chronic | ICD-10-CM

## 2020-12-01 LAB
ALBUMIN SERPL ELPH-MCNC: 4.2 G/DL — SIGNIFICANT CHANGE UP (ref 3.3–5)
ALP SERPL-CCNC: 51 U/L — SIGNIFICANT CHANGE UP (ref 40–120)
ALT FLD-CCNC: 14 U/L — SIGNIFICANT CHANGE UP (ref 4–33)
ANION GAP SERPL CALC-SCNC: 9 MMO/L — SIGNIFICANT CHANGE UP (ref 7–14)
APPEARANCE UR: CLEAR — SIGNIFICANT CHANGE UP
AST SERPL-CCNC: 14 U/L — SIGNIFICANT CHANGE UP (ref 4–32)
BASOPHILS # BLD AUTO: 0.04 K/UL — SIGNIFICANT CHANGE UP (ref 0–0.2)
BASOPHILS NFR BLD AUTO: 0.8 % — SIGNIFICANT CHANGE UP (ref 0–2)
BILIRUB SERPL-MCNC: < 0.2 MG/DL — LOW (ref 0.2–1.2)
BILIRUB UR-MCNC: NEGATIVE — SIGNIFICANT CHANGE UP
BLOOD UR QL VISUAL: NEGATIVE — SIGNIFICANT CHANGE UP
BUN SERPL-MCNC: 20 MG/DL — SIGNIFICANT CHANGE UP (ref 7–23)
CALCIUM SERPL-MCNC: 9.2 MG/DL — SIGNIFICANT CHANGE UP (ref 8.4–10.5)
CHLORIDE SERPL-SCNC: 103 MMOL/L — SIGNIFICANT CHANGE UP (ref 98–107)
CO2 SERPL-SCNC: 25 MMOL/L — SIGNIFICANT CHANGE UP (ref 22–31)
COLOR SPEC: SIGNIFICANT CHANGE UP
CREAT ?TM UR-MCNC: 74.7 MG/DL — SIGNIFICANT CHANGE UP
CREAT SERPL-MCNC: 0.94 MG/DL — SIGNIFICANT CHANGE UP (ref 0.5–1.3)
EOSINOPHIL # BLD AUTO: 0.11 K/UL — SIGNIFICANT CHANGE UP (ref 0–0.5)
EOSINOPHIL NFR BLD AUTO: 2.1 % — SIGNIFICANT CHANGE UP (ref 0–6)
GLUCOSE SERPL-MCNC: 89 MG/DL — SIGNIFICANT CHANGE UP (ref 70–99)
GLUCOSE UR-MCNC: NEGATIVE — SIGNIFICANT CHANGE UP
HCT VFR BLD CALC: 32.9 % — LOW (ref 34.5–45)
HGB BLD-MCNC: 10.1 G/DL — LOW (ref 11.5–15.5)
IMM GRANULOCYTES NFR BLD AUTO: 0 % — SIGNIFICANT CHANGE UP (ref 0–1.5)
KETONES UR-MCNC: NEGATIVE — SIGNIFICANT CHANGE UP
LEUKOCYTE ESTERASE UR-ACNC: NEGATIVE — SIGNIFICANT CHANGE UP
LYMPHOCYTES # BLD AUTO: 1.83 K/UL — SIGNIFICANT CHANGE UP (ref 1–3.3)
LYMPHOCYTES # BLD AUTO: 35.7 % — SIGNIFICANT CHANGE UP (ref 13–44)
MCHC RBC-ENTMCNC: 25.1 PG — LOW (ref 27–34)
MCHC RBC-ENTMCNC: 30.7 % — LOW (ref 32–36)
MCV RBC AUTO: 81.6 FL — SIGNIFICANT CHANGE UP (ref 80–100)
MONOCYTES # BLD AUTO: 0.5 K/UL — SIGNIFICANT CHANGE UP (ref 0–0.9)
MONOCYTES NFR BLD AUTO: 9.8 % — SIGNIFICANT CHANGE UP (ref 2–14)
NEUTROPHILS # BLD AUTO: 2.64 K/UL — SIGNIFICANT CHANGE UP (ref 1.8–7.4)
NEUTROPHILS NFR BLD AUTO: 51.6 % — SIGNIFICANT CHANGE UP (ref 43–77)
NITRITE UR-MCNC: NEGATIVE — SIGNIFICANT CHANGE UP
NRBC # FLD: 0 K/UL — SIGNIFICANT CHANGE UP (ref 0–0)
PH UR: 5.5 — SIGNIFICANT CHANGE UP (ref 5–8)
PLATELET # BLD AUTO: 411 K/UL — HIGH (ref 150–400)
PMV BLD: 10.5 FL — SIGNIFICANT CHANGE UP (ref 7–13)
POTASSIUM SERPL-MCNC: 4.3 MMOL/L — SIGNIFICANT CHANGE UP (ref 3.5–5.3)
POTASSIUM SERPL-SCNC: 4.3 MMOL/L — SIGNIFICANT CHANGE UP (ref 3.5–5.3)
PROT SERPL-MCNC: 6.8 G/DL — SIGNIFICANT CHANGE UP (ref 6–8.3)
PROT UR-MCNC: 4.1 MG/DL — SIGNIFICANT CHANGE UP
PROT UR-MCNC: NEGATIVE — SIGNIFICANT CHANGE UP
RBC # BLD: 4.03 M/UL — SIGNIFICANT CHANGE UP (ref 3.8–5.2)
RBC # FLD: 21.6 % — HIGH (ref 10.3–14.5)
SODIUM SERPL-SCNC: 137 MMOL/L — SIGNIFICANT CHANGE UP (ref 135–145)
SP GR SPEC: 1.01 — SIGNIFICANT CHANGE UP (ref 1–1.04)
T4 FREE SERPL-MCNC: 1.01 NG/DL — SIGNIFICANT CHANGE UP (ref 0.9–1.8)
TSH SERPL-MCNC: 4.4 UIU/ML — HIGH (ref 0.27–4.2)
UROBILINOGEN FLD QL: NORMAL — SIGNIFICANT CHANGE UP
WBC # BLD: 5.12 K/UL — SIGNIFICANT CHANGE UP (ref 3.8–10.5)
WBC # FLD AUTO: 5.12 K/UL — SIGNIFICANT CHANGE UP (ref 3.8–10.5)

## 2020-12-01 RX ORDER — GABAPENTIN 300 MG/1
300 CAPSULE ORAL
Qty: 90 | Refills: 1 | Status: DISCONTINUED | COMMUNITY
Start: 2020-08-31 | End: 2020-12-01

## 2020-12-01 RX ORDER — CYCLOBENZAPRINE HYDROCHLORIDE 7.5 MG/1
7.5 TABLET, FILM COATED ORAL AT BEDTIME
Qty: 30 | Refills: 1 | Status: DISCONTINUED | COMMUNITY
Start: 2020-08-31 | End: 2020-12-01

## 2020-12-01 RX ORDER — RANITIDINE 300 MG/1
300 TABLET ORAL
Qty: 60 | Refills: 2 | Status: DISCONTINUED | COMMUNITY
Start: 2020-01-28 | End: 2020-12-01

## 2020-12-01 RX ORDER — ACETAMINOPHEN 500 MG/1
500 TABLET, COATED ORAL
Refills: 0 | Status: DISCONTINUED | COMMUNITY
End: 2020-12-01

## 2020-12-01 RX ORDER — OMEPRAZOLE 40 MG/1
40 CAPSULE, DELAYED RELEASE ORAL
Qty: 60 | Refills: 3 | Status: DISCONTINUED | COMMUNITY
Start: 2020-01-28 | End: 2020-12-01

## 2020-12-02 ENCOUNTER — OUTPATIENT (OUTPATIENT)
Dept: OUTPATIENT SERVICES | Facility: HOSPITAL | Age: 45
LOS: 1 days | End: 2020-12-02

## 2020-12-02 ENCOUNTER — APPOINTMENT (OUTPATIENT)
Dept: ORTHOPEDIC SURGERY | Facility: HOSPITAL | Age: 45
End: 2020-12-02

## 2020-12-02 VITALS
BODY MASS INDEX: 32.1 KG/M2 | TEMPERATURE: 97.6 F | SYSTOLIC BLOOD PRESSURE: 121 MMHG | HEIGHT: 64 IN | HEART RATE: 79 BPM | DIASTOLIC BLOOD PRESSURE: 72 MMHG | WEIGHT: 188 LBS

## 2020-12-02 DIAGNOSIS — Z98.890 OTHER SPECIFIED POSTPROCEDURAL STATES: Chronic | ICD-10-CM

## 2020-12-02 DIAGNOSIS — D50.0 IRON DEFICIENCY ANEMIA SECONDARY TO BLOOD LOSS (CHRONIC): ICD-10-CM

## 2020-12-02 DIAGNOSIS — Z98.51 TUBAL LIGATION STATUS: Chronic | ICD-10-CM

## 2020-12-02 DIAGNOSIS — N94.6 DYSMENORRHEA, UNSPECIFIED: ICD-10-CM

## 2020-12-02 DIAGNOSIS — R59.0 LOCALIZED ENLARGED LYMPH NODES: ICD-10-CM

## 2020-12-02 DIAGNOSIS — Z98.891 HISTORY OF UTERINE SCAR FROM PREVIOUS SURGERY: Chronic | ICD-10-CM

## 2020-12-02 NOTE — END OF VISIT
[] : Resident [FreeTextEntry3] : 46 y/o with iron deficiency anemia secondary to abnormal uterine bleeding, cervical and submental lymphadenopathy of unclear etiology (benign FNA) here for follow up.\par Pt would like to see Rheum to further work up LAD. Would repeat u/s to re-evaluate lymph nodes in 3 months. ENT had discussed excisional biopsy vs. observation in 9/2020.\par Pt would like to proceed with hysterectomy at this time. Refer to GYN for further management.\par Agree with additional plan as per Dr. Henriquez

## 2020-12-02 NOTE — HEALTH RISK ASSESSMENT
[0-5] : 0-5 [1 or 2 (0 pts)] : 1 or 2 (0 points) [Never (0 pts)] : Never (0 points) [No] : In the past 12 months have you used drugs other than those required for medical reasons? No [No falls in past year] : Patient reported no falls in the past year [0] : 2) Feeling down, depressed, or hopeless: Not at all (0) [] : No [Audit-CScore] : 0 [LMY4Gfxkt] : 0

## 2020-12-02 NOTE — PHYSICAL EXAM
[No Acute Distress] : no acute distress [Well Nourished] : well nourished [Well Developed] : well developed [Normal Sclera/Conjunctiva] : normal sclera/conjunctiva [PERRL] : pupils equal round and reactive to light [No JVD] : no jugular venous distention [No Respiratory Distress] : no respiratory distress  [No Accessory Muscle Use] : no accessory muscle use [Clear to Auscultation] : lungs were clear to auscultation bilaterally [Normal Rate] : normal rate  [Regular Rhythm] : with a regular rhythm [Normal S1, S2] : normal S1 and S2 [No Murmur] : no murmur heard [No Varicosities] : no varicosities [Pedal Pulses Present] : the pedal pulses are present [Soft] : abdomen soft [Non-distended] : non-distended [No HSM] : no HSM [Normal Bowel Sounds] : normal bowel sounds [No Spinal Tenderness] : no spinal tenderness [No Rash] : no rash [Normal] : affect was normal and insight and judgment were intact [de-identified] : Small, mobile, less than 1 cm2 lymph nodes along the posterior cervical chain  [de-identified] : 1+ bilateral lower extremity pitting edema  [de-identified] : Small, mobile, less than 1 cm2 lymph nodes along the posterior cervical chain

## 2020-12-02 NOTE — HISTORY OF PRESENT ILLNESS
[FreeTextEntry1] : three-month follow-up  [de-identified] : The patient is a 45-year-old woman who is being followed for chronic anemia in the setting of menorrhagia secondary to a large fibroid and cervical lymphadenopathy who is presenting to clinic today for three-month follow-up. The patient notes persistent heavy vaginal bleeding every month. Her periods are regular, and she denies any bleeding outside of her monthly menstrual periods and denies bleeding gums or nose bleeding. She has no new sexual partners, and recent gonorrhea and chlamydia tests were negative. Associated with her bleeding, the patient notes symptoms of dizziness, shortness of breath, palpitations, and chest pain. These symptoms are worst when she stands from a seated position and are not associated with straining to use the bathroom. The patient is not taking any new medications and does not carry a diagnosis of cardiac disease. \par \par The patient carries a diagnosis of cervical lymphadenopathy that has been present since about April 2019. She notes non-painful, mobile bumps in the posterior cervical chain on the right hand-side, and more were noted on an ultrasound of the head and neck in September 2020. Of note, workup thus far has been negative--CMV and EBV IgG positive but IgM negative, KRISTIN negative, SSA and SSB antibodies negative. The patient endorses weight gain of about ten pounds over the past year but denies any unintended weight loss. She has no known personal or family history of autoimmune disease. \par \par Over the past two months, the patient notes new swelling in her bilateral lower extremities, left greater than right. She has no known personal or family history of cardiac disease but endorses orthopnea. She denies paroxysmal nocturnal dyspnea or cough. She has not noticed any changes in urinary frequency or in the consistency of her urine. She has not noticed any abdominal swelling. \par \par The patient also notices pain in her left medial chest that is worst with palpation. It has been present for about two months and is present especially when she drinks water. It is not associated with eating or with physical or emotional exertion. It is a dull pain, and it does not radiate.

## 2020-12-08 DIAGNOSIS — M67.40 GANGLION, UNSPECIFIED SITE: ICD-10-CM

## 2020-12-17 ENCOUNTER — OUTPATIENT (OUTPATIENT)
Dept: OUTPATIENT SERVICES | Facility: HOSPITAL | Age: 45
LOS: 1 days | Discharge: HOME | End: 2020-12-17

## 2020-12-17 DIAGNOSIS — Z98.890 OTHER SPECIFIED POSTPROCEDURAL STATES: Chronic | ICD-10-CM

## 2020-12-17 DIAGNOSIS — Z98.891 HISTORY OF UTERINE SCAR FROM PREVIOUS SURGERY: Chronic | ICD-10-CM

## 2020-12-17 DIAGNOSIS — D50.0 IRON DEFICIENCY ANEMIA SECONDARY TO BLOOD LOSS (CHRONIC): ICD-10-CM

## 2020-12-17 DIAGNOSIS — Z98.51 TUBAL LIGATION STATUS: Chronic | ICD-10-CM

## 2020-12-21 ENCOUNTER — APPOINTMENT (OUTPATIENT)
Dept: HEMATOLOGY ONCOLOGY | Facility: CLINIC | Age: 45
End: 2020-12-21

## 2020-12-28 ENCOUNTER — OUTPATIENT (OUTPATIENT)
Dept: OUTPATIENT SERVICES | Facility: HOSPITAL | Age: 45
LOS: 1 days | End: 2020-12-28

## 2020-12-28 ENCOUNTER — RESULT REVIEW (OUTPATIENT)
Age: 45
End: 2020-12-28

## 2020-12-28 ENCOUNTER — APPOINTMENT (OUTPATIENT)
Dept: RHEUMATOLOGY | Facility: CLINIC | Age: 45
End: 2020-12-28

## 2020-12-28 VITALS — TEMPERATURE: 96.5 F

## 2020-12-28 VITALS
HEIGHT: 64 IN | HEART RATE: 86 BPM | RESPIRATION RATE: 16 BRPM | OXYGEN SATURATION: 99 % | WEIGHT: 190 LBS | DIASTOLIC BLOOD PRESSURE: 78 MMHG | SYSTOLIC BLOOD PRESSURE: 126 MMHG | BODY MASS INDEX: 32.44 KG/M2

## 2020-12-28 DIAGNOSIS — Z98.890 OTHER SPECIFIED POSTPROCEDURAL STATES: Chronic | ICD-10-CM

## 2020-12-28 DIAGNOSIS — Z98.891 HISTORY OF UTERINE SCAR FROM PREVIOUS SURGERY: Chronic | ICD-10-CM

## 2020-12-28 DIAGNOSIS — Z98.51 TUBAL LIGATION STATUS: Chronic | ICD-10-CM

## 2020-12-29 ENCOUNTER — APPOINTMENT (OUTPATIENT)
Dept: CT IMAGING | Facility: IMAGING CENTER | Age: 45
End: 2020-12-29
Payer: COMMERCIAL

## 2020-12-29 ENCOUNTER — RESULT REVIEW (OUTPATIENT)
Age: 45
End: 2020-12-29

## 2020-12-29 ENCOUNTER — OUTPATIENT (OUTPATIENT)
Dept: OUTPATIENT SERVICES | Facility: HOSPITAL | Age: 45
LOS: 1 days | End: 2020-12-29
Payer: COMMERCIAL

## 2020-12-29 DIAGNOSIS — Z98.890 OTHER SPECIFIED POSTPROCEDURAL STATES: Chronic | ICD-10-CM

## 2020-12-29 DIAGNOSIS — Z00.8 ENCOUNTER FOR OTHER GENERAL EXAMINATION: ICD-10-CM

## 2020-12-29 DIAGNOSIS — Z98.51 TUBAL LIGATION STATUS: Chronic | ICD-10-CM

## 2020-12-29 DIAGNOSIS — R59.0 LOCALIZED ENLARGED LYMPH NODES: ICD-10-CM

## 2020-12-29 DIAGNOSIS — Z98.891 HISTORY OF UTERINE SCAR FROM PREVIOUS SURGERY: Chronic | ICD-10-CM

## 2020-12-29 PROCEDURE — 74176 CT ABD & PELVIS W/O CONTRAST: CPT | Mod: 26

## 2020-12-29 PROCEDURE — 74176 CT ABD & PELVIS W/O CONTRAST: CPT

## 2020-12-29 PROCEDURE — 71250 CT THORAX DX C-: CPT

## 2020-12-29 PROCEDURE — 71250 CT THORAX DX C-: CPT | Mod: 26

## 2020-12-29 NOTE — PHYSICAL EXAM
[General Appearance - Alert] : alert [General Appearance - In No Acute Distress] : in no acute distress [Sclera] : the sclera and conjunctiva were normal [Outer Ear] : the ears and nose were normal in appearance [Oropharynx] : the oropharynx was normal [Auscultation Breath Sounds / Voice Sounds] : lungs were clear to auscultation bilaterally [Heart Rate And Rhythm] : heart rate was normal and rhythm regular [Heart Sounds] : normal S1 and S2 [Heart Sounds Gallop] : no gallops [Murmurs] : no murmurs [Heart Sounds Pericardial Friction Rub] : no pericardial rub [Bowel Sounds] : normal bowel sounds [Abdomen Soft] : soft [Abdomen Tenderness] : non-tender [Abdomen Mass (___ Cm)] : no abdominal mass palpated [No CVA Tenderness] : no ~M costovertebral angle tenderness [No Spinal Tenderness] : no spinal tenderness [Skin Color & Pigmentation] : normal skin color and pigmentation [Skin Turgor] : normal skin turgor [] : no rash [Deep Tendon Reflexes (DTR)] : deep tendon reflexes were 2+ and symmetric [Sensation] : the sensory exam was normal to light touch and pinprick [No Focal Deficits] : no focal deficits [FreeTextEntry1] : Crepitus noted in b/l knee joints. Non signs of synovitis, limitation of ROM, erythema, tenderness, limitation of ROM or deformity noted in any joint.

## 2020-12-29 NOTE — ASSESSMENT
[FreeTextEntry1] : 45 Y F with a PMHx of iron deficiency anemia, uterine fibroids, cervical lymphadenopathy of unknown etiology, chronic back pain has been referred to the Rheumatology office for evaluation of persistent cervical lymphadenopathy.\par \par Impression: \par # Persistent Lymphadenopathy. \par Benign hypertrophy of nodes vs infectious vs malignancy vs autoimmune causes. \par No pain or persistent fever to suggest Kikuchi syndrome. \par SLE, RA, Sjogren and Sarcoidosis ROS negative on our Hx. \par No concerning feature on exam to suggest any systemic autoimmune dx. \par KRISTIN, RF and SSA/B was checked by primary doctor was negative. \par Previous FNA negative for malignancy, no persistent fever to suggest infectious cause of adenopathy. \par Will complete autoimmune w/u with ACE, Vitamin D 25 and 1-25 (for sarcoidosis), ANCA, MPO, MO 3 and early Sjogren ab.\par Will do CT chest and abdomen to see the extent of lymphadenopathy, if lymphadenopathy is present in chest and abdomen we will recommend excision bx of lymph nodes. \par \par # Chronic back pain.\par Pattern sounds more related to spinal degenerative changes rather than inflammatory origin. \par Recent Xrays did not show any gross abnomality. \par Pt should get PT.\par If pain persists or she has any alarm symptoms then MRI could be ordered. \par \par # Chronic b/l knee pain\par Is mild. \par Pattern sounds more related to DJD (such as OA) rather than inflammatory origin. \par Pt should get PT. \par \par Pt should return to clinic if any lab abnormalities on the autoimmune w/u is found, otherwise she should f/u with primary doctor, ENT and Hematology. \par \par DW with Dr. Ruelas\par \par Trini Nagel MD\par Rheumatology Fellow\par \par

## 2020-12-29 NOTE — HISTORY OF PRESENT ILLNESS
[FreeTextEntry1] : 45 Y F with a PMHx of iron deficiency anemia, uterine fibroids, cervical lymphadenopathy of unknown etiology, chronic back pain has been referred to the Rheumatology office for evaluation of persistent cervical lymphadenopathy.\par The patient says the lymphadenopathy was discovered around 18 months ago in her neck; since then she has had repeat US that have shown stability of these lymph nodes.\par She has seen ENT as well as has had a FNA of one of the lymph nodes; pathology showed benign features. \par Pt denies any chronic fever, chills, weight loss.\par SLE ROS: Alopecia, oral ulcer, malar rash, Raynaud's, pleuritic CP, miscarriages, pregnancy complications negative. \par Sjogren ROS: profound dry eyes, dry mouth negative. \par Sarcoidosis ROS: respiratory symptoms, CNS symptoms, eye symptoms, skin rashes negetaive. \par Pt does say she had back pain in the thoracic and lumbar regions for 6 years; has been told that she has multiple disc herniations. \par Back pain gets worse with standing straight and gets better with leaning forward; sometimes she has nocturnal back pain as well. \par No alarm signs such as bowel/urinary incontinence or muscle weakness of limbs. \par No symptoms of eye pain, psoriasis rash. \par Says she has chronic knee pains, worse with activity and bearing weight, better with rest, no episodes of red/hot/swollen knees. \par No pain in any other joints. \par Denies any recent abdominal pain, n/v/d, cough or CP. \par Follows with hematology for anemia from uterine fibroids (gets intermittent iron infusions). \par

## 2020-12-30 DIAGNOSIS — M17.10 UNILATERAL PRIMARY OSTEOARTHRITIS, UNSPECIFIED KNEE: ICD-10-CM

## 2020-12-30 DIAGNOSIS — R59.0 LOCALIZED ENLARGED LYMPH NODES: ICD-10-CM

## 2021-01-07 ENCOUNTER — APPOINTMENT (OUTPATIENT)
Dept: INTERNAL MEDICINE | Facility: CLINIC | Age: 46
End: 2021-01-07

## 2021-01-07 ENCOUNTER — OUTPATIENT (OUTPATIENT)
Dept: OUTPATIENT SERVICES | Facility: HOSPITAL | Age: 46
LOS: 1 days | End: 2021-01-07

## 2021-01-07 VITALS
OXYGEN SATURATION: 99 % | WEIGHT: 190 LBS | DIASTOLIC BLOOD PRESSURE: 84 MMHG | BODY MASS INDEX: 32.44 KG/M2 | SYSTOLIC BLOOD PRESSURE: 136 MMHG | HEART RATE: 109 BPM | HEIGHT: 64 IN

## 2021-01-07 VITALS — TEMPERATURE: 97.2 F

## 2021-01-07 DIAGNOSIS — R07.89 OTHER CHEST PAIN: ICD-10-CM

## 2021-01-07 DIAGNOSIS — Z98.891 HISTORY OF UTERINE SCAR FROM PREVIOUS SURGERY: Chronic | ICD-10-CM

## 2021-01-07 DIAGNOSIS — Z98.890 OTHER SPECIFIED POSTPROCEDURAL STATES: Chronic | ICD-10-CM

## 2021-01-07 DIAGNOSIS — K62.5 HEMORRHAGE OF ANUS AND RECTUM: ICD-10-CM

## 2021-01-07 DIAGNOSIS — Z87.898 PERSONAL HISTORY OF OTHER SPECIFIED CONDITIONS: ICD-10-CM

## 2021-01-07 DIAGNOSIS — M79.641 PAIN IN RIGHT HAND: ICD-10-CM

## 2021-01-07 DIAGNOSIS — Z98.51 TUBAL LIGATION STATUS: Chronic | ICD-10-CM

## 2021-01-07 DIAGNOSIS — T78.40XA ALLERGY, UNSPECIFIED, INITIAL ENCOUNTER: ICD-10-CM

## 2021-01-08 DIAGNOSIS — N93.9 ABNORMAL UTERINE AND VAGINAL BLEEDING, UNSPECIFIED: ICD-10-CM

## 2021-01-08 DIAGNOSIS — N92.0 EXCESSIVE AND FREQUENT MENSTRUATION WITH REGULAR CYCLE: ICD-10-CM

## 2021-01-08 DIAGNOSIS — D50.0 IRON DEFICIENCY ANEMIA SECONDARY TO BLOOD LOSS (CHRONIC): ICD-10-CM

## 2021-01-08 DIAGNOSIS — R59.0 LOCALIZED ENLARGED LYMPH NODES: ICD-10-CM

## 2021-01-08 DIAGNOSIS — D25.1 INTRAMURAL LEIOMYOMA OF UTERUS: ICD-10-CM

## 2021-01-08 NOTE — HISTORY OF PRESENT ILLNESS
[FreeTextEntry8] : The patient is a 45-year-old woman who is being followed for chronic anemia in the setting of menorrhagia secondary to a large fibroid and cervical lymphadenopathy who is presenting to clinic today for workup of a menstrual irregularity. Typically, her periods occur exactly once every four weeks and last about six days. However, she is currently experiencing a period, which began on the second of January, just three weeks after her last period began on .Although her menstrual flow typically increases and then decreases progressively over the course of her period, her flow thus far has been constant for five days. In the past, she denies any irregularities of her menstrual cycle. She denies any bleeding outside of her monthly menstrual periods and denies bleeding gums or nose bleeding. She is sexually active with her  but has no new sexual partners, and recent gonorrhea and chlamydia tests were negative. Her most recent pap and hpv contesting this past year were negative. She has a history of multiple  deliveries. The patient denies any recent weight changes. She and her  do not currently use any methods of pharmacologic or barrier contraception. She denies any abdominal or pelvic pain or cramping associated with her current bleeding. \par \par The patient carries a diagnosis of cervical lymphadenopathy that has been present since about 2019. She notes non-painful, mobile bumps in the posterior cervical chain on the right hand-side, and more were noted on an ultrasound of the head and neck in 2020. Of note, workup thus far has been negative--CMV and EBV IgG positive but IgM negative, KRISTIN negative, SSA and SSB antibodies negative. Additional workup at the time of recent rheumatology visit—anca, ace, 1,25 vitamin d—also negative. The patient endorses weight gain of about ten pounds over the past year but denies any unintended weight loss. She has no known personal or family history of autoimmune disease. \par \par Over the past several months, the patient notes new swelling in her bilateral lower extremities, left greater than right. She has no known personal or family history of cardiac disease but endorses orthopnea. She denies paroxysmal nocturnal dyspnea or cough. She has noticed mildly increased urinary frequency at night. She has not noticed any abdominal swelling. Complete metabolic panel performed at the time of the patient’s last visit noted no elevations in serum transaminases and normal serum albumin production. It also noted normal serum creatinine and bun levels. Urine protein-to-creatinine ratio also was noted to be within normal limits.  \par

## 2021-01-08 NOTE — ASSESSMENT
[FreeTextEntry1] : The patient is a 45-year-old woman who is being followed for symptomatic anemia secondary to menorrhagia in the setting of a uterine fibroid, unexplained cervical lymphadenopathy, and bilateral lower extremity pitting edema who is presenting to clinic today for an acute visit for menometrorrhagia. \par \par 1. Metromenorrhagia : -Previous heavy menstrual bleeding most likely secondary to known uterine leiomyoma\par -Current menstrual period about one week early is most likely secondary to idiopathic menstrual cycle irregularity; no changes in exercise habits, no new medications, no new changes in weight\par -Suspicion for cervical neoplasia low (negative pap and hpv contesting in 2020), reports negative gonorrhea and chlamydia tests and only one sexual partner unchanged(so cervical or vaginal infection unlikely); no weight changes to suggest neoplasia; no bleeding elsewhere to suggest coagulopathy\par -No barrier or hormonal contraception used, but patient is status post tubal ligation; suspicion for pregnancy-related bleeding is very, very low, but will order urine pregnancy test to be certain \par -Will order cbc given history of symptomatic anemia to assess current hemoglobin level and need for iron administration (previously did not tolerate oral iron) \par -Still plan for follow-up with gynecology to assess possibility of hysterectomy \par \par 2. Adnexal cyst: -Incidentally noted left adnexal cyst on CT abdomen and pelvis with iv contrast (performed to look for abdominal lymph nodes in the setting of cervical lymphadenopathy that might explain systemic disease process \par -No comment on report regarding nature of cyst (simple vs. complex); however, new since imaging performed in September \par -Most likely simple cyst and/or developing follicle; however, differential diagnosis includes hemorrhagic cyst, endometrioma, teratoma, ovarian malignancy, and hydroureter \par -Will refer for follow-up with gynecology\par -Will likely require transvaginal ultrasound to better characterize mass \par \par 3. Lymphadenopathy: -Differential diagnosis includes infection, malignancy, auto-immune/inflammatory condition\par -EBV and CMV IgG positive but IgM negative, indicating possible previous infection, and EBV and CMV unlikely to cause lymphadenopathy for 18 months; other possible infectious etiologies include bartonella hensleae (no known history of immunosuppression and no obvious rashes), hiv negative, toxoplasmosis (unlikely with no known history of immunosuppression)  \par -No weight loss, night sweats concerning for malignancy; lymph nodes not fixed or firm\par -KRISTIN and anti ssa/ssb negative; other possible inflammatory etiologies include Castleman's disease (however patient does not have any known history of immunosuppression) and KIkuchi disease (although unlikely given lack of fevers)\par - ACE within normal limits and 1,25-OH vitamin D within normal limits (suggesting lower likelihood of granulomatous process; cANCA positive but pr3-anca negative; lymphadenopathy unlikely to be caused by granulomatosis with polyangiitis or eosinophilic granulomatosis with polyangiitis  \par -Fine-needle aspiration consistent results with reactive lymph node; ENT follow-up in March to re-evaluate with head and neck ultrasound and consider excisional biopsy \par \par 4. Bilateral lower extremity pitting edema: -No known history of cardiac pathology; previous shortness of breath most likely related to symptomatic anemia\par -No previous laboratory evidence of liver dysfunction or hypoalbuminemia \par -Urine protein and creatinine spot levels in addition to urinalysis do not suggest ongoing proteinuria \par -Most likely related to lower extremity venous stasis in the setting of sedentary status secondary to anemia and in the setting of obesity; no elevated jugular venous pressure noted \par -However, patient reports family history of unspecified “enlarged heart”\par -Will refer for echocardiogram \par \par 5. Healthcare maintenance: -Patient denies flu vaccine today; tdap up to date \par -hcv and hiv negative \par -A1c and lipids within normal limits at recent visit \par -phq, dast, audit negative. \par

## 2021-01-08 NOTE — PHYSICAL EXAM
[No Acute Distress] : no acute distress [Well Nourished] : well nourished [Well Developed] : well developed [Well-Appearing] : well-appearing [Normal Sclera/Conjunctiva] : normal sclera/conjunctiva [No JVD] : no jugular venous distention [No Respiratory Distress] : no respiratory distress  [No Accessory Muscle Use] : no accessory muscle use [Clear to Auscultation] : lungs were clear to auscultation bilaterally [Regular Rhythm] : with a regular rhythm [Normal S1, S2] : normal S1 and S2 [No Murmur] : no murmur heard [Pedal Pulses Present] : the pedal pulses are present [Soft] : abdomen soft [Non Tender] : non-tender [No HSM] : no HSM [Normal Bowel Sounds] : normal bowel sounds [Normal Anterior Cervical Nodes] : no anterior cervical lymphadenopathy [Normal] : affect was normal and insight and judgment were intact [de-identified] : No conjunctival rim pallor  [de-identified] : JVP about 6-7 cm; posterior right cervical lymph node palpated, highly mobile  [de-identified] : No s3, no displaced point of maximal impulse  [de-identified] : 1+ lower extremity pitting edema noted bilaterally  [de-identified] : 1 cm, spongy, mobile posterior cervical lymph node noted on the right-hand side

## 2021-01-08 NOTE — END OF VISIT
[] : Resident [FreeTextEntry3] :  Metromenorrhagia- DIANA- FIbroids- Left Adnexal cyst \par -get HCG CBC, PT.INR  - GYN evaluation

## 2021-01-08 NOTE — REVIEW OF SYSTEMS
[Fatigue] : fatigue [Lower Ext Edema] : lower extremity edema [Orthopnea] : orthopnea [Negative] : Heme/Lymph [Fever] : no fever [Chills] : no chills [Night Sweats] : no night sweats [Chest Pain] : no chest pain [Shortness Of Breath] : no shortness of breath [Wheezing] : no wheezing [Abdominal Pain] : no abdominal pain [Nausea] : no nausea [Constipation] : no constipation [Vomiting] : no vomiting [Dysuria] : no dysuria [Incontinence] : no incontinence [Hematuria] : no hematuria [Joint Pain] : no joint pain [Joint Stiffness] : no joint stiffness

## 2021-01-11 ENCOUNTER — RESULT REVIEW (OUTPATIENT)
Age: 46
End: 2021-01-11

## 2021-01-11 ENCOUNTER — APPOINTMENT (OUTPATIENT)
Dept: HEMATOLOGY ONCOLOGY | Facility: CLINIC | Age: 46
End: 2021-01-11
Payer: COMMERCIAL

## 2021-01-11 VITALS
HEART RATE: 100 BPM | DIASTOLIC BLOOD PRESSURE: 86 MMHG | BODY MASS INDEX: 32.59 KG/M2 | OXYGEN SATURATION: 100 % | RESPIRATION RATE: 16 BRPM | TEMPERATURE: 97.8 F | HEIGHT: 63.98 IN | WEIGHT: 190.92 LBS | SYSTOLIC BLOOD PRESSURE: 128 MMHG

## 2021-01-11 LAB
BASOPHILS # BLD AUTO: 0.04 K/UL — SIGNIFICANT CHANGE UP (ref 0–0.2)
BASOPHILS NFR BLD AUTO: 0.9 % — SIGNIFICANT CHANGE UP (ref 0–2)
EOSINOPHIL # BLD AUTO: 0.08 K/UL — SIGNIFICANT CHANGE UP (ref 0–0.5)
EOSINOPHIL NFR BLD AUTO: 1.8 % — SIGNIFICANT CHANGE UP (ref 0–6)
HCT VFR BLD CALC: 31.3 % — LOW (ref 34.5–45)
HGB BLD-MCNC: 9.3 G/DL — LOW (ref 11.5–15.5)
IMM GRANULOCYTES NFR BLD AUTO: 0.4 % — SIGNIFICANT CHANGE UP (ref 0–1.5)
LYMPHOCYTES # BLD AUTO: 1.27 K/UL — SIGNIFICANT CHANGE UP (ref 1–3.3)
LYMPHOCYTES # BLD AUTO: 28.5 % — SIGNIFICANT CHANGE UP (ref 13–44)
MCHC RBC-ENTMCNC: 22.9 PG — LOW (ref 27–34)
MCHC RBC-ENTMCNC: 29.7 G/DL — LOW (ref 32–36)
MCV RBC AUTO: 77.1 FL — LOW (ref 80–100)
MONOCYTES # BLD AUTO: 0.2 K/UL — SIGNIFICANT CHANGE UP (ref 0–0.9)
MONOCYTES NFR BLD AUTO: 4.5 % — SIGNIFICANT CHANGE UP (ref 2–14)
NEUTROPHILS # BLD AUTO: 2.84 K/UL — SIGNIFICANT CHANGE UP (ref 1.8–7.4)
NEUTROPHILS NFR BLD AUTO: 63.9 % — SIGNIFICANT CHANGE UP (ref 43–77)
NRBC # BLD: 0 /100 WBCS — SIGNIFICANT CHANGE UP (ref 0–0)
PLATELET # BLD AUTO: 507 K/UL — HIGH (ref 150–400)
RBC # BLD: 4.06 M/UL — SIGNIFICANT CHANGE UP (ref 3.8–5.2)
RBC # FLD: 18.7 % — HIGH (ref 10.3–14.5)
WBC # BLD: 4.45 K/UL — SIGNIFICANT CHANGE UP (ref 3.8–10.5)
WBC # FLD AUTO: 4.45 K/UL — SIGNIFICANT CHANGE UP (ref 3.8–10.5)

## 2021-01-11 PROCEDURE — 99214 OFFICE O/P EST MOD 30 MIN: CPT

## 2021-01-11 NOTE — HISTORY OF PRESENT ILLNESS
[de-identified] : 43 yo female with PMHx of gastritis, left benign ovarian cyst (s/p ovarian conservation resection), adenomyosis, uterine fibroids, menorrhagia presents for evaluation of chronic blood loss anemia. Patient has had heavy menses for years with intolerance to oral iron (nausea, vomiting, constipation) undergoing IV iron formulation.  She first had IV iron 2013 in [place] with appropriate Hb response. Additionally she presented in 2017 again for IV iron with Hb ~7 that responded to 13.9.  LMP July 1, 2018. On July 2 2018 she had severe menstrual bleeding that caused her to be dizzy and presented to [facility] ER. There her Hb was 8.6, down from 9.3 in June. She did not receive any iron or blood transfusion in the ED. Denies adverse reactions to iron infusion.\par \par Has close follow up with her GYN and failed multiple OCPs and IUD in an attempt control the bleeding. OCPs gave her acne so she does not want to try that again. Currently, goes through 6-7 pads a day for the first 3 days, and period lasts for about 6 days. She was offered a hysterectomy, currently patient is considering.\par \par Visit Hx:\par 7/16/19: Patient presents today for follow-up from ED visit for menstrual cycle related chronic blood loss anemia with symptoms of fatigue, intermittent dizziness and mild SOB on exertion. She denies chest pain on exertion, or other sources of bleeding including GI. She denies issues with bleeding when she gets cuts and scrapes, and denies bruising.\par She is requesting IV iron as she is unable to tolerate oral supplementation. She is planning a trip to [place] tomorrow for 2 weeks with her daughters that she cannot change. She is requesting IV Iron today. \par \par 2/28/2019: Patient presents after about 7 months since her IV iron infusion. She continues to have heavy periods and has been experiencing weakness and dizziness again with intermittent headaches similar to when she was anemic in the past. She denies SOB with exertion. She did not want a hysterectomy at this time as she may be looking into fertility options since she was recently remarried.\par \par 4/11/19: She is feeling well today and back to her healthy baseline. Her counts have fully recovered. She continues to have heavy periods and will consider further gyn eval and possible hysterectomy in the future.\par \par 8/22/2019: Patient complains of recurrent dizziness and losing her hair again. She also reports fatigue / weakness. She denies pica or shortness of breath. LMP 8/11/19 (heavy bleeding experienced x 5 days). She also complains of tenderness in her right neck where she feels a right swollen node. This was present last visit and has not significantly changed in size. Patient will be evaluated for an additional treatment with Injectafer. She is also being evaluated for endometrial thickening and myomatous uterus and hopes to have a D&C procedure in the near future. She is still discussion fertility options and has not yet decided on hysterectomy.\par \par 9/26/2019: Ms Santos presents for follow-up today. She has completed 2/3 doses of venofer. Injectafer was too expensive due to insurance lapse so she agreed to venofer. After the 1st dose of venofer she had vomiting and retching for 24 hours after the infusion. After the 2nd dose (on 9/17/19) of venofer she started developing chest pain 7/10 around her sternum that was worse with touch. She did not call a doctor, felt it was not internal pain and has been feeling daily improvement of this pain, which she states is felt with bending over and touching her sternum now at 2/10. She skipped her 3rd dose. Today she denies nausea, and complains of enlargement of her right neck lump that was examined last visit. She is planning to go for endometrial polyp removal (hysteroscopy and D&C) after medical clearance, she has already seen pulm for clearance. LMP 9/7, lasted 5 days.\par \par On 12/2/19 patient was new to me. She reports that the lymphadenopathy in her neck feels like it is not necessarily "growing" but it is "joining together". She reports that she feels fatigued but no shortness of breath or anything like that. No pain in the area of the lymph nodes. She feels frustrated that she has continued menstrual bleeding and she had a procedure recently. She cannot tolerate the PO iron according to her and she has been on IV iron in the past. Pending IR appointment Wednesday to evaluate for possible biopsy of lymph node. Of note, recently she overcame the flu but still has mild residual cough. She denied any weight loss, she said she was getting some mild night sweats. \par \par Due to continued iron deificiency and menstrual blood loss, patient had Injectafer x 2 in March 2020. Biopsy of lymph node showed changes consistent with a reactive lymph node on May 14, 2020.\par \par Patient with continued menstrual losses and had second round of IV iron on 9/17/2020 and 9/24/2020.  [de-identified] : Patient reports her lymph nodes don't feel larger, but feel like they are "more together". They aren't painful. \par Hard and bumpy by biopsy. Heavy period this month - earlier than supposed to and much longer than typical. \par +weakness, headache, light sensitivity. Dizziness sometimes. +palpitations. No dyspnea on exertion.\par +pica.

## 2021-01-11 NOTE — ASSESSMENT
[FreeTextEntry1] : 44 year old female with Iron deficiency anemia due to chronic blood loss from menorrhagia. Treated with IV Injectafer in August 2018 and Feb 2019 with adequate response. Unable to tolerate oral iron therapy. Again presented in August 2019 with sx concerning for DIANA, shown to have low iron stores and set-up for venofer (unable to get injectafer at that point due to cost / no insurance). She is now s/p 2 treatments with Injectafer March 2020 . Right cervical LN etiology unclear, reactive on biopsy. Now with worsening iron deficiency and anemia again s/p Feraheme x 2 in Sep 2020. Now with evidence of worsening iron deficiency again, needs IV iron likely again. \par \par Plan:\par - Patient planning for possible hysterectomy\par - Will recheck iron studies. \par - Patient with positive c-anca and ACE levels, will follow up with rheumatology.

## 2021-01-11 NOTE — REVIEW OF SYSTEMS
[Hoarseness] : hoarseness [Muscle Pain] : muscle pain [Fever] : no fever [Chills] : no chills [Night Sweats] : no night sweats [Fatigue] : fatigue [Recent Change In Weight] : ~T no recent weight change [Eye Pain] : no eye pain [Vision Problems] : no vision problems [Dysphagia] : no dysphagia [Chest Pain] : no chest pain [Palpitations] : palpitations [Lower Ext Edema] : no lower extremity edema [Shortness Of Breath] : no shortness of breath [Wheezing] : no wheezing [SOB on Exertion] : shortness of breath during exertion [Abdominal Pain] : no abdominal pain [Vomiting] : no vomiting [Joint Pain] : no joint pain [Muscle Weakness] : no muscle weakness [Skin Rash] : no skin rash [Skin Wound] : no skin wound [Confused] : no confusion [Dizziness] : no dizziness [Fainting] : no fainting [Easy Bleeding] : no tendency for easy bleeding [Easy Bruising] : no tendency for easy bruising [FreeTextEntry2] : +pica [FreeTextEntry4] : voice improved but still with swollen lymph nodes, stable.  [de-identified] : lymph nodes in neck stable.

## 2021-01-11 NOTE — PHYSICAL EXAM
[Fully active, able to carry on all pre-disease performance without restriction] : Status 0 - Fully active, able to carry on all pre-disease performance without restriction [Normal] : affect appropriate [de-identified] : pale appearing [de-identified] : pale conjunctiva [de-identified] : Posterior to anterior solitary cervical lymph node measuring about 8-10 mm, freely mobile, non-tender NO CHANGE [de-identified] : No axillary or inguinal adenopathy

## 2021-01-12 ENCOUNTER — RESULT REVIEW (OUTPATIENT)
Age: 46
End: 2021-01-12

## 2021-01-12 ENCOUNTER — APPOINTMENT (OUTPATIENT)
Dept: INTERNAL MEDICINE | Facility: CLINIC | Age: 46
End: 2021-01-12

## 2021-01-12 ENCOUNTER — OUTPATIENT (OUTPATIENT)
Dept: OUTPATIENT SERVICES | Facility: HOSPITAL | Age: 46
LOS: 1 days | End: 2021-01-12

## 2021-01-12 VITALS — TEMPERATURE: 98.1 F

## 2021-01-12 DIAGNOSIS — Z98.890 OTHER SPECIFIED POSTPROCEDURAL STATES: Chronic | ICD-10-CM

## 2021-01-12 DIAGNOSIS — Z98.51 TUBAL LIGATION STATUS: Chronic | ICD-10-CM

## 2021-01-12 DIAGNOSIS — Z98.891 HISTORY OF UTERINE SCAR FROM PREVIOUS SURGERY: Chronic | ICD-10-CM

## 2021-01-12 LAB
BASOPHILS # BLD AUTO: 0.05 K/UL — SIGNIFICANT CHANGE UP (ref 0–0.2)
BASOPHILS NFR BLD AUTO: 1 % — SIGNIFICANT CHANGE UP (ref 0–2)
EOSINOPHIL # BLD AUTO: 0.08 K/UL — SIGNIFICANT CHANGE UP (ref 0–0.5)
EOSINOPHIL NFR BLD AUTO: 1.6 % — SIGNIFICANT CHANGE UP (ref 0–6)
HCT VFR BLD CALC: 30.5 % — LOW (ref 34.5–45)
HGB BLD-MCNC: 9.2 G/DL — LOW (ref 11.5–15.5)
IANC: 2.83 K/UL — SIGNIFICANT CHANGE UP (ref 1.5–8.5)
IMM GRANULOCYTES NFR BLD AUTO: 0.2 % — SIGNIFICANT CHANGE UP (ref 0–1.5)
LYMPHOCYTES # BLD AUTO: 1.51 K/UL — SIGNIFICANT CHANGE UP (ref 1–3.3)
LYMPHOCYTES # BLD AUTO: 30.9 % — SIGNIFICANT CHANGE UP (ref 13–44)
MCHC RBC-ENTMCNC: 22.7 PG — LOW (ref 27–34)
MCHC RBC-ENTMCNC: 30.2 GM/DL — LOW (ref 32–36)
MCV RBC AUTO: 75.1 FL — LOW (ref 80–100)
MONOCYTES # BLD AUTO: 0.4 K/UL — SIGNIFICANT CHANGE UP (ref 0–0.9)
MONOCYTES NFR BLD AUTO: 8.2 % — SIGNIFICANT CHANGE UP (ref 2–14)
NEUTROPHILS # BLD AUTO: 2.83 K/UL — SIGNIFICANT CHANGE UP (ref 1.8–7.4)
NEUTROPHILS NFR BLD AUTO: 58.1 % — SIGNIFICANT CHANGE UP (ref 43–77)
NRBC # BLD: 0 /100 WBCS — SIGNIFICANT CHANGE UP
NRBC # FLD: 0 K/UL — SIGNIFICANT CHANGE UP
PLATELET # BLD AUTO: 539 K/UL — HIGH (ref 150–400)
RBC # BLD: 4.06 M/UL — SIGNIFICANT CHANGE UP (ref 3.8–5.2)
RBC # FLD: 18.8 % — HIGH (ref 10.3–14.5)
WBC # BLD: 4.88 K/UL — SIGNIFICANT CHANGE UP (ref 3.8–10.5)
WBC # FLD AUTO: 4.88 K/UL — SIGNIFICANT CHANGE UP (ref 3.8–10.5)

## 2021-01-13 ENCOUNTER — OUTPATIENT (OUTPATIENT)
Dept: OUTPATIENT SERVICES | Facility: HOSPITAL | Age: 46
LOS: 1 days | End: 2021-01-13

## 2021-01-13 ENCOUNTER — NON-APPOINTMENT (OUTPATIENT)
Age: 46
End: 2021-01-13

## 2021-01-13 ENCOUNTER — APPOINTMENT (OUTPATIENT)
Dept: ORTHOPEDIC SURGERY | Facility: HOSPITAL | Age: 46
End: 2021-01-13

## 2021-01-13 VITALS
HEART RATE: 97 BPM | HEIGHT: 64 IN | DIASTOLIC BLOOD PRESSURE: 91 MMHG | WEIGHT: 189 LBS | TEMPERATURE: 99.1 F | SYSTOLIC BLOOD PRESSURE: 117 MMHG | BODY MASS INDEX: 32.27 KG/M2

## 2021-01-13 DIAGNOSIS — Z98.890 OTHER SPECIFIED POSTPROCEDURAL STATES: Chronic | ICD-10-CM

## 2021-01-13 DIAGNOSIS — Z98.891 HISTORY OF UTERINE SCAR FROM PREVIOUS SURGERY: Chronic | ICD-10-CM

## 2021-01-13 DIAGNOSIS — Z98.51 TUBAL LIGATION STATUS: Chronic | ICD-10-CM

## 2021-01-13 LAB
ALBUMIN SERPL ELPH-MCNC: 4.2 G/DL
ALP BLD-CCNC: 56 U/L
ALT SERPL-CCNC: 21 U/L
ANION GAP SERPL CALC-SCNC: 11 MMOL/L
AST SERPL-CCNC: 18 U/L
BILIRUB SERPL-MCNC: 0.2 MG/DL
BUN SERPL-MCNC: 17 MG/DL
CALCIUM SERPL-MCNC: 9.4 MG/DL
CHLORIDE SERPL-SCNC: 101 MMOL/L
CO2 SERPL-SCNC: 27 MMOL/L
CREAT SERPL-MCNC: 0.96 MG/DL
FERRITIN SERPL-MCNC: 3 NG/ML
GLUCOSE SERPL-MCNC: 169 MG/DL
IRON SATN MFR SERPL: 4 %
IRON SERPL-MCNC: 16 UG/DL
POTASSIUM SERPL-SCNC: 4.2 MMOL/L
PROT SERPL-MCNC: 6.9 G/DL
SODIUM SERPL-SCNC: 138 MMOL/L
TIBC SERPL-MCNC: 455 UG/DL
UIBC SERPL-MCNC: 439 UG/DL

## 2021-01-13 NOTE — HISTORY OF PRESENT ILLNESS
[de-identified] : Pt here  for follow-up eval of her hands\par Previously noted to have retinacular cysts bilaterally: cortisone injections given and not effective on last visit

## 2021-01-13 NOTE — DISCUSSION/SUMMARY
[de-identified] : The patient was advised on following up in the hand clinic for consideration of surgical excision, given her lack of response to non-operative treatment thus far\par \par Appoint for [facility]\par \par RAIMUNDO Smallwood MD

## 2021-01-14 ENCOUNTER — NON-APPOINTMENT (OUTPATIENT)
Age: 46
End: 2021-01-14

## 2021-01-15 ENCOUNTER — OUTPATIENT (OUTPATIENT)
Dept: OUTPATIENT SERVICES | Facility: HOSPITAL | Age: 46
LOS: 1 days | Discharge: HOME | End: 2021-01-15

## 2021-01-15 DIAGNOSIS — Z98.890 OTHER SPECIFIED POSTPROCEDURAL STATES: Chronic | ICD-10-CM

## 2021-01-15 DIAGNOSIS — D50.0 IRON DEFICIENCY ANEMIA SECONDARY TO BLOOD LOSS (CHRONIC): ICD-10-CM

## 2021-01-15 DIAGNOSIS — Z98.51 TUBAL LIGATION STATUS: Chronic | ICD-10-CM

## 2021-01-15 DIAGNOSIS — Z98.891 HISTORY OF UTERINE SCAR FROM PREVIOUS SURGERY: Chronic | ICD-10-CM

## 2021-01-15 DIAGNOSIS — N92.0 EXCESSIVE AND FREQUENT MENSTRUATION WITH REGULAR CYCLE: ICD-10-CM

## 2021-01-15 DIAGNOSIS — N93.9 ABNORMAL UTERINE AND VAGINAL BLEEDING, UNSPECIFIED: ICD-10-CM

## 2021-01-18 ENCOUNTER — LABORATORY RESULT (OUTPATIENT)
Age: 46
End: 2021-01-18

## 2021-01-19 ENCOUNTER — APPOINTMENT (OUTPATIENT)
Dept: INFUSION THERAPY | Facility: HOSPITAL | Age: 46
End: 2021-01-19

## 2021-01-19 ENCOUNTER — APPOINTMENT (OUTPATIENT)
Dept: INTERNAL MEDICINE | Facility: CLINIC | Age: 46
End: 2021-01-19

## 2021-01-20 ENCOUNTER — APPOINTMENT (OUTPATIENT)
Dept: ORTHOPEDIC SURGERY | Facility: HOSPITAL | Age: 46
End: 2021-01-20

## 2021-01-20 ENCOUNTER — OUTPATIENT (OUTPATIENT)
Dept: OUTPATIENT SERVICES | Facility: HOSPITAL | Age: 46
LOS: 1 days | End: 2021-01-20

## 2021-01-20 VITALS
SYSTOLIC BLOOD PRESSURE: 117 MMHG | WEIGHT: 190 LBS | HEIGHT: 64 IN | BODY MASS INDEX: 32.44 KG/M2 | HEART RATE: 89 BPM | DIASTOLIC BLOOD PRESSURE: 70 MMHG | TEMPERATURE: 98.8 F

## 2021-01-20 DIAGNOSIS — Z98.51 TUBAL LIGATION STATUS: Chronic | ICD-10-CM

## 2021-01-20 DIAGNOSIS — Z98.890 OTHER SPECIFIED POSTPROCEDURAL STATES: Chronic | ICD-10-CM

## 2021-01-20 DIAGNOSIS — Z98.891 HISTORY OF UTERINE SCAR FROM PREVIOUS SURGERY: Chronic | ICD-10-CM

## 2021-01-21 ENCOUNTER — APPOINTMENT (OUTPATIENT)
Dept: INFUSION THERAPY | Facility: HOSPITAL | Age: 46
End: 2021-01-21

## 2021-01-25 ENCOUNTER — RESULT REVIEW (OUTPATIENT)
Age: 46
End: 2021-01-25

## 2021-01-25 ENCOUNTER — OUTPATIENT (OUTPATIENT)
Dept: OUTPATIENT SERVICES | Facility: HOSPITAL | Age: 46
LOS: 1 days | End: 2021-01-25

## 2021-01-25 ENCOUNTER — APPOINTMENT (OUTPATIENT)
Dept: OBGYN | Facility: HOSPITAL | Age: 46
End: 2021-01-25
Payer: COMMERCIAL

## 2021-01-25 VITALS
SYSTOLIC BLOOD PRESSURE: 124 MMHG | WEIGHT: 192.6 LBS | TEMPERATURE: 97.9 F | BODY MASS INDEX: 32.88 KG/M2 | DIASTOLIC BLOOD PRESSURE: 75 MMHG | HEART RATE: 96 BPM | HEIGHT: 64 IN

## 2021-01-25 DIAGNOSIS — Z98.51 TUBAL LIGATION STATUS: Chronic | ICD-10-CM

## 2021-01-25 DIAGNOSIS — Z98.890 OTHER SPECIFIED POSTPROCEDURAL STATES: Chronic | ICD-10-CM

## 2021-01-25 DIAGNOSIS — Z98.891 HISTORY OF UTERINE SCAR FROM PREVIOUS SURGERY: Chronic | ICD-10-CM

## 2021-01-25 LAB
APPEARANCE UR: CLEAR — SIGNIFICANT CHANGE UP
BILIRUB UR-MCNC: NEGATIVE — SIGNIFICANT CHANGE UP
COLOR SPEC: YELLOW — SIGNIFICANT CHANGE UP
DIFF PNL FLD: NEGATIVE — SIGNIFICANT CHANGE UP
GLUCOSE UR QL: NEGATIVE — SIGNIFICANT CHANGE UP
KETONES UR-MCNC: NEGATIVE — SIGNIFICANT CHANGE UP
LEUKOCYTE ESTERASE UR-ACNC: NEGATIVE — SIGNIFICANT CHANGE UP
NITRITE UR-MCNC: NEGATIVE — SIGNIFICANT CHANGE UP
PH UR: 5.5 — SIGNIFICANT CHANGE UP (ref 5–8)
PROT UR-MCNC: ABNORMAL
SP GR SPEC: 1.03 — HIGH (ref 1.01–1.02)
UROBILINOGEN FLD QL: SIGNIFICANT CHANGE UP

## 2021-01-25 PROCEDURE — ZZZZZ: CPT

## 2021-01-25 NOTE — PHYSICAL EXAM
[Appropriately responsive] : appropriately responsive [Alert] : alert [No Acute Distress] : no acute distress [Soft] : soft [Non-tender] : non-tender [Non-distended] : non-distended [Oriented x3] : oriented x3 [Labia Majora] : normal [Labia Minora] : normal [Normal] : normal [Uterine Adnexae] : normal [FreeTextEntry7] : KENDALL fibroid felt, scar tissue from previous surgeries felt. [FreeTextEntry4] : Thin white/gray discharge [FreeTextEntry5] : No CMT [FreeTextEntry6] : KENDALL fibroid felt on bimanual exam approximately 7cm

## 2021-01-26 DIAGNOSIS — M25.849 OTHER SPECIFIED JOINT DISORDERS, UNSPECIFIED HAND: ICD-10-CM

## 2021-01-26 LAB
CULTURE RESULTS: NO GROWTH — SIGNIFICANT CHANGE UP
SPECIMEN SOURCE: SIGNIFICANT CHANGE UP

## 2021-01-27 ENCOUNTER — APPOINTMENT (OUTPATIENT)
Dept: OBGYN | Facility: HOSPITAL | Age: 46
End: 2021-01-27
Payer: COMMERCIAL

## 2021-01-27 ENCOUNTER — OUTPATIENT (OUTPATIENT)
Dept: OUTPATIENT SERVICES | Facility: HOSPITAL | Age: 46
LOS: 1 days | End: 2021-01-27

## 2021-01-27 VITALS
SYSTOLIC BLOOD PRESSURE: 122 MMHG | WEIGHT: 191 LBS | HEIGHT: 64 IN | BODY MASS INDEX: 32.61 KG/M2 | DIASTOLIC BLOOD PRESSURE: 74 MMHG | TEMPERATURE: 98.1 F | HEART RATE: 83 BPM

## 2021-01-27 DIAGNOSIS — Z98.891 HISTORY OF UTERINE SCAR FROM PREVIOUS SURGERY: Chronic | ICD-10-CM

## 2021-01-27 DIAGNOSIS — M67.40 GANGLION, UNSPECIFIED SITE: ICD-10-CM

## 2021-01-27 DIAGNOSIS — N94.9 UNSPECIFIED CONDITION ASSOCIATED WITH FEMALE GENITAL ORGANS AND MENSTRUAL CYCLE: ICD-10-CM

## 2021-01-27 DIAGNOSIS — Z98.890 OTHER SPECIFIED POSTPROCEDURAL STATES: Chronic | ICD-10-CM

## 2021-01-27 DIAGNOSIS — Z98.51 TUBAL LIGATION STATUS: Chronic | ICD-10-CM

## 2021-01-27 LAB
C TRACH RRNA SPEC QL NAA+PROBE: SIGNIFICANT CHANGE UP
CANDIDA AB TITR SER: SIGNIFICANT CHANGE UP
G VAGINALIS DNA SPEC QL NAA+PROBE: DETECTED
N GONORRHOEA RRNA SPEC QL NAA+PROBE: SIGNIFICANT CHANGE UP
SPECIMEN SOURCE: SIGNIFICANT CHANGE UP
T VAGINALIS SPEC QL WET PREP: SIGNIFICANT CHANGE UP

## 2021-01-27 PROCEDURE — 99213 OFFICE O/P EST LOW 20 MIN: CPT | Mod: GC

## 2021-01-28 ENCOUNTER — APPOINTMENT (OUTPATIENT)
Dept: INFUSION THERAPY | Facility: HOSPITAL | Age: 46
End: 2021-01-28

## 2021-01-28 DIAGNOSIS — R11.2 NAUSEA WITH VOMITING, UNSPECIFIED: ICD-10-CM

## 2021-01-28 DIAGNOSIS — D25.9 LEIOMYOMA OF UTERUS, UNSPECIFIED: ICD-10-CM

## 2021-01-29 ENCOUNTER — NON-APPOINTMENT (OUTPATIENT)
Age: 46
End: 2021-01-29

## 2021-02-01 ENCOUNTER — APPOINTMENT (OUTPATIENT)
Dept: RHEUMATOLOGY | Facility: CLINIC | Age: 46
End: 2021-02-01

## 2021-02-08 NOTE — END OF VISIT
[] : Fellow [FreeTextEntry3] : 46 yo P2 w/symptomatic ut myomas.\par Pt w/HMB now interested in definitive surgical intervention.\par Past hx of obesity,  x 2, lsc --> xlap LO cystectomy (dense adhesions), and abdominoplasty w/mesh(?).\par Risks discussed at length.\par \par Plan\par Will schedule TLH\par Will discuss UAE again as possible preferred route.

## 2021-02-25 ENCOUNTER — NON-APPOINTMENT (OUTPATIENT)
Age: 46
End: 2021-02-25

## 2021-03-09 PROBLEM — N94.10 DYSPAREUNIA, FEMALE: Status: ACTIVE | Noted: 2020-09-08

## 2021-03-10 ENCOUNTER — APPOINTMENT (OUTPATIENT)
Dept: INTERVENTIONAL RADIOLOGY/VASCULAR | Facility: CLINIC | Age: 46
End: 2021-03-10
Payer: SELF-PAY

## 2021-03-10 VITALS — BODY MASS INDEX: 31.76 KG/M2 | HEIGHT: 64 IN | WEIGHT: 186 LBS

## 2021-03-10 DIAGNOSIS — R92.2 INCONCLUSIVE MAMMOGRAM: ICD-10-CM

## 2021-03-10 DIAGNOSIS — N94.10 UNSPECIFIED DYSPAREUNIA: ICD-10-CM

## 2021-03-10 DIAGNOSIS — N94.9 UNSPECIFIED CONDITION ASSOCIATED WITH FEMALE GENITAL ORGANS AND MENSTRUAL CYCLE: ICD-10-CM

## 2021-03-10 PROCEDURE — 99443: CPT

## 2021-03-10 RX ORDER — METRONIDAZOLE 500 MG/1
500 TABLET ORAL
Qty: 14 | Refills: 0 | Status: DISCONTINUED | COMMUNITY
Start: 2021-01-25 | End: 2021-03-10

## 2021-03-16 ENCOUNTER — OUTPATIENT (OUTPATIENT)
Dept: OUTPATIENT SERVICES | Facility: HOSPITAL | Age: 46
LOS: 1 days | Discharge: HOME | End: 2021-03-16

## 2021-03-16 ENCOUNTER — OUTPATIENT (OUTPATIENT)
Dept: OUTPATIENT SERVICES | Facility: HOSPITAL | Age: 46
LOS: 1 days | End: 2021-03-16
Payer: COMMERCIAL

## 2021-03-16 ENCOUNTER — APPOINTMENT (OUTPATIENT)
Dept: ULTRASOUND IMAGING | Facility: IMAGING CENTER | Age: 46
End: 2021-03-16
Payer: COMMERCIAL

## 2021-03-16 ENCOUNTER — RESULT REVIEW (OUTPATIENT)
Age: 46
End: 2021-03-16

## 2021-03-16 ENCOUNTER — APPOINTMENT (OUTPATIENT)
Dept: OTOLARYNGOLOGY | Facility: CLINIC | Age: 46
End: 2021-03-16
Payer: COMMERCIAL

## 2021-03-16 VITALS
RESPIRATION RATE: 8 BRPM | TEMPERATURE: 97.7 F | HEIGHT: 64 IN | HEART RATE: 80 BPM | BODY MASS INDEX: 31.41 KG/M2 | SYSTOLIC BLOOD PRESSURE: 124 MMHG | WEIGHT: 184 LBS | DIASTOLIC BLOOD PRESSURE: 80 MMHG

## 2021-03-16 DIAGNOSIS — R59.0 LOCALIZED ENLARGED LYMPH NODES: ICD-10-CM

## 2021-03-16 DIAGNOSIS — Z98.891 HISTORY OF UTERINE SCAR FROM PREVIOUS SURGERY: Chronic | ICD-10-CM

## 2021-03-16 DIAGNOSIS — Z98.890 OTHER SPECIFIED POSTPROCEDURAL STATES: Chronic | ICD-10-CM

## 2021-03-16 DIAGNOSIS — J35.8 OTHER CHRONIC DISEASES OF TONSILS AND ADENOIDS: ICD-10-CM

## 2021-03-16 DIAGNOSIS — Z98.51 TUBAL LIGATION STATUS: Chronic | ICD-10-CM

## 2021-03-16 PROCEDURE — 99214 OFFICE O/P EST MOD 30 MIN: CPT

## 2021-03-16 PROCEDURE — 76536 US EXAM OF HEAD AND NECK: CPT

## 2021-03-16 PROCEDURE — 76536 US EXAM OF HEAD AND NECK: CPT | Mod: 26

## 2021-03-16 NOTE — HISTORY OF PRESENT ILLNESS
[de-identified] : 45 year old female presents for follow up for right swollen lymph adenopathy  which have increased in size since last visit. She is also c/o tonsil stones.

## 2021-03-16 NOTE — CONSULT LETTER
[Dear  ___] : Dear  [unfilled], [Courtesy Letter:] : I had the pleasure of seeing your patient, [unfilled], in my office today. [Please see my note below.] : Please see my note below. [Consult Closing:] : Thank you very much for allowing me to participate in the care of this patient.  If you have any questions, please do not hesitate to contact me. [Sincerely,] : Sincerely, [FreeTextEntry2] : Yovana Baldwin MD ([facility]) [FreeTextEntry3] : Edson Reinoso MD, FACS\par Chief of Otolaryngology [facility]\par  - Dept. of Otolaryngology\par [employer]\par \par

## 2021-03-16 NOTE — REASON FOR VISIT
[Subsequent Evaluation] : a subsequent evaluation for [Other: _____] : [unfilled] [FreeTextEntry2] : follow up for swollen lymph nodes

## 2021-03-16 NOTE — REVIEW OF SYSTEMS
[As Noted in HPI] : as noted in HPI [Swelling Neck] : swelling neck [Negative] : Heme/Lymph [de-identified] : Tonsil stones

## 2021-03-16 NOTE — PHYSICAL EXAM
[de-identified] : Palpable nodes in R level V neck and submental region.   [Midline] : trachea located in midline position [de-identified] : 1+.  No stones seen today. [Normal] : no rashes

## 2021-03-18 ENCOUNTER — APPOINTMENT (OUTPATIENT)
Dept: INTERNAL MEDICINE | Facility: CLINIC | Age: 46
End: 2021-03-18
Payer: COMMERCIAL

## 2021-03-18 ENCOUNTER — OUTPATIENT (OUTPATIENT)
Dept: OUTPATIENT SERVICES | Facility: HOSPITAL | Age: 46
LOS: 1 days | End: 2021-03-18

## 2021-03-18 VITALS
OXYGEN SATURATION: 98 % | WEIGHT: 190 LBS | HEART RATE: 90 BPM | SYSTOLIC BLOOD PRESSURE: 130 MMHG | DIASTOLIC BLOOD PRESSURE: 70 MMHG | HEIGHT: 64 IN | BODY MASS INDEX: 32.44 KG/M2 | RESPIRATION RATE: 18 BRPM

## 2021-03-18 VITALS — TEMPERATURE: 96.8 F

## 2021-03-18 DIAGNOSIS — Z98.890 OTHER SPECIFIED POSTPROCEDURAL STATES: Chronic | ICD-10-CM

## 2021-03-18 DIAGNOSIS — M54.5 LOW BACK PAIN: ICD-10-CM

## 2021-03-18 DIAGNOSIS — Z87.2 PERSONAL HISTORY OF DISEASES OF THE SKIN AND SUBCUTANEOUS TISSUE: ICD-10-CM

## 2021-03-18 DIAGNOSIS — Z09 ENCOUNTER FOR FOLLOW-UP EXAMINATION AFTER COMPLETED TREATMENT FOR CONDITIONS OTHER THAN MALIGNANT NEOPLASM: ICD-10-CM

## 2021-03-18 DIAGNOSIS — Z87.898 PERSONAL HISTORY OF OTHER SPECIFIED CONDITIONS: ICD-10-CM

## 2021-03-18 DIAGNOSIS — G89.29 LOW BACK PAIN: ICD-10-CM

## 2021-03-18 DIAGNOSIS — M51.26 OTHER INTERVERTEBRAL DISC DISPLACEMENT, LUMBAR REGION: ICD-10-CM

## 2021-03-18 DIAGNOSIS — Z98.51 TUBAL LIGATION STATUS: Chronic | ICD-10-CM

## 2021-03-18 DIAGNOSIS — Z98.891 HISTORY OF UTERINE SCAR FROM PREVIOUS SURGERY: Chronic | ICD-10-CM

## 2021-03-18 DIAGNOSIS — Z87.42 PERSONAL HISTORY OF OTHER DISEASES OF THE FEMALE GENITAL TRACT: ICD-10-CM

## 2021-03-18 PROCEDURE — ZZZZZ: CPT

## 2021-03-19 DIAGNOSIS — R59.0 LOCALIZED ENLARGED LYMPH NODES: ICD-10-CM

## 2021-03-19 DIAGNOSIS — N94.6 DYSMENORRHEA, UNSPECIFIED: ICD-10-CM

## 2021-03-19 DIAGNOSIS — D64.9 ANEMIA, UNSPECIFIED: ICD-10-CM

## 2021-03-19 DIAGNOSIS — D25.1 INTRAMURAL LEIOMYOMA OF UTERUS: ICD-10-CM

## 2021-03-19 DIAGNOSIS — M67.40 GANGLION, UNSPECIFIED SITE: ICD-10-CM

## 2021-03-19 DIAGNOSIS — M54.9 DORSALGIA, UNSPECIFIED: ICD-10-CM

## 2021-03-19 DIAGNOSIS — R93.5 ABNORMAL FINDINGS ON DIAGNOSTIC IMAGING OF OTHER ABDOMINAL REGIONS, INCLUDING RETROPERITONEUM: ICD-10-CM

## 2021-03-19 DIAGNOSIS — D50.0 IRON DEFICIENCY ANEMIA SECONDARY TO BLOOD LOSS (CHRONIC): ICD-10-CM

## 2021-03-19 DIAGNOSIS — E66.9 OBESITY, UNSPECIFIED: ICD-10-CM

## 2021-03-19 DIAGNOSIS — D25.9 LEIOMYOMA OF UTERUS, UNSPECIFIED: ICD-10-CM

## 2021-03-19 PROBLEM — Z87.898 HISTORY OF NOCTURIA: Status: RESOLVED | Noted: 2020-09-08 | Resolved: 2021-03-19

## 2021-03-19 PROBLEM — Z87.898 HISTORY OF URINARY FREQUENCY: Status: RESOLVED | Noted: 2020-09-08 | Resolved: 2021-03-19

## 2021-03-19 PROBLEM — M51.26 HERNIATED LUMBAR INTERVERTEBRAL DISC: Status: RESOLVED | Noted: 2020-08-31 | Resolved: 2021-03-19

## 2021-03-19 PROBLEM — Z87.42 HISTORY OF ABNORMAL UTERINE BLEEDING: Status: RESOLVED | Noted: 2020-10-08 | Resolved: 2021-03-19

## 2021-03-19 PROBLEM — M54.5 CHRONIC BILATERAL LOW BACK PAIN WITHOUT SCIATICA: Status: RESOLVED | Noted: 2020-10-14 | Resolved: 2021-03-19

## 2021-03-19 PROBLEM — Z09 FOLLOW UP: Status: RESOLVED | Noted: 2021-03-16 | Resolved: 2021-03-19

## 2021-03-19 NOTE — HISTORY OF PRESENT ILLNESS
[FreeTextEntry1] : follow-up chronic medical problems  [de-identified] : The patient is a 45-year-old woman who is being followed for menorrhagia in the setting of known uterine fibroids, unexplained lymphadenopathy, right adnexal cyst, and obesity who is presenting to the clinic for follow-up. She has persistent heavy vaginal bleeding with each menstrual period, occurring approximately every twenty eight days, lasting about six days, and requiring about six-to-seven pads daily. However, she never has any bleeding in addition to these menstrual periods. She has not noted any unintended weight changes. She did have one episode of bacterial vaginosis noted since her last visit, which subsequently resolved with antibiotic therapy. She has not had any reported bleeding or bruising on the skin or any other mucosal bleeding. The patient does have a known uterine leiomyoma for which management with uterine artery embolization is planned. She has required one additional intravenous transfusion of iron since her last visit with us. \par \par Since the time of her last visit, the patient's cervical lymphadenopathy has been persistent. She underwent repeat evaluation with otolaryngology who recommended repeat sonographic evaluation. At this time, the patient reports no intended or unintended weight changes, fevers, night sweats, chills, or rigors. She denies any joint paint or swelling or any cough, and she denies any abdominal fullness, nausea, or vomiting. The patient is not taking any medications. \par \par The patient also has noticed pain that extends from her lumbar spine to her right iliac crest and is worse when she walks. The pain is not associated with the clothes the patient is wearing. It is not associated with any fevers, chills, point tenderness, or overlying erythema. She has not noticed any pain or burning with urination or any palpable pulsations. She has not had any nausea or vomiting.  \par \par Also since the time of her last visit, the patient was evaluated by an orthopedic surgeon for management of calcifications on the palmar surface of her first metacarpophalangeal joint of her right hand and the second metacarpophalangeal joint of her left hand. Fluid was drained, and the patient was advised to follow up for surgical management.

## 2021-03-19 NOTE — ASSESSMENT
[FreeTextEntry1] : The patient is a 45-year-old woman who is being followed for symptomatic anemia secondary to menorrhagia in the setting of a uterine fibroid, unexplained cervical lymphadenopathy, and an adnexal cyst who is presenting to the clinic today for follow-up. \par \par 1. Metromenorrhagia : -Heavy menstrual bleeding most likely secondary to known uterine leiomyoma\par -Suspicion for cervical neoplasia low (negative pap and hpv contesting in 2020), reports negative gonorrhea and chlamydia tests and only one sexual partner unchanged; status post recent treatment for bacterial vaginosis and no reported current pain or itching or burning with urination (so current cervical or vaginal infection unlikely); no weight changes to suggest neoplasia; no bleeding elsewhere to suggest coagulopathy\par -No barrier or hormonal contraception used, but patient is status post tubal ligation; suspicion for pregnancy-related bleeding is very, very low\par -Received intravenous iron infusion with hematologist  since last visit \par -Uterine artery embolization planned for next week \par \par 2. Adnexal cyst: -Incidentally noted left adnexal cyst on CT abdomen and pelvis with iv contrast 12/20 (performed to look for abdominal lymph nodes in the setting of cervical lymphadenopathy that might explain systemic disease process \par -No comment on report regarding nature of cyst (simple vs. complex); however, new since imaging performed in September \par -Most likely simple cyst and/or developing follicle; however, differential diagnosis includes hemorrhagic cyst, endometrioma, teratoma, ovarian malignancy, and hydroureter \par -No comment at gynecology follow-up; will send task to question if patient requires transvaginal ultrasound to better characterize mass \par \par 3. Lymphadenopathy: -Differential diagnosis includes infection, malignancy, auto-immune/inflammatory condition\par -EBV and CMV IgG positive but IgM negative, indicating possible previous infection, and EBV and CMV unlikely to cause lymphadenopathy for 18 months; other possible infectious etiologies include bartonella hensleae (no known history of immunosuppression and no obvious rashes), hiv negative, toxoplasmosis (unlikely with no known history of immunosuppression) \par -No weight loss, night sweats concerning for malignancy; lymph nodes not fixed or firm\par -KRISTIN and anti ssa/ssb negative; other possible inflammatory etiologies include Castleman's disease (however patient does not have any known history of immunosuppression) and KIkuchi disease (although unlikely given lack of fevers)\par - ACE within normal limits and 1,25-OH vitamin D within normal limits (suggesting lower likelihood of granulomatous process; cANCA positive but pr3-anca negative; lymphadenopathy unlikely to be caused by granulomatosis with polyangiitis or eosinophilic granulomatosis with polyangiitis \par -Fine-needle aspiration consistent results with reactive lymph node; ENT follow-up earlier this month to re-evaluate with head and neck ultrasound; will follow results and likely plan for excisional biopsy with ENT\par \par 4. Hip and back pain: -Pain originating from lumbar spine and extending to right iliac crest most likely related to strain of quadratus lumborum and/or iliacus and psoas muscles \par -No pain with straight leg raise, claudication, or sensory changes to suggest herniated disc; no fevers, chills, or rigor to suggest discitis or osteomyelitis; no weight changes or spinal point tenderness to suggest neoplasm; no genitourinary, gastrointestinal, or vascular symptoms to suggest alternative etiology \par -Will recommend patient rest at this time and engage in graded exercise regimen following resolution of symptomatic anemia \par \par 5. 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 Healthcare maintenance: -Patient denies flu vaccine today; tdap up to date \par -hcv and hiv negative \par -A1c and lipids within normal limits at recent visit \par -phq, dast, audit negative.

## 2021-03-19 NOTE — END OF VISIT
[] : Resident [FreeTextEntry3] : Following up with the ENT for ultrasound to determine if she needs excisional biopsy of cervical lymph nodes -she is aware of the importance of this.  Plan to uterine artery embolization and current IV iron treatment -as anemia improves, may be able to participate more in physical therapy for low back/hip pain.  No red flag signs of hip pain.

## 2021-03-19 NOTE — REVIEW OF SYSTEMS
[Swollen Glands] : swollen glands [Negative] : Psychiatric [Fever] : no fever [Chills] : no chills [Fatigue] : no fatigue [Night Sweats] : no night sweats [Recent Change In Weight] : ~T no recent weight change [Chest Pain] : no chest pain [Palpitations] : no palpitations [Orthopnea] : no orthopnea [Shortness Of Breath] : no shortness of breath [Wheezing] : no wheezing [Cough] : no cough [Abdominal Pain] : no abdominal pain [Nausea] : no nausea [Vomiting] : no vomiting [Dysuria] : no dysuria [Hematuria] : no hematuria [Frequency] : no frequency [Joint Pain] : no joint pain [Joint Stiffness] : no joint stiffness [Joint Swelling] : no joint swelling [Itching] : no itching [Skin Rash] : no skin rash [Easy Bleeding] : no easy bleeding [Easy Bruising] : no easy bruising [FreeTextEntry9] : 1 cm round masses noted on palmar surface of two metacarpophalangeal joints  [de-identified] : Lymphadenopathy persistent

## 2021-03-19 NOTE — REVIEW OF SYSTEMS
[Swollen Glands] : swollen glands [Negative] : Psychiatric [Fever] : no fever [Chills] : no chills [Fatigue] : no fatigue [Night Sweats] : no night sweats [Recent Change In Weight] : ~T no recent weight change [Chest Pain] : no chest pain [Palpitations] : no palpitations [Orthopnea] : no orthopnea [Shortness Of Breath] : no shortness of breath [Wheezing] : no wheezing [Cough] : no cough [Abdominal Pain] : no abdominal pain [Nausea] : no nausea [Vomiting] : no vomiting [Dysuria] : no dysuria [Hematuria] : no hematuria [Frequency] : no frequency [Joint Pain] : no joint pain [Joint Stiffness] : no joint stiffness [Joint Swelling] : no joint swelling [Itching] : no itching [Skin Rash] : no skin rash [Easy Bleeding] : no easy bleeding [Easy Bruising] : no easy bruising [FreeTextEntry9] : 1 cm round masses noted on palmar surface of two metacarpophalangeal joints  [de-identified] : Lymphadenopathy persistent

## 2021-03-19 NOTE — PHYSICAL EXAM
[No Acute Distress] : no acute distress [Well Nourished] : well nourished [Well Developed] : well developed [Well-Appearing] : well-appearing [Normal Sclera/Conjunctiva] : normal sclera/conjunctiva [Normal Outer Ear/Nose] : the outer ears and nose were normal in appearance [Normal Oropharynx] : the oropharynx was normal [No JVD] : no jugular venous distention [No Respiratory Distress] : no respiratory distress  [No Accessory Muscle Use] : no accessory muscle use [Clear to Auscultation] : lungs were clear to auscultation bilaterally [Normal Rate] : normal rate  [Regular Rhythm] : with a regular rhythm [Normal S1, S2] : normal S1 and S2 [No Murmur] : no murmur heard [Pedal Pulses Present] : the pedal pulses are present [No Extremity Clubbing/Cyanosis] : no extremity clubbing/cyanosis [No Axillary Lymphadenopathy] : no axillary lymphadenopathy [Soft] : abdomen soft [Non Tender] : non-tender [Non-distended] : non-distended [No Masses] : no abdominal mass palpated [No HSM] : no HSM [Normal Bowel Sounds] : normal bowel sounds [No CVA Tenderness] : no CVA  tenderness [No Spinal Tenderness] : no spinal tenderness [No Joint Swelling] : no joint swelling [Grossly Normal Strength/Tone] : grossly normal strength/tone [No Rash] : no rash [Normal] : affect was normal and insight and judgment were intact [de-identified] : No conjunctival rim pallor  [de-identified] : Palpable lymphadenopathy evident in posterior cervical chain, submandibular, submental areas  [de-identified] : Palpable lymphadenopathy evident in posterior cervical chain, submandibular, submental areas  [de-identified] : No pain on straight leg raise but some pain when lowering back to bed  [de-identified] : Gait antalgic on right side but otherwise normal; no spinal rotation or inclination evident

## 2021-03-19 NOTE — HISTORY OF PRESENT ILLNESS
[FreeTextEntry1] : follow-up chronic medical problems  [de-identified] : The patient is a 45-year-old woman who is being followed for menorrhagia in the setting of known uterine fibroids, unexplained lymphadenopathy, right adnexal cyst, and obesity who is presenting to the clinic for follow-up. She has persistent heavy vaginal bleeding with each menstrual period, occurring approximately every twenty eight days, lasting about six days, and requiring about six-to-seven pads daily. However, she never has any bleeding in addition to these menstrual periods. She has not noted any unintended weight changes. She did have one episode of bacterial vaginosis noted since her last visit, which subsequently resolved with antibiotic therapy. She has not had any reported bleeding or bruising on the skin or any other mucosal bleeding. The patient does have a known uterine leiomyoma for which management with uterine artery embolization is planned. She has required one additional intravenous transfusion of iron since her last visit with us. \par \par Since the time of her last visit, the patient's cervical lymphadenopathy has been persistent. She underwent repeat evaluation with otolaryngology who recommended repeat sonographic evaluation. At this time, the patient reports no intended or unintended weight changes, fevers, night sweats, chills, or rigors. She denies any joint paint or swelling or any cough, and she denies any abdominal fullness, nausea, or vomiting. The patient is not taking any medications. \par \par The patient also has noticed pain that extends from her lumbar spine to her right iliac crest and is worse when she walks. The pain is not associated with the clothes the patient is wearing. It is not associated with any fevers, chills, point tenderness, or overlying erythema. She has not noticed any pain or burning with urination or any palpable pulsations. She has not had any nausea or vomiting.  \par \par Also since the time of her last visit, the patient was evaluated by an orthopedic surgeon for management of calcifications on the palmar surface of her first metacarpophalangeal joint of her right hand and the second metacarpophalangeal joint of her left hand. Fluid was drained, and the patient was advised to follow up for surgical management.

## 2021-03-19 NOTE — PHYSICAL EXAM
[No Acute Distress] : no acute distress [Well Nourished] : well nourished [Well Developed] : well developed [Well-Appearing] : well-appearing [Normal Sclera/Conjunctiva] : normal sclera/conjunctiva [Normal Outer Ear/Nose] : the outer ears and nose were normal in appearance [Normal Oropharynx] : the oropharynx was normal [No JVD] : no jugular venous distention [No Respiratory Distress] : no respiratory distress  [No Accessory Muscle Use] : no accessory muscle use [Clear to Auscultation] : lungs were clear to auscultation bilaterally [Normal Rate] : normal rate  [Regular Rhythm] : with a regular rhythm [Normal S1, S2] : normal S1 and S2 [No Murmur] : no murmur heard [Pedal Pulses Present] : the pedal pulses are present [No Extremity Clubbing/Cyanosis] : no extremity clubbing/cyanosis [No Axillary Lymphadenopathy] : no axillary lymphadenopathy [Soft] : abdomen soft [Non Tender] : non-tender [Non-distended] : non-distended [No Masses] : no abdominal mass palpated [No HSM] : no HSM [Normal Bowel Sounds] : normal bowel sounds [No CVA Tenderness] : no CVA  tenderness [No Spinal Tenderness] : no spinal tenderness [No Joint Swelling] : no joint swelling [Grossly Normal Strength/Tone] : grossly normal strength/tone [No Rash] : no rash [Normal] : affect was normal and insight and judgment were intact [de-identified] : No conjunctival rim pallor  [de-identified] : Palpable lymphadenopathy evident in posterior cervical chain, submandibular, submental areas  [de-identified] : Palpable lymphadenopathy evident in posterior cervical chain, submandibular, submental areas  [de-identified] : No pain on straight leg raise but some pain when lowering back to bed  [de-identified] : Gait antalgic on right side but otherwise normal; no spinal rotation or inclination evident

## 2021-03-22 ENCOUNTER — OUTPATIENT (OUTPATIENT)
Dept: OUTPATIENT SERVICES | Facility: HOSPITAL | Age: 46
LOS: 1 days | End: 2021-03-22
Payer: SELF-PAY

## 2021-03-22 VITALS
DIASTOLIC BLOOD PRESSURE: 80 MMHG | SYSTOLIC BLOOD PRESSURE: 115 MMHG | TEMPERATURE: 98 F | OXYGEN SATURATION: 98 % | WEIGHT: 184.97 LBS | HEART RATE: 104 BPM | HEIGHT: 64 IN | RESPIRATION RATE: 17 BRPM

## 2021-03-22 DIAGNOSIS — Z11.52 ENCOUNTER FOR SCREENING FOR COVID-19: ICD-10-CM

## 2021-03-22 DIAGNOSIS — Z98.890 OTHER SPECIFIED POSTPROCEDURAL STATES: Chronic | ICD-10-CM

## 2021-03-22 DIAGNOSIS — D25.9 LEIOMYOMA OF UTERUS, UNSPECIFIED: ICD-10-CM

## 2021-03-22 DIAGNOSIS — Z91.040 LATEX ALLERGY STATUS: ICD-10-CM

## 2021-03-22 DIAGNOSIS — Z98.891 HISTORY OF UTERINE SCAR FROM PREVIOUS SURGERY: Chronic | ICD-10-CM

## 2021-03-22 DIAGNOSIS — Z98.51 TUBAL LIGATION STATUS: Chronic | ICD-10-CM

## 2021-03-22 DIAGNOSIS — Z29.9 ENCOUNTER FOR PROPHYLACTIC MEASURES, UNSPECIFIED: ICD-10-CM

## 2021-03-22 DIAGNOSIS — D25.0 SUBMUCOUS LEIOMYOMA OF UTERUS: ICD-10-CM

## 2021-03-22 LAB
ANION GAP SERPL CALC-SCNC: 11 MMOL/L — SIGNIFICANT CHANGE UP (ref 5–17)
BLD GP AB SCN SERPL QL: NEGATIVE — SIGNIFICANT CHANGE UP
BUN SERPL-MCNC: 18 MG/DL — SIGNIFICANT CHANGE UP (ref 7–23)
CALCIUM SERPL-MCNC: 9 MG/DL — SIGNIFICANT CHANGE UP (ref 8.4–10.5)
CHLORIDE SERPL-SCNC: 105 MMOL/L — SIGNIFICANT CHANGE UP (ref 96–108)
CO2 SERPL-SCNC: 23 MMOL/L — SIGNIFICANT CHANGE UP (ref 22–31)
CREAT SERPL-MCNC: 0.83 MG/DL — SIGNIFICANT CHANGE UP (ref 0.5–1.3)
GLUCOSE SERPL-MCNC: 115 MG/DL — HIGH (ref 70–99)
HCT VFR BLD CALC: 32.9 % — LOW (ref 34.5–45)
HGB BLD-MCNC: 10.1 G/DL — LOW (ref 11.5–15.5)
MCHC RBC-ENTMCNC: 26.1 PG — LOW (ref 27–34)
MCHC RBC-ENTMCNC: 30.7 GM/DL — LOW (ref 32–36)
MCV RBC AUTO: 85 FL — SIGNIFICANT CHANGE UP (ref 80–100)
NRBC # BLD: 0 /100 WBCS — SIGNIFICANT CHANGE UP (ref 0–0)
PLATELET # BLD AUTO: 389 K/UL — SIGNIFICANT CHANGE UP (ref 150–400)
POTASSIUM SERPL-MCNC: 4.3 MMOL/L — SIGNIFICANT CHANGE UP (ref 3.5–5.3)
POTASSIUM SERPL-SCNC: 4.3 MMOL/L — SIGNIFICANT CHANGE UP (ref 3.5–5.3)
RBC # BLD: 3.87 M/UL — SIGNIFICANT CHANGE UP (ref 3.8–5.2)
RBC # FLD: 24.8 % — HIGH (ref 10.3–14.5)
RH IG SCN BLD-IMP: POSITIVE — SIGNIFICANT CHANGE UP
SARS-COV-2 RNA SPEC QL NAA+PROBE: SIGNIFICANT CHANGE UP
SODIUM SERPL-SCNC: 139 MMOL/L — SIGNIFICANT CHANGE UP (ref 135–145)
WBC # BLD: 5.42 K/UL — SIGNIFICANT CHANGE UP (ref 3.8–10.5)
WBC # FLD AUTO: 5.42 K/UL — SIGNIFICANT CHANGE UP (ref 3.8–10.5)

## 2021-03-22 PROCEDURE — 86900 BLOOD TYPING SEROLOGIC ABO: CPT

## 2021-03-22 PROCEDURE — C9803: CPT

## 2021-03-22 PROCEDURE — G0463: CPT

## 2021-03-22 PROCEDURE — U0005: CPT

## 2021-03-22 PROCEDURE — 80048 BASIC METABOLIC PNL TOTAL CA: CPT

## 2021-03-22 PROCEDURE — 85027 COMPLETE CBC AUTOMATED: CPT

## 2021-03-22 PROCEDURE — U0003: CPT

## 2021-03-22 PROCEDURE — 86850 RBC ANTIBODY SCREEN: CPT

## 2021-03-22 PROCEDURE — 86901 BLOOD TYPING SEROLOGIC RH(D): CPT

## 2021-03-22 NOTE — H&P PST ADULT - NEGATIVE SKIN SYMPTOMS
no rash/no itching/no dryness/no change in size/color of mole/no tumor/no brittle nails/no hair loss

## 2021-03-22 NOTE — H&P PST ADULT - NEGATIVE GASTROINTESTINAL SYMPTOMS
no nausea/no vomiting/no diarrhea/no constipation/no change in bowel habits/no abdominal pain/no melena/no jaundice/no hiccoughs no diarrhea/no constipation/no change in bowel habits/no abdominal pain/no melena/no jaundice/no hiccoughs

## 2021-03-22 NOTE — H&P PST ADULT - NEGATIVE CARDIOVASCULAR SYMPTOMS
no chest pain/no palpitations/no dyspnea on exertion/no orthopnea/no paroxysmal nocturnal dyspnea/no peripheral edema/no claudication no chest pain/no dyspnea on exertion/no orthopnea/no paroxysmal nocturnal dyspnea/no peripheral edema/no claudication

## 2021-03-22 NOTE — H&P PST ADULT - NSICDXPASTMEDICALHX_GEN_ALL_CORE_FT
PAST MEDICAL HISTORY:  Carpal tunnel syndrome, unspecified laterality     Cyst of left ovary     Endometrial polyp     Iron deficiency anemia, unspecified iron deficiency anemia type last iron infusion was 2 months ago    PUD (peptic ulcer disease)     Uterine leiomyoma, unspecified location

## 2021-03-22 NOTE — H&P PST ADULT - NSICDXPASTSURGICALHX_GEN_ALL_CORE_FT
PAST SURGICAL HISTORY:  H/O abdominoplasty     H/O ovarian cystectomy laparotomy    History of bilateral tubal ligation     History of D&C Polypectomy 2019    History of elbow surgery right; 2018    S/P  x2    S/P dilation and curettage

## 2021-03-22 NOTE — H&P PST ADULT - NEGATIVE GENERAL GENITOURINARY SYMPTOMS
no hematuria/no renal colic/no flank pain L/no flank pain R/no urine discoloration/normal urinary frequency

## 2021-03-22 NOTE — H&P PST ADULT - SKIN/BREAST COMMENTS
9/19 h/o  muscle pain " of chest especially with bending" - pts/p medical evaluation pcp 9/19-  denies chest pain, palpitations or SOB

## 2021-03-22 NOTE — H&P PST ADULT - ASSESSMENT
GILI VTE 2.0 SCORE [CLOT updated 2019]    AGE RELATED RISK FACTORS                                                       MOBILITY RELATED FACTORS  [x ] Age 41-60 years                                            (1 Point)                    [ ] Bed rest                                                        (1 Point)  [ ] Age: 61-74 years                                           (2 Points)                  [ ] Plaster cast                                                   (2 Points)  [ ] Age= 75 years                                              (3 Points)                    [ ] Bed bound for more than 72 hours                 (2 Points)    DISEASE RELATED RISK FACTORS                                               GENDER SPECIFIC FACTORS  [ ] Edema in the lower extremities                       (1 Point)              [ ] Pregnancy                                                     (1 Point)  [ ] Varicose veins                                               (1 Point)                     [ ] Post-partum < 6 weeks                                   (1 Point)             [x ] BMI > 25 Kg/m2                                            (1 Point)                     [ ] Hormonal therapy  or oral contraception          (1 Point)                 [ ] Sepsis (in the previous month)                        (1 Point)               [ ] History of pregnancy complications                 (1 point)  [ ] Pneumonia or serious lung disease                                               [ ] Unexplained or recurrent                     (1 Point)           (in the previous month)                               (1 Point)  [ ] Abnormal pulmonary function test                     (1 Point)                 SURGERY RELATED RISK FACTORS  [ ] Acute myocardial infarction                              (1 Point)               [ ]  Section                                             (1 Point)  [ ] Congestive heart failure (in the previous month)  (1 Point)      [ ] Minor surgery                                                  (1 Point)   [ ] Inflammatory bowel disease                             (1 Point)               [ ] Arthroscopic surgery                                        (2 Points)  [ ] Central venous access                                      (2 Points)                [x ] General surgery lasting more than 45 minutes (2 points)  [ ] Malignancy- Present or previous                   (2 Points)                [ ] Elective arthroplasty                                         (5 points)    [ ] Stroke (in the previous month)                          (5 Points)                                                                                                                                                           HEMATOLOGY RELATED FACTORS                                                 TRAUMA RELATED RISK FACTORS  [ ] Prior episodes of VTE                                     (3 Points)                [ ] Fracture of the hip, pelvis, or leg                       (5 Points)  [ ] Positive family history for VTE                         (3 Points)             [ ] Acute spinal cord injury (in the previous month)  (5 Points)  [ ] Prothrombin 33300 A                                     (3 Points)               [ ] Paralysis  (less than 1 month)                             (5 Points)  [ ] Factor V Leiden                                             (3 Points)                  [ ] Multiple Trauma within 1 month                        (5 Points)  [ ] Lupus anticoagulants                                     (3 Points)                                                           [ ] Anticardiolipin antibodies                               (3 Points)                                                       [ ] High homocysteine in the blood                      (3 Points)                                             [ ] Other congenital or acquired thrombophilia      (3 Points)                                                [ ] Heparin induced thrombocytopenia                  (3 Points)                                     Total Score [    4      ]

## 2021-03-22 NOTE — H&P PST ADULT - NEGATIVE GENERAL SYMPTOMS
no fever/no chills/no sweating/no anorexia/no weight loss/no weight gain/no polyphagia/no fatigue no fever/no chills/no sweating

## 2021-03-22 NOTE — H&P PST ADULT - ANESTHESIA, PREVIOUS REACTION, PROFILE
"I feel numb in different area on the face" after Lidocaine/nausea/vomiting "I feel numb in different area on the face, lips, hands" after Lidocaine ([facility] 2007)/nausea/vomiting c/o numbness of the face, lips, ears, hands after Lidocaine (Sx at [facility] 2007)/nausea/vomiting

## 2021-03-22 NOTE — H&P PST ADULT - NSICDXPROBLEM_GEN_ALL_CORE_FT
PROBLEM DIAGNOSES  Problem: Need for prophylactic measure  Assessment and Plan: The Caprini score indicates this patient is at risk for a VTE event (score 3-5).  Most surgical patients in this group would benefit from pharmacologic prophylaxis.  The surgical team will determine the balance between VTE risk and bleeding risk     Problem: Uterine leiomyoma  Assessment and Plan: Pt. is scheduled for UFE under anesthesia on 3/24/21.   Preop labs drawn today, CBC, BMP  and T & S.   Preop Covid swab to be done 3/22/21.        PROBLEM DIAGNOSES  Problem: Latex allergy  Assessment and Plan: OR booking notified for Latex allergy.    Problem: Need for prophylactic measure  Assessment and Plan: The Caprini score indicates this patient is at risk for a VTE event (score 3-5).  Most surgical patients in this group would benefit from pharmacologic prophylaxis.  The surgical team will determine the balance between VTE risk and bleeding risk     Problem: Uterine leiomyoma  Assessment and Plan: Pt. is scheduled for UFE under anesthesia on 3/24/21.   Preop labs drawn today, CBC, BMP  and T & S.   Preop Covid swab to be done 3/22/21.

## 2021-03-24 ENCOUNTER — INPATIENT (INPATIENT)
Facility: HOSPITAL | Age: 46
LOS: 0 days | Discharge: HOME | DRG: 750 | End: 2021-03-25
Attending: INTERNAL MEDICINE | Admitting: RADIOLOGY
Payer: SELF-PAY

## 2021-03-24 VITALS
WEIGHT: 194.01 LBS | OXYGEN SATURATION: 97 % | TEMPERATURE: 99 F | RESPIRATION RATE: 16 BRPM | SYSTOLIC BLOOD PRESSURE: 126 MMHG | HEIGHT: 64 IN | DIASTOLIC BLOOD PRESSURE: 78 MMHG | HEART RATE: 68 BPM

## 2021-03-24 DIAGNOSIS — D25.9 LEIOMYOMA OF UTERUS, UNSPECIFIED: ICD-10-CM

## 2021-03-24 DIAGNOSIS — R59.0 LOCALIZED ENLARGED LYMPH NODES: ICD-10-CM

## 2021-03-24 DIAGNOSIS — Z98.890 OTHER SPECIFIED POSTPROCEDURAL STATES: Chronic | ICD-10-CM

## 2021-03-24 DIAGNOSIS — K21.9 GASTRO-ESOPHAGEAL REFLUX DISEASE WITHOUT ESOPHAGITIS: ICD-10-CM

## 2021-03-24 DIAGNOSIS — Z29.9 ENCOUNTER FOR PROPHYLACTIC MEASURES, UNSPECIFIED: ICD-10-CM

## 2021-03-24 DIAGNOSIS — D25.0 SUBMUCOUS LEIOMYOMA OF UTERUS: ICD-10-CM

## 2021-03-24 DIAGNOSIS — Z98.51 TUBAL LIGATION STATUS: Chronic | ICD-10-CM

## 2021-03-24 DIAGNOSIS — D50.0 IRON DEFICIENCY ANEMIA SECONDARY TO BLOOD LOSS (CHRONIC): ICD-10-CM

## 2021-03-24 DIAGNOSIS — Z98.891 HISTORY OF UTERINE SCAR FROM PREVIOUS SURGERY: Chronic | ICD-10-CM

## 2021-03-24 DIAGNOSIS — J35.8 OTHER CHRONIC DISEASES OF TONSILS AND ADENOIDS: ICD-10-CM

## 2021-03-24 LAB
HCG SERPL-ACNC: <2 MIU/ML — SIGNIFICANT CHANGE UP
INR BLD: 0.94 RATIO — SIGNIFICANT CHANGE UP (ref 0.88–1.16)
PROTHROM AB SERPL-ACNC: 11.3 SEC — SIGNIFICANT CHANGE UP (ref 10.6–13.6)
RH IG SCN BLD-IMP: POSITIVE — SIGNIFICANT CHANGE UP

## 2021-03-24 PROCEDURE — 99223 1ST HOSP IP/OBS HIGH 75: CPT

## 2021-03-24 PROCEDURE — 76937 US GUIDE VASCULAR ACCESS: CPT | Mod: 26

## 2021-03-24 PROCEDURE — 37243 VASC EMBOLIZE/OCCLUDE ORGAN: CPT

## 2021-03-24 PROCEDURE — 36247 INS CATH ABD/L-EXT ART 3RD: CPT

## 2021-03-24 RX ORDER — HYDROMORPHONE HYDROCHLORIDE 2 MG/ML
30 INJECTION INTRAMUSCULAR; INTRAVENOUS; SUBCUTANEOUS
Refills: 0 | Status: DISCONTINUED | OUTPATIENT
Start: 2021-03-24 | End: 2021-03-25

## 2021-03-24 RX ORDER — HYDROMORPHONE HYDROCHLORIDE 2 MG/ML
0.25 INJECTION INTRAMUSCULAR; INTRAVENOUS; SUBCUTANEOUS
Refills: 0 | Status: DISCONTINUED | OUTPATIENT
Start: 2021-03-24 | End: 2021-03-25

## 2021-03-24 RX ORDER — ACETAMINOPHEN 500 MG
1000 TABLET ORAL EVERY 6 HOURS
Refills: 0 | Status: DISCONTINUED | OUTPATIENT
Start: 2021-03-24 | End: 2021-03-25

## 2021-03-24 RX ORDER — ACETAMINOPHEN 500 MG
1000 TABLET ORAL ONCE
Refills: 0 | Status: COMPLETED | OUTPATIENT
Start: 2021-03-24 | End: 2021-03-24

## 2021-03-24 RX ORDER — HYDROMORPHONE HYDROCHLORIDE 2 MG/ML
0.5 INJECTION INTRAMUSCULAR; INTRAVENOUS; SUBCUTANEOUS
Refills: 0 | Status: DISCONTINUED | OUTPATIENT
Start: 2021-03-24 | End: 2021-03-25

## 2021-03-24 RX ORDER — PANTOPRAZOLE SODIUM 20 MG/1
40 TABLET, DELAYED RELEASE ORAL
Refills: 0 | Status: DISCONTINUED | OUTPATIENT
Start: 2021-03-24 | End: 2021-03-25

## 2021-03-24 RX ORDER — NALOXONE HYDROCHLORIDE 4 MG/.1ML
0.1 SPRAY NASAL
Refills: 0 | Status: DISCONTINUED | OUTPATIENT
Start: 2021-03-24 | End: 2021-03-25

## 2021-03-24 RX ORDER — ACETAMINOPHEN 500 MG
1000 TABLET ORAL ONCE
Refills: 0 | Status: DISCONTINUED | OUTPATIENT
Start: 2021-03-24 | End: 2021-03-24

## 2021-03-24 RX ORDER — FAMOTIDINE 10 MG/ML
20 INJECTION INTRAVENOUS ONCE
Refills: 0 | Status: COMPLETED | OUTPATIENT
Start: 2021-03-24 | End: 2021-03-24

## 2021-03-24 RX ORDER — ONDANSETRON 8 MG/1
4 TABLET, FILM COATED ORAL EVERY 6 HOURS
Refills: 0 | Status: DISCONTINUED | OUTPATIENT
Start: 2021-03-24 | End: 2021-03-25

## 2021-03-24 RX ORDER — SODIUM CHLORIDE 9 MG/ML
1000 INJECTION, SOLUTION INTRAVENOUS
Refills: 0 | Status: DISCONTINUED | OUTPATIENT
Start: 2021-03-24 | End: 2021-03-25

## 2021-03-24 RX ORDER — DIPHENHYDRAMINE HCL 50 MG
12.5 CAPSULE ORAL ONCE
Refills: 0 | Status: COMPLETED | OUTPATIENT
Start: 2021-03-24 | End: 2021-03-24

## 2021-03-24 RX ORDER — ACETAMINOPHEN 500 MG
1000 TABLET ORAL EVERY 6 HOURS
Refills: 0 | Status: COMPLETED | OUTPATIENT
Start: 2021-03-24 | End: 2021-03-25

## 2021-03-24 RX ORDER — SODIUM CHLORIDE 9 MG/ML
1000 INJECTION, SOLUTION INTRAVENOUS
Refills: 0 | Status: DISCONTINUED | OUTPATIENT
Start: 2021-03-24 | End: 2021-03-24

## 2021-03-24 RX ADMIN — ONDANSETRON 4 MILLIGRAM(S): 8 TABLET, FILM COATED ORAL at 19:58

## 2021-03-24 RX ADMIN — Medication 1000 MILLIGRAM(S): at 17:55

## 2021-03-24 RX ADMIN — HYDROMORPHONE HYDROCHLORIDE 30 MILLILITER(S): 2 INJECTION INTRAMUSCULAR; INTRAVENOUS; SUBCUTANEOUS at 13:19

## 2021-03-24 RX ADMIN — HYDROMORPHONE HYDROCHLORIDE 30 MILLILITER(S): 2 INJECTION INTRAMUSCULAR; INTRAVENOUS; SUBCUTANEOUS at 15:41

## 2021-03-24 RX ADMIN — FAMOTIDINE 20 MILLIGRAM(S): 10 INJECTION INTRAVENOUS at 13:02

## 2021-03-24 RX ADMIN — Medication 12.5 MILLIGRAM(S): at 13:02

## 2021-03-24 RX ADMIN — HYDROMORPHONE HYDROCHLORIDE 0.25 MILLIGRAM(S): 2 INJECTION INTRAMUSCULAR; INTRAVENOUS; SUBCUTANEOUS at 12:50

## 2021-03-24 RX ADMIN — Medication 400 MILLIGRAM(S): at 17:35

## 2021-03-24 RX ADMIN — HYDROMORPHONE HYDROCHLORIDE 30 MILLILITER(S): 2 INJECTION INTRAMUSCULAR; INTRAVENOUS; SUBCUTANEOUS at 19:38

## 2021-03-24 RX ADMIN — HYDROMORPHONE HYDROCHLORIDE 0.25 MILLIGRAM(S): 2 INJECTION INTRAMUSCULAR; INTRAVENOUS; SUBCUTANEOUS at 13:05

## 2021-03-24 NOTE — ASU PATIENT PROFILE, ADULT - ALCOHOL USE HISTORY SINGLE SELECT
PAST MEDICAL HISTORY:  Atrial fibrillation     HLD (hyperlipidemia)     HTN (hypertension)     Pacemaker never

## 2021-03-24 NOTE — ASU PATIENT PROFILE, ADULT - PSH
H/O abdominoplasty    H/O ovarian cystectomy  laparotomy  History of bilateral tubal ligation    History of D&C  Polypectomy 2019  History of elbow surgery  right; 2018  S/P   x2  S/P dilation and curettage

## 2021-03-24 NOTE — H&P ADULT - NSHPPHYSICALEXAM_GEN_ALL_CORE
Vital Signs Last 24 Hrs  T(C): 36.4 (24 Mar 2021 12:35), Max: 37.1 (24 Mar 2021 09:32)  T(F): 97.6 (24 Mar 2021 12:35), Max: 98.8 (24 Mar 2021 09:32)  HR: 90 (24 Mar 2021 14:00) (68 - 103)  BP: 143/79 (24 Mar 2021 14:00) (126/78 - 143/79)  BP(mean): --  RR: 14 (24 Mar 2021 14:00) (14 - 17)  SpO2: 96% (24 Mar 2021 14:00) (96% - 100%)    PHYSICAL EXAM:  CONSTITUTIONAL: in mild discomfort, pale, well-groomed  EYES: PERRLA; conjunctiva and sclera clear  ENMT: Moist oral mucosa, no pharyngeal injection or exudates; normal dentition  NECK: Supple, no palpable masses; no thyromegaly  RESPIRATORY: Normal respiratory effort; lungs are clear to auscultation bilaterally  CARDIOVASCULAR: Regular rate and rhythm, normal S1 and S2, no murmur/rub/gallop; No lower extremity edema; Peripheral pulses are 2+ bilaterally +RLE groin site c/d/i no bruit, distal pulses intact  ABDOMEN: tender to palpation, normoactive bowel sounds, no rebound/guarding  MUSCULOSKELETAL:  unable to assess gait; no clubbing or cyanosis of digits; no joint swelling or tenderness to palpation  PSYCH: A+O to person, place, and time; affect appropriate  NEUROLOGY: moving all ext; no gross sensory deficits   SKIN: No rashes; no palpable lesions

## 2021-03-24 NOTE — H&P ADULT - NSHPLABSRESULTS_GEN_ALL_CORE
LABS:       hgb 10 on last labs 3/22  cr 0.83    PT/INR - ( 24 Mar 2021 10:06 )   PT: 11.3 sec;   INR: 0.94 ratio                       Records reviewed from prior hospitalization.  Labs reviewed remarkable for mild anemia and creatinine

## 2021-03-24 NOTE — PRE PROCEDURE NOTE - PRE PROCEDURE EVALUATION
Interventional Radiology  Pre-Procedure Note    This is a 45y  Female      HPI:  46 y/o female with history of menorrhagia and uterine fibroids presents today for presurgical evaluation.  PMHx also includes unexplained lymphadenopathy, iron deficiency anemia (receiving iron infusions) GERD, low back pain and obesity.  She complains of heavy vaginal bleeding which lasts approximately 4 days, period lasts usually 6 days and occurs every 28 days.  She complains of heavy cramping with her periods.  She presents for Uterine Fibroid Embolization with anesthesiatoday.         PAST MEDICAL & SURGICAL HISTORY:  Carpal tunnel syndrome, unspecified laterality    Cyst of left ovary    Iron deficiency anemia, unspecified iron deficiency anemia type  last iron infusion was 2 months ago    Endometrial polyp    Uterine leiomyoma, unspecified location    PUD (peptic ulcer disease)    History of D&C  Polypectomy     H/O abdominoplasty    History of elbow surgery  right; 2018    S/P dilation and curettage    H/O ovarian cystectomy  laparotomy    History of bilateral tubal ligation    S/P   x2      Social History:   denies smoking, alcohol or illicit drug use    FAMILY HISTORY:  Family history of cervical cancer        Allergies: adhesives (Rash)  cephalexin (Anaphylaxis)  cephalosporins (Short breath; Swelling)  latex (Rash)  lidocaine (Vomiting; Other)  oxycodone (Other; Vomiting; Drowsiness)      Current Medications:   Home Medications:  omega 3: 1 milligram(s)  once a day (22 Mar 2021 12:33)  omeprazole 40 mg oral delayed release capsule: 1 cap(s) orally once a day (22 Mar 2021 12:33)      Labs:   COVID-19 PCR . (21 @ 15:37)    COVID-19 PCR: NotDetec: You can help in the fight against COVID-19. [facility] may contact  you to see if you are interested in voluntarily participating in one of  our clinical trials.  Testing is performed using polymerase chain reaction (PCR) or  transcription mediated amplification (TMA). This COVID-19 (SARS-CoV-2)  nucleic acid amplification test was validated by [facility] and is  in use under the FDA Emergency Use Authorization (EUA) for clinical labs  CLIA-certified to perform high complexity testing. Test results should be  correlated with clinical presentation, patient history, and epidemiology.                            10.1   5.42  )-----------( 389      ( 22 Mar 2021 13:17 )             32.9           139  |  105  |  18  ----------------------------<  115<H>  4.3   |  23  |  0.83    Ca    9.0      22 Mar 2021 13:17    D&C done 2020 - endometrial sample wnl (report on chart)    UCG:  not required - pt had malina tubal ligation in     Blood Bank: Type + Screen  @ 13:30  A  --  Negative  Positive  --  --    Blood Available Until: second t&s sent stat - results pending    Assessment/Plan:   This is a 45 year old Female who presents with symptomatic uterine fibroids.  Patient presents to IR for UFE.  Stat INR done today - results pending  NPO since 10 pm 3/23  LMP 3/19/21  denies NSAIDs or a/c usage  Global care with medicine team when admitted      Pt A & O x 3. Procedure/ risks/ benefits/ goals/ alternatives were explained. All questions answered. Informed content obtained from patient. Consent placed in chart.     Samra Brown [facility]  ext [phone]  # [phone]

## 2021-03-24 NOTE — PROCEDURE NOTE - PLAN
-Access via right common femoral artery, bedrest with RLE straight for 4 hours  -Pain control with IV tylenol (patient has gastritis so can NOT receive NSAIDS) and PCA pump (pain consult placed)  -Zofran for nausea prn  -Advance diet as tolerated  -Patient to be admitted to inpatient Hospitalist service, case discussed with Dr. Guidry  -Patient to be seen in AM by IR prior to discharge

## 2021-03-24 NOTE — H&P ADULT - PROBLEM SELECTOR PLAN 1
-s/p bilateral uterine artery embolization  -IR to eval in AM prior to dc  -PCA and IV tylenol for pain control, wean as tolerated  -zofran PRN for nausea  -advance diet as tolerated -s/p bilateral uterine artery embolization  -IR to eval in AM prior to dc  -PCA and IV tylenol for pain control, wean as tolerated  -TOV once off bed rest and able to ambulate  -zofran PRN for nausea  -advance diet as tolerated

## 2021-03-24 NOTE — H&P ADULT - NSHPREVIEWOFSYSTEMS_GEN_ALL_CORE
Review of Systems:   CONSTITUTIONAL: No fever, weight loss  EYES: No eye pain, visual disturbances, or discharge  ENMT:  No difficulty hearing, tinnitus, vertigo; No sinus or throat pain  RESPIRATORY: No SOB. No cough, wheezing, chills or hemoptysis  CARDIOVASCULAR: No chest pain, palpitations, dizziness, or leg swelling  GASTROINTESTINAL: +abdominal pain. +nausea, no vomiting, or hematemesis; No diarrhea or constipation. No melena or hematochezia.  GENITOURINARY: No dysuria, frequency, hematuria, or incontinence  NEUROLOGICAL: No headaches, memory loss, loss of strength, numbness, or tremors  SKIN: No itching, burning, rashes, or lesions   LYMPH NODES: No enlarged glands  ENDOCRINE: No heat or cold intolerance; No hair loss  MUSCULOSKELETAL: No joint pain or swelling; No muscle, back pain  PSYCHIATRIC: No depression, anxiety, mood swings, or difficulty sleeping  HEME/LYMPH: No easy bruising, or bleeding gums

## 2021-03-24 NOTE — PROCEDURE NOTE - PROCEDURE FINDINGS AND DETAILS
Enlarged myomatous uterus with hypertrophied bilateral uterine arteries.   Status post bilateral uterine artery embolization.

## 2021-03-24 NOTE — H&P ADULT - PROBLEM SELECTOR PLAN 4
-hold lovenox for now in case of bleeding. Overall lower risk and anticipate dc in AM. Reassess pharm ppx should she remain admitted.

## 2021-03-24 NOTE — H&P ADULT - ASSESSMENT
45F w/ PMHx of menorrhagia 2/2 fibroids, DIANA requiring IV iron, GERD, unexplained LAD presents after planned bilateral uterine artery embolization, being admitted for pain control and monitoring post procedure.

## 2021-03-24 NOTE — ASU PATIENT PROFILE, ADULT - PMH
Carpal tunnel syndrome, unspecified laterality    Cyst of left ovary    Endometrial polyp    Iron deficiency anemia, unspecified iron deficiency anemia type  last iron infusion was 2 months ago  PUD (peptic ulcer disease)    Uterine leiomyoma, unspecified location

## 2021-03-24 NOTE — H&P ADULT - HISTORY OF PRESENT ILLNESS
45F w/ PMHx of menorrhagia 2/2 fibroids, DIANA requiring IV iron, GERD, unexplained LAD presents after planned bilateral uterine artery embolization. She underwent the procedure without complication and is now being admitted for pain control and monitoring post operatively. On my assessment she is reporting sharp, severe RLQ pain with some improvement w/ PCA. She reports nausea earlier which has now subsided. No fevers, chills. No bleeding from groin site or pain in RLE. No cp or sob.    In PACU, started on PCA per anesthesia for pain control. Also receiving IV tylenol.

## 2021-03-25 ENCOUNTER — TRANSCRIPTION ENCOUNTER (OUTPATIENT)
Age: 46
End: 2021-03-25

## 2021-03-25 VITALS
RESPIRATION RATE: 19 BRPM | DIASTOLIC BLOOD PRESSURE: 85 MMHG | OXYGEN SATURATION: 95 % | TEMPERATURE: 100 F | HEART RATE: 96 BPM | SYSTOLIC BLOOD PRESSURE: 132 MMHG

## 2021-03-25 LAB
ANION GAP SERPL CALC-SCNC: 9 MMOL/L — SIGNIFICANT CHANGE UP (ref 5–17)
APPEARANCE UR: SIGNIFICANT CHANGE UP
BILIRUB UR-MCNC: NEGATIVE — SIGNIFICANT CHANGE UP
BUN SERPL-MCNC: 14 MG/DL — SIGNIFICANT CHANGE UP (ref 7–23)
CALCIUM SERPL-MCNC: 8.7 MG/DL — SIGNIFICANT CHANGE UP (ref 8.4–10.5)
CHLORIDE SERPL-SCNC: 101 MMOL/L — SIGNIFICANT CHANGE UP (ref 96–108)
CO2 SERPL-SCNC: 25 MMOL/L — SIGNIFICANT CHANGE UP (ref 22–31)
COLOR SPEC: SIGNIFICANT CHANGE UP
CREAT SERPL-MCNC: 0.79 MG/DL — SIGNIFICANT CHANGE UP (ref 0.5–1.3)
DIFF PNL FLD: ABNORMAL
GLUCOSE SERPL-MCNC: 130 MG/DL — HIGH (ref 70–99)
GLUCOSE UR QL: ABNORMAL
HCT VFR BLD CALC: 26.8 % — LOW (ref 34.5–45)
HCT VFR BLD CALC: 27.3 % — LOW (ref 34.5–45)
HCT VFR BLD CALC: 28.3 % — LOW (ref 34.5–45)
HGB BLD-MCNC: 8.4 G/DL — LOW (ref 11.5–15.5)
HGB BLD-MCNC: 8.5 G/DL — LOW (ref 11.5–15.5)
HGB BLD-MCNC: 8.9 G/DL — LOW (ref 11.5–15.5)
KETONES UR-MCNC: SIGNIFICANT CHANGE UP
LEUKOCYTE ESTERASE UR-ACNC: NEGATIVE — SIGNIFICANT CHANGE UP
MCHC RBC-ENTMCNC: 26.4 PG — LOW (ref 27–34)
MCHC RBC-ENTMCNC: 26.4 PG — LOW (ref 27–34)
MCHC RBC-ENTMCNC: 26.6 PG — LOW (ref 27–34)
MCHC RBC-ENTMCNC: 31.1 GM/DL — LOW (ref 32–36)
MCHC RBC-ENTMCNC: 31.3 GM/DL — LOW (ref 32–36)
MCHC RBC-ENTMCNC: 31.4 GM/DL — LOW (ref 32–36)
MCV RBC AUTO: 84.3 FL — SIGNIFICANT CHANGE UP (ref 80–100)
MCV RBC AUTO: 84.5 FL — SIGNIFICANT CHANGE UP (ref 80–100)
MCV RBC AUTO: 84.8 FL — SIGNIFICANT CHANGE UP (ref 80–100)
NITRITE UR-MCNC: NEGATIVE — SIGNIFICANT CHANGE UP
NRBC # BLD: 0 /100 WBCS — SIGNIFICANT CHANGE UP (ref 0–0)
PH UR: 6 — SIGNIFICANT CHANGE UP (ref 5–8)
PLATELET # BLD AUTO: 374 K/UL — SIGNIFICANT CHANGE UP (ref 150–400)
PLATELET # BLD AUTO: 376 K/UL — SIGNIFICANT CHANGE UP (ref 150–400)
PLATELET # BLD AUTO: 384 K/UL — SIGNIFICANT CHANGE UP (ref 150–400)
POTASSIUM SERPL-MCNC: 3.9 MMOL/L — SIGNIFICANT CHANGE UP (ref 3.5–5.3)
POTASSIUM SERPL-SCNC: 3.9 MMOL/L — SIGNIFICANT CHANGE UP (ref 3.5–5.3)
PROT UR-MCNC: ABNORMAL
RBC # BLD: 3.18 M/UL — LOW (ref 3.8–5.2)
RBC # BLD: 3.22 M/UL — LOW (ref 3.8–5.2)
RBC # BLD: 3.35 M/UL — LOW (ref 3.8–5.2)
RBC # FLD: 24.6 % — HIGH (ref 10.3–14.5)
RBC # FLD: 24.7 % — HIGH (ref 10.3–14.5)
RBC # FLD: 25.2 % — HIGH (ref 10.3–14.5)
SODIUM SERPL-SCNC: 135 MMOL/L — SIGNIFICANT CHANGE UP (ref 135–145)
SP GR SPEC: 1.04 — HIGH (ref 1.01–1.02)
UROBILINOGEN FLD QL: NEGATIVE — SIGNIFICANT CHANGE UP
WBC # BLD: 10.52 K/UL — HIGH (ref 3.8–10.5)
WBC # BLD: 11.9 K/UL — HIGH (ref 3.8–10.5)
WBC # BLD: 9.49 K/UL — SIGNIFICANT CHANGE UP (ref 3.8–10.5)
WBC # FLD AUTO: 10.52 K/UL — HIGH (ref 3.8–10.5)
WBC # FLD AUTO: 11.9 K/UL — HIGH (ref 3.8–10.5)
WBC # FLD AUTO: 9.49 K/UL — SIGNIFICANT CHANGE UP (ref 3.8–10.5)

## 2021-03-25 PROCEDURE — 85027 COMPLETE CBC AUTOMATED: CPT

## 2021-03-25 PROCEDURE — C1760: CPT

## 2021-03-25 PROCEDURE — 99239 HOSP IP/OBS DSCHRG MGMT >30: CPT

## 2021-03-25 PROCEDURE — 76937 US GUIDE VASCULAR ACCESS: CPT

## 2021-03-25 PROCEDURE — 80048 BASIC METABOLIC PNL TOTAL CA: CPT

## 2021-03-25 PROCEDURE — C1894: CPT

## 2021-03-25 PROCEDURE — 85610 PROTHROMBIN TIME: CPT

## 2021-03-25 PROCEDURE — C1887: CPT

## 2021-03-25 PROCEDURE — 81001 URINALYSIS AUTO W/SCOPE: CPT

## 2021-03-25 PROCEDURE — C1889: CPT

## 2021-03-25 PROCEDURE — 37243 VASC EMBOLIZE/OCCLUDE ORGAN: CPT

## 2021-03-25 PROCEDURE — C1769: CPT

## 2021-03-25 PROCEDURE — 36247 INS CATH ABD/L-EXT ART 3RD: CPT | Mod: 59

## 2021-03-25 PROCEDURE — 84702 CHORIONIC GONADOTROPIN TEST: CPT

## 2021-03-25 RX ORDER — OXYCODONE AND ACETAMINOPHEN 5; 325 MG/1; MG/1
2 TABLET ORAL EVERY 6 HOURS
Refills: 0 | Status: DISCONTINUED | OUTPATIENT
Start: 2021-03-25 | End: 2021-03-25

## 2021-03-25 RX ORDER — ACETAMINOPHEN 500 MG
1000 TABLET ORAL ONCE
Refills: 0 | Status: COMPLETED | OUTPATIENT
Start: 2021-03-25 | End: 2021-03-25

## 2021-03-25 RX ORDER — HYDROMORPHONE HYDROCHLORIDE 2 MG/ML
2 INJECTION INTRAMUSCULAR; INTRAVENOUS; SUBCUTANEOUS EVERY 6 HOURS
Refills: 0 | Status: DISCONTINUED | OUTPATIENT
Start: 2021-03-25 | End: 2021-03-25

## 2021-03-25 RX ORDER — ACETAMINOPHEN 500 MG
2 TABLET ORAL
Qty: 0 | Refills: 0 | DISCHARGE
Start: 2021-03-25

## 2021-03-25 RX ORDER — ONDANSETRON 8 MG/1
1 TABLET, FILM COATED ORAL
Qty: 15 | Refills: 0
Start: 2021-03-25 | End: 2021-03-29

## 2021-03-25 RX ORDER — PANTOPRAZOLE SODIUM 20 MG/1
40 TABLET, DELAYED RELEASE ORAL
Refills: 0 | Status: DISCONTINUED | OUTPATIENT
Start: 2021-03-25 | End: 2021-03-25

## 2021-03-25 RX ORDER — ACETAMINOPHEN 500 MG
650 TABLET ORAL EVERY 6 HOURS
Refills: 0 | Status: DISCONTINUED | OUTPATIENT
Start: 2021-03-25 | End: 2021-03-25

## 2021-03-25 RX ORDER — ONDANSETRON 8 MG/1
8 TABLET, FILM COATED ORAL EVERY 8 HOURS
Refills: 0 | Status: DISCONTINUED | OUTPATIENT
Start: 2021-03-25 | End: 2021-03-25

## 2021-03-25 RX ORDER — SENNA PLUS 8.6 MG/1
2 TABLET ORAL AT BEDTIME
Refills: 0 | Status: DISCONTINUED | OUTPATIENT
Start: 2021-03-25 | End: 2021-03-25

## 2021-03-25 RX ORDER — KETOROLAC TROMETHAMINE 30 MG/ML
15 SYRINGE (ML) INJECTION ONCE
Refills: 0 | Status: DISCONTINUED | OUTPATIENT
Start: 2021-03-25 | End: 2021-03-25

## 2021-03-25 RX ORDER — ONDANSETRON 8 MG/1
8 TABLET, FILM COATED ORAL THREE TIMES A DAY
Refills: 0 | Status: DISCONTINUED | OUTPATIENT
Start: 2021-03-25 | End: 2021-03-25

## 2021-03-25 RX ORDER — SENNA PLUS 8.6 MG/1
2 TABLET ORAL
Qty: 0 | Refills: 0 | DISCHARGE
Start: 2021-03-25

## 2021-03-25 RX ORDER — POLYETHYLENE GLYCOL 3350 17 G/17G
17 POWDER, FOR SOLUTION ORAL DAILY
Refills: 0 | Status: DISCONTINUED | OUTPATIENT
Start: 2021-03-25 | End: 2021-03-25

## 2021-03-25 RX ORDER — IBUPROFEN 200 MG
600 TABLET ORAL EVERY 8 HOURS
Refills: 0 | Status: DISCONTINUED | OUTPATIENT
Start: 2021-03-25 | End: 2021-03-25

## 2021-03-25 RX ORDER — PANTOPRAZOLE SODIUM 20 MG/1
40 TABLET, DELAYED RELEASE ORAL ONCE
Refills: 0 | Status: COMPLETED | OUTPATIENT
Start: 2021-03-25 | End: 2021-03-25

## 2021-03-25 RX ORDER — DOCUSATE SODIUM 100 MG
1 CAPSULE ORAL
Qty: 30 | Refills: 0
Start: 2021-03-25 | End: 2021-04-03

## 2021-03-25 RX ORDER — POLYETHYLENE GLYCOL 3350 17 G/17G
17 POWDER, FOR SOLUTION ORAL
Qty: 0 | Refills: 0 | DISCHARGE
Start: 2021-03-25

## 2021-03-25 RX ORDER — HYDROMORPHONE HYDROCHLORIDE 2 MG/ML
1 INJECTION INTRAMUSCULAR; INTRAVENOUS; SUBCUTANEOUS
Qty: 12 | Refills: 0
Start: 2021-03-25 | End: 2021-03-27

## 2021-03-25 RX ADMIN — PANTOPRAZOLE SODIUM 40 MILLIGRAM(S): 20 TABLET, DELAYED RELEASE ORAL at 01:59

## 2021-03-25 RX ADMIN — Medication 1000 MILLIGRAM(S): at 08:15

## 2021-03-25 RX ADMIN — ONDANSETRON 4 MILLIGRAM(S): 8 TABLET, FILM COATED ORAL at 01:37

## 2021-03-25 RX ADMIN — Medication 15 MILLIGRAM(S): at 06:00

## 2021-03-25 RX ADMIN — Medication 1000 MILLIGRAM(S): at 02:30

## 2021-03-25 RX ADMIN — Medication 400 MILLIGRAM(S): at 07:56

## 2021-03-25 RX ADMIN — ONDANSETRON 4 MILLIGRAM(S): 8 TABLET, FILM COATED ORAL at 06:42

## 2021-03-25 RX ADMIN — POLYETHYLENE GLYCOL 3350 17 GRAM(S): 17 POWDER, FOR SOLUTION ORAL at 12:19

## 2021-03-25 RX ADMIN — HYDROMORPHONE HYDROCHLORIDE 0.25 MILLIGRAM(S): 2 INJECTION INTRAMUSCULAR; INTRAVENOUS; SUBCUTANEOUS at 11:50

## 2021-03-25 RX ADMIN — Medication 650 MILLIGRAM(S): at 15:32

## 2021-03-25 RX ADMIN — HYDROMORPHONE HYDROCHLORIDE 2 MILLIGRAM(S): 2 INJECTION INTRAMUSCULAR; INTRAVENOUS; SUBCUTANEOUS at 16:25

## 2021-03-25 RX ADMIN — Medication 15 MILLIGRAM(S): at 06:30

## 2021-03-25 RX ADMIN — Medication 400 MILLIGRAM(S): at 01:37

## 2021-03-25 RX ADMIN — HYDROMORPHONE HYDROCHLORIDE 2 MILLIGRAM(S): 2 INJECTION INTRAMUSCULAR; INTRAVENOUS; SUBCUTANEOUS at 16:45

## 2021-03-25 RX ADMIN — ONDANSETRON 8 MILLIGRAM(S): 8 TABLET, FILM COATED ORAL at 09:06

## 2021-03-25 RX ADMIN — HYDROMORPHONE HYDROCHLORIDE 0.25 MILLIGRAM(S): 2 INJECTION INTRAMUSCULAR; INTRAVENOUS; SUBCUTANEOUS at 11:32

## 2021-03-25 RX ADMIN — Medication 650 MILLIGRAM(S): at 16:32

## 2021-03-25 NOTE — DISCHARGE NOTE PROVIDER - NSDCACTIVITY_GEN_ALL_CORE
x 48 hours, then resume prior activity as tolerated.   Nothing per vagina x 6 weeks/Do not drive or operate machinery/Showering allowed/Do not make important decisions/No heavy lifting/straining

## 2021-03-25 NOTE — PROVIDER CONTACT NOTE (OTHER) - ACTION/TREATMENT ORDERED:
Will collect & hold urine for NP to see when pt voids. CBC ordered and performed. Will continue to monitor

## 2021-03-25 NOTE — PROGRESS NOTE ADULT - ATTENDING COMMENTS
Pain Management Attending Addendum    SUBJECTIVE: Patient doing well with IV PCA    Therapy:    [X] IV PCA         [ ] PRN Analgesics    OBJECTIVE:   [X] Pain appropriately controlled    [ ] Other:    Side Effects:  [X] None	             [ ] Nausea              [ ] Pruritis                	[ ] Other:    ASSESSMENT/PLAN: Continue current therapy    Comments:
disposition: dc once pain controlled, likely 3/26  discussed with adam Archer D.O.  Hospitalist Pager # [phone]

## 2021-03-25 NOTE — DISCHARGE NOTE PROVIDER - PROVIDER TOKENS
PROVIDER:[TOKEN:[[id number]],FOLLOWUP:[1 month]] PROVIDER:[TOKEN:[[id number]],FOLLOWUP:[1 month]],FREE:[LAST:[GYN],FIRST:[Clinic],PHONE:[(   )    -],FAX:[(   )    -],FOLLOWUP:[2 weeks]]

## 2021-03-25 NOTE — DISCHARGE NOTE PROVIDER - NSDCMRMEDTOKEN_GEN_ALL_CORE_FT
acetaminophen 325 mg oral tablet: 2 tab(s) orally every 6 hours, As needed, Mild Pain (1 - 3), Moderate Pain (4 - 6), Severe Pain (7 - 10)  omega 3: 1 milligram(s)  once a day  omeprazole 40 mg oral delayed release capsule: 1 cap(s) orally once a day  polyethylene glycol 3350 oral powder for reconstitution: 17 gram(s) orally once a day  senna oral tablet: 2 tab(s) orally once a day (at bedtime)   acetaminophen 325 mg oral tablet: 2 tab(s) orally every 6 hours, As needed, Mild Pain (1 - 3), Moderate Pain (4 - 6), Severe Pain (7 - 10)  Colace 100 mg oral capsule: 1 cap(s) orally 3 times a day   HYDROmorphone 2 mg oral tablet: 1 tab(s) orally every 6 hours, As needed, moderate to severe pain MDD:4  omega 3: 1 milligram(s)  once a day  omeprazole 40 mg oral delayed release capsule: 1 cap(s) orally once a day  ondansetron 8 mg oral tablet: 1 tab(s) orally every 8 hours, As needed, Nausea  polyethylene glycol 3350 oral powder for reconstitution: 17 gram(s) orally once a day  senna oral tablet: 2 tab(s) orally once a day (at bedtime)

## 2021-03-25 NOTE — PROVIDER CONTACT NOTE (OTHER) - ASSESSMENT
Pt urine bloody & clear. No clots or odor present. Pt states she came in for increased vaginal bleeding and previously had not blood in urine. Pt states she is at the end of her menstruation cycle. Pt continues to have some vaginal bleeding. Pt denies bladder pain

## 2021-03-25 NOTE — PROGRESS NOTE ADULT - SUBJECTIVE AND OBJECTIVE BOX
HPI:  45F w/ PMHx of menorrhagia 2/2 fibroids, DIANA requiring IV iron, GERD, unexplained LAD presents after planned bilateral uterine artery embolization. She underwent the procedure without complication and is now being admitted for pain control and monitoring post operatively.     POD # 1 from bilateral UAE. Patient seen and evaluated at bedside      Allergies: adhesives (Rash)  cephalexin (Anaphylaxis)  cephalosporins (Short breath; Swelling)  latex (Rash)  lidocaine (Vomiting; Other)  oxycodone (Other; Vomiting; Drowsiness)      PAST MEDICAL & SURGICAL HISTORY:  Carpal tunnel syndrome, unspecified laterality    Cyst of left ovary    Iron deficiency anemia, unspecified iron deficiency anemia type  last iron infusion was 2 months ago    Endometrial polyp    Uterine leiomyoma, unspecified location    PUD (peptic ulcer disease)    History of D&C  Polypectomy 2019    H/O abdominoplasty    History of elbow surgery  right; 2018    S/P dilation and curettage    H/O ovarian cystectomy  laparotomy    History of bilateral tubal ligation    S/P   x2      Pertinent labs:                      8.5    10.52 )-----------( 384      ( 25 Mar 2021 07:22 )             27.3   03-    135  |  101  |  14  ----------------------------<  130<H>  3.9   |  25  |  0.79    Ca    8.7      25 Mar 2021 07:19    PT/INR - ( 24 Mar 2021 10:06 )   PT: 11.3 sec;   INR: 0.94 ratio        A/P:        HPI:  45F w/ PMHx of menorrhagia 2/2 fibroids, DIANA requiring IV iron, GERD, unexplained LAD presents after planned bilateral uterine artery embolization. She underwent the procedure without complication and is now being admitted for pain control and monitoring post operatively.     POD # 1 from bilateral UAE. Patient seen and evaluated at bedside, patient stated she had moderate abdominal cramping and pelvic pain with intermittent vaginal spotting, patient states her pain her improved from overnight and immediate post-procedure state.     The patient was seen and evaluated at bedside, limited physical exam was performed:     Neuro: A & O x3  Abdomen: soft, pelvic pain 6-7/10 on deep palpation  LE: Right groin dressing clean/ dry/ intact.  No groin hematoma. 2+ bilateral DP pulses.       Allergies: adhesives (Rash)  cephalexin (Anaphylaxis)  cephalosporins (Short breath; Swelling)  latex (Rash)  lidocaine (Vomiting; Other)  oxycodone (Other; Vomiting; Drowsiness)      PAST MEDICAL & SURGICAL HISTORY:  Carpal tunnel syndrome, unspecified laterality    Cyst of left ovary    Iron deficiency anemia, unspecified iron deficiency anemia type  last iron infusion was 2 months ago    Endometrial polyp    Uterine leiomyoma, unspecified location    PUD (peptic ulcer disease)    History of D&C  Polypectomy 2019    H/O abdominoplasty    History of elbow surgery  right; 2018    S/P dilation and curettage    H/O ovarian cystectomy  laparotomy    History of bilateral tubal ligation    S/P   x2      Pertinent labs:                      8.5    10.52 )-----------( 384      ( 25 Mar 2021 07:22 )             27.3   03-25    135  |  101  |  14  ----------------------------<  130<H>  3.9   |  25  |  0.79    Ca    8.7      25 Mar 2021 07:19    PT/INR - ( 24 Mar 2021 10:06 )   PT: 11.3 sec;   INR: 0.94 ratio        A/P:   45F w/ PMHx of menorrhagia 2/2 fibroids, DIANA requiring IV iron, GERD, unexplained LAD presents after planned bilateral uterine artery embolization. She underwent the procedure without complication and is now being admitted for pain control and monitoring post operatively.     POD # 1 from bilateral UAE. Patient seen and evaluated at bedside.   -PCA pump was discontinued at 7am today  -Continue with IV tylenol prn with transition to PO tylenol after the last dose of IV tylenol this AM at 8am  -Ordered IV Zofran prn for nausea/ vomiting  -Patient has gastritis with bleeding, do NOT give NSAIDS/ toradol  -IR to see patient at noon today after repeat CBC before clearing pt for discharge later today  -Case discussed with Dr. Mccarty

## 2021-03-25 NOTE — DISCHARGE NOTE PROVIDER - CARE PROVIDER_API CALL
Perez Mccarty)  Interventional Radiology and Diagnostic Radiology  [phone] [place] [address]  Phone: [phone]  Fax: [phone]  Follow Up Time: 1 month   Perez Mccarty)  Interventional Radiology and Diagnostic Radiology  [phone] [place] [address]  Phone: [phone]  Fax: [phone]  Follow Up Time: 1 month    GYN, Clinic  Phone: (   )    -  Fax: (   )    -  Follow Up Time: 2 weeks

## 2021-03-25 NOTE — DISCHARGE NOTE NURSING/CASE MANAGEMENT/SOCIAL WORK - PATIENT PORTAL LINK FT
You can access the FollowMyHealth Patient Portal offered by [facility] by registering at the following website: [email or web address]. By joining [employer]’s FollowMyHealth portal, you will also be able to view your health information using other applications (apps) compatible with our system.

## 2021-03-25 NOTE — PROGRESS NOTE ADULT - SUBJECTIVE AND OBJECTIVE BOX
Day 1 of Anesthesia Pain Management Service    SUBJECTIVE: Lots of cramping  Pain Scale Score:	[X] Refer to charted pain scores    THERAPY:    [ ] IV PCA Morphine		        [ ] 5 mg/mL	[ ] 1 mg/mL  [X] IV PCA Hydromorphone	[ ] 5 mg/mL	[X] 1 mg/mL  [ ] IV PCA Fentanyl		        [ ] 50 micrograms/mL    Demand dose: 0.2 mg     Lockout: 6 minutes   Continuous Rate: 0 mg/hr  4 Hour Limit: 4 mg    MEDICATIONS  (STANDING):  lactated ringers. 1000 milliLiter(s) (75 mL/Hr) IV Continuous <Continuous>  pantoprazole    Tablet 40 milliGRAM(s) Oral before breakfast    MEDICATIONS  (PRN):  acetaminophen   Tablet .. 650 milliGRAM(s) Oral every 6 hours PRN Mild Pain (1 - 3), Moderate Pain (4 - 6), Severe Pain (7 - 10)  acetaminophen  IVPB .. 1000 milliGRAM(s) IV Intermittent every 6 hours PRN Mild Pain (1 - 3), Moderate Pain (4 - 6), Severe Pain (7 - 10)  HYDROmorphone  Injectable 0.25 milliGRAM(s) IV Push every 10 minutes PRN Moderate Pain (4 - 6)  naloxone Injectable 0.1 milliGRAM(s) IV Push every 3 minutes PRN For ANY of the following changes in patient status:  A. RR LESS THAN 10 breaths per minute, B. Oxygen saturation LESS THAN 90%, C. Sedation score of 6  ondansetron    Tablet 8 milliGRAM(s) Oral every 8 hours PRN Nausea  ondansetron Injectable 8 milliGRAM(s) IV Push three times a day PRN Nausea and/or Vomiting      OBJECTIVE:    Sedation Score:	[ X] Alert	 [ ] Drowsy 	[ ] Arousable	[ ] Asleep	[ ] Unresponsive    Side Effects:	[X ] None	[ ] Nausea	[ ] Vomiting	[ ] Pruritus  		[ ] Other:    Vital Signs Last 24 Hrs  T(C): 37.1 (25 Mar 2021 05:34), Max: 37.6 (25 Mar 2021 01:11)  T(F): 98.7 (25 Mar 2021 05:34), Max: 99.7 (25 Mar 2021 01:11)  HR: 108 (25 Mar 2021 05:34) (68 - 116)  BP: 125/80 (25 Mar 2021 05:34) (117/80 - 143/90)  BP(mean): --  RR: 18 (25 Mar 2021 05:34) (14 - 18)  SpO2: 95% (25 Mar 2021 05:34) (94% - 100%)    ASSESSMENT/ PLAN    Therapy to  be:               [  ] Continued   [X ] Discontinued   [ X] Changed to PRN Analgesics    Documentation and Verification of current medications:   [X] Done	[ ] Not done, not eligible    Comments: Endorsed lots of cramping overnight. Some relief with acetaminophen PCA D\C'd by primary service this am

## 2021-03-25 NOTE — PROGRESS NOTE ADULT - REASON FOR ADMISSION
uterine fibroid embolization

## 2021-03-25 NOTE — PROVIDER CONTACT NOTE (OTHER) - ASSESSMENT
Pt Hr elevated, low grade fever, & BP elevated. Pt states she is nauseous and had an episode of emesis. Pt complains of mild pain. Pt denies SOB, dizziness, chills, or body aches

## 2021-03-25 NOTE — CHART NOTE - NSCHARTNOTEFT_GEN_A_CORE
MEDICINE NP    BRENDA IGLESIAS  45y Female    Patient is a 45y old  Female who presents with a chief complaint of uterine fibroid embolization (24 Mar 2021 14:16)       > Event Summary:  Hematuria   Notified by RN, concern Patient with low grade temp T-99.7 and elevated /90.  Also concern for blood in urine without clots.    Patient seen at bedside, AAOX4, grimacing and appears uncomfortable, reports recurrent intermittent 8/10  lower abdominal pain and achieving only temporary relief after using PCA pump.   States nausea recurrent after PCA pump use.   Denies chest pain, palpitations, sob, vomiting, feverish and chills.  Right groin access site s/p embolization with dressing clean/dry/intact without edema or redness.        -Vital Signs Last 24 Hrs  T(C): 37.3 (25 Mar 2021 01:31), Max: 37.6 (25 Mar 2021 01:11)  T(F): 99.2 (25 Mar 2021 01:31), Max: 99.7 (25 Mar 2021 01:11)  HR: 116 (25 Mar 2021 01:11) (68 - 116)  BP: 143/90 (25 Mar 2021 01:11) (117/80 - 143/90)  RR: 18 (25 Mar 2021 01:11) (14 - 18)  SpO2: 94% (25 Mar 2021 01:11) (94% - 100%)    >LABS                        8.9    9.49  )-----------( 376      ( 25 Mar 2021 01:31 )             28.3     > Assessment & Plan:  Patient is a 45F w/ PMHx of Menorrhagia 2/2 Fibroids, DIANA requiring IV iron, GERD, unexplained LAD.  Presents after planned bilateral uterine artery embolization on PCA pump, being admitted for pain control and monitoring post procedure.  Now with concern of hematuria     1. Hematuria - Likely 2/2 Menorrhagia  -F/u UA  -STAT CBC Hgb 8.9 stable.  continue to trend q6hs  -F/u     2. S/P Bilateral uterine artery embolization  -Patient with nausea likely 2/2 PCA pump - Will c/w Antiemetic prn and monitor    -IV Tylenol for breakthrough pain now  -F/u IR in AM       3. Patient without fever, Temp-99.7/99.2.  Will c/w monitoring         Heather Ortega, [facility]  Medicine Department  #[id number] MEDICINE NP    BRENDA IGLESIAS  45y Female    Patient is a 45y old  Female who presents with a chief complaint of uterine fibroid embolization (24 Mar 2021 14:16)       > Event Summary:  Hematuria   Notified by RN, concern Patient with low grade temp T-99.7 and elevated /90.  Also concern for blood in urine without clots.    Patient seen at bedside, AAOX4, grimacing and appears uncomfortable, reports recurrent intermittent 8/10  lower abdominal pain and achieving only temporary relief after using PCA pump.   States nausea recurrent after PCA pump use.   Reports bleeding likely from menses started 4 days ago with normal 6 days cycles   Denies chest pain, palpitations, sob, vomiting, feverish and chills.  Right groin access site s/p embolization with dressing clean/dry/intact without edema or redness.        -Vital Signs Last 24 Hrs  T(C): 37.3 (25 Mar 2021 01:31), Max: 37.6 (25 Mar 2021 01:11)  T(F): 99.2 (25 Mar 2021 01:31), Max: 99.7 (25 Mar 2021 01:11)  HR: 116 (25 Mar 2021 01:11) (68 - 116)  BP: 143/90 (25 Mar 2021 01:11) (117/80 - 143/90)  RR: 18 (25 Mar 2021 01:11) (14 - 18)  SpO2: 94% (25 Mar 2021 01:11) (94% - 100%)    >LABS                        8.9    9.49  )-----------( 376      ( 25 Mar 2021 01:31 )             28.3     > Assessment & Plan:  Patient is a 45F w/ PMHx of Menorrhagia 2/2 Fibroids, DIANA requiring IV iron, GERD, unexplained LAD.  Presents after planned bilateral uterine artery embolization on PCA pump, being admitted for pain control and monitoring post procedure.  Now with concern of hematuria     1. Hematuria - Likely 2/2 Menorrhagia  -F/u UA  -STAT CBC Hgb 8.9 stable.  continue to trend q6hs  -F/u with gyn    2. S/P Bilateral uterine artery embolization  -Patient with nausea likely 2/2 PCA pump - Will c/w Antiemetic prn and monitor    -IV Tylenol for breakthrough pain now  -F/u IR in AM       3. Patient without fever, Temp-99.7/99.2.  Will c/w monitoring         Heather Ortega, [facility]  Medicine Department  #[id number]

## 2021-03-25 NOTE — PROGRESS NOTE ADULT - SUBJECTIVE AND OBJECTIVE BOX
Patient seen post IV dilaudid. She appears comfortable but she states she was in a lot of pain prior iv meds.     GENERAL: No fevers, no chills.   EYES: No blurry vision,  No photophobia  ENT: No sore throat.  No dysphagia  Cardiovascular: No chest pain, palpitations, orthopnea  Pulmonary: No cough, no wheezing. No shortness of breath  Gastrointestinal: ++ abdominal pain, no diarrhea, no constipation.    Musculoskeletal: No weakness.  No myalgias.  Dermatology:  No rashes.  Neuro: No Headache.  No vertigo.  No dizziness.  Psych: No anxiety, no depression.  Denies suicidal thoughts.    MEDICATIONS  (STANDING):  lactated ringers. 1000 milliLiter(s) (75 mL/Hr) IV Continuous <Continuous>  pantoprazole    Tablet 40 milliGRAM(s) Oral before breakfast  polyethylene glycol 3350 17 Gram(s) Oral daily  senna 2 Tablet(s) Oral at bedtime    MEDICATIONS  (PRN):  acetaminophen   Tablet .. 650 milliGRAM(s) Oral every 6 hours PRN Mild Pain (1 - 3), Moderate Pain (4 - 6), Severe Pain (7 - 10)  HYDROmorphone   Tablet 2 milliGRAM(s) Oral every 6 hours PRN moderate to severe pain  naloxone Injectable 0.1 milliGRAM(s) IV Push every 3 minutes PRN For ANY of the following changes in patient status:  A. RR LESS THAN 10 breaths per minute, B. Oxygen saturation LESS THAN 90%, C. Sedation score of 6  ondansetron    Tablet 8 milliGRAM(s) Oral every 8 hours PRN Nausea    Vital Signs Last 24 Hrs  T(C): 37.3 (25 Mar 2021 09:59), Max: 37.6 (25 Mar 2021 01:11)  T(F): 99.1 (25 Mar 2021 09:59), Max: 99.7 (25 Mar 2021 01:11)  HR: 98 (25 Mar 2021 09:59) (72 - 116)  BP: 141/86 (25 Mar 2021 09:59) (117/80 - 143/90)  BP(mean): --  RR: 20 (25 Mar 2021 09:59) (16 - 20)  SpO2: 94% (25 Mar 2021 09:59) (94% - 97%)    GENERAL: in acute distress from pain  HEAD:  Atraumatic, Normocephalic  EYES: EOMI, PERRLA, conjunctiva and sclera clear  ENT: Pharynx not erythematous  PULMONARY: Clear to auscultation bilaterally; No wheeze  CARDIOVASCULAR: Regular rate and rhythm; No murmurs, rubs, or gallops  ABDOMEN: Soft, mild tenderness to palpation, Nondistended; Bowel sounds present  EXTREMITIES:  2+ Peripheral Pulses, No clubbing, cyanosis, or edema  MUSCULOSKELETAL: No calf tenderness  PSYCH: AAOx3, normal affect  SKIN: warm and dry, No rashes or lesions    .  LABS:                         8.4    11.90 )-----------( 374      ( 25 Mar 2021 12:57 )             26.8     03-    135  |  101  |  14  ----------------------------<  130<H>  3.9   |  25  |  0.79    Ca    8.7      25 Mar 2021 07:19      PT/INR - ( 24 Mar 2021 10:06 )   PT: 11.3 sec;   INR: 0.94 ratio           Urinalysis Basic - ( 25 Mar 2021 07:07 )    Color: red / Appearance: turbid / S.042 / pH: x  Gluc: x / Ketone: Trace  / Bili: Negative / Urobili: Negative   Blood: x / Protein: 100 mg/dL / Nitrite: Negative   Leuk Esterase: Negative / RBC: >50 /hpf / WBC see note /HPF   Sq Epi: x / Non Sq Epi: Few / Bacteria: Negative            RADIOLOGY, EKG & ADDITIONAL TESTS: Reviewed.

## 2021-03-25 NOTE — DISCHARGE NOTE PROVIDER - CARE PROVIDERS DIRECT ADDRESSES
[email or web address]

## 2021-03-25 NOTE — DISCHARGE NOTE PROVIDER - HOSPITAL COURSE
45F w/ PMHx of menorrhagia 2/2 fibroids, DIANA requiring IV iron, GERD, unexplained LAD presents after planned bilateral uterine artery embolization, being admitted for pain control and monitoring post procedure.  Transitioned from PCA dilaudid to PO dilaudid and comfortable.  Hbg stable at 8.4.  Medically cleared for d/c with follow up to Gyn in 2 weeks.  Follow up with IR in 2 months.

## 2021-03-25 NOTE — PROGRESS NOTE ADULT - SUBJECTIVE AND OBJECTIVE BOX
Interventional Radiology Follow- Up Note      45y Female s/p uterine fibroid embolization on 3/24/21 in Interventional Radiology with Dr Mccarty.     Patient seen and examined @ bedside. Pt c/o severe pain overnight and through the morning.  Unable to receive NSAIDS (hx of gastritis with bleeding last year) and pt c/o "head pounding" with percocet.  Received Dilaudid IV recently and reporting pain was a 10, now currently a 4/5.  No complaints of side effects after Dilaudid.  OOB ambulating to the BR, voiding without difficulty, tolerating solid food.  Denies n/v.    Vitals: T(F): 99.1 (03-25-21 @ 09:59), Max: 99.7 (03-25-21 @ 01:11)  HR: 98 (03-25-21 @ 09:59) (72 - 116)  BP: 141/86 (03-25-21 @ 09:59) (117/80 - 143/90)  RR: 20 (03-25-21 @ 09:59) (14 - 20)  SpO2: 94% (03-25-21 @ 09:59) (94% - 100%)  Wt(kg): --    LABS:                        8.5    10.52 )-----------( 384      ( 25 Mar 2021 07:22 )             27.3     03-25    135  |  101  |  14  ----------------------------<  130<H>  3.9   |  25  |  0.79    Ca    8.7      25 Mar 2021 07:19      PT/INR - ( 24 Mar 2021 10:06 )   PT: 11.3 sec;   INR: 0.94 ratio           I&O's Detail    24 Mar 2021 07:01  -  25 Mar 2021 07:00  --------------------------------------------------------  IN:    IV PiggyBack: 100 mL    Lactated Ringers: 150 mL    Lactated Ringers: 510 mL  Total IN: 760 mL    OUT:    Indwelling Catheter - Urethral (mL): 350 mL    Voided (mL): 850 mL  Total OUT: 1200 mL    Total NET: -440 mL      25 Mar 2021 07:01  -  25 Mar 2021 12:01  --------------------------------------------------------  IN:    Oral Fluid: 200 mL  Total IN: 200 mL    OUT:    Voided (mL): 100 mL  Total OUT: 100 mL    Total NET: 100 mL    PHYSICAL EXAM:  General: Nontoxic, in NAD  Neuro:  Alert & oriented x 3  Lung: , respirations nonlabored, good inspiratory effort  Abdomen: soft, mild tenderness to palpation  Extremities: no pedal edema or calf tenderness noted, right groin +2 femoral and Dp pulse, no hematoma/ecchymosis, +movement, +sensation, warm to touch        Impression: 45F w/ PMHx of menorrhagia 2/2 fibroids, DIANA requiring IV iron, GERD, s/p planned bilateral uterine artery embolization.        Plan:  1.  Uterine fibroids  - s/p UAE on 3/24, now with persistent severe pain post procedure, likely from inability to take NSAIDS, refusal of previous dose opioids.  Pt reports lower abdominal/upper thigh pain similar to cramps she has during her menses.   - tolerated IV Dilaudid prn dose and tolerating solid food, will change to Dilaudid PO prn  - continue Tylenol PO prn  - zofran prn  - bowel regimen (last BM 2 days ago, this may also contribute to pain)  - above plan d/w patient.  If pain well controlled later today on oral regimen, will consider d/c home  -trend vitals, labs  - will f/u with patient later in day  - above d/w Dr. Mccarty    2.  Gastritis  - pt reports hx of EGD last year, with reported "gastritis and bleeding"  - avoid NSAIDs  - continue PPI       Please call IR at extension [phone] with any questions, concerns, or issues regarding above.

## 2021-03-25 NOTE — PROGRESS NOTE ADULT - PROBLEM SELECTOR PLAN 1
-s/p bilateral uterine artery embolization  - IR to help manage pain   -zofran PRN for nausea  -regular diet

## 2021-03-26 ENCOUNTER — APPOINTMENT (OUTPATIENT)
Dept: INTERVENTIONAL RADIOLOGY/VASCULAR | Facility: CLINIC | Age: 46
End: 2021-03-26

## 2021-03-26 ENCOUNTER — NON-APPOINTMENT (OUTPATIENT)
Age: 46
End: 2021-03-26

## 2021-03-26 VITALS
TEMPERATURE: 100.2 F | DIASTOLIC BLOOD PRESSURE: 90 MMHG | RESPIRATION RATE: 18 BRPM | HEART RATE: 102 BPM | OXYGEN SATURATION: 100 % | SYSTOLIC BLOOD PRESSURE: 129 MMHG

## 2021-03-26 DIAGNOSIS — K59.00 CONSTIPATION, UNSPECIFIED: ICD-10-CM

## 2021-03-26 NOTE — DISCUSSION/SUMMARY
[Home] : patient was discharged to home [Med Rec Performed] : med rec performed [Follow Up Appt with Provider within 7 days] : follow up appt with provider within 7 days [FreeTextEntry1] : Spoke to patient, states she is feeling better after medication changed today.  Patient encourage to increase fluid in her diet, comply with discharge planning and f/u care .  Verbalized understanding.  mary

## 2021-03-27 ENCOUNTER — NON-APPOINTMENT (OUTPATIENT)
Age: 46
End: 2021-03-27

## 2021-03-29 ENCOUNTER — NON-APPOINTMENT (OUTPATIENT)
Age: 46
End: 2021-03-29

## 2021-04-02 ENCOUNTER — NON-APPOINTMENT (OUTPATIENT)
Age: 46
End: 2021-04-02

## 2021-04-06 ENCOUNTER — NON-APPOINTMENT (OUTPATIENT)
Age: 46
End: 2021-04-06

## 2021-04-07 ENCOUNTER — RESULT REVIEW (OUTPATIENT)
Age: 46
End: 2021-04-07

## 2021-04-07 ENCOUNTER — APPOINTMENT (OUTPATIENT)
Dept: OBGYN | Facility: HOSPITAL | Age: 46
End: 2021-04-07
Payer: COMMERCIAL

## 2021-04-07 ENCOUNTER — OUTPATIENT (OUTPATIENT)
Dept: OUTPATIENT SERVICES | Facility: HOSPITAL | Age: 46
LOS: 1 days | End: 2021-04-07

## 2021-04-07 VITALS
SYSTOLIC BLOOD PRESSURE: 124 MMHG | HEART RATE: 107 BPM | WEIGHT: 185 LBS | BODY MASS INDEX: 31.58 KG/M2 | HEIGHT: 64 IN | TEMPERATURE: 98.4 F | DIASTOLIC BLOOD PRESSURE: 76 MMHG

## 2021-04-07 DIAGNOSIS — Z98.51 TUBAL LIGATION STATUS: Chronic | ICD-10-CM

## 2021-04-07 DIAGNOSIS — Z98.890 OTHER SPECIFIED POSTPROCEDURAL STATES: Chronic | ICD-10-CM

## 2021-04-07 DIAGNOSIS — Z98.891 HISTORY OF UTERINE SCAR FROM PREVIOUS SURGERY: Chronic | ICD-10-CM

## 2021-04-07 LAB
APPEARANCE UR: ABNORMAL
BACTERIA # UR AUTO: ABNORMAL
BASOPHILS # BLD AUTO: 0.03 K/UL — SIGNIFICANT CHANGE UP (ref 0–0.2)
BASOPHILS NFR BLD AUTO: 0.5 % — SIGNIFICANT CHANGE UP (ref 0–2)
BILIRUB UR-MCNC: NEGATIVE — SIGNIFICANT CHANGE UP
COLOR SPEC: YELLOW — SIGNIFICANT CHANGE UP
DIFF PNL FLD: NEGATIVE — SIGNIFICANT CHANGE UP
EOSINOPHIL # BLD AUTO: 0.09 K/UL — SIGNIFICANT CHANGE UP (ref 0–0.5)
EOSINOPHIL NFR BLD AUTO: 1.6 % — SIGNIFICANT CHANGE UP (ref 0–6)
EPI CELLS # UR: 12 /HPF — HIGH (ref 0–5)
FERRITIN SERPL-MCNC: 18 NG/ML — SIGNIFICANT CHANGE UP (ref 15–150)
GLUCOSE UR QL: NEGATIVE — SIGNIFICANT CHANGE UP
HCT VFR BLD CALC: 29.4 % — LOW (ref 34.5–45)
HGB BLD-MCNC: 8.9 G/DL — LOW (ref 11.5–15.5)
HYALINE CASTS # UR AUTO: 1 /LPF — SIGNIFICANT CHANGE UP (ref 0–7)
IANC: 3.89 K/UL — SIGNIFICANT CHANGE UP (ref 1.5–8.5)
IMM GRANULOCYTES NFR BLD AUTO: 0.4 % — SIGNIFICANT CHANGE UP (ref 0–1.5)
IRON SATN MFR SERPL: 15 UG/DL — LOW (ref 30–160)
IRON SATN MFR SERPL: 4 % — LOW (ref 14–50)
KETONES UR-MCNC: NEGATIVE — SIGNIFICANT CHANGE UP
LEUKOCYTE ESTERASE UR-ACNC: ABNORMAL
LYMPHOCYTES # BLD AUTO: 1.33 K/UL — SIGNIFICANT CHANGE UP (ref 1–3.3)
LYMPHOCYTES # BLD AUTO: 23.3 % — SIGNIFICANT CHANGE UP (ref 13–44)
MCHC RBC-ENTMCNC: 25.4 PG — LOW (ref 27–34)
MCHC RBC-ENTMCNC: 30.3 GM/DL — LOW (ref 32–36)
MCV RBC AUTO: 83.8 FL — SIGNIFICANT CHANGE UP (ref 80–100)
MONOCYTES # BLD AUTO: 0.35 K/UL — SIGNIFICANT CHANGE UP (ref 0–0.9)
MONOCYTES NFR BLD AUTO: 6.1 % — SIGNIFICANT CHANGE UP (ref 2–14)
NEUTROPHILS # BLD AUTO: 3.89 K/UL — SIGNIFICANT CHANGE UP (ref 1.8–7.4)
NEUTROPHILS NFR BLD AUTO: 68.1 % — SIGNIFICANT CHANGE UP (ref 43–77)
NITRITE UR-MCNC: NEGATIVE — SIGNIFICANT CHANGE UP
NRBC # BLD: 0 /100 WBCS — SIGNIFICANT CHANGE UP
NRBC # FLD: 0 K/UL — SIGNIFICANT CHANGE UP
PH UR: 5.5 — SIGNIFICANT CHANGE UP (ref 5–8)
PLATELET # BLD AUTO: 577 K/UL — HIGH (ref 150–400)
PROT UR-MCNC: ABNORMAL
RBC # BLD: 3.51 M/UL — LOW (ref 3.8–5.2)
RBC # FLD: 23 % — HIGH (ref 10.3–14.5)
RBC CASTS # UR COMP ASSIST: 2 /HPF — SIGNIFICANT CHANGE UP (ref 0–4)
SP GR SPEC: 1.03 — HIGH (ref 1.01–1.02)
TIBC SERPL-MCNC: 385 UG/DL — SIGNIFICANT CHANGE UP (ref 220–430)
UIBC SERPL-MCNC: 370 UG/DL — SIGNIFICANT CHANGE UP (ref 110–370)
UROBILINOGEN FLD QL: SIGNIFICANT CHANGE UP
WBC # BLD: 5.71 K/UL — SIGNIFICANT CHANGE UP (ref 3.8–10.5)
WBC # FLD AUTO: 5.71 K/UL — SIGNIFICANT CHANGE UP (ref 3.8–10.5)
WBC UR QL: 18 /HPF — HIGH (ref 0–5)

## 2021-04-07 PROCEDURE — 99213 OFFICE O/P EST LOW 20 MIN: CPT | Mod: GC

## 2021-04-08 DIAGNOSIS — D64.9 ANEMIA, UNSPECIFIED: ICD-10-CM

## 2021-04-08 DIAGNOSIS — D25.1 INTRAMURAL LEIOMYOMA OF UTERUS: ICD-10-CM

## 2021-04-08 LAB
CULTURE RESULTS: SIGNIFICANT CHANGE UP
SPECIMEN SOURCE: SIGNIFICANT CHANGE UP

## 2021-04-14 NOTE — REVIEW OF SYSTEMS
[Abn Vaginal bleeding] : abnormal vaginal bleeding [Negative] : Heme/Lymph [de-identified] : Fatigue

## 2021-04-14 NOTE — END OF VISIT
[] : Resident [FreeTextEntry3] : 43 yo P2 s/p UFE 3/25/21.\par Pt referred to IR for f/u. [Time Spent: ___ minutes] : I have spent [unfilled] minutes of time on the encounter.

## 2021-04-14 NOTE — PHYSICAL EXAM
[Appropriately responsive] : appropriately responsive [Alert] : alert [No Acute Distress] : no acute distress [Soft] : soft [Non-tender] : non-tender [Non-distended] : non-distended [No Lesions] : no lesions [FreeTextEntry7] : Fibroid uterus

## 2021-04-16 ENCOUNTER — OUTPATIENT (OUTPATIENT)
Dept: OUTPATIENT SERVICES | Facility: HOSPITAL | Age: 46
LOS: 1 days | Discharge: HOME | End: 2021-04-16

## 2021-04-16 DIAGNOSIS — Z98.890 OTHER SPECIFIED POSTPROCEDURAL STATES: Chronic | ICD-10-CM

## 2021-04-16 DIAGNOSIS — Z98.51 TUBAL LIGATION STATUS: Chronic | ICD-10-CM

## 2021-04-16 DIAGNOSIS — D50.0 IRON DEFICIENCY ANEMIA SECONDARY TO BLOOD LOSS (CHRONIC): ICD-10-CM

## 2021-04-16 DIAGNOSIS — Z98.891 HISTORY OF UTERINE SCAR FROM PREVIOUS SURGERY: Chronic | ICD-10-CM

## 2021-04-20 ENCOUNTER — RESULT REVIEW (OUTPATIENT)
Age: 46
End: 2021-04-20

## 2021-04-20 ENCOUNTER — APPOINTMENT (OUTPATIENT)
Dept: HEMATOLOGY ONCOLOGY | Facility: CLINIC | Age: 46
End: 2021-04-20
Payer: COMMERCIAL

## 2021-04-20 VITALS
DIASTOLIC BLOOD PRESSURE: 75 MMHG | SYSTOLIC BLOOD PRESSURE: 112 MMHG | TEMPERATURE: 97.8 F | WEIGHT: 185.63 LBS | OXYGEN SATURATION: 99 % | HEIGHT: 63.98 IN | RESPIRATION RATE: 18 BRPM | HEART RATE: 90 BPM | BODY MASS INDEX: 31.69 KG/M2

## 2021-04-20 LAB
BASOPHILS # BLD AUTO: 0.04 K/UL — SIGNIFICANT CHANGE UP (ref 0–0.2)
BASOPHILS NFR BLD AUTO: 1 % — SIGNIFICANT CHANGE UP (ref 0–2)
EOSINOPHIL # BLD AUTO: 0.05 K/UL — SIGNIFICANT CHANGE UP (ref 0–0.5)
EOSINOPHIL NFR BLD AUTO: 1.3 % — SIGNIFICANT CHANGE UP (ref 0–6)
HCT VFR BLD CALC: 30.5 % — LOW (ref 34.5–45)
HGB BLD-MCNC: 9.4 G/DL — LOW (ref 11.5–15.5)
IMM GRANULOCYTES NFR BLD AUTO: 0 % — SIGNIFICANT CHANGE UP (ref 0–1.5)
LYMPHOCYTES # BLD AUTO: 1.24 K/UL — SIGNIFICANT CHANGE UP (ref 1–3.3)
LYMPHOCYTES # BLD AUTO: 32 % — SIGNIFICANT CHANGE UP (ref 13–44)
MCHC RBC-ENTMCNC: 24.7 PG — LOW (ref 27–34)
MCHC RBC-ENTMCNC: 30.8 G/DL — LOW (ref 32–36)
MCV RBC AUTO: 80.3 FL — SIGNIFICANT CHANGE UP (ref 80–100)
MONOCYTES # BLD AUTO: 0.34 K/UL — SIGNIFICANT CHANGE UP (ref 0–0.9)
MONOCYTES NFR BLD AUTO: 8.8 % — SIGNIFICANT CHANGE UP (ref 2–14)
NEUTROPHILS # BLD AUTO: 2.21 K/UL — SIGNIFICANT CHANGE UP (ref 1.8–7.4)
NEUTROPHILS NFR BLD AUTO: 56.9 % — SIGNIFICANT CHANGE UP (ref 43–77)
NRBC # BLD: 0 /100 WBCS — SIGNIFICANT CHANGE UP (ref 0–0)
PLATELET # BLD AUTO: 479 K/UL — HIGH (ref 150–400)
RBC # BLD: 3.8 M/UL — SIGNIFICANT CHANGE UP (ref 3.8–5.2)
RBC # FLD: 21.3 % — HIGH (ref 10.3–14.5)
WBC # BLD: 3.88 K/UL — SIGNIFICANT CHANGE UP (ref 3.8–10.5)
WBC # FLD AUTO: 3.88 K/UL — SIGNIFICANT CHANGE UP (ref 3.8–10.5)

## 2021-04-20 PROCEDURE — 99214 OFFICE O/P EST MOD 30 MIN: CPT

## 2021-04-20 RX ORDER — IBUPROFEN 600 MG/1
600 TABLET, FILM COATED ORAL EVERY 8 HOURS
Qty: 15 | Refills: 0 | Status: DISCONTINUED | COMMUNITY
Start: 2021-03-26 | End: 2021-04-20

## 2021-04-20 RX ORDER — HYDROMORPHONE HYDROCHLORIDE 2 MG/1
2 TABLET ORAL EVERY 6 HOURS
Qty: 12 | Refills: 0 | Status: DISCONTINUED | COMMUNITY
Start: 2021-03-26 | End: 2021-04-20

## 2021-04-20 NOTE — ASSESSMENT
[FreeTextEntry1] : 44 year old female with Iron deficiency anemia due to chronic blood loss from menorrhagia. Treated with IV Injectafer in August 2018 and Feb 2019 with adequate response. Unable to tolerate oral iron therapy. Again presented in August 2019 with sx concerning for DIANA, shown to have low iron stores and s/p venofer. \par \par -She is now s/p 2 treatments with Injectafer March 2020 . \par -Right cervical LN etiology unclear, reactive on biopsy. Appreciate rheumatology input. Monitor. \par -With worsening iron deficiency and anemia again last summer 2020, s/p Feraheme x 2 in Sep 2020. This was complicated by infusion reaction. \par - Patient s/p interventional radiology guided embolization of fibroid. Still with significant pain, but less heavy bleeding. \par - Patient anemic with Hb 9.4 and low iron studies again earlier this month. Will schedule for Venofer x 5 (didn't react to this, had reacted to Feraheme). \par -Patient understands and agrees with plan. All information explained to the best of my ability.\par

## 2021-04-20 NOTE — REVIEW OF SYSTEMS
[Fatigue] : fatigue [Hoarseness] : hoarseness [Palpitations] : palpitations [SOB on Exertion] : shortness of breath during exertion [Muscle Pain] : muscle pain [Fever] : no fever [Chills] : no chills [Night Sweats] : no night sweats [Recent Change In Weight] : ~T no recent weight change [Eye Pain] : no eye pain [Vision Problems] : no vision problems [Dysphagia] : no dysphagia [Chest Pain] : no chest pain [Lower Ext Edema] : no lower extremity edema [Shortness Of Breath] : no shortness of breath [Wheezing] : no wheezing [Abdominal Pain] : no abdominal pain [Vomiting] : no vomiting [Joint Pain] : no joint pain [Muscle Weakness] : no muscle weakness [Skin Rash] : no skin rash [Skin Wound] : no skin wound [Confused] : no confusion [Dizziness] : no dizziness [Fainting] : no fainting [Easy Bleeding] : no tendency for easy bleeding [Easy Bruising] : no tendency for easy bruising [FreeTextEntry2] : +pica [FreeTextEntry4] : voice improved but still with swollen lymph nodes, stable.  [de-identified] : lymph nodes in neck stable.

## 2021-04-20 NOTE — PHYSICAL EXAM
[Fully active, able to carry on all pre-disease performance without restriction] : Status 0 - Fully active, able to carry on all pre-disease performance without restriction [Normal] : affect appropriate [de-identified] : pale appearing [de-identified] : pale conjunctiva [de-identified] : Posterior to anterior solitary cervical lymph node measuring about 8-10 mm, freely mobile, non-tender NO CHANGE [de-identified] : No axillary or inguinal adenopathy

## 2021-04-20 NOTE — HISTORY OF PRESENT ILLNESS
[de-identified] : 45 yo female with PMHx of gastritis, left benign ovarian cyst (s/p ovarian conservation resection), adenomyosis, uterine fibroids, menorrhagia presents for evaluation of chronic blood loss anemia. Patient has had heavy menses for years with intolerance to oral iron (nausea, vomiting, constipation) undergoing IV iron formulation.  She first had IV iron 2013 in [place] with appropriate Hb response. Additionally she presented in 2017 again for IV iron with Hb ~7 that responded to 13.9.  LMP July 1, 2018. On July 2 2018 she had severe menstrual bleeding that caused her to be dizzy and presented to [facility] ER. There her Hb was 8.6, down from 9.3 in June. She did not receive any iron or blood transfusion in the ED. Denies adverse reactions to iron infusion.\par \par Has close follow up with her GYN and failed multiple OCPs and IUD in an attempt control the bleeding. OCPs gave her acne so she does not want to try that again. Currently, goes through 6-7 pads a day for the first 3 days, and period lasts for about 6 days. She was offered a hysterectomy, currently patient is considering.\par \par Visit Hx:\par 7/16/19: Patient presents today for follow-up from ED visit for menstrual cycle related chronic blood loss anemia with symptoms of fatigue, intermittent dizziness and mild SOB on exertion. She denies chest pain on exertion, or other sources of bleeding including GI. She denies issues with bleeding when she gets cuts and scrapes, and denies bruising.\par She is requesting IV iron as she is unable to tolerate oral supplementation. She is planning a trip to [place] tomorrow for 2 weeks with her daughters that she cannot change. She is requesting IV Iron today. \par \par 2/28/2019: Patient presents after about 7 months since her IV iron infusion. She continues to have heavy periods and has been experiencing weakness and dizziness again with intermittent headaches similar to when she was anemic in the past. She denies SOB with exertion. She did not want a hysterectomy at this time as she may be looking into fertility options since she was recently remarried.\par \par 4/11/19: She is feeling well today and back to her healthy baseline. Her counts have fully recovered. She continues to have heavy periods and will consider further gyn eval and possible hysterectomy in the future.\par \par 8/22/2019: Patient complains of recurrent dizziness and losing her hair again. She also reports fatigue / weakness. She denies pica or shortness of breath. LMP 8/11/19 (heavy bleeding experienced x 5 days). She also complains of tenderness in her right neck where she feels a right swollen node. This was present last visit and has not significantly changed in size. Patient will be evaluated for an additional treatment with Injectafer. She is also being evaluated for endometrial thickening and myomatous uterus and hopes to have a D&C procedure in the near future. She is still discussion fertility options and has not yet decided on hysterectomy.\par \par 9/26/2019: Ms Santos presents for follow-up today. She has completed 2/3 doses of venofer. Injectafer was too expensive due to insurance lapse so she agreed to venofer. After the 1st dose of venofer she had vomiting and retching for 24 hours after the infusion. After the 2nd dose (on 9/17/19) of venofer she started developing chest pain 7/10 around her sternum that was worse with touch. She did not call a doctor, felt it was not internal pain and has been feeling daily improvement of this pain, which she states is felt with bending over and touching her sternum now at 2/10. She skipped her 3rd dose. Today she denies nausea, and complains of enlargement of her right neck lump that was examined last visit. She is planning to go for endometrial polyp removal (hysteroscopy and D&C) after medical clearance, she has already seen pulm for clearance. LMP 9/7, lasted 5 days.\par \par On 12/2/19 patient was new to me. She reports that the lymphadenopathy in her neck feels like it is not necessarily "growing" but it is "joining together". She reports that she feels fatigued but no shortness of breath or anything like that. No pain in the area of the lymph nodes. She feels frustrated that she has continued menstrual bleeding and she had a procedure recently. She cannot tolerate the PO iron according to her and she has been on IV iron in the past. Pending IR appointment Wednesday to evaluate for possible biopsy of lymph node. Of note, recently she overcame the flu but still has mild residual cough. She denied any weight loss, she said she was getting some mild night sweats. \par \par Due to continued iron deificiency and menstrual blood loss, patient had Injectafer x 2 in March 2020. Biopsy of lymph node showed changes consistent with a reactive lymph node on May 14, 2020.\par \par Patient with continued menstrual losses and had second round of IV iron on 9/17/2020 and 9/24/2020. \par She is now s/p uterine fibroid embolization with IR on 3/24/21.  [de-identified] : Patient with a constant pain in her L leg ever since the procedure. She reports she was supposed to get her menstrual period last week. She had cramping but only small pink discharge. She still has cramping in the left side of her abdomen. On the pain scale, the pain is a 7/10. When its at its worst she cannot even walk. Tylenol will bring it down to a 3. No fatigue or shortness of breath at this time. Pica + "like always". No chest pain. Appetite is a little less than before. Lymph nodes on her neck feel the same to her. No pain there. Iron deficit calculated as 1024 mg.

## 2021-04-20 NOTE — REASON FOR VISIT
[Follow-Up Visit] : a follow-up visit for [FreeTextEntry2] : DIANA I, Felicitas Contreras, performed the initial face to face bedside interview with this patient regarding history of present illness, review of symptoms and relevant past medical, social and family history.  I completed an independent physical examination.   The medical decision making and follow-up on ordered tests (ie labs, radiologic studies) and re-evaluation of the patient's status has been communicated to the ACP.  Disposition of the patient will be based on test outcome and response to ED interventions.  The history, relevant review of systems, past medical and surgical history, medical decision making, and physical examination was documented by the scribe in my presence and I attest to the accuracy of the documentation.

## 2021-04-22 LAB
FERRITIN SERPL-MCNC: 8 NG/ML
IRON SATN MFR SERPL: 3 %
IRON SERPL-MCNC: 13 UG/DL
TIBC SERPL-MCNC: 402 UG/DL
UIBC SERPL-MCNC: 389 UG/DL

## 2021-04-23 ENCOUNTER — APPOINTMENT (OUTPATIENT)
Dept: INTERVENTIONAL RADIOLOGY/VASCULAR | Facility: CLINIC | Age: 46
End: 2021-04-23

## 2021-04-26 ENCOUNTER — NON-APPOINTMENT (OUTPATIENT)
Age: 46
End: 2021-04-26

## 2021-04-26 ENCOUNTER — APPOINTMENT (OUTPATIENT)
Dept: INFUSION THERAPY | Facility: HOSPITAL | Age: 46
End: 2021-04-26

## 2021-04-28 ENCOUNTER — EMERGENCY (EMERGENCY)
Facility: HOSPITAL | Age: 46
LOS: 1 days | Discharge: HOME | End: 2021-04-28
Attending: STUDENT IN AN ORGANIZED HEALTH CARE EDUCATION/TRAINING PROGRAM | Admitting: STUDENT IN AN ORGANIZED HEALTH CARE EDUCATION/TRAINING PROGRAM
Payer: COMMERCIAL

## 2021-04-28 ENCOUNTER — APPOINTMENT (OUTPATIENT)
Dept: INFUSION THERAPY | Facility: HOSPITAL | Age: 46
End: 2021-04-28

## 2021-04-28 VITALS
HEIGHT: 64 IN | DIASTOLIC BLOOD PRESSURE: 77 MMHG | OXYGEN SATURATION: 99 % | HEART RATE: 100 BPM | SYSTOLIC BLOOD PRESSURE: 132 MMHG | RESPIRATION RATE: 18 BRPM | TEMPERATURE: 100 F

## 2021-04-28 DIAGNOSIS — Z98.890 OTHER SPECIFIED POSTPROCEDURAL STATES: Chronic | ICD-10-CM

## 2021-04-28 DIAGNOSIS — Z98.891 HISTORY OF UTERINE SCAR FROM PREVIOUS SURGERY: Chronic | ICD-10-CM

## 2021-04-28 DIAGNOSIS — Z98.51 TUBAL LIGATION STATUS: Chronic | ICD-10-CM

## 2021-04-28 LAB
ALBUMIN SERPL ELPH-MCNC: 3.9 G/DL — SIGNIFICANT CHANGE UP (ref 3.3–5)
ALP SERPL-CCNC: 65 U/L — SIGNIFICANT CHANGE UP (ref 40–120)
ALT FLD-CCNC: 14 U/L — SIGNIFICANT CHANGE UP (ref 4–33)
ANION GAP SERPL CALC-SCNC: 9 MMOL/L — SIGNIFICANT CHANGE UP (ref 7–14)
APPEARANCE UR: CLEAR — SIGNIFICANT CHANGE UP
APTT BLD: 28.2 SEC — SIGNIFICANT CHANGE UP (ref 27–36.3)
AST SERPL-CCNC: 12 U/L — SIGNIFICANT CHANGE UP (ref 4–32)
BASOPHILS # BLD AUTO: 0.04 K/UL — SIGNIFICANT CHANGE UP (ref 0–0.2)
BASOPHILS NFR BLD AUTO: 0.7 % — SIGNIFICANT CHANGE UP (ref 0–2)
BILIRUB SERPL-MCNC: <0.2 MG/DL — SIGNIFICANT CHANGE UP (ref 0.2–1.2)
BILIRUB UR-MCNC: NEGATIVE — SIGNIFICANT CHANGE UP
BLD GP AB SCN SERPL QL: NEGATIVE — SIGNIFICANT CHANGE UP
BUN SERPL-MCNC: 13 MG/DL — SIGNIFICANT CHANGE UP (ref 7–23)
CALCIUM SERPL-MCNC: 9.8 MG/DL — SIGNIFICANT CHANGE UP (ref 8.4–10.5)
CHLORIDE SERPL-SCNC: 102 MMOL/L — SIGNIFICANT CHANGE UP (ref 98–107)
CO2 SERPL-SCNC: 27 MMOL/L — SIGNIFICANT CHANGE UP (ref 22–31)
COLOR SPEC: YELLOW — SIGNIFICANT CHANGE UP
CREAT SERPL-MCNC: 0.83 MG/DL — SIGNIFICANT CHANGE UP (ref 0.5–1.3)
DIFF PNL FLD: ABNORMAL
EOSINOPHIL # BLD AUTO: 0.06 K/UL — SIGNIFICANT CHANGE UP (ref 0–0.5)
EOSINOPHIL NFR BLD AUTO: 1 % — SIGNIFICANT CHANGE UP (ref 0–6)
GLUCOSE SERPL-MCNC: 108 MG/DL — HIGH (ref 70–99)
GLUCOSE UR QL: NEGATIVE — SIGNIFICANT CHANGE UP
HCG SERPL-ACNC: <5 MIU/ML — SIGNIFICANT CHANGE UP
HCT VFR BLD CALC: 30.8 % — LOW (ref 34.5–45)
HGB BLD-MCNC: 9.3 G/DL — LOW (ref 11.5–15.5)
IANC: 4.51 K/UL — SIGNIFICANT CHANGE UP (ref 1.5–8.5)
IMM GRANULOCYTES NFR BLD AUTO: 0.3 % — SIGNIFICANT CHANGE UP (ref 0–1.5)
INR BLD: 1.11 RATIO — SIGNIFICANT CHANGE UP (ref 0.88–1.16)
KETONES UR-MCNC: ABNORMAL
LEUKOCYTE ESTERASE UR-ACNC: NEGATIVE — SIGNIFICANT CHANGE UP
LYMPHOCYTES # BLD AUTO: 1.18 K/UL — SIGNIFICANT CHANGE UP (ref 1–3.3)
LYMPHOCYTES # BLD AUTO: 19.2 % — SIGNIFICANT CHANGE UP (ref 13–44)
MCHC RBC-ENTMCNC: 23.8 PG — LOW (ref 27–34)
MCHC RBC-ENTMCNC: 30.2 GM/DL — LOW (ref 32–36)
MCV RBC AUTO: 78.8 FL — LOW (ref 80–100)
MONOCYTES # BLD AUTO: 0.34 K/UL — SIGNIFICANT CHANGE UP (ref 0–0.9)
MONOCYTES NFR BLD AUTO: 5.5 % — SIGNIFICANT CHANGE UP (ref 2–14)
NEUTROPHILS # BLD AUTO: 4.51 K/UL — SIGNIFICANT CHANGE UP (ref 1.8–7.4)
NEUTROPHILS NFR BLD AUTO: 73.3 % — SIGNIFICANT CHANGE UP (ref 43–77)
NITRITE UR-MCNC: NEGATIVE — SIGNIFICANT CHANGE UP
NRBC # BLD: 0 /100 WBCS — SIGNIFICANT CHANGE UP
NRBC # FLD: 0 K/UL — SIGNIFICANT CHANGE UP
PH UR: 5.5 — SIGNIFICANT CHANGE UP (ref 5–8)
PLATELET # BLD AUTO: 438 K/UL — HIGH (ref 150–400)
POTASSIUM SERPL-MCNC: 4.1 MMOL/L — SIGNIFICANT CHANGE UP (ref 3.5–5.3)
POTASSIUM SERPL-SCNC: 4.1 MMOL/L — SIGNIFICANT CHANGE UP (ref 3.5–5.3)
PROT SERPL-MCNC: 7.1 G/DL — SIGNIFICANT CHANGE UP (ref 6–8.3)
PROT UR-MCNC: ABNORMAL
PROTHROM AB SERPL-ACNC: 12.7 SEC — SIGNIFICANT CHANGE UP (ref 10.6–13.6)
RBC # BLD: 3.91 M/UL — SIGNIFICANT CHANGE UP (ref 3.8–5.2)
RBC # FLD: 21.3 % — HIGH (ref 10.3–14.5)
RH IG SCN BLD-IMP: POSITIVE — SIGNIFICANT CHANGE UP
SARS-COV-2 RNA SPEC QL NAA+PROBE: SIGNIFICANT CHANGE UP
SODIUM SERPL-SCNC: 138 MMOL/L — SIGNIFICANT CHANGE UP (ref 135–145)
SP GR SPEC: 1.03 — HIGH (ref 1.01–1.02)
UROBILINOGEN FLD QL: SIGNIFICANT CHANGE UP
WBC # BLD: 6.15 K/UL — SIGNIFICANT CHANGE UP (ref 3.8–10.5)
WBC # FLD AUTO: 6.15 K/UL — SIGNIFICANT CHANGE UP (ref 3.8–10.5)

## 2021-04-28 PROCEDURE — 99053 MED SERV 10PM-8AM 24 HR FAC: CPT

## 2021-04-28 PROCEDURE — 74177 CT ABD & PELVIS W/CONTRAST: CPT | Mod: 26

## 2021-04-28 PROCEDURE — 99284 EMERGENCY DEPT VISIT MOD MDM: CPT

## 2021-04-28 RX ORDER — SODIUM CHLORIDE 9 MG/ML
1000 INJECTION, SOLUTION INTRAVENOUS ONCE
Refills: 0 | Status: COMPLETED | OUTPATIENT
Start: 2021-04-28 | End: 2021-04-28

## 2021-04-28 RX ORDER — ONDANSETRON 8 MG/1
4 TABLET, FILM COATED ORAL ONCE
Refills: 0 | Status: COMPLETED | OUTPATIENT
Start: 2021-04-28 | End: 2021-04-28

## 2021-04-28 RX ORDER — ACETAMINOPHEN 500 MG
650 TABLET ORAL ONCE
Refills: 0 | Status: COMPLETED | OUTPATIENT
Start: 2021-04-28 | End: 2021-04-28

## 2021-04-28 RX ADMIN — ONDANSETRON 4 MILLIGRAM(S): 8 TABLET, FILM COATED ORAL at 09:05

## 2021-04-28 RX ADMIN — Medication 650 MILLIGRAM(S): at 09:05

## 2021-04-28 RX ADMIN — SODIUM CHLORIDE 1000 MILLILITER(S): 9 INJECTION, SOLUTION INTRAVENOUS at 09:06

## 2021-04-28 NOTE — ED ADULT TRIAGE NOTE - SOURCE OF INFORMATION
Patient sees Dr Harris   Patient called requesting medication refill of:  Enbrel 50 mg/mL, pt stated having one more injection left  Patient was told by the pharmacy that they try reaching Dr Harris  3 times     # [phone] Patient

## 2021-04-28 NOTE — ED PROVIDER NOTE - OBJECTIVE STATEMENT
44 yo F w/PMH uterine fibroids 1 month s/p UFE p/w 24 hours of RLQ abdominal pain associated with N/V/D. Vomiting x1, NBNB. Last BM was this morning prior to arrival. Denies fever/chills, CP/SOB, hematuria/frequency/dysuria, hematochezia, melena, vaginal bleeding or discharge. Patient states that since the procedure 1 month ago she has had cramping abdominal pain in the suprapubic area with discomfort through BLE. However, last night she developed new, sharp RLQ pain that she has never experienced before. She tried ibuprofen yesterday without relief, has not taken any further medications.

## 2021-04-28 NOTE — ED PROVIDER NOTE - CARE PLAN
Principal Discharge DX:	Abdominal pain   Principal Discharge DX:	Abdominal pain  Assessment and plan of treatment:	During your ED visit you were evaluated for abdominal pain. You had a ct scan and blood work , you were provided with the results. Take tylenol and motrin as needed. Follow up with your PMD. Return to the ED if you exhibit any new, continued or worsening symptoms.

## 2021-04-28 NOTE — ED ADULT NURSE NOTE - OBJECTIVE STATEMENT
Received pt to intake bed 5, A+Ox4, ambulatory. C/O RLQ pain associated with N+V, diarrhea since last night. Respirations even and unlabored, normal work of breathing, no accessory muscle use, speaking in full clear uninterrupted sentences. ABD is soft, tender to RLQ, non distended. Pt denies any chest pain, SOB, headache, dizziness, fever, chills. . 20G to LAC, Labs sent, Medicated as per MD, will continue to monitor.

## 2021-04-28 NOTE — ED ADULT TRIAGE NOTE - RESPIRATORY RATE (BREATHS/MIN)
END OF SHIFT NOTE: 
 
Patient had one small, hard bowel movement. Patient was hurting from sitting up in chair all day and required 5mg Iv morphine 3x. Patient rested well once pain was gotten under control. Patient to receive PRN 20mg Lasix today! Intake/Output 02/17 1901 - 02/18 0700 In: 5854 [P.O.:450; I.V.:1279] Out: 1425 [[id number]] Voiding: YES Catheter: YES Drain:   
 
 
 
 
 
Stool:  1 occurrences. Emesis:  0 occurrences. VITAL SIGNS Patient Vitals for the past 12 hrs: 
 Temp Pulse Resp BP SpO2  
02/18/20 0242 99 °F (37.2 °C) 82 18 128/66   
02/17/20 2303 98.5 °F (36.9 °C) 71 20 133/65 95 % 02/17/20 1923 98.9 °F (37.2 °C) 69 20 129/65 94 % 02/17/20 1910     93 % Pain Assessment Pain 1 Pain Scale 1: Visual (02/18/20 0114) Pain Intensity 1: 0 (02/18/20 0114) Patient Stated Pain Goal: 0 (02/17/20 1730) Pain Reassessment 1: Patient resting w/respiratory rate greater than 10 (02/18/20 0114) Pain Location 1: Arm;Neck; Shoulder (02/17/20 2037) Pain Orientation 1: Right (02/17/20 1852) Pain Description 1: Constant; Aching (02/17/20 1852) Pain Intervention(s) 1: Medication (see MAR) (02/18/20 0035) Ambulating Yes Shift report given to oncoming nurse at the bedside. Savanah Collier 18

## 2021-04-28 NOTE — ED PROVIDER NOTE - PATIENT PORTAL LINK FT
You can access the FollowMyHealth Patient Portal offered by [facility] by registering at the following website: [email or web address]. By joining [employer]’s FollowMyHealth portal, you will also be able to view your health information using other applications (apps) compatible with our system.

## 2021-04-28 NOTE — ED ADULT NURSE NOTE - NSIMPLEMENTINTERV_GEN_ALL_ED
Implemented All Universal Safety Interventions:  [place] to call system. Call bell, personal items and telephone within reach. Instruct patient to call for assistance. Room bathroom lighting operational. Non-slip footwear when patient is off stretcher. Physically safe environment: no spills, clutter or unnecessary equipment. Stretcher in lowest position, wheels locked, appropriate side rails in place.

## 2021-04-28 NOTE — ED PROVIDER NOTE - PHYSICAL EXAMINATION
Gen: Awake, Alert, WD, WN, NAD  Head:  NC/AT  Eyes:  PERRL, EOMI, Conjunctiva pink, lids normal, no scleral icterus  ENT: OP clear, no exudates, no erythema, uvula midline, TMs clear bilaterally, moist mucus membranes  Neck: supple, nontender, no meningismus, no JVD, trachea midline  Cardiac/CV:  S1 S2, RRR, no M/G/R  Respiratory/Pulm:  CTAB, good air movement, normal resp effort, no wheezes/stridor/retractions/rales/rhonchi  Gastrointestinal/Abdomen:  Soft, nondistended, TTP RLQ/suprapubic area, +BS, no rebound/guarding  Back:  no CVAT, no MLT  Ext:  warm, well perfused, moving all extremities spontaneously, no peripheral edema, distal pulses intact x4  Skin: intact, no rash  Neuro:  AAOx3, sensation intact, motor 5/5 x 4 extremities, normal gait, speech clear Gen: Awake, Alert, WD, WN, NAD  Head:  NC/AT  Eyes:  PERRL, EOMI, Conjunctiva pink, lids normal, no scleral icterus  ENT: OP clear, no exudates, no erythema, uvula midline, TMs clear bilaterally, moist mucus membranes  Neck: supple, nontender, no meningismus, no JVD, trachea midline  Cardiac/CV:  S1 S2, RRR, no M/G/R  Respiratory/Pulm:  CTAB, good air movement, normal resp effort, no wheezes/stridor/retractions/rales/rhonchi  Gastrointestinal/Abdomen:  Soft, nondistended, TTP RLQ/suprapubic area, +BS, no rebound/guarding  Pelvic Exam: mild, physiologic discharge. No CMT or adnexal tenderness. Cervix closed/thick/high. (Witnessed by ED Tech)  Back:  no CVAT, no MLT  Ext:  warm, well perfused, moving all extremities spontaneously, no peripheral edema, distal pulses intact x4  Skin: intact, no rash  Neuro:  AAOx3, sensation intact, motor 5/5 x 4 extremities, normal gait, speech clear

## 2021-04-28 NOTE — ED PROVIDER NOTE - PROGRESS NOTE DETAILS
Ct negative, pain improved, pt provided with lab results and given follow up with pmd and return instructions Pain improved on reassessment. Patient states PCP contacted her for follow up tomorrow which she is going to confirm on discharge.

## 2021-04-28 NOTE — ED ADULT TRIAGE NOTE - CHIEF COMPLAINT QUOTE
c/o RLQ pain associated with N+V, diarrhea since last night. Denies chest pain, sob, sick contacts. PMHx ovarian cysts, PUD.

## 2021-04-28 NOTE — ED PROVIDER NOTE - CLINICAL SUMMARY MEDICAL DECISION MAKING FREE TEXT BOX
Pt p/w RLQ pain x24 hours with N/V/D and decreased po. History concerning for acute appendicitis. Pt with hx of recent uterine procedure, possible post op complication. Given abdominal surgical history, SBO also possible though felt less likely given BM. Pt is tolerating po at home. labs, CT pending. Anti-emetics and pain control PRN. Dispo per workup. 46 yo F w/PMH uterine fibroids 1 month s/p UFE p/w 24 hours of RLQ abdominal pain associated with N/V/D. Vomiting x1, NBNB. Last BM was this morning prior to arriva. History concerning for acute appendicitis. Pt with hx of recent uterine procedure, possible post op complication. Given abdominal surgical history, SBO also possible though felt less likely given BM. Pt is tolerating po at home. labs, CT pending. Anti-emetics and pain control PRN. Dispo per workup.

## 2021-04-28 NOTE — ED PROVIDER NOTE - ATTENDING CONTRIBUTION TO CARE
44 yo F w/PMH uterine fibroids 1 month s/p UFE p/w 24 hours of RLQ abdominal pain associated with N/V/D. Vomiting x1, NBNB. Last BM was this morning prior to arrival. Pt states she has been having increasing pain in the RLQ accompanied by several episode of vomiting. She states she had ovarian cyst s/p cystectomy and a mesh placement on the right side. Her pain is worse with movement palpation. the pain is currently 6/10. She did not take anything for the pain. Her last BM was this morning and was normal. She denies bleeding. LMP was few weeks ago but was minor. She denies dysuria, or hematuria.  denies fever, chills, chest pain, SOB, + abdominal pain, diarrhea, dysuria, syncope, bleeding, new rash,weakness, numbness, blurred vision  + n/v   ROS  otherwise negative as per HPI  Gen: Awake, Alert, WD, WN, NAD  Head:  NC/AT  Eyes:  PERRL, EOMI, Conjunctiva pink, lids normal, no scleral icterus  ENT:, moist mucus membranes  Neck: supple, nontender, no meningismus, no JVD, trachea midline  Cardiac/CV:  S1 S2, RRR, no M/G/R  Respiratory/Pulm:  CTAB, good air movement, normal resp effort, no wheezes/stridor/retractions/rales/rhonchi  Gastrointestinal/Abdomen:  Soft, tender suprapubic and RLQ w/ , ?guarding  Back:  no CVAT, no MLT  Ext:  warm, well perfused, moving all extremities spontaneously, no peripheral edema, distal pulses intact  Skin: intact, no rash  Neuro:  AAOx3, sensation intact, motor 5/5 x 4 extremities, normal gait, speech clear  MDM as above

## 2021-04-28 NOTE — ED PROVIDER NOTE - NSFOLLOWUPINSTRUCTIONS_ED_ALL_ED_FT
During your ED visit you were evaluated for abdominal pain. You had a ct scan and blood work , you were provided with the results. Take tylenol and motrin as needed. Follow up with your PMD. Return to the ED if you exhibit any new, continued or worsening symptoms.

## 2021-04-29 LAB
CULTURE RESULTS: SIGNIFICANT CHANGE UP
SPECIMEN SOURCE: SIGNIFICANT CHANGE UP

## 2021-04-30 ENCOUNTER — APPOINTMENT (OUTPATIENT)
Dept: INFUSION THERAPY | Facility: HOSPITAL | Age: 46
End: 2021-04-30

## 2021-05-03 ENCOUNTER — APPOINTMENT (OUTPATIENT)
Dept: INFUSION THERAPY | Facility: HOSPITAL | Age: 46
End: 2021-05-03

## 2021-05-04 ENCOUNTER — OUTPATIENT (OUTPATIENT)
Dept: OUTPATIENT SERVICES | Facility: HOSPITAL | Age: 46
LOS: 1 days | End: 2021-05-04

## 2021-05-04 ENCOUNTER — APPOINTMENT (OUTPATIENT)
Dept: INTERNAL MEDICINE | Facility: CLINIC | Age: 46
End: 2021-05-04
Payer: COMMERCIAL

## 2021-05-04 VITALS
BODY MASS INDEX: 31.41 KG/M2 | HEART RATE: 110 BPM | SYSTOLIC BLOOD PRESSURE: 110 MMHG | RESPIRATION RATE: 17 BRPM | OXYGEN SATURATION: 98 % | WEIGHT: 184 LBS | DIASTOLIC BLOOD PRESSURE: 78 MMHG | HEIGHT: 64 IN

## 2021-05-04 VITALS — TEMPERATURE: 99.1 F

## 2021-05-04 DIAGNOSIS — Z98.891 HISTORY OF UTERINE SCAR FROM PREVIOUS SURGERY: Chronic | ICD-10-CM

## 2021-05-04 DIAGNOSIS — G89.29 DORSALGIA, UNSPECIFIED: ICD-10-CM

## 2021-05-04 DIAGNOSIS — Z98.890 OTHER SPECIFIED POSTPROCEDURAL STATES: Chronic | ICD-10-CM

## 2021-05-04 DIAGNOSIS — M54.9 DORSALGIA, UNSPECIFIED: ICD-10-CM

## 2021-05-04 DIAGNOSIS — Z98.51 TUBAL LIGATION STATUS: Chronic | ICD-10-CM

## 2021-05-04 PROCEDURE — ZZZZZ: CPT

## 2021-05-04 NOTE — PHYSICAL EXAM
[No Acute Distress] : no acute distress [Well Nourished] : well nourished [No JVD] : no jugular venous distention [No Lymphadenopathy] : no lymphadenopathy [No Respiratory Distress] : no respiratory distress  [Normal Rate] : normal rate  [No Abdominal Bruit] : a ~M bruit was not heard ~T in the abdomen [Soft] : abdomen soft [Non Tender] : non-tender [No Joint Swelling] : no joint swelling [No Rash] : no rash

## 2021-05-04 NOTE — REVIEW OF SYSTEMS
[Abdominal Pain] : abdominal pain [Joint Pain] : joint pain [Joint Stiffness] : joint stiffness [Negative] : Integumentary

## 2021-05-05 ENCOUNTER — APPOINTMENT (OUTPATIENT)
Dept: INFUSION THERAPY | Facility: HOSPITAL | Age: 46
End: 2021-05-05

## 2021-05-05 DIAGNOSIS — K29.70 GASTRITIS, UNSPECIFIED, WITHOUT BLEEDING: ICD-10-CM

## 2021-05-05 DIAGNOSIS — M54.9 DORSALGIA, UNSPECIFIED: ICD-10-CM

## 2021-05-05 DIAGNOSIS — D64.9 ANEMIA, UNSPECIFIED: ICD-10-CM

## 2021-05-05 DIAGNOSIS — D25.9 LEIOMYOMA OF UTERUS, UNSPECIFIED: ICD-10-CM

## 2021-05-05 RX ORDER — SUCRALFATE 1 G/10ML
1 SUSPENSION ORAL 3 TIMES DAILY
Qty: 1 | Refills: 0 | Status: DISCONTINUED | COMMUNITY
Start: 2021-05-04 | End: 2021-05-05

## 2021-05-05 NOTE — ASSESSMENT
[FreeTextEntry1] : 46 y/o F with hx of gastritis, chronic anemia requiring IV iron, and menorrhagia s/p uterine fibroid embolization on 3/24/21 p/w post hospitalization visit. \par \par #Abdominal Pain\par -likely gastritis given presentation and recent use of Ibuprofen\par -will refer to GI for repeat Endoscopy given history of bleeding ulcer and anemia. The pain has also not improved with BID Omeprazole and stopping ibuprofen\par -CT A/P with IV contrast wnl 1 week ago. \par \par #Back pain\par -likely MSK given improvement with lidocaine patches\par -will reassess at next visit. no red flag symptoms\par \par #Joint pain\par -pain in PIP joints, but no concerning symptoms or morning stiffness. KRISTIN titers negative 1 year ago\par -has improved with ibuprofen. Hold NSAIDs until gastritis is improved. Recommend topical creams like Bengay or Icy Hot for now\par -Can reassess and search for possible autoimmune causes if deemed appropriate in future visits\par \par RTC in 7 weeks. \par \par Discussed with Dr. Soria\par \par Rosa Isela Crane, PGY3

## 2021-05-05 NOTE — HISTORY OF PRESENT ILLNESS
[FreeTextEntry2] : 46 y/o F with gastritis, chronic anemia requiring IV iron, and menorrhagia s/p uterine fibroid embolization on 3/24/21 p/w post hospitalization visit. \par \par Since her embolization, her menorrhagia improved significantly, but had been suffering from abdominal pain. Patient presented to hospital on 4/28 with RLQ pain, N/V, concerned about appendicitis. CT A/P was wnl and patient was sent home, and pain was deemed likely 2/2 embolization. Patient's pain has improved significantly with addition of ibuprofen, but her gastritis has worsened. She feels increased bloating and left upper quadrant to epigastric pain. Because of this gastritis pain, she stopped the ibuprofen and has been taking Omeprazole 40 mg BID, which hasn't helped. She had an endoscopy a few years ago showing a small bleeding ulcer. \par \par She also has c/o bilateral hip and back pain, and pain in the PIP joints. Denies constitutional symptoms or morning stiffness. The hip pain improves with lidocaine patches, and she states she didn't feel much pain on ibuprofen. Had been going to PT 2x/week for 2 months with minimal improvement.

## 2021-05-06 ENCOUNTER — APPOINTMENT (OUTPATIENT)
Dept: GASTROENTEROLOGY | Facility: CLINIC | Age: 46
End: 2021-05-06

## 2021-05-06 ENCOUNTER — OUTPATIENT (OUTPATIENT)
Dept: OUTPATIENT SERVICES | Facility: HOSPITAL | Age: 46
LOS: 1 days | End: 2021-05-06

## 2021-05-06 DIAGNOSIS — Z98.890 OTHER SPECIFIED POSTPROCEDURAL STATES: Chronic | ICD-10-CM

## 2021-05-06 DIAGNOSIS — Z98.891 HISTORY OF UTERINE SCAR FROM PREVIOUS SURGERY: Chronic | ICD-10-CM

## 2021-05-06 DIAGNOSIS — Z98.51 TUBAL LIGATION STATUS: Chronic | ICD-10-CM

## 2021-05-06 NOTE — ASSESSMENT
[FreeTextEntry1] : Impression:\par # LUQ Abdominal pain after NSAIDs use DDX: PUD, NSAIDs induced gastropathy, IBS or functional pain.\par # Hematochezia  DDx: hemorrhoids, diverticulosis, colonic polyps or Colon cancer \par # Constipation- likely functional\par # DIANA hgb at baseline 9.4\par # GERD\par # Menorrhagia s/p uterine fibroid embolization on 3/24/21 \par \par Recommendations:\par - EGD and colonoscopy given abdominal pain and hematochezia \par - Avoid NSAIDs \par - PPI 40 mg once a day for now.\par - Can use Mylanta and MiraLAX as needed for constipation. ( we can consider Metamucil after we rule out any intraluminal obstruction )\par \par

## 2021-05-06 NOTE — END OF VISIT
[Time Spent: ___ minutes] : I have spent [unfilled] minutes of time on the encounter. [] : Fellow [FreeTextEntry3] : As modified and discussed with patient\par MD JEREMIAH Liz FACPiedmont Rockdale\par Associate Professor of Medicine\par [facility]\par

## 2021-05-06 NOTE — HISTORY OF PRESENT ILLNESS
[Home] : at home, [unfilled] , at the time of the visit. [Medical Office: ([facility])___] : at the medical office located in  [Verbal consent obtained from patient] : the patient, [unfilled] [Heartburn] : stable heartburn [Nausea] : denies nausea [Vomiting] : denies vomiting [Diarrhea] : denies diarrhea [Constipation] : stable constipation [Yellow Skin Or Eyes (Jaundice)] : denies jaundice [Abdominal Swelling] : denies abdominal swelling [Rectal Pain] : denies rectal pain [Abdominal Pain] : abdominal pain [de-identified] : 46 y/o F with DIANA 2nd to menorrhagia s/p uterine fibroid embolization on 3/24/21 presents today for abdominal pain and hematochezia.\par \par Patient was seen in GI clinic in 2017 for dyspepsia - had EGD that showed gastric erosions. Negative H. Pylori \par \par Since her embolization, her menorrhagia improved significantly, but had been suffering from abdominal pain. Patient presented to hospital on 4/28 with RLQ pain, N/V, concerned about appendicitis. CT A/P was wnl and patient was sent home, and pain was deemed likely 2/2 embolization. Patient's pain has improved significantly with addition of ibuprofen, 600 mg two tabs / day which she stopped two weeks ago.\par \par  She feels increased bloating and left upper quadrant to epigastric pain. Because of this gastritis pain, she stopped the ibuprofen and has been taking Omeprazole 40 mg BID, which hasn't helped. \par \par She reports pain worse at nights- fasting, better with eating and PPI.\par She reports heartburn that is controlled usually with PPI as needed.\par She reports baseline bowel habits as one bm / day that is usually hard stool - need to push hard. \par She had three episodes of BRBPR over the past 2 weeks. She denies any melena \par \par Status post excision of hidradenoma papilliferum. Patient currently without complaint. Denies pain. No fevers or chills. Occasional spotting of blood which draining. Patient does report constipation. Bowel movement every other day with occasional straining.\par Status post excision of anal skin tag -Pathology consistent with hidradenoma papilliferum 12/0219

## 2021-05-10 ENCOUNTER — APPOINTMENT (OUTPATIENT)
Dept: INFUSION THERAPY | Facility: HOSPITAL | Age: 46
End: 2021-05-10

## 2021-05-19 ENCOUNTER — APPOINTMENT (OUTPATIENT)
Dept: PULMONOLOGY | Facility: CLINIC | Age: 46
End: 2021-05-19

## 2021-05-28 ENCOUNTER — NON-APPOINTMENT (OUTPATIENT)
Age: 46
End: 2021-05-28

## 2021-06-03 NOTE — H&P PST ADULT - LIVES WITH, PROFILE
Doing well.  Occasional cramping every 3-4 days.   No painful ctx. Occasional meliza choi ctx.  She feels like she can eat and breathe easier in the past week.    No lof.  No bleeding.    She did do a lot of walking in the past few days.    gbs done today.  Reviewed kick counts  Reviewed when to go to the hospital.    Baby is vertex on US.       x2/children

## 2021-06-23 ENCOUNTER — APPOINTMENT (OUTPATIENT)
Dept: INTERVENTIONAL RADIOLOGY/VASCULAR | Facility: CLINIC | Age: 46
End: 2021-06-23
Payer: COMMERCIAL

## 2021-06-23 VITALS — HEIGHT: 64 IN | BODY MASS INDEX: 31.41 KG/M2 | WEIGHT: 184 LBS

## 2021-06-23 PROCEDURE — 99243 OFF/OP CNSLTJ NEW/EST LOW 30: CPT

## 2021-06-23 RX ORDER — DOCUSATE SODIUM 100 MG/1
100 CAPSULE ORAL 3 TIMES DAILY
Qty: 21 | Refills: 0 | Status: DISCONTINUED | COMMUNITY
Start: 2021-03-26 | End: 2021-06-23

## 2021-06-23 RX ORDER — SENNOSIDES 8.6 MG TABLETS 8.6 MG/1
8.6 TABLET ORAL
Qty: 20 | Refills: 0 | Status: DISCONTINUED | COMMUNITY
Start: 2021-03-26 | End: 2021-06-23

## 2021-06-23 RX ORDER — POLYETHYLENE GLYCOL 3350 17 G/17G
17 POWDER, FOR SOLUTION ORAL DAILY
Qty: 30 | Refills: 3 | Status: DISCONTINUED | COMMUNITY
Start: 2021-05-06 | End: 2021-06-23

## 2021-06-23 RX ORDER — POLYETHYLENE GLYCOL 3350 17 G/17G
17 POWDER, FOR SOLUTION ORAL
Qty: 30 | Refills: 0 | Status: DISCONTINUED | COMMUNITY
Start: 2021-05-06 | End: 2021-06-23

## 2021-07-19 ENCOUNTER — OUTPATIENT (OUTPATIENT)
Dept: OUTPATIENT SERVICES | Facility: HOSPITAL | Age: 46
LOS: 1 days | End: 2021-07-19

## 2021-07-19 ENCOUNTER — APPOINTMENT (OUTPATIENT)
Dept: INTERNAL MEDICINE | Facility: CLINIC | Age: 46
End: 2021-07-19
Payer: COMMERCIAL

## 2021-07-19 VITALS
DIASTOLIC BLOOD PRESSURE: 70 MMHG | HEIGHT: 64 IN | WEIGHT: 190 LBS | SYSTOLIC BLOOD PRESSURE: 118 MMHG | OXYGEN SATURATION: 98 % | BODY MASS INDEX: 32.44 KG/M2 | HEART RATE: 79 BPM

## 2021-07-19 VITALS — TEMPERATURE: 98.4 F

## 2021-07-19 DIAGNOSIS — Z98.890 OTHER SPECIFIED POSTPROCEDURAL STATES: Chronic | ICD-10-CM

## 2021-07-19 DIAGNOSIS — Z98.51 TUBAL LIGATION STATUS: Chronic | ICD-10-CM

## 2021-07-19 DIAGNOSIS — M67.40 GANGLION, UNSPECIFIED SITE: ICD-10-CM

## 2021-07-19 DIAGNOSIS — Z98.891 HISTORY OF UTERINE SCAR FROM PREVIOUS SURGERY: Chronic | ICD-10-CM

## 2021-07-19 PROCEDURE — ZZZZZ: CPT | Mod: GE

## 2021-07-20 NOTE — REVIEW OF SYSTEMS
[Abdominal Pain] : abdominal pain [Muscle Weakness] : muscle weakness [Dizziness] : dizziness [Negative] : Psychiatric

## 2021-07-21 ENCOUNTER — RESULT REVIEW (OUTPATIENT)
Age: 46
End: 2021-07-21

## 2021-07-21 ENCOUNTER — OUTPATIENT (OUTPATIENT)
Dept: OUTPATIENT SERVICES | Facility: HOSPITAL | Age: 46
LOS: 1 days | End: 2021-07-21

## 2021-07-21 ENCOUNTER — APPOINTMENT (OUTPATIENT)
Dept: INTERNAL MEDICINE | Facility: CLINIC | Age: 46
End: 2021-07-21

## 2021-07-21 VITALS — TEMPERATURE: 97.3 F

## 2021-07-21 DIAGNOSIS — Z98.890 OTHER SPECIFIED POSTPROCEDURAL STATES: Chronic | ICD-10-CM

## 2021-07-21 DIAGNOSIS — Z98.51 TUBAL LIGATION STATUS: Chronic | ICD-10-CM

## 2021-07-21 DIAGNOSIS — D50.0 IRON DEFICIENCY ANEMIA SECONDARY TO BLOOD LOSS (CHRONIC): ICD-10-CM

## 2021-07-21 DIAGNOSIS — Z98.891 HISTORY OF UTERINE SCAR FROM PREVIOUS SURGERY: Chronic | ICD-10-CM

## 2021-07-21 PROBLEM — M67.40 VOLAR RETINACULAR GANGLION: Status: ACTIVE | Noted: 2020-12-02

## 2021-07-21 LAB
APPEARANCE UR: CLEAR — SIGNIFICANT CHANGE UP
BASOPHILS # BLD AUTO: 0.04 K/UL — SIGNIFICANT CHANGE UP (ref 0–0.2)
BASOPHILS NFR BLD AUTO: 1 % — SIGNIFICANT CHANGE UP (ref 0–2)
BILIRUB UR-MCNC: NEGATIVE — SIGNIFICANT CHANGE UP
COLOR SPEC: SIGNIFICANT CHANGE UP
DIFF PNL FLD: NEGATIVE — SIGNIFICANT CHANGE UP
EOSINOPHIL # BLD AUTO: 0.1 K/UL — SIGNIFICANT CHANGE UP (ref 0–0.5)
EOSINOPHIL NFR BLD AUTO: 2.4 % — SIGNIFICANT CHANGE UP (ref 0–6)
GLUCOSE UR QL: NEGATIVE — SIGNIFICANT CHANGE UP
HCT VFR BLD CALC: 44.2 % — SIGNIFICANT CHANGE UP (ref 34.5–45)
HGB BLD-MCNC: 13.9 G/DL — SIGNIFICANT CHANGE UP (ref 11.5–15.5)
IANC: 2.22 K/UL — SIGNIFICANT CHANGE UP (ref 1.5–8.5)
IMM GRANULOCYTES NFR BLD AUTO: 0 % — SIGNIFICANT CHANGE UP (ref 0–1.5)
KETONES UR-MCNC: NEGATIVE — SIGNIFICANT CHANGE UP
LEUKOCYTE ESTERASE UR-ACNC: NEGATIVE — SIGNIFICANT CHANGE UP
LYMPHOCYTES # BLD AUTO: 1.51 K/UL — SIGNIFICANT CHANGE UP (ref 1–3.3)
LYMPHOCYTES # BLD AUTO: 36.3 % — SIGNIFICANT CHANGE UP (ref 13–44)
MCHC RBC-ENTMCNC: 28.1 PG — SIGNIFICANT CHANGE UP (ref 27–34)
MCHC RBC-ENTMCNC: 31.4 GM/DL — LOW (ref 32–36)
MCV RBC AUTO: 89.5 FL — SIGNIFICANT CHANGE UP (ref 80–100)
MONOCYTES # BLD AUTO: 0.29 K/UL — SIGNIFICANT CHANGE UP (ref 0–0.9)
MONOCYTES NFR BLD AUTO: 7 % — SIGNIFICANT CHANGE UP (ref 2–14)
NEUTROPHILS # BLD AUTO: 2.22 K/UL — SIGNIFICANT CHANGE UP (ref 1.8–7.4)
NEUTROPHILS NFR BLD AUTO: 53.3 % — SIGNIFICANT CHANGE UP (ref 43–77)
NITRITE UR-MCNC: NEGATIVE — SIGNIFICANT CHANGE UP
NRBC # BLD: 0 /100 WBCS — SIGNIFICANT CHANGE UP
NRBC # FLD: 0 K/UL — SIGNIFICANT CHANGE UP
PH UR: 5.5 — SIGNIFICANT CHANGE UP (ref 5–8)
PLATELET # BLD AUTO: 258 K/UL — SIGNIFICANT CHANGE UP (ref 150–400)
PROT UR-MCNC: NEGATIVE — SIGNIFICANT CHANGE UP
RBC # BLD: 4.94 M/UL — SIGNIFICANT CHANGE UP (ref 3.8–5.2)
RBC # FLD: 19.8 % — HIGH (ref 10.3–14.5)
SP GR SPEC: 1.02 — SIGNIFICANT CHANGE UP (ref 1.01–1.02)
TSH SERPL-MCNC: 3.53 UIU/ML — SIGNIFICANT CHANGE UP (ref 0.27–4.2)
UROBILINOGEN FLD QL: SIGNIFICANT CHANGE UP
WBC # BLD: 4.16 K/UL — SIGNIFICANT CHANGE UP (ref 3.8–10.5)
WBC # FLD AUTO: 4.16 K/UL — SIGNIFICANT CHANGE UP (ref 3.8–10.5)

## 2021-07-21 NOTE — COUNSELING
[FreeTextEntry2] :  Counseled on importance of lifestyle changes including diet and weight loss. Patient not amenable to conversation further regarding the topic

## 2021-07-21 NOTE — HISTORY OF PRESENT ILLNESS
[de-identified] : 46 y/o F with gastritis,  anemia (DIANA) requiring IV iron due to menorrhagia s/p uterine fibroid embolization on 3/24/21 presents for follow up for multiple medical complaints.\par \par Patient states that she has had difficulty adducting her thumb x 1 week. No issues w/ sensation, no pain, no inciting events including trauma. Patient has followed up with orthopedics for cyst drainage which she states was unsuccessful. Patient also had steroid injected into R thumb. 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 \par \par  Patient is now s/p uterine fibroid embolectomy w/ reduction of vaginal bleeding. Patient however states that she does pass dark blood and clots during her menstrual cycle now.  Patient complaints of crampy abdominal pain that is c/w her menstrual cycle but also an associated burning pain in the lower quadrants. No melena, hematochezia, n/v,d/c.  Patient has follow up with Gyn at the end of august and IR for a follow up of a pelvic MRI in September.\par \par Iron deficiency, ? GI source given hx of gastritis, and vaginal bleeding as stated above. Patient does not take oral iron supplements due to personal intolerance. Patient counseled on the necessity of iron supplementation regarding pathology. Given hx of gastritis poss GI bleed contributing. Patient follows with GI and has endoscopy and colonoscopy pending.  Vaginal bleeding likely contributing however, likely improved given recent embolectomy as stated above.  \par \par Patient w/ complaints of leg swelling x few months. No pain, no sensory loss, no cp, sob, orthopnea. No aggravating/alleviating factors.\par Patient describes lightheaded sensation x 1 week. Occurs when moving from lying to sitting and sitting to standing. Self resolves. No neurological deficits, ha, bv. \par \par . \par \par

## 2021-07-21 NOTE — PHYSICAL EXAM
[No Acute Distress] : no acute distress [Well Nourished] : well nourished [Well Developed] : well developed [Normal Sclera/Conjunctiva] : normal sclera/conjunctiva [Normal Outer Ear/Nose] : the outer ears and nose were normal in appearance [Supple] : supple [No Respiratory Distress] : no respiratory distress  [No Accessory Muscle Use] : no accessory muscle use [Clear to Auscultation] : lungs were clear to auscultation bilaterally [Normal Rate] : normal rate  [Regular Rhythm] : with a regular rhythm [Normal S1, S2] : normal S1 and S2 [Soft] : abdomen soft [Non Tender] : non-tender [No Joint Swelling] : no joint swelling [No Focal Deficits] : no focal deficits [de-identified] : 4/5 strength adduction/abduction in R 1st digit, 5/5 strength and sensation intact b/l hands, 2+ radial pulses

## 2021-07-21 NOTE — PLAN
[FreeTextEntry1] : 46 y/o F with gastritis, chronic anemia requiring IV iron, and menorrhagia s/p uterine fibroid embolization on 3/24/21 presents for follow up for multiple medical complaints.\par \par #Right 1st Digit Weakness: \par Trigger finger vs Volar reticular ganglion\par -Has ortho follow up but would like 2nd opinion; referral sent\par \par #Vaginal Bleeding/Abdominal Pain\par s/p uterine fibroid embolectomy w/ reduction of vaginal bleeding\par -Gyn f/u at the end of august\par -IR for a follow up of a pelvic MRI in September\par \par Chronic DIANA  Anemia\par Recheck labs today, if inadequate response may need to continue IV iron\par  -Will recheck CBC at this visit   \par \par Leg Swelling\par Likely 2/2 venous insufficiency; unlikely cardiac given demographics and normal physical exam\par -counseled on leg elevation and compression stocking use\par -UA for protein deficiency\par -Will f/u at next visit; if normal and sx persists will get echo\par \par Dizziness\par Likely orthostatic\par -Counseled on slow ascension from sitting/lying\par -CTM\par \par HCM:\par -Counseled on importance of lifestyle changes including diet and weight loss. Patient not amenable to conversation further regarding the topic\par -hcv and hiv negative \par -A1c and lipids within normal limits at recent visit \par -phq, dast, audit negative. \par -UTD vaccination\par -UTD pap smear and mammogram\par \par RTC for follow up of CBC, pelvic MRI and ortho follow up in 4-5 months\par Case discussed w/ Dr. Story\par \par Rubio Brannon\par [facility] PGY3\par

## 2021-07-21 NOTE — END OF VISIT
[] : Resident [FreeTextEntry3] : Pt with classic DIANA so type of anemia is know. Most likely due to menorrhagia, as gastritis itself unlikely even if bleeding to cause significant anemia and complete depletion of iron store in absences of PUd and debra hematemesis/hematochezia. Pt still bleeding significantly despite uterine embolization so continues to be in negative iron balance, likely will need to continue IV iron until ferritin is in normal range. Regarding edema, differential includes CKD, CLD, thyroid dx, low albumin/protein, and CVI - if evaluation of ALL these are negative then consider echo for evaluation of RHD.

## 2021-07-22 DIAGNOSIS — M67.40 GANGLION, UNSPECIFIED SITE: ICD-10-CM

## 2021-07-22 DIAGNOSIS — K29.70 GASTRITIS, UNSPECIFIED, WITHOUT BLEEDING: ICD-10-CM

## 2021-07-22 DIAGNOSIS — K21.9 GASTRO-ESOPHAGEAL REFLUX DISEASE WITHOUT ESOPHAGITIS: ICD-10-CM

## 2021-07-22 DIAGNOSIS — D50.0 IRON DEFICIENCY ANEMIA SECONDARY TO BLOOD LOSS (CHRONIC): ICD-10-CM

## 2021-07-22 DIAGNOSIS — N94.6 DYSMENORRHEA, UNSPECIFIED: ICD-10-CM

## 2021-07-22 DIAGNOSIS — E66.9 OBESITY, UNSPECIFIED: ICD-10-CM

## 2021-07-22 LAB — T4 FREE SERPL-MCNC: 1 NG/DL — SIGNIFICANT CHANGE UP (ref 0.9–1.8)

## 2021-07-26 ENCOUNTER — OUTPATIENT (OUTPATIENT)
Dept: OUTPATIENT SERVICES | Facility: HOSPITAL | Age: 46
LOS: 1 days | Discharge: HOME | End: 2021-07-26

## 2021-07-26 DIAGNOSIS — Z98.890 OTHER SPECIFIED POSTPROCEDURAL STATES: Chronic | ICD-10-CM

## 2021-07-26 DIAGNOSIS — D50.0 IRON DEFICIENCY ANEMIA SECONDARY TO BLOOD LOSS (CHRONIC): ICD-10-CM

## 2021-07-26 DIAGNOSIS — Z98.51 TUBAL LIGATION STATUS: Chronic | ICD-10-CM

## 2021-07-26 DIAGNOSIS — Z98.891 HISTORY OF UTERINE SCAR FROM PREVIOUS SURGERY: Chronic | ICD-10-CM

## 2021-07-27 ENCOUNTER — APPOINTMENT (OUTPATIENT)
Dept: HEMATOLOGY ONCOLOGY | Facility: CLINIC | Age: 46
End: 2021-07-27

## 2021-08-17 ENCOUNTER — APPOINTMENT (OUTPATIENT)
Dept: HEMATOLOGY ONCOLOGY | Facility: CLINIC | Age: 46
End: 2021-08-17

## 2021-08-22 ENCOUNTER — APPOINTMENT (OUTPATIENT)
Dept: DISASTER EMERGENCY | Facility: CLINIC | Age: 46
End: 2021-08-22

## 2021-08-23 ENCOUNTER — LABORATORY RESULT (OUTPATIENT)
Age: 46
End: 2021-08-23

## 2021-08-23 ENCOUNTER — NON-APPOINTMENT (OUTPATIENT)
Age: 46
End: 2021-08-23

## 2021-08-23 ENCOUNTER — APPOINTMENT (OUTPATIENT)
Dept: DISASTER EMERGENCY | Facility: CLINIC | Age: 46
End: 2021-08-23

## 2021-08-25 ENCOUNTER — OUTPATIENT (OUTPATIENT)
Dept: OUTPATIENT SERVICES | Facility: HOSPITAL | Age: 46
LOS: 1 days | End: 2021-08-25
Payer: COMMERCIAL

## 2021-08-25 ENCOUNTER — OUTPATIENT (OUTPATIENT)
Dept: OUTPATIENT SERVICES | Facility: HOSPITAL | Age: 46
LOS: 1 days | Discharge: HOME | End: 2021-08-25
Payer: COMMERCIAL

## 2021-08-25 ENCOUNTER — APPOINTMENT (OUTPATIENT)
Dept: ORTHOPEDIC SURGERY | Facility: HOSPITAL | Age: 46
End: 2021-08-25

## 2021-08-25 ENCOUNTER — RESULT REVIEW (OUTPATIENT)
Age: 46
End: 2021-08-25

## 2021-08-25 ENCOUNTER — APPOINTMENT (OUTPATIENT)
Dept: RADIOLOGY | Facility: HOSPITAL | Age: 46
End: 2021-08-25

## 2021-08-25 VITALS
OXYGEN SATURATION: 97 % | HEART RATE: 84 BPM | DIASTOLIC BLOOD PRESSURE: 67 MMHG | RESPIRATION RATE: 19 BRPM | SYSTOLIC BLOOD PRESSURE: 102 MMHG

## 2021-08-25 VITALS
TEMPERATURE: 97 F | RESPIRATION RATE: 20 BRPM | OXYGEN SATURATION: 97 % | SYSTOLIC BLOOD PRESSURE: 127 MMHG | DIASTOLIC BLOOD PRESSURE: 82 MMHG

## 2021-08-25 VITALS
SYSTOLIC BLOOD PRESSURE: 128 MMHG | TEMPERATURE: 98.4 F | HEIGHT: 64 IN | WEIGHT: 193 LBS | HEART RATE: 82 BPM | BODY MASS INDEX: 32.95 KG/M2 | DIASTOLIC BLOOD PRESSURE: 88 MMHG

## 2021-08-25 DIAGNOSIS — Z98.51 TUBAL LIGATION STATUS: Chronic | ICD-10-CM

## 2021-08-25 DIAGNOSIS — K92.1 MELENA: ICD-10-CM

## 2021-08-25 DIAGNOSIS — Z98.890 OTHER SPECIFIED POSTPROCEDURAL STATES: Chronic | ICD-10-CM

## 2021-08-25 DIAGNOSIS — Z98.891 HISTORY OF UTERINE SCAR FROM PREVIOUS SURGERY: Chronic | ICD-10-CM

## 2021-08-25 DIAGNOSIS — M25.849 OTHER SPECIFIED JOINT DISORDERS, UNSPECIFIED HAND: ICD-10-CM

## 2021-08-25 LAB — HCG UR QL: NEGATIVE — SIGNIFICANT CHANGE UP

## 2021-08-25 PROCEDURE — 45378 DIAGNOSTIC COLONOSCOPY: CPT | Mod: GC

## 2021-08-25 PROCEDURE — 73130 X-RAY EXAM OF HAND: CPT | Mod: 26,50

## 2021-08-25 PROCEDURE — 43239 EGD BIOPSY SINGLE/MULTIPLE: CPT | Mod: GC

## 2021-08-25 PROCEDURE — 88305 TISSUE EXAM BY PATHOLOGIST: CPT | Mod: 26

## 2021-08-25 RX ORDER — SODIUM CHLORIDE 9 MG/ML
500 INJECTION, SOLUTION INTRAVENOUS
Refills: 0 | Status: DISCONTINUED | OUTPATIENT
Start: 2021-08-25 | End: 2021-09-08

## 2021-08-25 NOTE — ASU PREOP CHECKLIST - HEIGHT IN FEET
Subjective:       Patient ID: Marsha Marina is a 34 y.o. female.    Chief Complaint: Rash (x 1 week)    Rash   This is a new problem. The current episode started in the past 7 days. The problem is unchanged. The rash is diffuse. The rash is characterized by redness and itchiness. She was exposed to a new medication (recently started sertraline). Pertinent negatives include no fever or shortness of breath. Past treatments include nothing. (Sensitive skin)     Review of Systems   Constitutional: Negative for fever.   Respiratory: Negative for shortness of breath.    Skin: Positive for rash.       Objective:      Physical Exam   Constitutional: She is oriented to person, place, and time. She appears well-developed and well-nourished. No distress.   Eyes: No scleral icterus.   Cardiovascular: Normal rate and regular rhythm.    Pulmonary/Chest: Effort normal. No respiratory distress.   Neurological: She is alert and oriented to person, place, and time.   Skin: Skin is warm and dry. Rash (macular, erythematous to hyperpigmented lesions on neck, chest, arms and legs) noted.   Psychiatric: She has a normal mood and affect.   Vitals reviewed.      Assessment:       1. Dermatitis        Plan:       Marsha was seen today for rash.    Diagnoses and all orders for this visit:    Dermatitis  -     May be due to sertraline but she does not want to try another medication at this time. She is feeling better on sertraline  -     triamcinolone acetonide 0.1% (KENALOG) 0.1 % cream; Apply topically 2 (two) times daily.    I have advised her to notify me of symptoms in the next 10-14 days.          
5

## 2021-08-26 DIAGNOSIS — M25.849 OTHER SPECIFIED JOINT DISORDERS, UNSPECIFIED HAND: ICD-10-CM

## 2021-08-27 LAB — SURGICAL PATHOLOGY STUDY: SIGNIFICANT CHANGE UP

## 2021-09-08 ENCOUNTER — APPOINTMENT (OUTPATIENT)
Dept: INTERNAL MEDICINE | Facility: CLINIC | Age: 46
End: 2021-09-08
Payer: COMMERCIAL

## 2021-09-08 ENCOUNTER — OUTPATIENT (OUTPATIENT)
Dept: OUTPATIENT SERVICES | Facility: HOSPITAL | Age: 46
LOS: 1 days | End: 2021-09-08

## 2021-09-08 VITALS
WEIGHT: 193 LBS | HEART RATE: 90 BPM | HEIGHT: 64 IN | SYSTOLIC BLOOD PRESSURE: 138 MMHG | OXYGEN SATURATION: 98 % | BODY MASS INDEX: 32.95 KG/M2 | DIASTOLIC BLOOD PRESSURE: 82 MMHG | RESPIRATION RATE: 16 BRPM

## 2021-09-08 VITALS — TEMPERATURE: 97.6 F

## 2021-09-08 DIAGNOSIS — Z98.890 OTHER SPECIFIED POSTPROCEDURAL STATES: Chronic | ICD-10-CM

## 2021-09-08 DIAGNOSIS — Z87.19 PERSONAL HISTORY OF OTHER DISEASES OF THE DIGESTIVE SYSTEM: ICD-10-CM

## 2021-09-08 DIAGNOSIS — G47.9 SLEEP DISORDER, UNSPECIFIED: ICD-10-CM

## 2021-09-08 DIAGNOSIS — R10.12 LEFT UPPER QUADRANT PAIN: ICD-10-CM

## 2021-09-08 DIAGNOSIS — Z98.51 TUBAL LIGATION STATUS: Chronic | ICD-10-CM

## 2021-09-08 DIAGNOSIS — R59.0 LOCALIZED ENLARGED LYMPH NODES: ICD-10-CM

## 2021-09-08 DIAGNOSIS — Z98.891 HISTORY OF UTERINE SCAR FROM PREVIOUS SURGERY: Chronic | ICD-10-CM

## 2021-09-08 DIAGNOSIS — M25.562 PAIN IN LEFT KNEE: ICD-10-CM

## 2021-09-08 DIAGNOSIS — K29.70 GASTRITIS, UNSPECIFIED, WITHOUT BLEEDING: ICD-10-CM

## 2021-09-08 DIAGNOSIS — M25.559 PAIN IN UNSPECIFIED HIP: ICD-10-CM

## 2021-09-08 DIAGNOSIS — Z87.42 PERSONAL HISTORY OF OTHER DISEASES OF THE FEMALE GENITAL TRACT: ICD-10-CM

## 2021-09-08 DIAGNOSIS — D25.1 INTRAMURAL LEIOMYOMA OF UTERUS: ICD-10-CM

## 2021-09-08 DIAGNOSIS — E66.9 OBESITY, UNSPECIFIED: ICD-10-CM

## 2021-09-08 PROCEDURE — ZZZZZ: CPT

## 2021-09-13 ENCOUNTER — RESULT REVIEW (OUTPATIENT)
Age: 46
End: 2021-09-13

## 2021-09-13 ENCOUNTER — APPOINTMENT (OUTPATIENT)
Dept: OBGYN | Facility: HOSPITAL | Age: 46
End: 2021-09-13
Payer: COMMERCIAL

## 2021-09-13 ENCOUNTER — APPOINTMENT (OUTPATIENT)
Dept: MRI IMAGING | Facility: IMAGING CENTER | Age: 46
End: 2021-09-13

## 2021-09-13 ENCOUNTER — OUTPATIENT (OUTPATIENT)
Dept: OUTPATIENT SERVICES | Facility: HOSPITAL | Age: 46
LOS: 1 days | End: 2021-09-13

## 2021-09-13 VITALS
BODY MASS INDEX: 33.13 KG/M2 | WEIGHT: 193 LBS | SYSTOLIC BLOOD PRESSURE: 129 MMHG | DIASTOLIC BLOOD PRESSURE: 82 MMHG | TEMPERATURE: 98.1 F | HEART RATE: 86 BPM

## 2021-09-13 DIAGNOSIS — Z98.891 HISTORY OF UTERINE SCAR FROM PREVIOUS SURGERY: Chronic | ICD-10-CM

## 2021-09-13 DIAGNOSIS — Z98.890 OTHER SPECIFIED POSTPROCEDURAL STATES: Chronic | ICD-10-CM

## 2021-09-13 DIAGNOSIS — Z98.51 TUBAL LIGATION STATUS: Chronic | ICD-10-CM

## 2021-09-13 LAB — HIV 1+2 AB+HIV1 P24 AG SERPL QL IA: SIGNIFICANT CHANGE UP

## 2021-09-13 PROCEDURE — 99213 OFFICE O/P EST LOW 20 MIN: CPT | Mod: GC

## 2021-09-13 NOTE — PHYSICAL EXAM
[Appropriately responsive] : appropriately responsive [Alert] : alert [No Acute Distress] : no acute distress [Regular Rate Rhythm] : regular rate rhythm [Mass] : mass [Soft] : soft [Non-tender] : non-tender [Non-distended] : non-distended [Oriented x3] : oriented x3 [Examination Of The Breasts] : a normal appearance [No Masses] : no breast masses were palpable [Labia Majora] : normal [Labia Minora] : normal [Normal] : normal [Normal Position] : in a normal position [Uterine Adnexae] : normal

## 2021-09-14 ENCOUNTER — APPOINTMENT (OUTPATIENT)
Dept: PULMONOLOGY | Facility: CLINIC | Age: 46
End: 2021-09-14

## 2021-09-16 DIAGNOSIS — Z01.419 ENCOUNTER FOR GYNECOLOGICAL EXAMINATION (GENERAL) (ROUTINE) WITHOUT ABNORMAL FINDINGS: ICD-10-CM

## 2021-09-16 NOTE — HISTORY OF PRESENT ILLNESS
[FreeTextEntry1] : follow-up  [de-identified] : The patient is a 46-year-old woman who is followed for unexplained lymphadenopathy, resolved menorrhagia subsequent to uterine artery embolization, volar retinacular cysts, chronic hip pain, and obesity who is presenting to the clinic today for follow-up. Since the patient's recent uterine artery embolization, her menorrhagia has improved significantly. However, she continues to experience pain in her lower abdomen, worst at the time of her menstrual periods and associated with the passage of dark blood. The pain does not radiate, responds to administration of oral acetaminophen, and has been resolving clinically. \par \par The patient continues to experience epigastric gnawing associated with eating triggering foods. In the time since her last visit, the patient underwent upper endoscopy, which demonstrated non-erosive gastritis.\par \par Unrelated to her abdominal pains, the patient intermittently will experience unpredictable episodes of diarrhea that occur about once weekly, resolve on their own, and are punctuated by episodes of constipation. She notes no triggering foods, no vomiting, no passage of blood in stool, and no weight loss. Her stooling never wakes her at night. \par \par The patient continues to note subtle lymphadenopathy in the area of her posterior cervical chain. Typically, it is not associated with pain. However, unpredictably, it sometimes is associated with swelling of the lymph nodes and with pain and swelling of the mcp, pip, and dip joints. These symptoms have not been associated with any fevers or any rashes. They have not been associated with any weight changes or any symptoms of coughing, shortness of breath, or dysuria. \par \par In the time since the patients' most recent visit, she has continued to experience pain in her right hip, which extends to her knee. The pain is worst when the patient attempts to extend her knee. Although the patient previously had attempted exercise, she noted that the exercise was not alleviating her symptoms, so she stopped. Since that time, her symptoms have worsened.

## 2021-09-16 NOTE — REVIEW OF SYSTEMS
[Abdominal Pain] : abdominal pain [Constipation] : constipation [Diarrhea] : diarrhea [Joint Pain] : joint pain [Joint Stiffness] : joint stiffness [Negative] : Heme/Lymph [FreeTextEntry4] : Posterior cervical lymphadenopathy noted  [FreeTextEntry9] : right hip pain; stiffness intermittently in wrist, mcp, pip, and dip joints

## 2021-09-16 NOTE — ASSESSMENT
[FreeTextEntry1] : The patient is a 46-year-old woman who is followed for unexplained lymphadenopathy, obesity, gastritis, and uterine fibroids with associated menorrhagia and symptomatic anemia who is presenting to the clinic today for follow-up with interval complaints of pain from retinacular cysts, lower abdominal pain subsequent to uterine artery embolization, intermittent diarrhea and constipation, and right hip pain.  \par \par 1. Uterine fibroids with menorrhagia: -Significant history of menorrhagia from uterine artery fibroids with associated symptomatic anemia \par -Anemia and menorrhagia significantly improved following uterine artery embolization \par -However, persistent pain noted at the time of patient's menstrual periods with associated passage of dark blood \par -Of note, pain has been improving over several months and pain is responsive to administration of oral acetaminophen \par -Suspect pain most likely secondary to scarring and ongoing healing in setting of recent uterine artery embolization; encouraged administration of over-the-counter pain medications as needed \par \par 2. Gastritis: -Patient notes gnawing pain in left  upper quadrant and in epigastrium that is associated with triggering foods; recent upper endoscopy demonstrates gastritis but no evident ulceration; biopsies negative for h. pylori \par -Will encourage avoidance of triggering foods, caffeine, tobacco, and alcohol; encourage administration of nsaids only as absolutely necessary; patient can administer oral ppi as necessary \par \par 3. Diarrhea: -Patient notes unpredictable episodes of diarrhea about once weekly that are self resolving; episodes punctuated by periods of constipation \par -Diarrhea never wakes patient at night, not associated with weight loss, no triggering foods, no blood in stool \par -Triggered by feelings of anxiety \par -Suspect that symptoms may be secondary to irritable bowel syndrome; encouraged ongoing stress management; if patient's symptoms do not improve, will attempt to prescirbe patient linzess\par \par 4. Lymphadenopathy: -Persistent posterior cervical lymphadenopathy with occasional swelling and joint pain\par -Extensive workup initiated at times of previous visits; no current infection with ebv, cmv; acute bacterial infection unlikely given chronicity; malignancy unlikely as size of lymph nodes has remained unchanged on serial ultrasonography; autoimmune condition possible--kikuchi disease, kimura disease \par -ENT chose to defer excisional lymph node biopsy as size of lymph nodes unchanged; if patient has persistent symptoms, will refer back to ent for excisional lymph node biopsy better to assess etiology of inflammation \par \par 5. Volar retinacular ganglion cysts: -Pain in cysts of volar retinacular ganglion noted on bilateral hands; patient evaluated by orthopedic surgery team; mri recommended; patient to undergo surgical excision \par \par 6. Sleep disturbance: -Patient notes episodes of night-time awakenings and episodes of choking during sleep; suspect sleep apnea in setting of stage II obesity; provided referral to pulmonology team for sleep study \par \par 7. Right hip pain: -Persistent right hip pain noted and worst at level of right anterior superior iliac spine with radiation to right knee\par -Given location of pain; suspect related to tendinopathy; encouraged patient to undergo graded exercise regimen in attempt to strengthen surrounding musculature \par \par 8. Healthcare maintenance: -Up-to-date on flu, tdap vaccinations; made appointment for covid vaccination today \par -mammogram birads 2 in 2020; pap and hpv negative in 2020

## 2021-09-16 NOTE — PHYSICAL EXAM
[No Acute Distress] : no acute distress [Well Nourished] : well nourished [Well Developed] : well developed [Well-Appearing] : well-appearing [No JVD] : no jugular venous distention [Thyroid Normal, No Nodules] : the thyroid was normal and there were no nodules present [Soft] : abdomen soft [Non Tender] : non-tender [Non-distended] : non-distended [No HSM] : no HSM [Normal Bowel Sounds] : normal bowel sounds [Normal] : affect was normal and insight and judgment were intact [de-identified] : Minimal right posterior cervical lymphadenopathy noted  [de-identified] : 1+ bilateral lower extremity pitting edema  [de-identified] : minimal right posterior cervical lymphadenopathy  [de-identified] : Tenderness to palpation of right anterior superior iliac spine

## 2021-09-17 NOTE — REVIEW OF SYSTEMS
[Fever] : no fever [Chills] : no chills [Fatigue] : no fatigue [SOB on Exertion] : no shortness of breath on exertion [Chest Pain] : no chest pain [Palpitations] : no palpitations [Abdominal Pain] : no abdominal pain [Constipation] : no constipation [Diarrhea] : diarrhea [Urgency] : no urgency [Frequency] : no frequency [Headache] : no headache

## 2021-09-17 NOTE — HISTORY OF PRESENT ILLNESS
[FreeTextEntry1] : \par 45 yo  presenting for annual visit. \par \par Pt had UAE 3/25/21. Doing well since procedure. Endorsing abdominal fullness and burning sensation for a few days following her periods ever since the procedure. Used to feel this pain constantly immediately after the procedure, has lessened in frequency and severity. Relieved by NSAIDs. Has IR follow up this month. \par \par LMP . Vaginal bleeding is much improved. Periods are now regular with mild to moderate bleeding lasting 6 days. \par \par Pt has received first dose of covid vaccine. Due for 2nd dose next month. \par \par HCM: \par Pap May 2020 NILM HRHPV neg\par Mammo: 2020, normal repeat in 1 year\par Colonoscopy: this month, has GI f/u for results \par \par PMH: gerd, gastritis\par \par PSURG: UAE, D&Cx2, L ovarian cystectomy\par

## 2021-09-17 NOTE — PLAN
[FreeTextEntry1] : \par 47 yo  presenting for annual. Pt has improved AUB s/p UAE. Endorsing pain from UAE which is gradually improving and improved by NSAIDs. Previously seen by Dr. Ferguson regarding pain who recommended f/u with IR. \par \par - F/u with IR this month\par - Encourage NSAID use for pain\par - GC/CT, HIV, Syphillis collected today\par - Mammogram oredered\par - Pt has received COVID vaccine\par - RTC 1 year for annual\par \par Judie Hanna, PGY\par d/w Dr. Renee

## 2021-09-21 ENCOUNTER — OUTPATIENT (OUTPATIENT)
Dept: OUTPATIENT SERVICES | Facility: HOSPITAL | Age: 46
LOS: 1 days | Discharge: HOME | End: 2021-09-21

## 2021-09-21 DIAGNOSIS — Z98.890 OTHER SPECIFIED POSTPROCEDURAL STATES: Chronic | ICD-10-CM

## 2021-09-21 DIAGNOSIS — D50.0 IRON DEFICIENCY ANEMIA SECONDARY TO BLOOD LOSS (CHRONIC): ICD-10-CM

## 2021-09-21 DIAGNOSIS — Z98.891 HISTORY OF UTERINE SCAR FROM PREVIOUS SURGERY: Chronic | ICD-10-CM

## 2021-09-21 DIAGNOSIS — Z98.51 TUBAL LIGATION STATUS: Chronic | ICD-10-CM

## 2021-09-21 NOTE — ED ADULT NURSE NOTE - SUICIDE SCREENING DEPRESSION
Discharge Summary


General


Date of Admission


Aug 28, 2021 at 07:57


Date of Discharge


8/29/21





Discharge Summary


PROCEDURES PERFORMED DURING STAY: [None].





DISCHARGE DIAGNOSES:


Diastolic CHF Exacerbation


Hypertensive urgency


Aortic and Mitral valvular disease


Right adenexal/ovarian complex mass 





SECONDARY DIAGNOSIS:


Third-degree heart block, right bundle branch block, left anterior fascicular 

block s/p Pacemaker 2015


Diastolic CHF


Diabetes.


Hypertension with hypertensive heart disease


Hyperlipidemia.


Hypothyroidism.


Anxiety disorder.


MI (NSTEMI) in March 2018


Obesity


Degenerative aortic valve disease: aortic stenosis and aortic regurgitation 


Degenerative mitral valvular disease : Mild mitral stenosis and mild mitral 

regurgitation 


History of breast cancer normal coronary arteries as by cardiac catheterization 


CVA in 03/2017 right middle cerebral artery territory lacunar stroke, 


Rheumatic fever at age 10, 


Neuropathy, 


Sensory ataxia imbalance,


Knee surgery, bilateral.


Cataract surgery.


Partial breast mastectomy for ductal cell carcinoma


Cardiac cath in 2017: Normal coronaries and normal EF.





COMPLICATIONS/CHIEF COMPLAINT: CHF.





HOSPITAL COURSE: 87-year-old female with history of diastolic congestive heart 

failure, hypertension with hypertensive heart disease, diabetes, degenerative 

aortic and mitral valvular disease, hypothyroid, pacemaker due to AV block 

presented to the emergency room on 8/28/2021 early morning with sudden onset 

shortness of breath from the evening of 8/27/2021.  Patient reported that all 

day yesterday she was fine and then after dinner she started feeling a little 

short of breath which got worse then about 2 AM woke up his her son and as she 

could not catch her breath and was getting distressed and EMS was called and she

 came to the emergency room.  She reported she may have been wheezing at that 

time.  Denied any cough or phlegm denied any leg swelling.  She reports she has 

been taking all her medications.  On presentation to the ED she was found to be 

hypertensive with a blood pressure of 190/90.  Chest x-ray showed possible 

interstitial edema.  She then had a CT angio of the chest done which was 

negative for pulmonary embolism did show bilateral basal atelectasis and also 

interstitial edema.  She was admitted for hypertensive urgency and diastolic CHF

 exacerbation





Diastolic CHF exacerbation


likely due to reduction in lasix dosage recently


Renal Doppler flow  negative for renal artery stenosis.


Lasix 40 mg daily


Monitor intake and output, 1.8 L fluid restriction





Hypertensive urgency


due to CHF exacerbation


now resolved


continue home metoprolol





Aortic and mitral valvular heart disease


She does have calcific aortic valve and mild aortic stenosis as well as aortic 

regurgitation she also does have calcified mitral annular ring as well as mitral

 regurgitation


This also may have caused CHF exacerbation





Diabetes


home medications





Hypothyroid


Not on any medication





Pacemaker in place due to AV block


All paced rhythm at present





Incidental finding of right adenexal/ complex mass


CT with contrast 


Follow up with PMD 





DISCHARGE MEDICATIONS: Please see below.


 


ALLERGIES: Please see below.





PHYSICAL EXAMINATION ON DISCHARGE:


VITAL SIGNS: Please see below.


General Exam:  Positive: Alert, Cooperative, No Acute Distress


Eye Exam:  Positive: PERRLA, Conjunctiva & lids normal, EOMI; 


   Negative: Sclera icteric


ENT Exam:  Positive: Atraumatic, Mucous membr. moist/pink, Pharynx Normal


Neck Exam:  Positive: Supple, JVD; 


   Negative: thyromegaly


Chest Exam:  Positive: Normal air movement, Diminished (Bilateral diffuse basal 

crackles); 


   Negative: Rales, Rhonchi, Wheezing


Heart Exam:  Positive: Rate Normal, Regular Rhythm, Normal S1, Normal S2, 

Murmurs (Systolic murmurs); 


   Negative: Rubs


Telemetry:  Positive: Other Telemetry: (Paced rhythm)


Abdomen Exam:  Positive: Normal bowel sounds, Soft; 


   Negative: Tenderness


Extremity Exam:  Negative: Clubbing, Cyanosis, Edema


Neuro Exam:  Positive: Normal Speech, Strength at 5/5 X4 ext, Normal Tone


Psych Exam:  Positive: Memory Intact, Oriented x 3





LABORATORY DATA: Please see below.





IMAGING:


CTA of chest:


IMPRESSION: 


1. No evidence of acute pulmonary embolism. Assessment of some of the smaller 


branches is limited due to motion artifact, especially in the lung bases. 


2. Mild, geographic ground-glass of attenuation of the lungs and small pleural 


effusions, which are nonspecific findings but may be due to mild interstitial 


pulmonary edema. Assessment of the fine detail of the lungs is limited. 


3. Small amount of bilateral lung base atelectasis. 





Renal US with Doppler


1. Kidneys appear essentially age-appropriate and without hydronephrosis.


2. Doppler interegation without definite sonographic evidence for renal arterial


stenosis.


3. Complex right ovary and adnexal mass are suspected and incompletely 

evaluated.Consider pelvic ultrasound and or contrast-enhanced CT of the abdomen 

and pelvis for


further investigation





CT abd and pelvis: 


IMPRESSION:


1.  7.5 x 5 cm presumed proteinaceous cyst dominates the right ovary.  No 

associated acute stranding, fluid or adenopathy noted.  2.6 cm simple left 

ovarian cyst also


identified.


2.  No further acute abdominopelvic pathology appreciated.





ACTIVITY: [As tolerated].





DIET: 2 gm sodium, fluid restriction 1.8 liters. 





DISCHARGE PLAN: Home. 





DISCHARGE INSTRUCTIONS:


PMD in 1 week





ITEMS TO FOLLOWUP ON ON OUTPATIENT:





DISCHARGE CONDITION: [Stable].





TIME SPENT ON DISCHARGE: 35 minutes.





Vital Signs/I&Os





Vital Signs








  Date Time  Temp Pulse Resp B/P (MAP) Pulse Ox O2 Delivery O2 Flow Rate FiO2


 


8/29/21 09:30  71  138/63    


 


8/29/21 08:00 97.0  17  93 Room Air  


 


8/28/21 11:30       2.0 














I&O- Last 24 Hours up to 6 AM 


 


 8/29/21





 06:00


 


Intake Total 650 ml


 


Output Total 1000 ml


 


Balance -350 ml











Laboratory Data


Labs 24H


Laboratory Tests 2


8/28/21 12:56: Bedside Glucose (Misc Panel) 195H


8/28/21 17:49: Bedside Glucose (Misc Panel) 200H


8/28/21 20:03: Bedside Glucose (Misc Panel) 252H


8/29/21 05:06: 


Immature Granulocyte % (Auto) 0.2, Neutrophils (%) (Auto) 69.1H, Lymphocytes (%)

 (Auto) 21.0L, Monocytes (%) (Auto) 5.8, Eosinophils (%) (Auto) 3.0, Basophils 

(%) (Auto) 0.9, Neutrophils # (Auto) 3.9, Lymphocytes # (Auto) 1.2L, Monocytes #

 (Auto) 0.3, Eosinophils # (Auto) 0.2, Basophils # (Auto) 0.1, Nucleated Red 

Blood Cells % (auto) 0.0, Anion Gap 5L, Glomerular Filtration Rate 34.9, Calcium

 Level 8.8, Magnesium Level 1.9


CBC/BMP


Laboratory Tests


8/29/21 05:06








FSBS





Laboratory Tests








Test


 8/28/21


12:56 8/28/21


17:49 8/28/21


20:03 Range/Units


 


 


Bedside Glucose (Misc Panel) 195 200 252   MG/DL











Discharge Medications


Scheduled


Atorvastatin Calcium (Atorvastatin Calcium) 40 Mg Tab, 40 MG PO QPM, (Reported)


   1700 


Cholecalciferol (Vitamin D3) (Vitamin D3) 1,000 Unit Tablet, 2,000 UNITS PO JULIANE

LY, (Reported)


Clopidogrel Bisulfate (Clopidogrel) 75 Mg Tab, 75 MG PO DAILY, (Reported)


Docusate Sodium (Dok) 100 Mg Tab, 100 MG PO DAILY, (Reported)


Furosemide (Furosemide) 40 Mg Tablet, 40 MG PO DAILY


Insulin Glargine,Hum.rec.anlog (Lantus Solostar) 100 Unit/Ml Inj, 28 UNIT SC QP

M, (Reported)


Metoprolol Succinate (Metoprolol Succinate) 25 Mg Tab.er.24h, 25 MG PO DAILY, 

(Reported)


Multivitamins (Thera M Plus Tablet) 1 Tab Tab, 1 TAB PO DAILY, (Reported)


Potassium Chloride (Potassium Chloride) 20 Meq Tab.er.prt, 20 MEQ PO DAILY, 

(Reported)


Sertraline Hcl (Zoloft) 50 Mg Tab, 50 MG PO QHS, (Reported)





Allergies


Coded Allergies:  


     Sulfa (Sulfonamide Antibiotics) (Verified  Allergy, Intermediate, RASH 

HIVES, 5/25/20)


     codeine (Verified  Allergy, Intermediate, RASH HIVES, 5/25/20)


     tamoxifen (Verified  Adverse Reaction, Severe, SEVERE NAUSEA, 5/25/20)











Denisse Clarke MD                   Aug 29, 2021 11:09 Negative

## 2021-09-23 ENCOUNTER — RESULT REVIEW (OUTPATIENT)
Age: 46
End: 2021-09-23

## 2021-09-23 ENCOUNTER — APPOINTMENT (OUTPATIENT)
Dept: HEMATOLOGY ONCOLOGY | Facility: CLINIC | Age: 46
End: 2021-09-23
Payer: COMMERCIAL

## 2021-09-23 VITALS
TEMPERATURE: 98.7 F | HEIGHT: 63.98 IN | WEIGHT: 192.9 LBS | BODY MASS INDEX: 32.93 KG/M2 | OXYGEN SATURATION: 98 % | RESPIRATION RATE: 17 BRPM | HEART RATE: 98 BPM | DIASTOLIC BLOOD PRESSURE: 87 MMHG | SYSTOLIC BLOOD PRESSURE: 138 MMHG

## 2021-09-23 LAB
BASOPHILS # BLD AUTO: 0.03 K/UL — SIGNIFICANT CHANGE UP (ref 0–0.2)
BASOPHILS NFR BLD AUTO: 0.7 % — SIGNIFICANT CHANGE UP (ref 0–2)
EOSINOPHIL # BLD AUTO: 0.08 K/UL — SIGNIFICANT CHANGE UP (ref 0–0.5)
EOSINOPHIL NFR BLD AUTO: 1.8 % — SIGNIFICANT CHANGE UP (ref 0–6)
HCT VFR BLD CALC: 40.7 % — SIGNIFICANT CHANGE UP (ref 34.5–45)
HGB BLD-MCNC: 14.1 G/DL — SIGNIFICANT CHANGE UP (ref 11.5–15.5)
IMM GRANULOCYTES NFR BLD AUTO: 2 % — HIGH (ref 0–1.5)
LYMPHOCYTES # BLD AUTO: 1.39 K/UL — SIGNIFICANT CHANGE UP (ref 1–3.3)
LYMPHOCYTES # BLD AUTO: 30.6 % — SIGNIFICANT CHANGE UP (ref 13–44)
MCHC RBC-ENTMCNC: 30.8 PG — SIGNIFICANT CHANGE UP (ref 27–34)
MCHC RBC-ENTMCNC: 34.6 G/DL — SIGNIFICANT CHANGE UP (ref 32–36)
MCV RBC AUTO: 88.9 FL — SIGNIFICANT CHANGE UP (ref 80–100)
MONOCYTES # BLD AUTO: 0.27 K/UL — SIGNIFICANT CHANGE UP (ref 0–0.9)
MONOCYTES NFR BLD AUTO: 5.9 % — SIGNIFICANT CHANGE UP (ref 2–14)
NEUTROPHILS # BLD AUTO: 2.68 K/UL — SIGNIFICANT CHANGE UP (ref 1.8–7.4)
NEUTROPHILS NFR BLD AUTO: 59 % — SIGNIFICANT CHANGE UP (ref 43–77)
NRBC # BLD: 0 /100 WBCS — SIGNIFICANT CHANGE UP (ref 0–0)
PLATELET # BLD AUTO: 268 K/UL — SIGNIFICANT CHANGE UP (ref 150–400)
RBC # BLD: 4.58 M/UL — SIGNIFICANT CHANGE UP (ref 3.8–5.2)
RBC # FLD: 14.8 % — HIGH (ref 10.3–14.5)
WBC # BLD: 4.54 K/UL — SIGNIFICANT CHANGE UP (ref 3.8–10.5)
WBC # FLD AUTO: 4.54 K/UL — SIGNIFICANT CHANGE UP (ref 3.8–10.5)

## 2021-09-23 PROCEDURE — 99213 OFFICE O/P EST LOW 20 MIN: CPT

## 2021-09-24 ENCOUNTER — APPOINTMENT (OUTPATIENT)
Dept: MAMMOGRAPHY | Facility: IMAGING CENTER | Age: 46
End: 2021-09-24
Payer: COMMERCIAL

## 2021-09-24 ENCOUNTER — RESULT REVIEW (OUTPATIENT)
Age: 46
End: 2021-09-24

## 2021-09-24 ENCOUNTER — OUTPATIENT (OUTPATIENT)
Dept: OUTPATIENT SERVICES | Facility: HOSPITAL | Age: 46
LOS: 1 days | End: 2021-09-24
Payer: COMMERCIAL

## 2021-09-24 DIAGNOSIS — Z98.890 OTHER SPECIFIED POSTPROCEDURAL STATES: Chronic | ICD-10-CM

## 2021-09-24 DIAGNOSIS — Z00.8 ENCOUNTER FOR OTHER GENERAL EXAMINATION: ICD-10-CM

## 2021-09-24 DIAGNOSIS — Z98.891 HISTORY OF UTERINE SCAR FROM PREVIOUS SURGERY: Chronic | ICD-10-CM

## 2021-09-24 DIAGNOSIS — Z98.51 TUBAL LIGATION STATUS: Chronic | ICD-10-CM

## 2021-09-24 PROCEDURE — 77067 SCR MAMMO BI INCL CAD: CPT | Mod: 26

## 2021-09-24 PROCEDURE — 77063 BREAST TOMOSYNTHESIS BI: CPT

## 2021-09-24 PROCEDURE — 77063 BREAST TOMOSYNTHESIS BI: CPT | Mod: 26

## 2021-09-24 PROCEDURE — 77067 SCR MAMMO BI INCL CAD: CPT

## 2021-09-28 ENCOUNTER — APPOINTMENT (OUTPATIENT)
Dept: PULMONOLOGY | Facility: CLINIC | Age: 46
End: 2021-09-28
Payer: COMMERCIAL

## 2021-09-28 VITALS
DIASTOLIC BLOOD PRESSURE: 82 MMHG | WEIGHT: 190 LBS | TEMPERATURE: 97.5 F | HEIGHT: 64 IN | HEART RATE: 87 BPM | BODY MASS INDEX: 32.44 KG/M2 | OXYGEN SATURATION: 97 % | SYSTOLIC BLOOD PRESSURE: 126 MMHG

## 2021-09-28 PROCEDURE — 99204 OFFICE O/P NEW MOD 45 MIN: CPT | Mod: GC

## 2021-09-28 NOTE — HISTORY OF PRESENT ILLNESS
[FreeTextEntry1] : 47yo F with history of GERD, presenting for evaluation of possible sleep disordered breathing. She has a history of snoring, witnessed apneas, sleep fragmentation. Also complains of nonrestorative sleep as well as sleepiness during the day especially when engaged in passive activities. She has had minor surgeries in the past and needed oxygen during recovery. Was also told by the anesthesiologist that she needs to get checked for obstructive sleep apnea.

## 2021-09-28 NOTE — CONSULT LETTER
[Dear  ___] : Dear  [unfilled], [Consult Letter:] : I had the pleasure of evaluating your patient, [unfilled]. [Please see my note below.] : Please see my note below. [Consult Closing:] : Thank you very much for allowing me to participate in the care of this patient.  If you have any questions, please do not hesitate to contact me. [Sincerely,] : Sincerely, [FreeTextEntry3] : Alesha Montenegro MD

## 2021-09-28 NOTE — PHYSICAL EXAM
[General Appearance - In No Acute Distress] : no acute distress [Normal Conjunctiva] : the conjunctiva exhibited no abnormalities [Low Lying Soft Palate] : low lying soft palate [III] : III [Neck Appearance] : the appearance of the neck was normal [Heart Rate And Rhythm] : heart rate was normal and rhythm regular [Heart Sounds] : normal S1 and S2 [Respiration, Rhythm And Depth] : normal respiratory rhythm and effort [Auscultation Breath Sounds / Voice Sounds] : lungs were clear to auscultation bilaterally [Involuntary Movements] : no involuntary movements were seen [Nail Clubbing] : no clubbing of the fingernails [Non-Pitting] : non-pitting [Skin Color & Pigmentation] : normal skin color and pigmentation [No Focal Deficits] : no focal deficits [Oriented To Time, Place, And Person] : oriented to person, place, and time

## 2021-09-28 NOTE — ASSESSMENT
[FreeTextEntry1] : 47yo F with history of GERD, presenting for evaluation of possible sleep disordered breathing. She has a history of snoring, witnessed apneas, sleep fragmentation. Also complains of nonrestorative sleep as well as sleepiness during the day especially when engaged in passive activities. She has had minor surgeries in the past and needed oxygen during recovery. Was also told by the anesthesiologist that she needs to get checked for obstructive sleep apnea. \par \par Will order dx HSAT\par \par I explained the rationale for treatment of DWIGHT -- to improve quality of life, daytime function and to decrease the cardiometabolic and other medical risks that are associated with untreated DWIGHT. The patient verbalized understanding.\par I explained the rationale for treatment of DWIGHT -- to improve quality of life, daytime function and to decrease the cardiometabolic and other medical risks that are associated with untreated DWIGHT. The patient verbalized understanding.\par I also explained that the patient can expect a follow up call once results of the above study becomes available.\par \par Obesity\par I explained the relationship between obesity and obstructive sleep apnea and the role of weight loss in improving severity of DWIGHT.\par \par

## 2021-09-28 NOTE — REVIEW OF SYSTEMS
[EDS: ESS=____] : daytime somnolence: ESS=[unfilled] [Fatigue] : fatigue [Snoring] : snoring [Witnessed Apneas] : witnessed apnea [Obesity] : obesity [Heartburn] : heartburn [Nocturia] : nocturia [Negative] : Psychiatric [Chest Pain] : no chest pain [Anemia] : no anemia [A.M. Headache] : no headache present upon awakening [Difficulty Initiating Sleep] : no difficulty falling asleep [Lower Extremity Discomfort] : no lower extremity discomfort [Unusual Sleep Behavior] : no unusual sleep behavior [Cataplexy] :  no cataplexy

## 2021-09-29 ENCOUNTER — APPOINTMENT (OUTPATIENT)
Dept: ORTHOPEDIC SURGERY | Facility: HOSPITAL | Age: 46
End: 2021-09-29

## 2021-09-29 NOTE — REVIEW OF SYSTEMS
[Fatigue] : fatigue [Hoarseness] : hoarseness [Palpitations] : palpitations [SOB on Exertion] : shortness of breath during exertion [Muscle Pain] : muscle pain [Fever] : no fever [Chills] : no chills [Night Sweats] : no night sweats [Recent Change In Weight] : ~T no recent weight change [Eye Pain] : no eye pain [Vision Problems] : no vision problems [Dysphagia] : no dysphagia [Chest Pain] : no chest pain [Lower Ext Edema] : no lower extremity edema [Shortness Of Breath] : no shortness of breath [Wheezing] : no wheezing [Abdominal Pain] : no abdominal pain [Vomiting] : no vomiting [Joint Pain] : no joint pain [Muscle Weakness] : no muscle weakness [Skin Rash] : no skin rash [Skin Wound] : no skin wound [Confused] : no confusion [Dizziness] : no dizziness [Fainting] : no fainting [Easy Bleeding] : no tendency for easy bleeding [Easy Bruising] : no tendency for easy bruising [FreeTextEntry2] : +pica [FreeTextEntry4] : voice improved but still with swollen lymph nodes, stable.  [de-identified] : lymph nodes in neck stable.

## 2021-09-29 NOTE — HISTORY OF PRESENT ILLNESS
[de-identified] : 43 yo female with PMHx of gastritis, left benign ovarian cyst (s/p ovarian conservation resection), adenomyosis, uterine fibroids, menorrhagia presents for evaluation of chronic blood loss anemia. Patient has had heavy menses for years with intolerance to oral iron (nausea, vomiting, constipation) undergoing IV iron formulation.  She first had IV iron 2013 in [place] with appropriate Hb response. Additionally she presented in 2017 again for IV iron with Hb ~7 that responded to 13.9.  LMP July 1, 2018. On July 2 2018 she had severe menstrual bleeding that caused her to be dizzy and presented to [facility] ER. There her Hb was 8.6, down from 9.3 in June. She did not receive any iron or blood transfusion in the ED. Denies adverse reactions to iron infusion.\par \par Has close follow up with her GYN and failed multiple OCPs and IUD in an attempt control the bleeding. OCPs gave her acne so she does not want to try that again. Currently, goes through 6-7 pads a day for the first 3 days, and period lasts for about 6 days. She was offered a hysterectomy, currently patient is considering.\par \par Visit Hx:\par 7/16/19: Patient presents today for follow-up from ED visit for menstrual cycle related chronic blood loss anemia with symptoms of fatigue, intermittent dizziness and mild SOB on exertion. She denies chest pain on exertion, or other sources of bleeding including GI. She denies issues with bleeding when she gets cuts and scrapes, and denies bruising.\par She is requesting IV iron as she is unable to tolerate oral supplementation. She is planning a trip to [place] tomorrow for 2 weeks with her daughters that she cannot change. She is requesting IV Iron today. \par \par 2/28/2019: Patient presents after about 7 months since her IV iron infusion. She continues to have heavy periods and has been experiencing weakness and dizziness again with intermittent headaches similar to when she was anemic in the past. She denies SOB with exertion. She did not want a hysterectomy at this time as she may be looking into fertility options since she was recently remarried.\par \par 4/11/19: She is feeling well today and back to her healthy baseline. Her counts have fully recovered. She continues to have heavy periods and will consider further gyn eval and possible hysterectomy in the future.\par \par 8/22/2019: Patient complains of recurrent dizziness and losing her hair again. She also reports fatigue / weakness. She denies pica or shortness of breath. LMP 8/11/19 (heavy bleeding experienced x 5 days). She also complains of tenderness in her right neck where she feels a right swollen node. This was present last visit and has not significantly changed in size. Patient will be evaluated for an additional treatment with Injectafer. She is also being evaluated for endometrial thickening and myomatous uterus and hopes to have a D&C procedure in the near future. She is still discussion fertility options and has not yet decided on hysterectomy.\par \par 9/26/2019: Ms Santos presents for follow-up today. She has completed 2/3 doses of venofer. Injectafer was too expensive due to insurance lapse so she agreed to venofer. After the 1st dose of venofer she had vomiting and retching for 24 hours after the infusion. After the 2nd dose (on 9/17/19) of venofer she started developing chest pain 7/10 around her sternum that was worse with touch. She did not call a doctor, felt it was not internal pain and has been feeling daily improvement of this pain, which she states is felt with bending over and touching her sternum now at 2/10. She skipped her 3rd dose. Today she denies nausea, and complains of enlargement of her right neck lump that was examined last visit. She is planning to go for endometrial polyp removal (hysteroscopy and D&C) after medical clearance, she has already seen pulm for clearance. LMP 9/7, lasted 5 days.\par \par On 12/2/19 patient was new to me. She reports that the lymphadenopathy in her neck feels like it is not necessarily "growing" but it is "joining together". She reports that she feels fatigued but no shortness of breath or anything like that. No pain in the area of the lymph nodes. She feels frustrated that she has continued menstrual bleeding and she had a procedure recently. She cannot tolerate the PO iron according to her and she has been on IV iron in the past. Pending IR appointment Wednesday to evaluate for possible biopsy of lymph node. Of note, recently she overcame the flu but still has mild residual cough. She denied any weight loss, she said she was getting some mild night sweats. \par \par Due to continued iron deificiency and menstrual blood loss, patient had Injectafer x 2 in March 2020. Biopsy of lymph node showed changes consistent with a reactive lymph node on May 14, 2020.\par \par Patient with continued menstrual losses and had second round of IV iron on 9/17/2020 and 9/24/2020. \par She is now s/p uterine fibroid embolization with IR on 3/24/21. \par \par  [de-identified] : She has pain and bloating during and after period since the embolization, pain is 8-9/10 where she can't get out of bed. She takes tylenol brings the pain down to 5/10. When she sneezes the pain is horrible. She is getting an MRI to evaluate. No YBARRA, chest pain or palpitations. Hb normal today. Lymph nodes in her neck are the same and she still gets joint aches every day and they are stiff still. She has a follow up with a hand specialist. No fevers or skin rashes. She is always either constipated or with diarrhea, just had EGD / colonoscopy.

## 2021-09-29 NOTE — ASSESSMENT
[FreeTextEntry1] : 44 year old female with Iron deficiency anemia due to chronic blood loss from menorrhagia. Treated with IV Injectafer in August 2018 and Feb 2019 with adequate response. Unable to tolerate oral iron therapy. Again presented in August 2019 with sx concerning for DIANA, shown to have low iron stores and s/p venofer. \par \par -She is now s/p 2 treatments with Injectafer March 2020 and then 2 x Feraheme in September 2020. Complicated by infusion reaction to Feraheme. Received 5 X Venofer in April 2021. \par -Right cervical LN etiology unclear, reactive on biopsy. Appreciate rheumatology input. Monitor. \par - Patient s/p interventional radiology guided embolization of fibroid. Still with significant pain, but bleeding has resolved and now she has normal CBC. \par -Follow up in future PRN.\par -Patient understands and agrees with plan. All information explained to the best of my ability.\par

## 2021-09-29 NOTE — PHYSICAL EXAM
[Fully active, able to carry on all pre-disease performance without restriction] : Status 0 - Fully active, able to carry on all pre-disease performance without restriction [Normal] : affect appropriate [de-identified] : pale appearing [de-identified] : Posterior to anterior solitary cervical lymph node measuring about 8-10 mm, freely mobile, non-tender NO CHANGE [de-identified] : pale conjunctiva [de-identified] : No axillary or inguinal adenopathy

## 2021-09-30 ENCOUNTER — RESULT REVIEW (OUTPATIENT)
Age: 46
End: 2021-09-30

## 2021-09-30 ENCOUNTER — APPOINTMENT (OUTPATIENT)
Dept: ULTRASOUND IMAGING | Facility: IMAGING CENTER | Age: 46
End: 2021-09-30
Payer: COMMERCIAL

## 2021-09-30 ENCOUNTER — OUTPATIENT (OUTPATIENT)
Dept: OUTPATIENT SERVICES | Facility: HOSPITAL | Age: 46
LOS: 1 days | End: 2021-09-30
Payer: COMMERCIAL

## 2021-09-30 ENCOUNTER — APPOINTMENT (OUTPATIENT)
Dept: MAMMOGRAPHY | Facility: IMAGING CENTER | Age: 46
End: 2021-09-30
Payer: COMMERCIAL

## 2021-09-30 DIAGNOSIS — Z98.890 OTHER SPECIFIED POSTPROCEDURAL STATES: Chronic | ICD-10-CM

## 2021-09-30 DIAGNOSIS — Z00.8 ENCOUNTER FOR OTHER GENERAL EXAMINATION: ICD-10-CM

## 2021-09-30 DIAGNOSIS — Z98.891 HISTORY OF UTERINE SCAR FROM PREVIOUS SURGERY: Chronic | ICD-10-CM

## 2021-09-30 DIAGNOSIS — Z98.51 TUBAL LIGATION STATUS: Chronic | ICD-10-CM

## 2021-09-30 PROCEDURE — G0279: CPT | Mod: 26

## 2021-09-30 PROCEDURE — 76642 ULTRASOUND BREAST LIMITED: CPT

## 2021-09-30 PROCEDURE — 77065 DX MAMMO INCL CAD UNI: CPT | Mod: 26,LT

## 2021-09-30 PROCEDURE — 76642 ULTRASOUND BREAST LIMITED: CPT | Mod: 26,LT

## 2021-09-30 PROCEDURE — G0279: CPT

## 2021-09-30 PROCEDURE — 77065 DX MAMMO INCL CAD UNI: CPT

## 2021-10-02 ENCOUNTER — APPOINTMENT (OUTPATIENT)
Dept: DISASTER EMERGENCY | Facility: OTHER | Age: 46
End: 2021-10-02

## 2021-10-04 ENCOUNTER — APPOINTMENT (OUTPATIENT)
Dept: MRI IMAGING | Facility: CLINIC | Age: 46
End: 2021-10-04
Payer: COMMERCIAL

## 2021-10-04 ENCOUNTER — OUTPATIENT (OUTPATIENT)
Dept: OUTPATIENT SERVICES | Facility: HOSPITAL | Age: 46
LOS: 1 days | End: 2021-10-04

## 2021-10-04 DIAGNOSIS — Z98.890 OTHER SPECIFIED POSTPROCEDURAL STATES: Chronic | ICD-10-CM

## 2021-10-04 DIAGNOSIS — Z98.891 HISTORY OF UTERINE SCAR FROM PREVIOUS SURGERY: Chronic | ICD-10-CM

## 2021-10-04 DIAGNOSIS — Z98.51 TUBAL LIGATION STATUS: Chronic | ICD-10-CM

## 2021-10-04 DIAGNOSIS — D25.9 LEIOMYOMA OF UTERUS, UNSPECIFIED: ICD-10-CM

## 2021-10-04 PROCEDURE — 72197 MRI PELVIS W/O & W/DYE: CPT | Mod: 26

## 2021-10-05 ENCOUNTER — NON-APPOINTMENT (OUTPATIENT)
Age: 46
End: 2021-10-05

## 2021-10-06 ENCOUNTER — APPOINTMENT (OUTPATIENT)
Dept: OBGYN | Facility: HOSPITAL | Age: 46
End: 2021-10-06
Payer: COMMERCIAL

## 2021-10-06 ENCOUNTER — OUTPATIENT (OUTPATIENT)
Dept: OUTPATIENT SERVICES | Facility: HOSPITAL | Age: 46
LOS: 1 days | End: 2021-10-06

## 2021-10-06 VITALS
BODY MASS INDEX: 33.29 KG/M2 | DIASTOLIC BLOOD PRESSURE: 79 MMHG | WEIGHT: 195 LBS | HEART RATE: 82 BPM | SYSTOLIC BLOOD PRESSURE: 121 MMHG | TEMPERATURE: 98.2 F | HEIGHT: 64 IN

## 2021-10-06 DIAGNOSIS — Z98.890 OTHER SPECIFIED POSTPROCEDURAL STATES: Chronic | ICD-10-CM

## 2021-10-06 DIAGNOSIS — Z98.891 HISTORY OF UTERINE SCAR FROM PREVIOUS SURGERY: Chronic | ICD-10-CM

## 2021-10-06 DIAGNOSIS — Z98.51 TUBAL LIGATION STATUS: Chronic | ICD-10-CM

## 2021-10-06 PROCEDURE — 99213 OFFICE O/P EST LOW 20 MIN: CPT | Mod: GC

## 2021-10-07 ENCOUNTER — RESULT CHARGE (OUTPATIENT)
Age: 46
End: 2021-10-07

## 2021-10-08 ENCOUNTER — RESULT REVIEW (OUTPATIENT)
Age: 46
End: 2021-10-08

## 2021-10-08 ENCOUNTER — OUTPATIENT (OUTPATIENT)
Dept: OUTPATIENT SERVICES | Facility: HOSPITAL | Age: 46
LOS: 1 days | End: 2021-10-08

## 2021-10-08 ENCOUNTER — APPOINTMENT (OUTPATIENT)
Dept: INTERNAL MEDICINE | Facility: CLINIC | Age: 46
End: 2021-10-08
Payer: COMMERCIAL

## 2021-10-08 VITALS
HEIGHT: 64 IN | HEART RATE: 89 BPM | DIASTOLIC BLOOD PRESSURE: 86 MMHG | WEIGHT: 193 LBS | BODY MASS INDEX: 32.95 KG/M2 | SYSTOLIC BLOOD PRESSURE: 120 MMHG | RESPIRATION RATE: 16 BRPM | OXYGEN SATURATION: 98 %

## 2021-10-08 DIAGNOSIS — Z98.890 OTHER SPECIFIED POSTPROCEDURAL STATES: Chronic | ICD-10-CM

## 2021-10-08 DIAGNOSIS — Z98.51 TUBAL LIGATION STATUS: Chronic | ICD-10-CM

## 2021-10-08 DIAGNOSIS — Z98.891 HISTORY OF UTERINE SCAR FROM PREVIOUS SURGERY: Chronic | ICD-10-CM

## 2021-10-08 PROCEDURE — ZZZZZ: CPT | Mod: GC

## 2021-10-08 NOTE — RESULTS/DATA
[] : results reviewed [Normal] : The 12 - lead ECG is normal [NSR] : normal sinus rhythm [ECG Intervals IL.] : IL interval is normal [Normal QRS] : the QRS is normal [No Interval Change] : no interval change

## 2021-10-09 ENCOUNTER — APPOINTMENT (OUTPATIENT)
Dept: DISASTER EMERGENCY | Facility: OTHER | Age: 46
End: 2021-10-09

## 2021-10-10 NOTE — ASSESSMENT
[High Risk Surgery - Intraperitoneal, Intrathoracic or Supringuinal Vascular Procedures] : High Risk Surgery - Intraperitoneal, Intrathoracic or Supringuinal Vascular Procedures - No (0) [Ischemic Heart Disease] : Ischemic Heart Disease - No (0) [Congestive Heart Failure] : Congestive Heart Failure - No (0) [Prior Cerebrovascular Accident or TIA] : Prior Cerebrovascular Accident or TIA - No (0) [Creatinine >= 2mg/dL (1 Point)] : Creatinine >= 2mg/dL - No (0) [Insulin-dependent Diabetic (1 Point)] : Insulin-dependent Diabetic - No (0) [0] : 0 , RCRI Class: I, Risk of Post-Op Cardiac Complications: 3.9%, 95% CI for Risk Estimate: 2.8% - 5.4% [Patient Optimized for Surgery] : Patient optimized for surgery [No Further Testing Recommended] : no further testing recommended [FreeTextEntry4] : Patient is low clinical cardiovascular risk for low risk procedure. No medical contraindication for the planned procedure. Suspicion for sleep apnea with work up in progress, additional anesthesia management warranted. \par \par Unrelated to present surgical pre-op clearance, will order MR Head given neurological findings.

## 2021-10-10 NOTE — PLAN
[FreeTextEntry1] : \par Patient is a 47yo F with PMH of uterine fibroids, chronic hip pain, obesity, unexplained lymphadenopathy who presents for pre-op clearance prior to hysteroscopic myomectomy. \par \par Patient seen and discussed with Dr. Laura.\par \par Patient to f/u in clinic in 5 weeks after MRI Head completed. Patient also should get lipid panel at next visit.\par \par Kapil Canales\par PGY1\par [employer]

## 2021-10-10 NOTE — REVIEW OF SYSTEMS
[Lower Ext Edema] : lower extremity edema [Joint Pain] : joint pain [Headache] : headache [Dizziness] : dizziness [Anxiety] : anxiety [Depression] : depression [Fever] : no fever [Chills] : no chills [Night Sweats] : no night sweats [Pain] : no pain [Vision Problems] : no vision problems [Hearing Loss] : no hearing loss [Hoarseness] : no hoarseness [Sore Throat] : no sore throat [Chest Pain] : no chest pain [Palpitations] : no palpitations [Shortness Of Breath] : no shortness of breath [Wheezing] : no wheezing [Cough] : no cough [Abdominal Pain] : no abdominal pain [Nausea] : no nausea [Vomiting] : no vomiting [Dysuria] : no dysuria [Incontinence] : no incontinence [Hematuria] : no hematuria [Itching] : no itching [Suicidal] : not suicidal [Easy Bleeding] : no easy bleeding [Easy Bruising] : no easy bruising [FreeTextEntry5] : 1+ pitting bilaterally near ankles

## 2021-10-10 NOTE — PHYSICAL EXAM
[Well Developed] : well developed [Well Nourished] : well nourished [Looks Tired] : appears tired [Normal Sclera/Conjunctiva] : normal sclera/conjunctiva [PERRL] : pupils equal round and reactive to light [EOMI] : extraocular movements intact [Normal Outer Ear/Nose] : the outer ears and nose were normal in appearance [Normal Oropharynx] : the oropharynx was normal [No JVD] : no jugular venous distention [Supple] : supple [No Respiratory Distress] : no respiratory distress  [No Accessory Muscle Use] : no accessory muscle use [Clear to Auscultation] : lungs were clear to auscultation bilaterally [Normal Rate] : normal rate  [Regular Rhythm] : with a regular rhythm [Normal S1, S2] : normal S1 and S2 [No Murmur] : no murmur heard [Soft] : abdomen soft [Non Tender] : non-tender [Non-distended] : non-distended [No CVA Tenderness] : no CVA  tenderness [No Joint Swelling] : no joint swelling [Grossly Normal Strength/Tone] : grossly normal strength/tone [No Rash] : no rash [de-identified] : some dizziness noted after testing pupils [de-identified] : upper right back tenderness in one spot [de-identified] : 1+ pitting edema lower extremities bilaterally [de-identified] : decreased sensation to touch on right side of face and right upper extremity. 5/5 strength in extremities [de-identified] : anxious, sad about mother's recent passing

## 2021-10-10 NOTE — HISTORY OF PRESENT ILLNESS
[No Adverse Anesthesia Reaction] : no adverse anesthesia reaction in self or family member [(Patient denies any chest pain, claudication, dyspnea on exertion, orthopnea, palpitations or syncope)] : Patient denies any chest pain, claudication, dyspnea on exertion, orthopnea, palpitations or syncope [Excellent (>10 METs)] : Excellent (>10 METs) [Atrial Fibrillation] : no atrial fibrillation [Coronary Artery Disease] : no coronary artery disease [Recent Myocardial Infarction] : no recent myocardial infarction [Implantable Device/Pacemaker] : no implantable device/pacemaker [Asthma] : no asthma [COPD] : no COPD [Chronic Anticoagulation] : no chronic anticoagulation [Chronic Kidney Disease] : no chronic kidney disease [Diabetes] : no diabetes [FreeTextEntry2] : 10/24/21 [FreeTextEntry1] : Hysteroscopic Myomectomy [FreeTextEntry4] : Patient is a 45yo F with PMH of uterine fibroids, chronic hip pain, obesity, unexplained lymphadenopathy who presents for pre-op clearance prior to hysteroscopic myomectomy. Patient recently seen by GYN after recent MRI pelvis 10/4 noted complete internal devascularization of uterine fibroids with the largest decreased in size from 6.2cm -> 5.2cm appearing almost completely intracavitary.\par \par Patient has history of covid infection around 5 months ago, but no current difficulty breathing. Sating well on room air. Patient notes some back pain with breathing but has tender location on right side of her back. Likely MSK related. No change for the past 2-3 weeks. \par \par Patient notes vague neurologic symptoms. Her mother passed away on 9/17/21 due to worsening heart failure and symptom onset after that. Patient has headache that has been constant, some pain on right side. Headache wraps around bilaterally and frontal with pressure. Worsens with light. Pain is constant throughout the day, not worse in morning. No vomiting. Patient notes decreased sensation on right side of face and right upper extremity.  Feeling of shaking internally constantly. [FreeTextEntry5] : Patient undergoing work-up for sleep apnea. [FreeTextEntry7] : EKG today with normal sinus rhythm, no change from prior EKG.

## 2021-10-12 DIAGNOSIS — R42 DIZZINESS AND GIDDINESS: ICD-10-CM

## 2021-10-13 ENCOUNTER — OUTPATIENT (OUTPATIENT)
Dept: OUTPATIENT SERVICES | Facility: HOSPITAL | Age: 46
LOS: 1 days | End: 2021-10-13
Payer: COMMERCIAL

## 2021-10-13 VITALS
RESPIRATION RATE: 18 BRPM | WEIGHT: 195.99 LBS | HEART RATE: 72 BPM | SYSTOLIC BLOOD PRESSURE: 104 MMHG | HEIGHT: 64 IN | OXYGEN SATURATION: 98 % | DIASTOLIC BLOOD PRESSURE: 70 MMHG | TEMPERATURE: 97 F

## 2021-10-13 DIAGNOSIS — D25.9 LEIOMYOMA OF UTERUS, UNSPECIFIED: ICD-10-CM

## 2021-10-13 DIAGNOSIS — D25.9 LEIOMYOMA OF UTERUS, UNSPECIFIED: Chronic | ICD-10-CM

## 2021-10-13 DIAGNOSIS — Z98.890 OTHER SPECIFIED POSTPROCEDURAL STATES: Chronic | ICD-10-CM

## 2021-10-13 DIAGNOSIS — G47.30 SLEEP APNEA, UNSPECIFIED: ICD-10-CM

## 2021-10-13 DIAGNOSIS — Z98.891 HISTORY OF UTERINE SCAR FROM PREVIOUS SURGERY: Chronic | ICD-10-CM

## 2021-10-13 DIAGNOSIS — Z98.51 TUBAL LIGATION STATUS: Chronic | ICD-10-CM

## 2021-10-13 LAB
ANION GAP SERPL CALC-SCNC: 11 MMOL/L — SIGNIFICANT CHANGE UP (ref 7–14)
APPEARANCE UR: CLEAR — SIGNIFICANT CHANGE UP
BACTERIA # UR AUTO: NEGATIVE — SIGNIFICANT CHANGE UP
BILIRUB UR-MCNC: NEGATIVE — SIGNIFICANT CHANGE UP
BUN SERPL-MCNC: 17 MG/DL — SIGNIFICANT CHANGE UP (ref 7–23)
CALCIUM SERPL-MCNC: 9.4 MG/DL — SIGNIFICANT CHANGE UP (ref 8.4–10.5)
CHLORIDE SERPL-SCNC: 100 MMOL/L — SIGNIFICANT CHANGE UP (ref 98–107)
CO2 SERPL-SCNC: 26 MMOL/L — SIGNIFICANT CHANGE UP (ref 22–31)
COLOR SPEC: SIGNIFICANT CHANGE UP
CREAT SERPL-MCNC: 0.79 MG/DL — SIGNIFICANT CHANGE UP (ref 0.5–1.3)
DIFF PNL FLD: NEGATIVE — SIGNIFICANT CHANGE UP
GLUCOSE SERPL-MCNC: 81 MG/DL — SIGNIFICANT CHANGE UP (ref 70–99)
GLUCOSE UR QL: NEGATIVE — SIGNIFICANT CHANGE UP
HCG UR QL: NEGATIVE — SIGNIFICANT CHANGE UP
HCT VFR BLD CALC: 43.5 % — SIGNIFICANT CHANGE UP (ref 34.5–45)
HGB BLD-MCNC: 14.4 G/DL — SIGNIFICANT CHANGE UP (ref 11.5–15.5)
KETONES UR-MCNC: NEGATIVE — SIGNIFICANT CHANGE UP
LEUKOCYTE ESTERASE UR-ACNC: NEGATIVE — SIGNIFICANT CHANGE UP
MCHC RBC-ENTMCNC: 30.1 PG — SIGNIFICANT CHANGE UP (ref 27–34)
MCHC RBC-ENTMCNC: 33.1 GM/DL — SIGNIFICANT CHANGE UP (ref 32–36)
MCV RBC AUTO: 91 FL — SIGNIFICANT CHANGE UP (ref 80–100)
NITRITE UR-MCNC: NEGATIVE — SIGNIFICANT CHANGE UP
NRBC # BLD: 0 /100 WBCS — SIGNIFICANT CHANGE UP
NRBC # FLD: 0 K/UL — SIGNIFICANT CHANGE UP
PH UR: 5.5 — SIGNIFICANT CHANGE UP (ref 5–8)
PLATELET # BLD AUTO: 263 K/UL — SIGNIFICANT CHANGE UP (ref 150–400)
POTASSIUM SERPL-MCNC: 3.8 MMOL/L — SIGNIFICANT CHANGE UP (ref 3.5–5.3)
POTASSIUM SERPL-SCNC: 3.8 MMOL/L — SIGNIFICANT CHANGE UP (ref 3.5–5.3)
PROT UR-MCNC: NEGATIVE — SIGNIFICANT CHANGE UP
RBC # BLD: 4.78 M/UL — SIGNIFICANT CHANGE UP (ref 3.8–5.2)
RBC # FLD: 15.5 % — HIGH (ref 10.3–14.5)
RBC CASTS # UR COMP ASSIST: 1 /HPF — SIGNIFICANT CHANGE UP (ref 0–4)
SODIUM SERPL-SCNC: 137 MMOL/L — SIGNIFICANT CHANGE UP (ref 135–145)
SP GR SPEC: 1.01 — SIGNIFICANT CHANGE UP (ref 1–1.05)
UROBILINOGEN FLD QL: SIGNIFICANT CHANGE UP
WBC # BLD: 5.56 K/UL — SIGNIFICANT CHANGE UP (ref 3.8–10.5)
WBC # FLD AUTO: 5.56 K/UL — SIGNIFICANT CHANGE UP (ref 3.8–10.5)
WBC UR QL: 1 /HPF — SIGNIFICANT CHANGE UP (ref 0–5)

## 2021-10-13 PROCEDURE — 93010 ELECTROCARDIOGRAM REPORT: CPT

## 2021-10-13 RX ORDER — SODIUM CHLORIDE 9 MG/ML
1000 INJECTION, SOLUTION INTRAVENOUS
Refills: 0 | Status: DISCONTINUED | OUTPATIENT
Start: 2021-10-21 | End: 2021-11-04

## 2021-10-13 NOTE — DISCUSSION/SUMMARY
[FreeTextEntry1] : 47 y/o  LMP ~10/1 with pertinent gyn history of fibroid presenting for booking of D&C or hysteroscopy. Pt previously had successful fibroid embolization procedure, given pt symptoms and follow-up imaging, recommend booking for hysteroscopic myomectomy.\par \par #Fibroid uterus\par - s/p fibroid embolization 3/2021, procedure successful with normal post-procedure course, remaining fibroid now 5.2 cm from 6 cm, given pt symptoms of discharge with menstrual periods and change in imaging, recommend hysteroscopic myomectomy for management\par - Prev discussed, per record not good surgical candidate for hysterectomy\par - Recommended medical clearance from PCP given hx of COVID+ infection 2 months prior\par \par d/w Dr Dent\par Miya Lacy\par PGY-1

## 2021-10-13 NOTE — H&P PST ADULT - NSICDXPASTMEDICALHX_GEN_ALL_CORE_FT
PAST MEDICAL HISTORY:  Carpal tunnel syndrome, unspecified laterality     Cyst of left ovary     Endometrial polyp     Iron deficiency anemia, unspecified iron deficiency anemia type last iron infusion was 2 months ago    DWIGHT (obstructive sleep apnea) recesnt sleep study.  No CPAP yet    PUD (peptic ulcer disease)     Uterine leiomyoma, unspecified location      PAST MEDICAL HISTORY:  Carpal tunnel syndrome, unspecified laterality     Cyst of left ovary     Endometrial polyp     Iron deficiency anemia, unspecified iron deficiency anemia type     DWIGHT (obstructive sleep apnea) recesnt sleep study.  No CPAP yet    PUD (peptic ulcer disease)     Uterine leiomyoma, unspecified location      PAST MEDICAL HISTORY:  Carpal tunnel syndrome, unspecified laterality     Cyst of left ovary     Endometrial polyp     GERD (gastroesophageal reflux disease)     Iron deficiency anemia, unspecified iron deficiency anemia type     DWIGHT (obstructive sleep apnea) recesnt sleep study.  No CPAP yet    PUD (peptic ulcer disease)     Uterine leiomyoma, unspecified location

## 2021-10-13 NOTE — H&P PST ADULT - GENITOURINARY COMMENTS
hx of leiomyoma of uterus, s/p uterine embolization 7 months ago. Pt reports symptoms resolved, however there is uterine fibroid remaining.  scheduled for Hysteroscopic myomectomy with myosure

## 2021-10-13 NOTE — H&P PST ADULT - ASSESSMENT
45 y/o female with hx of leiomyoma of uterus, s/p uterine embolization of 7 months ago. Pt reports there is uterine fibroid remaining.  Scheduled for Hysteroscopic myomectomy with myosure

## 2021-10-13 NOTE — H&P PST ADULT - HISTORY OF PRESENT ILLNESS
45 y/o female with hx of leiomyoma of uterus, s/p uterine embolization 7 months ago. Pt reports uterine fibroid remaining.  scheduled for Hysteroscopic myomectomy with myosure 47 y/o female with hx of leiomyoma of uterus, s/p uterine embolization of 7 months ago. Pt reports there is uterine fibroid remaining.  Scheduled for Hysteroscopic myomectomy with myosure

## 2021-10-13 NOTE — H&P PST ADULT - BIRTH SEX
Progress Note - Omari Gonzalez  1943, 78 y o  male MRN: [id number]    Unit/Bed#: -01 Encounter: [id number]    Primary Care Provider: Sherine Naqvi DO   Date and time admitted to hospital: 1/10/2020  6:53 AM        * S/P carotid endarterectomy  Assessment & Plan  79 yo M w/ DM, HTN, recent right hemispheric stroke secondary to high-grade right carotid stenosis now status post right carotid endarterectomy by Dr Yandy Cleary 1/10    Exam : R neck incision intact; mild soft swelling, minimal ecchymosis     Plan:  -Right neck incision intact  Neurologically intact   -Postoperatively has had a elevated blood pressures  Weaned off nitro gtt  Zestril increased 10mg daily  BP better controlled   -ASA + Plavix   -Continue Lipitor   -Discharge home  -Follow up as an outpatient w/ Dr Yandy Cleary   -Follow up with PCP in 1 week for BP check            Subjective:  POD#2 s/p R CEA w/ bovine patch by Dr Yandy Cleary 1/10/20  Patient sitting up in bed  NAD  A+Ox3  Neuro intact  Nitro gtt weaned overnight  BP stable  VSS  Hemodynamically stable  Vitals:  /69 (BP Location: Right arm)   Pulse 74   Temp 98 2 °F (36 8 °C) (Oral)   Resp 14   Ht 5' 9" (1 753 m)   Wt 102 kg (224 lb 8 oz)   SpO2 96%   BMI 33 15 kg/m²     I/Os:  I/O last 3 completed shifts: In: 4076 9 [P O :1680; I V :2396 9]  Out: 3685 [Urine:3685]  No intake/output data recorded      Lab Results and Cultures:   Lab Results   Component Value Date    WBC 8 87 01/11/2020    HGB 11 7 (L) 01/11/2020    HCT 36 1 (L) 01/11/2020    MCV 88 01/11/2020     01/11/2020     Lab Results   Component Value Date    GLUCOSE 112 05/14/2015    CALCIUM 8 5 01/11/2020     05/14/2015    K 3 7 01/11/2020    CO2 30 01/11/2020     01/11/2020    BUN 15 01/11/2020    CREATININE 1 02 01/11/2020     Lab Results   Component Value Date    INR 1 21 (H) 01/11/2020    INR 1 11 01/03/2020    PROTIME 15 0 (H) 01/11/2020    PROTIME 14 0 01/03/2020        Blood Culture: No results found for: BLOODCX,   Urinalysis: No results found for: COLORU, CLARITYU, SPECGRAV, PHUR, LEUKOCYTESUR, NITRITE, PROTEINUA, GLUCOSEU, KETONESU, BILIRUBINUR, BLOODU,   Urine Culture: No results found for: URINECX,   Wound Culure:   Lab Results   Component Value Date    WOUNDCULT 1+ Growth of Staphylococcus aureus (A) 05/18/2018    WOUNDCULT 2+ Growth of  05/18/2018       Medications:  Current Facility-Administered Medications   Medication Dose Route Frequency    acetaminophen (TYLENOL) tablet 650 mg  650 mg Oral Q6H PRN    aspirin (ECOTRIN LOW STRENGTH) EC tablet 81 mg  81 mg Oral Daily    atorvastatin (LIPITOR) tablet 40 mg  40 mg Oral QPM    clopidogrel (PLAVIX) tablet 75 mg  75 mg Oral Daily    fluticasone (FLONASE) 50 mcg/act nasal spray 1 spray  1 spray Each Nare BID    heparin (porcine) subcutaneous injection 5,000 Units  5,000 Units Subcutaneous Q8H [address]    insulin lispro (HumaLOG) 100 units/mL subcutaneous injection 1-5 Units  1-5 Units Subcutaneous HS    insulin lispro (HumaLOG) 100 units/mL subcutaneous injection 1-6 Units  1-6 Units Subcutaneous TID AC    lisinopril (ZESTRIL) tablet 10 mg  10 mg Oral Daily    oxyCODONE-acetaminophen (PERCOCET) 5-325 mg per tablet 1 tablet  1 tablet Oral Q4H PRN    oxyCODONE-acetaminophen (PERCOCET) 5-325 mg per tablet 2 tablet  2 tablet Oral Q6H PRN    phenol (CHLORASEPTIC) 1 4 % mucosal liquid 1 spray  1 spray Mouth/Throat Q2H PRN    polyethylene glycol (MIRALAX) packet 17 g  17 g Oral Once    triamterene-hydrochlorothiazide (MAXZIDE-25) 37 5-25 mg per tablet 2 tablet  2 tablet Oral Daily         Physical Exam:    General appearance: alert and oriented, in no acute distress  Skin: Skin color, texture, turgor normal  No rashes or lesions  Neurologic: Alert and oriented X 3, normal strength and tone  Normal symmetric reflexes   Normal coordination and gait  Head: Normocephalic, without obvious abnormality, atraumatic  Eyes: EOMI  Throat: lips, mucosa, and tongue normal; teeth and gums normal  Neck: R neck incision intact   Mild swelling   Lungs: clear to auscultation bilaterally  Chest wall: no tenderness  Heart: regular rate and rhythm, S1, S2 normal, no murmur, click, rub or gallop  Abdomen: soft, non-tender; bowel sounds normal; no masses,  no organomegaly  Extremities: extremities normal, warm and well-perfused; no cyanosis, clubbing, or edema    Wound/Incision:  R neck incision clean, dry, and intact          BLAS [place]  1/12/2020  [facility]  [phone] Female

## 2021-10-13 NOTE — H&P PST ADULT - NSICDXPASTSURGICALHX_GEN_ALL_CORE_FT
PAST SURGICAL HISTORY:  Fibroid uterus embolization 3/2021    H/O abdominoplasty     H/O ovarian cystectomy laparotomy    History of bilateral tubal ligation     History of D&C Polypectomy 2019    History of elbow surgery right; 2018    S/P  x2    S/P dilation and curettage      PAST SURGICAL HISTORY:  Fibroid uterus embolization 3/2021    H/O abdominoplasty     H/O ovarian cystectomy laparotomy    History of bilateral tubal ligation 22 years ago    History of D&C Polypectomy 2019    History of elbow surgery right; 2018    S/P  x2    S/P dilation and curettage

## 2021-10-13 NOTE — HISTORY OF PRESENT ILLNESS
[FreeTextEntry1] : 47 y/o  LMP ~10/1 presenting for booking of D&C/hysteroscopy.\par \par Pt previously had fibroid embolization 3/2021. Has been following with Gyn and IR for appropriate post-op check. States that she has been having pain with periods as well as a sensation of burning, passage of discharge. Previously discussed with both gynecology and IR, patient was told symptoms are normal. No changes in symptoms since last visits. Denies any fever, chills, foul-smelling discharge. Not currently having any discharge or abdominal pain.\par \par ObHx: full-term CS , full-term CS \par GynHx: fibroids\par PMH: gastritis\par Meds: Omeprazole\par SurgHx: CSx2 (), L cystectomy lap converted to open (), D&Cx2 for polyps\par

## 2021-10-13 NOTE — END OF VISIT
[] : Fellow [FreeTextEntry3] : Patient seen and counseled, agree with above. Patient with extensive surgical hystory, prompting prior recommendation for UAE over hysterectomy to treat AUB. Patient had good response to AUB, but most recently bleeding started getting heavier again, though not at level as prior to UAE. MRI was performed and shows that her previous intramural fibroid seems to now be degenerating and aborting more into the endometrial cavity with an appearance that is now more consistent with a type 1 myoma. Given new location and degenerating nature of myoma, hysteroscopic resection is recommended to remove fibroid and prevent further bleeding and chance of aborting through the cervix. Patient expressed understanding, will proceed with booking of hysteroscopic myomectomy.\par \christine Dent MD

## 2021-10-15 ENCOUNTER — APPOINTMENT (OUTPATIENT)
Dept: MRI IMAGING | Facility: IMAGING CENTER | Age: 46
End: 2021-10-15
Payer: COMMERCIAL

## 2021-10-15 ENCOUNTER — OUTPATIENT (OUTPATIENT)
Dept: OUTPATIENT SERVICES | Facility: HOSPITAL | Age: 46
LOS: 1 days | End: 2021-10-15
Payer: COMMERCIAL

## 2021-10-15 DIAGNOSIS — Z98.890 OTHER SPECIFIED POSTPROCEDURAL STATES: Chronic | ICD-10-CM

## 2021-10-15 DIAGNOSIS — R42 DIZZINESS AND GIDDINESS: ICD-10-CM

## 2021-10-15 DIAGNOSIS — Z98.891 HISTORY OF UTERINE SCAR FROM PREVIOUS SURGERY: Chronic | ICD-10-CM

## 2021-10-15 DIAGNOSIS — D25.9 LEIOMYOMA OF UTERUS, UNSPECIFIED: Chronic | ICD-10-CM

## 2021-10-15 DIAGNOSIS — Z98.51 TUBAL LIGATION STATUS: Chronic | ICD-10-CM

## 2021-10-15 DIAGNOSIS — Z00.8 ENCOUNTER FOR OTHER GENERAL EXAMINATION: ICD-10-CM

## 2021-10-15 PROBLEM — K21.9 GASTRO-ESOPHAGEAL REFLUX DISEASE WITHOUT ESOPHAGITIS: Chronic | Status: ACTIVE | Noted: 2021-10-13

## 2021-10-15 PROBLEM — G47.33 OBSTRUCTIVE SLEEP APNEA (ADULT) (PEDIATRIC): Chronic | Status: ACTIVE | Noted: 2021-10-13

## 2021-10-15 PROCEDURE — A9585: CPT

## 2021-10-15 PROCEDURE — 70553 MRI BRAIN STEM W/O & W/DYE: CPT

## 2021-10-15 PROCEDURE — 70553 MRI BRAIN STEM W/O & W/DYE: CPT | Mod: 26

## 2021-10-18 ENCOUNTER — APPOINTMENT (OUTPATIENT)
Dept: OTOLARYNGOLOGY | Facility: CLINIC | Age: 46
End: 2021-10-18
Payer: COMMERCIAL

## 2021-10-18 ENCOUNTER — APPOINTMENT (OUTPATIENT)
Dept: DISASTER EMERGENCY | Facility: CLINIC | Age: 46
End: 2021-10-18

## 2021-10-18 ENCOUNTER — OUTPATIENT (OUTPATIENT)
Dept: OUTPATIENT SERVICES | Facility: HOSPITAL | Age: 46
LOS: 1 days | Discharge: HOME | End: 2021-10-18

## 2021-10-18 VITALS — SYSTOLIC BLOOD PRESSURE: 122 MMHG | DIASTOLIC BLOOD PRESSURE: 89 MMHG | HEART RATE: 80 BPM

## 2021-10-18 VITALS — BODY MASS INDEX: 32.78 KG/M2 | WEIGHT: 192 LBS | HEIGHT: 64 IN

## 2021-10-18 DIAGNOSIS — Z98.890 OTHER SPECIFIED POSTPROCEDURAL STATES: Chronic | ICD-10-CM

## 2021-10-18 DIAGNOSIS — D25.9 LEIOMYOMA OF UTERUS, UNSPECIFIED: Chronic | ICD-10-CM

## 2021-10-18 DIAGNOSIS — Z98.891 HISTORY OF UTERINE SCAR FROM PREVIOUS SURGERY: Chronic | ICD-10-CM

## 2021-10-18 DIAGNOSIS — Z98.51 TUBAL LIGATION STATUS: Chronic | ICD-10-CM

## 2021-10-18 PROCEDURE — 99214 OFFICE O/P EST MOD 30 MIN: CPT | Mod: 25

## 2021-10-18 PROCEDURE — 31575 DIAGNOSTIC LARYNGOSCOPY: CPT

## 2021-10-18 RX ORDER — SUCRALFATE 1 G/1
1 TABLET ORAL
Qty: 180 | Refills: 1 | Status: DISCONTINUED | COMMUNITY
Start: 2021-05-05 | End: 2021-10-18

## 2021-10-18 RX ORDER — SUCRALFATE 1 G/10ML
1 SUSPENSION ORAL
Qty: 1000 | Refills: 3 | Status: DISCONTINUED | COMMUNITY
Start: 2021-05-07 | End: 2021-10-18

## 2021-10-18 NOTE — HISTORY OF PRESENT ILLNESS
[de-identified] : 46 year old female with pmh arachnoid cyst, fibroids, joint pain (negative for rheum workup) last seen by Dr. Reinoso, follow up for lymphadenopathy. Patient describes 2.5 years in submental and right neck. She feels that the submental lymph nodes change in size, and that it grew in size after the FNA. She feels the R neck LAD has been stable. They slightly grow in size when she feels a URI or allergies. reports she started to have the LAD after getting immigration vaccinations. reports she also had axillary LAD on a recent mammogram. Has globus sensation. was evaluated by Dr. Reinoso for hoarseness and found to have reflux without any findings.  rhem workup negative, HIV neg. Was seeing heme onc for iron infusions 2/2 to anemia due to her fibroids, who intiated workup and referred to ENT initially.\par \par had of FNA of submental node in May 2020: \par Final Diagnosis\par LYMPH NODE, SUBMENTAL, RIGHT, US GUIDED FNA\par NEGATIVE FOR MALIGNANT CELLS.\par Favor Reactive Lymph Node.\par \par all labs in workup negative except for positive EBV.\par \par \par Also being evaluated for DWIGHT, home sleep study pending delivery of machine, Followed by [facility], Dr. Melendez, will pick it up next week for sleep study.\par \par She reports she has had decreased L sided sensation in her face for the last month since her mother  and she had a nervous crisis. She had an MRI  head last Friday, not in system yet.\par

## 2021-10-18 NOTE — PHYSICAL EXAM
[Midline] : trachea located in midline position [Normal] : cranial nerves 2-12 intact [de-identified] : small <1cm R LADs in level 2, small <1cm node in submentum, normal thyroid, normal SMG [de-identified] : except decreased sensation CN V in the R face in all divisions

## 2021-10-18 NOTE — REASON FOR VISIT
[Subsequent Evaluation] : a subsequent evaluation for [FreeTextEntry2] : last seen by Dr. Reinoso, follow up for DWIGHT

## 2021-10-19 LAB — SARS-COV-2 N GENE NPH QL NAA+PROBE: NOT DETECTED

## 2021-10-20 ENCOUNTER — TRANSCRIPTION ENCOUNTER (OUTPATIENT)
Age: 46
End: 2021-10-20

## 2021-10-21 ENCOUNTER — OUTPATIENT (OUTPATIENT)
Dept: OUTPATIENT SERVICES | Facility: HOSPITAL | Age: 46
LOS: 1 days | Discharge: HOME | End: 2021-10-21
Payer: COMMERCIAL

## 2021-10-21 ENCOUNTER — APPOINTMENT (OUTPATIENT)
Dept: OBGYN | Facility: HOSPITAL | Age: 46
End: 2021-10-21

## 2021-10-21 ENCOUNTER — RESULT REVIEW (OUTPATIENT)
Age: 46
End: 2021-10-21

## 2021-10-21 VITALS
SYSTOLIC BLOOD PRESSURE: 116 MMHG | HEART RATE: 98 BPM | RESPIRATION RATE: 16 BRPM | DIASTOLIC BLOOD PRESSURE: 70 MMHG | OXYGEN SATURATION: 98 %

## 2021-10-21 VITALS
OXYGEN SATURATION: 97 % | DIASTOLIC BLOOD PRESSURE: 81 MMHG | TEMPERATURE: 98 F | HEART RATE: 89 BPM | RESPIRATION RATE: 18 BRPM | WEIGHT: 195.99 LBS | HEIGHT: 64 IN | SYSTOLIC BLOOD PRESSURE: 114 MMHG

## 2021-10-21 DIAGNOSIS — D25.9 LEIOMYOMA OF UTERUS, UNSPECIFIED: Chronic | ICD-10-CM

## 2021-10-21 DIAGNOSIS — Z98.890 OTHER SPECIFIED POSTPROCEDURAL STATES: Chronic | ICD-10-CM

## 2021-10-21 DIAGNOSIS — Z98.51 TUBAL LIGATION STATUS: Chronic | ICD-10-CM

## 2021-10-21 DIAGNOSIS — Z98.891 HISTORY OF UTERINE SCAR FROM PREVIOUS SURGERY: Chronic | ICD-10-CM

## 2021-10-21 DIAGNOSIS — D25.9 LEIOMYOMA OF UTERUS, UNSPECIFIED: ICD-10-CM

## 2021-10-21 PROCEDURE — 88305 TISSUE EXAM BY PATHOLOGIST: CPT | Mod: 26

## 2021-10-21 PROCEDURE — 58561 HYSTEROSCOPY REMOVE MYOMA: CPT

## 2021-10-21 RX ORDER — FENTANYL CITRATE 50 UG/ML
50 INJECTION INTRAVENOUS
Refills: 0 | Status: DISCONTINUED | OUTPATIENT
Start: 2021-10-21 | End: 2021-10-21

## 2021-10-21 RX ORDER — SODIUM CHLORIDE 9 MG/ML
1000 INJECTION, SOLUTION INTRAVENOUS
Refills: 0 | Status: DISCONTINUED | OUTPATIENT
Start: 2021-10-21 | End: 2021-11-04

## 2021-10-21 RX ORDER — ONDANSETRON 8 MG/1
4 TABLET, FILM COATED ORAL ONCE
Refills: 0 | Status: COMPLETED | OUTPATIENT
Start: 2021-10-21 | End: 2021-10-21

## 2021-10-21 RX ORDER — FENTANYL CITRATE 50 UG/ML
25 INJECTION INTRAVENOUS
Refills: 0 | Status: DISCONTINUED | OUTPATIENT
Start: 2021-10-21 | End: 2021-10-21

## 2021-10-21 RX ADMIN — ONDANSETRON 4 MILLIGRAM(S): 8 TABLET, FILM COATED ORAL at 16:56

## 2021-10-21 NOTE — ASU PREOP CHECKLIST - LATEX ALLERGY
Patient left a message for [facility] in regards to his allergy injection appointment 2/4/21. The patient stated that he needs to cancel his appointment because he is sick. I called the patient back and left a message for him. The patient called yesterday to verify his appointment.
The patient called [facility] to reschedule his appointment, he stated that he did not know he had an appointment scheduled for today 2/17/21. The patient has been scheduled for an allergy shot 2/24/21.
The patient has no showed 4 allergy injection appointments this year.
yes

## 2021-10-21 NOTE — ASU DISCHARGE PLAN (ADULT/PEDIATRIC) - PROCEDURE
Hysteroscopy with Dilation and Curettage, uterine Myomectomy Hysteroscopy with Dilation and Curettage, Uterine Myomectomy

## 2021-10-21 NOTE — BRIEF OPERATIVE NOTE - OPERATION/FINDINGS
EUA: Anteverted 10 week size uterus  Hysteroscopy: Uterine cavity obstructed by large, anterior-left lateral fibroid with friable consistency. R ostia visualized and wnl, L ostia obstructed by fibroid prior to resection.

## 2021-10-21 NOTE — ASU DISCHARGE PLAN (ADULT/PEDIATRIC) - PROVIDER TOKENS
FREE:[LAST:[[facility]],PHONE:[phone]],FAX:[(   )    -],ADDRESS:[[place] [address] [place] [address]]]

## 2021-10-21 NOTE — ASU DISCHARGE PLAN (ADULT/PEDIATRIC) - CARE PROVIDER_API CALL
[facility]   [facility] Bas[facility]  [phone] [place] [address]  Phone: [phone]  Fax: (   )    -  Follow Up Time:

## 2021-10-21 NOTE — BRIEF OPERATIVE NOTE - NSICDXBRIEFPROCEDURE_GEN_ALL_CORE_FT
PROCEDURES:  Exam under anesthesia, vagina 21-Oct-2021 16:18:34  Leatha Graves  Dilation and curettage, uterus 21-Oct-2021 16:18:55  Leatha Graves  Diagnostic hysteroscopy 21-Oct-2021 18:10:40  Leatha Graves  Hysteroscopy, with dilation and curettage of uterus and polypectomy or uterine myomectomy using MyoSure tissue removal system 21-Oct-2021 18:11:38  Leatha Graves

## 2021-10-25 ENCOUNTER — OUTPATIENT (OUTPATIENT)
Dept: OUTPATIENT SERVICES | Facility: HOSPITAL | Age: 46
LOS: 1 days | End: 2021-10-25
Payer: SELF-PAY

## 2021-10-25 ENCOUNTER — APPOINTMENT (OUTPATIENT)
Dept: SLEEP CENTER | Facility: CLINIC | Age: 46
End: 2021-10-25
Payer: COMMERCIAL

## 2021-10-25 DIAGNOSIS — D25.9 LEIOMYOMA OF UTERUS, UNSPECIFIED: Chronic | ICD-10-CM

## 2021-10-25 DIAGNOSIS — Z98.890 OTHER SPECIFIED POSTPROCEDURAL STATES: Chronic | ICD-10-CM

## 2021-10-25 DIAGNOSIS — Z98.51 TUBAL LIGATION STATUS: Chronic | ICD-10-CM

## 2021-10-25 DIAGNOSIS — Z98.891 HISTORY OF UTERINE SCAR FROM PREVIOUS SURGERY: Chronic | ICD-10-CM

## 2021-10-25 PROCEDURE — 95806 SLEEP STUDY UNATT&RESP EFFT: CPT

## 2021-10-25 PROCEDURE — 95806 SLEEP STUDY UNATT&RESP EFFT: CPT | Mod: 26

## 2021-10-27 LAB — SURGICAL PATHOLOGY STUDY: SIGNIFICANT CHANGE UP

## 2021-10-28 DIAGNOSIS — G47.33 OBSTRUCTIVE SLEEP APNEA (ADULT) (PEDIATRIC): ICD-10-CM

## 2021-11-09 ENCOUNTER — APPOINTMENT (OUTPATIENT)
Dept: INTERNAL MEDICINE | Facility: CLINIC | Age: 46
End: 2021-11-09
Payer: COMMERCIAL

## 2021-11-09 ENCOUNTER — OUTPATIENT (OUTPATIENT)
Dept: OUTPATIENT SERVICES | Facility: HOSPITAL | Age: 46
LOS: 1 days | End: 2021-11-09

## 2021-11-09 VITALS
WEIGHT: 192 LBS | BODY MASS INDEX: 32.78 KG/M2 | SYSTOLIC BLOOD PRESSURE: 125 MMHG | HEIGHT: 64 IN | OXYGEN SATURATION: 98 % | DIASTOLIC BLOOD PRESSURE: 80 MMHG | HEART RATE: 86 BPM

## 2021-11-09 VITALS — TEMPERATURE: 96.5 F

## 2021-11-09 DIAGNOSIS — Z98.890 OTHER SPECIFIED POSTPROCEDURAL STATES: Chronic | ICD-10-CM

## 2021-11-09 DIAGNOSIS — Z98.891 HISTORY OF UTERINE SCAR FROM PREVIOUS SURGERY: Chronic | ICD-10-CM

## 2021-11-09 DIAGNOSIS — Z98.51 TUBAL LIGATION STATUS: Chronic | ICD-10-CM

## 2021-11-09 DIAGNOSIS — D25.9 LEIOMYOMA OF UTERUS, UNSPECIFIED: Chronic | ICD-10-CM

## 2021-11-09 DIAGNOSIS — R42 DIZZINESS AND GIDDINESS: ICD-10-CM

## 2021-11-09 PROCEDURE — ZZZZZ: CPT | Mod: GC

## 2021-11-10 ENCOUNTER — OUTPATIENT (OUTPATIENT)
Dept: OUTPATIENT SERVICES | Facility: HOSPITAL | Age: 46
LOS: 1 days | End: 2021-11-10

## 2021-11-10 ENCOUNTER — APPOINTMENT (OUTPATIENT)
Dept: OBGYN | Facility: HOSPITAL | Age: 46
End: 2021-11-10
Payer: COMMERCIAL

## 2021-11-10 VITALS
SYSTOLIC BLOOD PRESSURE: 126 MMHG | HEIGHT: 64 IN | HEART RATE: 87 BPM | BODY MASS INDEX: 32.44 KG/M2 | DIASTOLIC BLOOD PRESSURE: 80 MMHG | WEIGHT: 190 LBS | TEMPERATURE: 98 F

## 2021-11-10 DIAGNOSIS — Z98.890 OTHER SPECIFIED POSTPROCEDURAL STATES: Chronic | ICD-10-CM

## 2021-11-10 DIAGNOSIS — D25.9 LEIOMYOMA OF UTERUS, UNSPECIFIED: Chronic | ICD-10-CM

## 2021-11-10 DIAGNOSIS — Z98.51 TUBAL LIGATION STATUS: Chronic | ICD-10-CM

## 2021-11-10 DIAGNOSIS — Z98.891 HISTORY OF UTERINE SCAR FROM PREVIOUS SURGERY: Chronic | ICD-10-CM

## 2021-11-10 PROBLEM — R42 DIZZINESS: Status: ACTIVE | Noted: 2021-10-08

## 2021-11-10 PROCEDURE — 99024 POSTOP FOLLOW-UP VISIT: CPT | Mod: GC

## 2021-11-10 NOTE — HISTORY OF PRESENT ILLNESS
[FreeTextEntry1] : Follow up and right sided numbness [de-identified] : Patient is a 47yo F with uterine fibroids, chronic hip pain, obesity, unexplained lymphadenopathy who presents for follow-up appointment after her GYN procedure.\par \par Patient notes the procedure went well. She did have some side effects from the general anesthesia and felt dizzy for around 3 days but now feels like those symptoms are relieved. Patient also notes some throat discomfort likely related to intubation for procedure. She is able to swallow without a problem and denies any chest pain or shortness of breath. \par \par Patient notes she still has feelings of generalized right sided numbness, change in sensation. She also notes a heaviness feeling in the frontal and right temporal regions of her head. She recently had MR Head done that showed no acute intracranial pathology and unchanged right retrocerebellar arachnoid cyst versus a prominent cisterna magna. Patient also notes a feeling of generalized shaking feeling in her body that is unchanged from prior visit. Patient notes grief over recent loss of her mother but states that she has less depression now compared to before. She has her family for support. Denies SI/HI.

## 2021-11-10 NOTE — PLAN
[FreeTextEntry1] : Patient is a 45yo F with PMH of uterine fibroids, chronic hip pain, obesity, unexplained lymphadenopathy who presents for follow-up appointment after her GYN procedure. She will require neuro referral for sensory deficits and TTE given family hx of cardiomegaly. \par \par RTC in 6 months pending neuro evaluation.\par \par Case seen and discussed with Dr. Soria.\par \par Kapil Canales MD\par PGY1\par Firm 2
Skin normal color for race, warm, dry and intact. No evidence of rash.

## 2021-11-10 NOTE — PHYSICAL EXAM
[No Acute Distress] : no acute distress [Well Developed] : well developed [Well-Appearing] : well-appearing [Normal Sclera/Conjunctiva] : normal sclera/conjunctiva [PERRL] : pupils equal round and reactive to light [EOMI] : extraocular movements intact [Normal Outer Ear/Nose] : the outer ears and nose were normal in appearance [Normal Oropharynx] : the oropharynx was normal [No JVD] : no jugular venous distention [Supple] : supple [No Respiratory Distress] : no respiratory distress  [No Accessory Muscle Use] : no accessory muscle use [Clear to Auscultation] : lungs were clear to auscultation bilaterally [Normal Rate] : normal rate  [Regular Rhythm] : with a regular rhythm [Normal S1, S2] : normal S1 and S2 [No Murmur] : no murmur heard [No Edema] : there was no peripheral edema [Soft] : abdomen soft [Non Tender] : non-tender [Non-distended] : non-distended [Normal Posterior Cervical Nodes] : no posterior cervical lymphadenopathy [Normal Anterior Cervical Nodes] : no anterior cervical lymphadenopathy [No CVA Tenderness] : no CVA  tenderness [No Spinal Tenderness] : no spinal tenderness [No Joint Swelling] : no joint swelling [Grossly Normal Strength/Tone] : grossly normal strength/tone [No Rash] : no rash [Coordination Grossly Intact] : coordination grossly intact [No Focal Deficits] : no focal deficits [Normal Gait] : normal gait [Normal Affect] : the affect was normal [Normal Insight/Judgement] : insight and judgment were intact [de-identified] : full strength in all extremities, decreased sensation in right face, right upper extremity and  right lower extremity

## 2021-11-10 NOTE — PHYSICAL EXAM
[Appropriately responsive] : appropriately responsive [Alert] : alert [No Acute Distress] : no acute distress [Soft] : soft [Non-tender] : non-tender [Non-distended] : non-distended

## 2021-11-10 NOTE — REVIEW OF SYSTEMS
[Fatigue] : fatigue [Hoarseness] : hoarseness [Sore Throat] : sore throat [Headache] : headache [Fever] : no fever [Chills] : no chills [Discharge] : no discharge [Pain] : no pain [Redness] : no redness [Earache] : no earache [Chest Pain] : no chest pain [Palpitations] : no palpitations [Shortness Of Breath] : no shortness of breath [Wheezing] : no wheezing [Abdominal Pain] : no abdominal pain [Nausea] : no nausea [Vomiting] : no vomiting [Dysuria] : no dysuria [Incontinence] : no incontinence [Frequency] : no frequency [Joint Pain] : no joint pain [Joint Stiffness] : no joint stiffness [Itching] : no itching [Suicidal] : not suicidal [Easy Bleeding] : no easy bleeding [Easy Bruising] : no easy bruising [de-identified] : paresthesias in right side [de-identified] : at times depressed when thinking about her mother

## 2021-11-11 ENCOUNTER — APPOINTMENT (OUTPATIENT)
Dept: GASTROENTEROLOGY | Facility: CLINIC | Age: 46
End: 2021-11-11
Payer: COMMERCIAL

## 2021-11-11 ENCOUNTER — OUTPATIENT (OUTPATIENT)
Dept: OUTPATIENT SERVICES | Facility: HOSPITAL | Age: 46
LOS: 1 days | End: 2021-11-11

## 2021-11-11 VITALS
SYSTOLIC BLOOD PRESSURE: 120 MMHG | RESPIRATION RATE: 16 BRPM | BODY MASS INDEX: 32.44 KG/M2 | DIASTOLIC BLOOD PRESSURE: 75 MMHG | OXYGEN SATURATION: 97 % | WEIGHT: 190 LBS | HEART RATE: 95 BPM | HEIGHT: 64 IN

## 2021-11-11 DIAGNOSIS — Z98.890 OTHER SPECIFIED POSTPROCEDURAL STATES: Chronic | ICD-10-CM

## 2021-11-11 DIAGNOSIS — Z98.51 TUBAL LIGATION STATUS: Chronic | ICD-10-CM

## 2021-11-11 DIAGNOSIS — Z98.891 HISTORY OF UTERINE SCAR FROM PREVIOUS SURGERY: Chronic | ICD-10-CM

## 2021-11-11 DIAGNOSIS — D25.9 LEIOMYOMA OF UTERUS, UNSPECIFIED: Chronic | ICD-10-CM

## 2021-11-11 DIAGNOSIS — K64.9 UNSPECIFIED HEMORRHOIDS: ICD-10-CM

## 2021-11-11 PROCEDURE — ZZZZZ: CPT

## 2021-11-11 RX ORDER — OMEPRAZOLE 40 MG/1
40 CAPSULE, DELAYED RELEASE ORAL TWICE DAILY
Qty: 28 | Refills: 0 | Status: DISCONTINUED | COMMUNITY
End: 2021-11-11

## 2021-11-11 RX ORDER — FAMOTIDINE 40 MG/1
40 TABLET, FILM COATED ORAL
Qty: 30 | Refills: 2 | Status: DISCONTINUED | COMMUNITY
Start: 2021-10-18 | End: 2021-11-11

## 2021-11-11 NOTE — PHYSICAL EXAM
[General Appearance - Alert] : alert [General Appearance - In No Acute Distress] : in no acute distress [Auscultation Breath Sounds / Voice Sounds] : lungs were clear to auscultation bilaterally [Heart Rate And Rhythm] : heart rate was normal and rhythm regular [Heart Sounds Gallop] : no gallops [Heart Sounds] : normal S1 and S2 [Murmurs] : no murmurs [Heart Sounds Pericardial Friction Rub] : no pericardial rub [Bowel Sounds] : normal bowel sounds [Abdomen Soft] : soft [Abdomen Tenderness] : non-tender [] : no hepato-splenomegaly [Abdomen Mass (___ Cm)] : no abdominal mass palpated [Deep Tendon Reflexes (DTR)] : deep tendon reflexes were 2+ and symmetric [Sensation] : the sensory exam was normal to light touch and pinprick [No Focal Deficits] : no focal deficits

## 2021-11-11 NOTE — HISTORY OF PRESENT ILLNESS
[Heartburn] : stable heartburn [Nausea] : denies nausea [Vomiting] : denies vomiting [Diarrhea] : denies diarrhea [Constipation] : stable constipation [Yellow Skin Or Eyes (Jaundice)] : denies jaundice [Abdominal Pain] : denies abdominal pain [Abdominal Swelling] : denies abdominal swelling [Rectal Pain] : denies rectal pain [GERD] : gastroesophageal reflux disease [Wt Gain ___ Lbs] : no recent weight gain [Wt Loss ___ Lbs] : no recent weight loss [Hiatus Hernia] : no hiatus hernia [Peptic Ulcer Disease] : no peptic ulcer disease [Pancreatitis] : no pancreatitis [Cholelithiasis] : no cholelithiasis [Kidney Stone] : no kidney stone [Inflammatory Bowel Disease] : no inflammatory bowel disease [Irritable Bowel Syndrome] : no irritable bowel syndrome [Diverticulitis] : no diverticulitis [Alcohol Abuse] : no alcohol abuse [Malignancy] : no malignancy [Abdominal Surgery] : no abdominal surgery [Appendectomy] : no appendectomy [Cholecystectomy] : no cholecystectomy [de-identified] : 45F w/ DIANA 2/2 menorrhagia s/p uterine fibroid embolization 3/2021. Seen in GI clinic for abd pain and hematochezia 5/2021. \par \par 8/2021:\par EGD was normal with normal biopsies.\par Colonoscopy was normal other than internal hemorrhoids. \par \par Prior EGD 2017 showed gastric erosions - H pylori negative. \par \par Today patient describes epigastric discomfort. She says that when she lies down (day and night) she has chest burning and sometimes epigastic discomfort. This did not improve w/ correctly taking 40mg PPI daily for 3 months. When she stopped the PPI her symptoms did not change. \par \par She says she eats slowly, and sometimes feels full more easily, but does not feel full after only a few bites. She denies bloating/belching/abd distension. She denies diarrhea. She has long-standing constipation for which she takes fiber with some improvement. Denies hematochezia/melena. No weight loss. No NSAIDs or relevant FHx. \par

## 2021-11-11 NOTE — ASSESSMENT
[FreeTextEntry1] : Impression:\par #Dyspepsia - D/dx GERD vs IBS vs gastric hypersensitivity vs SIBO vs gastroparesis. Non-responsive to PPI daily but symptoms are c/w acid etiology.\par #Chronic constipation - improved w/ fiber\par #Internal hemorrhoids\par \par Recommendations:\par - Maximal acid suppression x 1 month with BID PPI and H2RA qHS.\par - If no improvement, can consider EGD/Bravo to confirm role of reflux, consider SIBO, and consider NM gastric emptying study. \par - Recommend daily fiber for constipation. Milk of magnesia PRN.\par - Hemorrhoidal care using aloe wipe, witch hazel wipes. psyllium husk discussed with the patient. \par -  Dietary strategies discussed with the patient including use of psyllium husk before breakfast and supper and Glucerna in place of lunch; mild exercise also discussed; weight once weekly; think of calory spending \par - Follow up with me on 1/27/22.\par \par D/w Dr. Ogden. \par

## 2021-11-11 NOTE — END OF VISIT
[] : Fellow [FreeTextEntry3] : As modified and discussed with patient\par MD J[facility]EMIAH Liz [facility]\par Associate Professor of Medicine\par [facility]\par   [Time Spent: ___ minutes] : I have spent [unfilled] minutes of time on the encounter.

## 2021-11-12 ENCOUNTER — APPOINTMENT (OUTPATIENT)
Dept: MRI IMAGING | Facility: CLINIC | Age: 46
End: 2021-11-12
Payer: SELF-PAY

## 2021-11-12 ENCOUNTER — RESULT REVIEW (OUTPATIENT)
Age: 46
End: 2021-11-12

## 2021-11-12 PROCEDURE — 73218 MRI UPPER EXTREMITY W/O DYE: CPT | Mod: LT

## 2021-11-17 ENCOUNTER — APPOINTMENT (OUTPATIENT)
Dept: CV DIAGNOSITCS | Facility: HOSPITAL | Age: 46
End: 2021-11-17

## 2021-11-23 ENCOUNTER — APPOINTMENT (OUTPATIENT)
Dept: INTERVENTIONAL RADIOLOGY/VASCULAR | Facility: CLINIC | Age: 46
End: 2021-11-23
Payer: COMMERCIAL

## 2021-11-23 DIAGNOSIS — K64.9 UNSPECIFIED HEMORRHOIDS: ICD-10-CM

## 2021-11-23 DIAGNOSIS — K29.70 GASTRITIS, UNSPECIFIED, WITHOUT BLEEDING: ICD-10-CM

## 2021-11-23 DIAGNOSIS — G62.9 POLYNEUROPATHY, UNSPECIFIED: ICD-10-CM

## 2021-11-23 DIAGNOSIS — R42 DIZZINESS AND GIDDINESS: ICD-10-CM

## 2021-11-23 DIAGNOSIS — E66.9 OBESITY, UNSPECIFIED: ICD-10-CM

## 2021-11-23 DIAGNOSIS — K59.09 OTHER CONSTIPATION: ICD-10-CM

## 2021-11-23 PROCEDURE — XXXXX: CPT

## 2021-11-23 RX ORDER — MAGNESIUM HYDROXIDE 400 MG/5ML
1200 SUSPENSION, ORAL (FINAL DOSE FORM) ORAL
Qty: 1 | Refills: 0 | Status: DISCONTINUED | COMMUNITY
Start: 2021-11-11 | End: 2021-11-23

## 2021-11-23 RX ORDER — PSYLLIUM SEED
48.57 PACKET (EA) ORAL
Qty: 1 | Refills: 3 | Status: DISCONTINUED | COMMUNITY
Start: 2021-11-11 | End: 2021-11-23

## 2021-11-23 RX ORDER — OMEPRAZOLE 40 MG/1
40 CAPSULE, DELAYED RELEASE ORAL
Qty: 30 | Refills: 2 | Status: DISCONTINUED | COMMUNITY
Start: 2021-10-18 | End: 2021-11-23

## 2021-11-23 RX ORDER — LIDOCAINE 40 MG/G
4 PATCH TOPICAL
Qty: 30 | Refills: 0 | Status: DISCONTINUED | COMMUNITY
Start: 2021-05-04 | End: 2021-11-23

## 2021-12-04 NOTE — REASON FOR VISIT
[Follow-Up] : a follow-up evaluation of [FreeTextEntry2] : s/p [facility] [place] D&C myomectomy

## 2021-12-04 NOTE — HISTORY OF PRESENT ILLNESS
[FreeTextEntry1] : 45 y/o  LMP 10/12 for routine postoperative visit s/p [facility] [place] D&C myomectomy on 10/21 for degenerating fibroid (77g) s/p UFE 3/2021. Today pt feels well and denies any pain, vaginal bleeding, discharge, fever/chills, N/V, CP/SOB. She has returned to routine daily activities without difficulty. Pathology showed large degenerating fibroid, benign EMC and endocervical tissue. Pathology results discussed today.\par \par ObHx: full-term CS , full-term CS \par GynHx: fibroids\par PMH: gastritis\par Meds: Omeprazole\par SurgHx: CSx2 (/), L cystectomy lap converted to open (), D&Cx2 for polyps

## 2021-12-04 NOTE — PLAN
[FreeTextEntry1] : 45 y/o  LMP 10/12 for routine postoperative visit s/p [facility] [place] D&C myomectomy on 10/21 for degenerating fibroid (77g) s/p UFE 3/2021, meeting all postoperative milestones.\par - Counseled pt that return of menses may be slightly irregular post-op, pt to call if return of heavy bleeding with next menses. Return precautions reviewed.\par - RTC PRN and for well-woman.\par \par Seen with Dr. Dent, d/w Dr. Ferguson\par CORETTA Payton PGY-1\par \par Patient seen and counseled, recovering well from procedure, no complaints. Return as needed if menses still heavy, otherwise just routine visits.\par \par SHAHBAZ Dent MD

## 2021-12-07 ENCOUNTER — NON-APPOINTMENT (OUTPATIENT)
Age: 46
End: 2021-12-07

## 2021-12-15 ENCOUNTER — APPOINTMENT (OUTPATIENT)
Dept: OBGYN | Facility: HOSPITAL | Age: 46
End: 2021-12-15

## 2021-12-29 DIAGNOSIS — K21.9 GASTRO-ESOPHAGEAL REFLUX DISEASE WITHOUT ESOPHAGITIS: ICD-10-CM

## 2021-12-29 DIAGNOSIS — R59.0 LOCALIZED ENLARGED LYMPH NODES: ICD-10-CM

## 2022-01-26 ENCOUNTER — APPOINTMENT (OUTPATIENT)
Dept: INTERNAL MEDICINE | Facility: CLINIC | Age: 47
End: 2022-01-26

## 2022-01-27 ENCOUNTER — APPOINTMENT (OUTPATIENT)
Dept: GASTROENTEROLOGY | Facility: CLINIC | Age: 47
End: 2022-01-27

## 2022-02-01 ENCOUNTER — OUTPATIENT (OUTPATIENT)
Dept: OUTPATIENT SERVICES | Facility: HOSPITAL | Age: 47
LOS: 1 days | End: 2022-02-01
Payer: SELF-PAY

## 2022-02-01 ENCOUNTER — APPOINTMENT (OUTPATIENT)
Dept: NEUROLOGY | Facility: HOSPITAL | Age: 47
End: 2022-02-01

## 2022-02-01 VITALS
DIASTOLIC BLOOD PRESSURE: 86 MMHG | HEART RATE: 87 BPM | TEMPERATURE: 96.5 F | HEIGHT: 64 IN | SYSTOLIC BLOOD PRESSURE: 123 MMHG | WEIGHT: 187 LBS | BODY MASS INDEX: 31.92 KG/M2

## 2022-02-01 DIAGNOSIS — D25.9 LEIOMYOMA OF UTERUS, UNSPECIFIED: Chronic | ICD-10-CM

## 2022-02-01 DIAGNOSIS — Z98.890 OTHER SPECIFIED POSTPROCEDURAL STATES: Chronic | ICD-10-CM

## 2022-02-01 DIAGNOSIS — R56.9 UNSPECIFIED CONVULSIONS: ICD-10-CM

## 2022-02-01 DIAGNOSIS — Z98.51 TUBAL LIGATION STATUS: Chronic | ICD-10-CM

## 2022-02-01 DIAGNOSIS — Z98.891 HISTORY OF UTERINE SCAR FROM PREVIOUS SURGERY: Chronic | ICD-10-CM

## 2022-02-01 PROCEDURE — G0463: CPT
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
## 2022-02-02 NOTE — PHYSICAL EXAM
[FreeTextEntry1] : AOx3\par EOMI, PERRL, v1-3 intact, no facial asymetry, u/p midline, shoudler shrug and head turn intact\par Motor: no drift x 4\par Sensation: intact to LT x 4\par Coordination: FNF no dysmetria b/l\par DTR 2+ throughout\par Gait: normal 20 feet
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
## 2022-02-03 DIAGNOSIS — R26.9 UNSPECIFIED ABNORMALITIES OF GAIT AND MOBILITY: ICD-10-CM

## 2022-04-04 ENCOUNTER — OUTPATIENT (OUTPATIENT)
Dept: OUTPATIENT SERVICES | Facility: HOSPITAL | Age: 47
LOS: 1 days | End: 2022-04-04

## 2022-04-04 ENCOUNTER — APPOINTMENT (OUTPATIENT)
Dept: OBGYN | Facility: HOSPITAL | Age: 47
End: 2022-04-04
Payer: COMMERCIAL

## 2022-04-04 VITALS
HEART RATE: 100 BPM | DIASTOLIC BLOOD PRESSURE: 89 MMHG | WEIGHT: 191 LBS | HEIGHT: 64 IN | SYSTOLIC BLOOD PRESSURE: 140 MMHG | BODY MASS INDEX: 32.61 KG/M2 | TEMPERATURE: 97.2 F

## 2022-04-04 DIAGNOSIS — D25.9 LEIOMYOMA OF UTERUS, UNSPECIFIED: Chronic | ICD-10-CM

## 2022-04-04 DIAGNOSIS — Z98.890 OTHER SPECIFIED POSTPROCEDURAL STATES: Chronic | ICD-10-CM

## 2022-04-04 DIAGNOSIS — Z98.51 TUBAL LIGATION STATUS: Chronic | ICD-10-CM

## 2022-04-04 DIAGNOSIS — Z98.891 HISTORY OF UTERINE SCAR FROM PREVIOUS SURGERY: Chronic | ICD-10-CM

## 2022-04-04 PROCEDURE — 99213 OFFICE O/P EST LOW 20 MIN: CPT | Mod: GE

## 2022-04-05 DIAGNOSIS — R10.2 PELVIC AND PERINEAL PAIN: ICD-10-CM

## 2022-04-05 NOTE — HISTORY OF PRESENT ILLNESS
[FreeTextEntry1] : 47 y/o  LMP 3/22 coming in with chronic pelvic pain. She takes Tylenol for her pelvic pain which helps. She [place] [facility] [place]21 for degenerating fibroid (77g) s/p UFE 3/2021. Pathology showed large degenerating fibroid, benign EMC and endocervical tissue. Patient reports lighter monthly periods but still has chronic pelvic pain and new onset pelvic burning. Patient also reports left leg weakness after UFE for which she saw neurology and underwent brain MRI which was unremarkable. She denies vaginal bleeding, discharge, fever/chills, N/V, CP/SOB. Patient is requesting a hysterectomy.\par \par Pelvic MRI (10/2021): Uterus: 11.5 x 6.8 x 8.5, previously seen dominant fibroid decreased in size from 6.2 cm to 5.2, small bilateral hydrosalpinges. EMS 6mm\par ObHx: full-term CS , full-term CS \par GynHx: fibroids\par PMH: gastritis\par Meds: Omeprazole\par SurgHx: CSx2 (/), L cystectomy lap converted to open (), D&Cx2 for polyps, BTL

## 2022-04-05 NOTE — PLAN
[FreeTextEntry1] : 47 y/o  LMP 3/22 s/p UFE 3/2021 with improvement in menorrhagia but persistent pelvic pain. Patient desires hysterectomy.\par \par # Chronic Pelvic Pain\par - possibly related to fibroids or adenomyosis\par - TVUS script given\par - Referal to booking clinic for laparoscopic hysterectomy\par \par #HCM\par - Patient UTD on pap smear, \par - 2020 NILM and HRHPV neg\par - next pap due \par - Colonoscopy last year nml\par \par d/w Dr. Renee\par ROJAS Crouch, PGY1

## 2022-04-07 ENCOUNTER — OUTPATIENT (OUTPATIENT)
Dept: OUTPATIENT SERVICES | Facility: HOSPITAL | Age: 47
LOS: 1 days | End: 2022-04-07
Payer: SELF-PAY

## 2022-04-07 ENCOUNTER — APPOINTMENT (OUTPATIENT)
Dept: ULTRASOUND IMAGING | Facility: CLINIC | Age: 47
End: 2022-04-07
Payer: COMMERCIAL

## 2022-04-07 DIAGNOSIS — Z98.890 OTHER SPECIFIED POSTPROCEDURAL STATES: Chronic | ICD-10-CM

## 2022-04-07 DIAGNOSIS — Z00.00 ENCOUNTER FOR GENERAL ADULT MEDICAL EXAMINATION WITHOUT ABNORMAL FINDINGS: ICD-10-CM

## 2022-04-07 DIAGNOSIS — Z98.51 TUBAL LIGATION STATUS: Chronic | ICD-10-CM

## 2022-04-07 DIAGNOSIS — D25.9 LEIOMYOMA OF UTERUS, UNSPECIFIED: Chronic | ICD-10-CM

## 2022-04-07 DIAGNOSIS — Z98.891 HISTORY OF UTERINE SCAR FROM PREVIOUS SURGERY: Chronic | ICD-10-CM

## 2022-04-07 PROCEDURE — 76830 TRANSVAGINAL US NON-OB: CPT | Mod: 26

## 2022-04-07 PROCEDURE — 76830 TRANSVAGINAL US NON-OB: CPT

## 2022-04-28 ENCOUNTER — APPOINTMENT (OUTPATIENT)
Dept: NEUROLOGY | Facility: HOSPITAL | Age: 47
End: 2022-04-28

## 2022-05-22 NOTE — ASU PREOP CHECKLIST - HEIGHT IN INCHES
[FreeTextEntry1] : I had the pleasure of evaluating your  patient at [facility] \par \par \par The patient was accompanied by:\par  mother\par \par \par    ILIANA BARLOW is a  11 year years old RH presenting for headaches. \par He is in 3rd grade. He is the youngest of 3 boys but new baby sister expected next month \par \par Does well at school, has friends, active boy. No other health concerns. \par \par \par Headaches began about 1 month or two ago. \par He feels the pain all over his head. \par They hurt anteriorly and posteriorly. \par They feel like a hard punch. \par They tend to come in the afternoons at school. He does not get them on the weekend. \par He doesn't complain of his headaches but also his face is twitching. This is the primary concern of parent. \par RIght side, turns pale, and his tongue feels numb. It happens 3 different times: eating, On ipad, hitting his head. \par He doesn't complain of the HA. \par He has had a Head MRI and it's normal. \par Was able to speak and walk during the event, but not able to talk. \par \par \par They consist of : \par \par Visual effects: N\par Emesis: N \par Nausea: Y\par Photophobia: Y \par Phonophobia: N\par Dizziness: A little \par Loss of consciousness: N \par Duration: They last 1 hour and 30 mins. \par Treatment: Use ice to make them go away. \par \par Exacerbating factors include: If hits in the head will precipitate a headache. \par He's getting them every 3 days. \par \par : \par \par Life style factors related to HA: \par \par Sleep regimen: Has  a regular bedtime, sleeps well. \par Exercise: Plays with friends. \par Hydration: Drinks water during the day. \par Diet: Good eater \par Stress Management: n/a \par \par \par \par \par \par PMHx sig for: \par \par MEDICATIONS:   \par None \par \par -Rescue Medications:  \par \par -Other medications:  \par \par Past Medications:  \par \par None \par \par All: NKDA\par \par Surg: none\par \par Social/Education: See above. Good student. \par BHx: unremarkable\par Dev hx: unremarkable. \par \par \par FHX sig for: \par Migraines not in family, some "regular headaches."\par SEizures: none in the family \par \par REVIEW OF SYSTEMS:  A 14-point review of systems was otherwise unremarkable. \par \par \par  \par \par \par \par  \par \par PHYSICAL EXAMINATION: \par \par Vital signs: see chart \par  \par \par GENERAL:   \par \par Awake, responsive,  \par \par HEAD:  Normocephalic, atraumatic. \par \par EYES:  Conjunctiva clear, sclera non-icteric. \par \par ENT:  Oropharynx without lesions/exudate, mucous membranes moist, lips and gums without lesion. \par \par NECK:  No masses, supple. \par \par RESPIRATORY:  CTA bilaterally, moving air well, breath sounds symmetric, no grunting, no flaring, no retractions. \par \par CARDIOVASCULAR:  RRR, normal S1 and S2, no murmur. \par \par GI:  Soft, NT, ND, normal bowel sounds. \par \par MUSCULOSKELETAL:  No swollen or inflamed joints, full range of motion in all joints. \par \par EXTREMITIES:  No cyanosis, no clubbing, no edema, warm and well perfused. \par \par SKIN:  Warm and dry, normal turgor, no rash, no neurocutaneous lesions. \par \par  \par \par NEUROLOGIC EXAMINATION: \par \par Mental Status/Language:   Full, Fluent \par \par Cranial Nerves:Fundi normal,   PERRL, EOM intact in six cardinal directions of gaze, visual fields intact to confrontation,  facial expression and sensation intact, hearing intact to finger rub bilaterally, palatal elevation symmetric with tongue protrusion in the midline, symmetric head turn and shoulder shrug. \par \par Strength:  Full strength, normal tone, normal bulk \par \par Reflexes:  DTR's 2+ and symmetric throughout.  Plantar response flexor bilaterally. \par \par Coordination:  Finger to nose testing normal, no adventitial movements. \par \par Sensation:  Intact sensation to light touch, normal proprioception. \par \par Stance/Gait:  Normal bipedal stance, developmentally appropriate gait with normal toe, heel and tandem gait. \par \par  \par \par TESTING:  \par \par Blood tests:  \par \par EEG:  \par \par AVEEG/VEEG:  \par \par MRI:  \par \par Other:  \par \par IMPRESSION:  \par \par  ILIANA BARLOW is a  11 year years old RH with concern for migraine and seizures. \par Our first order will be to schedule an EEG and an AEEG overnight to determine if epileptic events and discharges present.   \par \par \par PLAN: \par \par \par \par -  For lifestyle factors,   ILIANA BARLOW is going to work on: \par Sleep: \par Hydration: \par Exercise: \par Diet: protein- enriched meals \par Stress management: \par \par \par \par \par \par \par -  Follow up after testing.  \par \par \par Thank you for allowing us to participate in the care of your patient.  If you have any further questions, please call our office.\par  4

## 2022-06-01 ENCOUNTER — APPOINTMENT (OUTPATIENT)
Dept: INTERVENTIONAL RADIOLOGY/VASCULAR | Facility: CLINIC | Age: 47
End: 2022-06-01

## 2022-06-01 DIAGNOSIS — R10.2 PELVIC AND PERINEAL PAIN: ICD-10-CM

## 2022-06-01 DIAGNOSIS — R19.00 INTRA-ABDOMINAL AND PELVIC SWELLING, MASS AND LUMP, UNSPECIFIED SITE: ICD-10-CM

## 2022-06-01 PROCEDURE — 99443: CPT | Mod: 95

## 2022-06-03 ENCOUNTER — OUTPATIENT (OUTPATIENT)
Dept: OUTPATIENT SERVICES | Facility: HOSPITAL | Age: 47
LOS: 1 days | End: 2022-06-03
Payer: SELF-PAY

## 2022-06-03 ENCOUNTER — APPOINTMENT (OUTPATIENT)
Dept: MRI IMAGING | Facility: CLINIC | Age: 47
End: 2022-06-03
Payer: COMMERCIAL

## 2022-06-03 DIAGNOSIS — Z98.890 OTHER SPECIFIED POSTPROCEDURAL STATES: Chronic | ICD-10-CM

## 2022-06-03 DIAGNOSIS — Z98.51 TUBAL LIGATION STATUS: Chronic | ICD-10-CM

## 2022-06-03 DIAGNOSIS — Z98.891 HISTORY OF UTERINE SCAR FROM PREVIOUS SURGERY: Chronic | ICD-10-CM

## 2022-06-03 DIAGNOSIS — R93.5 ABNORMAL FINDINGS ON DIAGNOSTIC IMAGING OF OTHER ABDOMINAL REGIONS, INCLUDING RETROPERITONEUM: ICD-10-CM

## 2022-06-03 DIAGNOSIS — R19.00 INTRA-ABDOMINAL AND PELVIC SWELLING, MASS AND LUMP, UNSPECIFIED SITE: ICD-10-CM

## 2022-06-03 DIAGNOSIS — R10.2 PELVIC AND PERINEAL PAIN: ICD-10-CM

## 2022-06-03 DIAGNOSIS — D25.9 LEIOMYOMA OF UTERUS, UNSPECIFIED: ICD-10-CM

## 2022-06-03 DIAGNOSIS — D25.9 LEIOMYOMA OF UTERUS, UNSPECIFIED: Chronic | ICD-10-CM

## 2022-06-03 PROCEDURE — 72197 MRI PELVIS W/O & W/DYE: CPT | Mod: 26

## 2022-06-03 PROCEDURE — 72197 MRI PELVIS W/O & W/DYE: CPT

## 2022-06-03 PROCEDURE — A9585: CPT

## 2022-06-20 ENCOUNTER — APPOINTMENT (OUTPATIENT)
Dept: INTERVENTIONAL RADIOLOGY/VASCULAR | Facility: CLINIC | Age: 47
End: 2022-06-20

## 2022-06-20 DIAGNOSIS — G89.29 PELVIC AND PERINEAL PAIN: ICD-10-CM

## 2022-06-20 DIAGNOSIS — Z86.018 PERSONAL HISTORY OF OTHER BENIGN NEOPLASM: ICD-10-CM

## 2022-06-20 DIAGNOSIS — R10.2 PELVIC AND PERINEAL PAIN: ICD-10-CM

## 2022-06-20 DIAGNOSIS — R93.5 ABNORMAL FINDINGS ON DIAGNOSTIC IMAGING OF OTHER ABDOMINAL REGIONS, INCLUDING RETROPERITONEUM: ICD-10-CM

## 2022-06-20 PROCEDURE — 99443: CPT

## 2022-06-27 ENCOUNTER — APPOINTMENT (OUTPATIENT)
Dept: INTERNAL MEDICINE | Facility: CLINIC | Age: 47
End: 2022-06-27

## 2022-06-27 ENCOUNTER — OUTPATIENT (OUTPATIENT)
Dept: OUTPATIENT SERVICES | Facility: HOSPITAL | Age: 47
LOS: 1 days | End: 2022-06-27

## 2022-06-27 VITALS
HEART RATE: 93 BPM | OXYGEN SATURATION: 95 % | TEMPERATURE: 97.4 F | WEIGHT: 191 LBS | HEIGHT: 64 IN | SYSTOLIC BLOOD PRESSURE: 136 MMHG | BODY MASS INDEX: 32.61 KG/M2 | DIASTOLIC BLOOD PRESSURE: 86 MMHG

## 2022-06-27 DIAGNOSIS — L65.9 NONSCARRING HAIR LOSS, UNSPECIFIED: ICD-10-CM

## 2022-06-27 DIAGNOSIS — Z98.51 TUBAL LIGATION STATUS: Chronic | ICD-10-CM

## 2022-06-27 DIAGNOSIS — Z98.890 OTHER SPECIFIED POSTPROCEDURAL STATES: Chronic | ICD-10-CM

## 2022-06-27 DIAGNOSIS — Z98.891 HISTORY OF UTERINE SCAR FROM PREVIOUS SURGERY: Chronic | ICD-10-CM

## 2022-06-27 DIAGNOSIS — M79.7 FIBROMYALGIA: ICD-10-CM

## 2022-06-27 DIAGNOSIS — M79.10 MYALGIA, UNSPECIFIED SITE: ICD-10-CM

## 2022-06-27 DIAGNOSIS — D25.9 LEIOMYOMA OF UTERUS, UNSPECIFIED: Chronic | ICD-10-CM

## 2022-06-27 PROCEDURE — ZZZZZ: CPT | Mod: GC

## 2022-06-27 RX ORDER — FAMOTIDINE 20 MG/1
20 TABLET, FILM COATED ORAL
Qty: 30 | Refills: 2 | Status: DISCONTINUED | COMMUNITY
Start: 2021-11-11 | End: 2022-06-27

## 2022-06-28 PROBLEM — L65.9 HAIR LOSS: Status: ACTIVE | Noted: 2019-02-15

## 2022-06-28 PROBLEM — M79.10 MYALGIA: Status: ACTIVE | Noted: 2022-06-27

## 2022-06-28 PROBLEM — M79.7 FIBROMYALGIA: Status: ACTIVE | Noted: 2022-06-28

## 2022-06-29 RX ORDER — DULOXETINE HYDROCHLORIDE 20 MG/1
20 CAPSULE, DELAYED RELEASE PELLETS ORAL
Qty: 61 | Refills: 0 | Status: DISCONTINUED | COMMUNITY
Start: 2022-06-28 | End: 2022-06-29

## 2022-06-29 NOTE — ASSESSMENT
[FreeTextEntry1] : D/w Dr. Morris\par RTC in 1-2 months with Dr. Canales \par Mamadou Edwards PGY3 [employer]

## 2022-06-29 NOTE — REVIEW OF SYSTEMS
[Chills] : chills [Fatigue] : fatigue [Joint Pain] : joint pain [Joint Stiffness] : joint stiffness [Muscle Pain] : muscle pain [Hair Changes] : hair changes [Skin Rash] : skin rash [Dizziness] : dizziness [Insomnia] : insomnia [Negative] : Heme/Lymph [Fever] : no fever [Hot Flashes] : no hot flashes [Night Sweats] : no night sweats [Recent Change In Weight] : ~T no recent weight change [Joint Swelling] : no joint swelling [Muscle Weakness] : no muscle weakness [Back Pain] : no back pain [Itching] : no itching [Mole Changes] : no mole changes [Nail Changes] : no nail changes [Headache] : no headache [Fainting] : no fainting [Confusion] : no confusion [Memory Loss] : no memory loss [Unsteady Walking] : no ataxia [Suicidal] : not suicidal [Anxiety] : no anxiety [Depression] : no depression

## 2022-06-29 NOTE — PHYSICAL EXAM
[Normal] : soft, non-tender, non-distended, no masses palpated, no HSM and normal bowel sounds [No Joint Swelling] : no joint swelling [Grossly Normal Strength/Tone] : grossly normal strength/tone [de-identified] : NOted submental and right posterior cervical chain lymph nodes, soft, mobile.  [de-identified] : Noted trigger points around deltoids, forearms, wrist, and knees. Noted cracking with joint movements. Negative mcmurrays, anterior and posterior joint tests, phalens, or dequervains. Strength 5/5 b/l grossly

## 2022-06-29 NOTE — HISTORY OF PRESENT ILLNESS
[Musculoskeletal Symptoms Arms] : arms [Musculoskeletal Symptoms Knees] : knees [Joint Pain, Localized In The Shoulder] : shoulders [___ Weeks ago] : [unfilled] weeks ago [Constant] : constant [Moderate] : moderate [Heat] : heat [Stable] : stable [FreeTextEntry5] : Salon-pas patches [FreeTextEntry8] : 45yo F with uterine fibroids s/p UFE, chronic hip pain, PUD, obesity, unexplained lymphadenopathy here for acute visit for increased cervical lymphadenopathy and whole body aches. Pt. noted that at least 8 weeks ago she had been feeling significantly more fatigued without energy to do anything. She still has the right sided numbness from before but now there is considerable pain. It seems to be around her joints but she notes that there are portions of her muscles that when she presses she can elicit the pain R>L. The pain is an 8 but after placing salon-pas patches all around her body it is now a bearable 3-4. It is in her shoulders, elbows, forearms, and knees but not her ankles or feet. Subjective feeling of swelling. questionable stiffness. No noted debilitation in doing actionable things like taking off clothes or driving, or turning keys. She can't take ibuprofen but tylenol works okay. No fevers, changes in headaches, night sweats, unexplained weight loss, changes in vision, or sick contacts. No known hx of RA, miscarriages, of known family hx of rheum diseases. She does have light sensitivity where she gets a light spotted rash on her face, neck, and shoulders but not particularly around the eyes or cheeks. She does note that she has some episodes of dizziness but mostly when she is lying down that lasts a few seconds. She did note that for a little while after her mother passed from covid, she had a right sided facial paralysis that spared her eyebrow movements but it went away. She does note about 3-4 weeks ago she started losing her hair in bunches, but not clumps and has not been pulling it out. She states that it happens at this same level of quality whenever her anemia is bad but since her embolization she has not had any significant bleeding. Overall it has made it very difficult for her to move. She does also note that her lymph nodes, especially on the right side have been more palpable and the same with her submental lymph nodes. They aren't tender; they are mobile; they are soft. She states that she had a biopsy of the submental lymph nodes and was told that it was benign.

## 2022-06-30 DIAGNOSIS — R53.83 OTHER FATIGUE: ICD-10-CM

## 2022-06-30 DIAGNOSIS — R59.0 LOCALIZED ENLARGED LYMPH NODES: ICD-10-CM

## 2022-06-30 DIAGNOSIS — L65.9 NONSCARRING HAIR LOSS, UNSPECIFIED: ICD-10-CM

## 2022-06-30 DIAGNOSIS — M79.10 MYALGIA, UNSPECIFIED SITE: ICD-10-CM

## 2022-06-30 DIAGNOSIS — M79.7 FIBROMYALGIA: ICD-10-CM

## 2022-06-30 LAB
ALBUMIN SERPL ELPH-MCNC: 4.3 G/DL
ALP BLD-CCNC: 55 U/L
ALT SERPL-CCNC: 19 U/L
ANA PAT FLD IF-IMP: ABNORMAL
ANA SER IF-ACNC: ABNORMAL
ANION GAP SERPL CALC-SCNC: 12 MMOL/L
AST SERPL-CCNC: 17 U/L
BASOPHILS # BLD AUTO: 0.03 K/UL
BASOPHILS NFR BLD AUTO: 0.6 %
BILIRUB SERPL-MCNC: 0.3 MG/DL
BUN SERPL-MCNC: 14 MG/DL
CALCIUM SERPL-MCNC: 10 MG/DL
CHLORIDE SERPL-SCNC: 103 MMOL/L
CK SERPL-CCNC: 55 U/L
CO2 SERPL-SCNC: 25 MMOL/L
CREAT SERPL-MCNC: 0.83 MG/DL
CRP SERPL-MCNC: 3 MG/L
EGFR: 89 ML/MIN/1.73M2
EOSINOPHIL # BLD AUTO: 0.12 K/UL
EOSINOPHIL NFR BLD AUTO: 2.3 %
ERYTHROCYTE [SEDIMENTATION RATE] IN BLOOD BY WESTERGREN METHOD: 37 MM/HR
FERRITIN SERPL-MCNC: 64 NG/ML
GLUCOSE SERPL-MCNC: 114 MG/DL
HCT VFR BLD CALC: 44.9 %
HGB BLD-MCNC: 14.7 G/DL
IMM GRANULOCYTES NFR BLD AUTO: 0.6 %
IRON SATN MFR SERPL: 26 %
IRON SERPL-MCNC: 83 UG/DL
LYMPHOCYTES # BLD AUTO: 1.86 K/UL
LYMPHOCYTES NFR BLD AUTO: 36.2 %
MAN DIFF?: NORMAL
MCHC RBC-ENTMCNC: 30.2 PG
MCHC RBC-ENTMCNC: 32.7 GM/DL
MCV RBC AUTO: 92.4 FL
MONOCYTES # BLD AUTO: 0.36 K/UL
MONOCYTES NFR BLD AUTO: 7 %
NEUTROPHILS # BLD AUTO: 2.74 K/UL
NEUTROPHILS NFR BLD AUTO: 53.3 %
PLATELET # BLD AUTO: 258 K/UL
POTASSIUM SERPL-SCNC: 4.1 MMOL/L
PROT SERPL-MCNC: 7 G/DL
RBC # BLD: 4.86 M/UL
RBC # FLD: 15.1 %
RHEUMATOID FACT SER QL: <10 IU/ML
SODIUM SERPL-SCNC: 140 MMOL/L
TIBC SERPL-MCNC: 321 UG/DL
TRANSFERRIN SERPL-MCNC: 269 MG/DL
TSH SERPL-ACNC: 2.94 UIU/ML
UIBC SERPL-MCNC: 238 UG/DL
WBC # FLD AUTO: 5.14 K/UL

## 2022-07-06 ENCOUNTER — OUTPATIENT (OUTPATIENT)
Dept: OUTPATIENT SERVICES | Facility: HOSPITAL | Age: 47
LOS: 1 days | End: 2022-07-06

## 2022-07-06 ENCOUNTER — APPOINTMENT (OUTPATIENT)
Dept: INTERNAL MEDICINE | Facility: CLINIC | Age: 47
End: 2022-07-06

## 2022-07-06 DIAGNOSIS — Z98.890 OTHER SPECIFIED POSTPROCEDURAL STATES: Chronic | ICD-10-CM

## 2022-07-06 DIAGNOSIS — Z98.51 TUBAL LIGATION STATUS: Chronic | ICD-10-CM

## 2022-07-06 DIAGNOSIS — D25.9 LEIOMYOMA OF UTERUS, UNSPECIFIED: Chronic | ICD-10-CM

## 2022-07-06 DIAGNOSIS — Z98.891 HISTORY OF UTERINE SCAR FROM PREVIOUS SURGERY: Chronic | ICD-10-CM

## 2022-07-07 DIAGNOSIS — M79.7 FIBROMYALGIA: ICD-10-CM

## 2022-07-07 DIAGNOSIS — L65.9 NONSCARRING HAIR LOSS, UNSPECIFIED: ICD-10-CM

## 2022-07-07 DIAGNOSIS — M79.10 MYALGIA, UNSPECIFIED SITE: ICD-10-CM

## 2022-07-07 DIAGNOSIS — R59.0 LOCALIZED ENLARGED LYMPH NODES: ICD-10-CM

## 2022-07-07 DIAGNOSIS — R53.83 OTHER FATIGUE: ICD-10-CM

## 2022-07-28 ENCOUNTER — APPOINTMENT (OUTPATIENT)
Dept: GASTROENTEROLOGY | Facility: CLINIC | Age: 47
End: 2022-07-28

## 2022-08-23 ENCOUNTER — APPOINTMENT (OUTPATIENT)
Dept: INTERNAL MEDICINE | Facility: CLINIC | Age: 47
End: 2022-08-23

## 2022-09-14 ENCOUNTER — RESULT REVIEW (OUTPATIENT)
Age: 47
End: 2022-09-14

## 2022-09-14 ENCOUNTER — APPOINTMENT (OUTPATIENT)
Dept: INTERNAL MEDICINE | Facility: CLINIC | Age: 47
End: 2022-09-14

## 2022-09-14 ENCOUNTER — OUTPATIENT (OUTPATIENT)
Dept: OUTPATIENT SERVICES | Facility: HOSPITAL | Age: 47
LOS: 1 days | End: 2022-09-14

## 2022-09-14 VITALS
OXYGEN SATURATION: 97 % | BODY MASS INDEX: 32.44 KG/M2 | DIASTOLIC BLOOD PRESSURE: 77 MMHG | HEIGHT: 64 IN | RESPIRATION RATE: 16 BRPM | WEIGHT: 190 LBS | HEART RATE: 95 BPM | SYSTOLIC BLOOD PRESSURE: 120 MMHG

## 2022-09-14 DIAGNOSIS — K29.70 GASTRITIS, UNSPECIFIED, W/OUT BLEEDING: ICD-10-CM

## 2022-09-14 DIAGNOSIS — Z98.890 OTHER SPECIFIED POSTPROCEDURAL STATES: Chronic | ICD-10-CM

## 2022-09-14 DIAGNOSIS — Z98.891 HISTORY OF UTERINE SCAR FROM PREVIOUS SURGERY: Chronic | ICD-10-CM

## 2022-09-14 DIAGNOSIS — Z98.51 TUBAL LIGATION STATUS: Chronic | ICD-10-CM

## 2022-09-14 DIAGNOSIS — R53.83 OTHER FATIGUE: ICD-10-CM

## 2022-09-14 DIAGNOSIS — M54.6 PAIN IN THORACIC SPINE: ICD-10-CM

## 2022-09-14 DIAGNOSIS — R59.0 LOCALIZED ENLARGED LYMPH NODES: ICD-10-CM

## 2022-09-14 DIAGNOSIS — G62.9 POLYNEUROPATHY, UNSPECIFIED: ICD-10-CM

## 2022-09-14 DIAGNOSIS — K29.70 GASTRITIS, UNSPECIFIED, WITHOUT BLEEDING: ICD-10-CM

## 2022-09-14 DIAGNOSIS — R05.9 COUGH, UNSPECIFIED: ICD-10-CM

## 2022-09-14 DIAGNOSIS — D25.9 LEIOMYOMA OF UTERUS, UNSPECIFIED: Chronic | ICD-10-CM

## 2022-09-14 PROCEDURE — ZZZZZ: CPT | Mod: GC

## 2022-09-14 RX ORDER — OMEPRAZOLE 40 MG/1
40 CAPSULE, DELAYED RELEASE ORAL TWICE DAILY
Qty: 60 | Refills: 0 | Status: DISCONTINUED | COMMUNITY
Start: 2021-11-11 | End: 2022-09-14

## 2022-09-14 NOTE — PHYSICAL EXAM
[No Acute Distress] : no acute distress [Well Nourished] : well nourished [Well Developed] : well developed [Well-Appearing] : well-appearing [Normal Sclera/Conjunctiva] : normal sclera/conjunctiva [PERRL] : pupils equal round and reactive to light [EOMI] : extraocular movements intact [Normal Oropharynx] : the oropharynx was normal [No Lymphadenopathy] : no lymphadenopathy [Supple] : supple [No Respiratory Distress] : no respiratory distress  [No Accessory Muscle Use] : no accessory muscle use [Clear to Auscultation] : lungs were clear to auscultation bilaterally [Normal Rate] : normal rate  [Regular Rhythm] : with a regular rhythm [Normal S1, S2] : normal S1 and S2 [No Murmur] : no murmur heard [No Edema] : there was no peripheral edema [No Extremity Clubbing/Cyanosis] : no extremity clubbing/cyanosis [Soft] : abdomen soft [Non Tender] : non-tender [Non-distended] : non-distended [No Masses] : no abdominal mass palpated [Normal Bowel Sounds] : normal bowel sounds [Normal Supraclavicular Nodes] : no supraclavicular lymphadenopathy [Normal Posterior Cervical Nodes] : no posterior cervical lymphadenopathy [Normal Anterior Cervical Nodes] : no anterior cervical lymphadenopathy [No CVA Tenderness] : no CVA  tenderness [No Joint Swelling] : no joint swelling [Grossly Normal Strength/Tone] : grossly normal strength/tone [No Rash] : no rash [Coordination Grossly Intact] : coordination grossly intact [No Focal Deficits] : no focal deficits [Normal Gait] : normal gait [Normal Affect] : the affect was normal [Normal Insight/Judgement] : insight and judgment were intact [de-identified] : Boggy, erythematous nasal mucosa [de-identified] : Right-sided mid back tenderness to palpation inferior to scapula [de-identified] : Decreased sensation on R side (face, arm, leg) [de-identified] : Mood: "frustrated"

## 2022-09-14 NOTE — ASSESSMENT
[FreeTextEntry1] : #HCM\par - Mammogram and breast sonogram referral (previous indeterminate result d/t dense breast tissue)\par - f/u lipids, a1c\par - last colonoscopy 08/2021 unremarkable\par - last pap smear 11/2021 unremarkable\par \par RTC for CPE in 1 month\par \par Patient seen with Dr. Barragan.

## 2022-09-14 NOTE — HISTORY OF PRESENT ILLNESS
[FreeTextEntry1] : swollen LN f/u [de-identified] : Patient is 46 y/o F with history of uterine fibroids s/p UFE, PUD, obesity, unexplained lymphadenopathy here for persistent cervical lymphadenopathy.\par \par Pt endorses fatigue, headaches, dizziness, w/ whole body aches for the past year. Symptoms stable with minor worsening since last visit. Pt also endorses stable cervical lymphadenopathy for last 2 years primarily on R side of neck and submandibular. Pt also noted bilateral LE swelling during this time alleviated by leg elevation. Endorses minor tingling sensation in both feet. Pt has received extensive outpatient workup with ENT and neurology with no cause yet identified. Recent labs demonstrated KRISTIN positive and ESR 37.\par \par Pt reports right-sided mid back pain below scapula for last 3 weeks. Worse with deep inspiration. Reproducible on palpation.\par \par Pt reports chronic cough for last year. She notices it is worse when running fan at night. Cough productive of white-green sputum. Reports intermittent nasal congestion with post nasal drip. Denies aggravating/alleviating factors. No seasonal changes. Cough occurs throughout day and night. Denies fever, chills, sob, wheezing.\par \par Pt reports decreased right sided sensation for past 1 year. Stable. No aggravating or alleviating factors.

## 2022-09-14 NOTE — REVIEW OF SYSTEMS
[Fatigue] : fatigue [Nasal Discharge] : nasal discharge [Postnasal Drip] : postnasal drip [Lower Ext Edema] : lower extremity edema [Cough] : cough [Heartburn] : heartburn [Back Pain] : back pain [Headache] : headache [Dizziness] : dizziness [Fever] : no fever [Chills] : no chills [Chest Pain] : no chest pain [Shortness Of Breath] : no shortness of breath [Wheezing] : no wheezing [Abdominal Pain] : no abdominal pain [Muscle Weakness] : no muscle weakness [Skin Rash] : no skin rash [Fainting] : no fainting [Unsteady Walking] : no ataxia [FreeTextEntry4] : cervical lymphadenopathy

## 2022-09-15 ENCOUNTER — RESULT REVIEW (OUTPATIENT)
Age: 47
End: 2022-09-15

## 2022-09-15 ENCOUNTER — APPOINTMENT (OUTPATIENT)
Dept: OBGYN | Facility: HOSPITAL | Age: 47
End: 2022-09-15

## 2022-09-15 ENCOUNTER — OUTPATIENT (OUTPATIENT)
Dept: OUTPATIENT SERVICES | Facility: HOSPITAL | Age: 47
LOS: 1 days | End: 2022-09-15

## 2022-09-15 VITALS
HEART RATE: 99 BPM | DIASTOLIC BLOOD PRESSURE: 82 MMHG | BODY MASS INDEX: 33.12 KG/M2 | HEIGHT: 64 IN | SYSTOLIC BLOOD PRESSURE: 108 MMHG | TEMPERATURE: 97.5 F | WEIGHT: 194 LBS

## 2022-09-15 DIAGNOSIS — Z98.891 HISTORY OF UTERINE SCAR FROM PREVIOUS SURGERY: Chronic | ICD-10-CM

## 2022-09-15 DIAGNOSIS — N91.2 AMENORRHEA, UNSPECIFIED: ICD-10-CM

## 2022-09-15 DIAGNOSIS — D25.9 LEIOMYOMA OF UTERUS, UNSPECIFIED: Chronic | ICD-10-CM

## 2022-09-15 DIAGNOSIS — Z98.890 OTHER SPECIFIED POSTPROCEDURAL STATES: Chronic | ICD-10-CM

## 2022-09-15 DIAGNOSIS — Z98.51 TUBAL LIGATION STATUS: Chronic | ICD-10-CM

## 2022-09-15 LAB
HIV 1+2 AB+HIV1 P24 AG SERPL QL IA: SIGNIFICANT CHANGE UP
T4 FREE SERPL-MCNC: 1 NG/DL — SIGNIFICANT CHANGE UP (ref 0.9–1.8)
TSH SERPL-MCNC: 2.02 UIU/ML — SIGNIFICANT CHANGE UP (ref 0.27–4.2)

## 2022-09-15 PROCEDURE — 99213 OFFICE O/P EST LOW 20 MIN: CPT | Mod: GC,25

## 2022-09-15 PROCEDURE — 99396 PREV VISIT EST AGE 40-64: CPT | Mod: GC,25

## 2022-09-15 NOTE — PHYSICAL EXAM
[Appropriately responsive] : appropriately responsive [Alert] : alert [No Acute Distress] : no acute distress [No Lymphadenopathy] : no lymphadenopathy [Regular Rate Rhythm] : regular rate rhythm [No Murmurs] : no murmurs [Clear to Auscultation B/L] : clear to auscultation bilaterally [Soft] : soft [Non-tender] : non-tender [Non-distended] : non-distended [No HSM] : No HSM [No Lesions] : no lesions [No Mass] : no mass [Oriented x3] : oriented x3 [Examination Of The Breasts] : a normal appearance [Breast Palpation Diffuse Fibrous Tissue Bilateral] : fibrocystic changes [No Masses] : no breast masses were palpable [Labia Majora] : normal [Labia Minora] : normal [Normal] : normal [Uterine Adnexae] : non-palpable

## 2022-09-16 DIAGNOSIS — N91.2 AMENORRHEA, UNSPECIFIED: ICD-10-CM

## 2022-09-16 LAB
C TRACH RRNA SPEC QL NAA+PROBE: SIGNIFICANT CHANGE UP
FSH SERPL-MCNC: 4.6 IU/L — SIGNIFICANT CHANGE UP
HBV SURFACE AG SER-ACNC: SIGNIFICANT CHANGE UP
HCV AB S/CO SERPL IA: 0.45 S/CO — SIGNIFICANT CHANGE UP (ref 0–0.99)
HCV AB SERPL-IMP: SIGNIFICANT CHANGE UP
HCV RNA FLD QL NAA+PROBE: SIGNIFICANT CHANGE UP
HCV RNA SPEC QL PROBE+SIG AMP: SIGNIFICANT CHANGE UP
N GONORRHOEA RRNA SPEC QL NAA+PROBE: SIGNIFICANT CHANGE UP
SPECIMEN SOURCE: SIGNIFICANT CHANGE UP
T PALLIDUM AB TITR SER: NEGATIVE — SIGNIFICANT CHANGE UP

## 2022-09-18 NOTE — HISTORY OF PRESENT ILLNESS
[FreeTextEntry1] : 45 y/o  LMP 7/10/22 presents for annual GYN visit. Reports she has not had a menses since July. Her periods were regular, heavy, with painful cramping initially. Following the myomectomy () pt having lighter monthly periods. Reports that in August and September she did not experience any vaginal bleeding but experienced cramping. Patient denies hot flashes, night sweats, labile emotional state, vaginal dryness. \par Of note, PMH is significant for chronic pelvic pain. [place] [facility] [place] for degenerating fibroid (77g) s/p UFE 3/2021. Pt reports the pelvic pain has improved. Rates the pelvic pain 3/10 and notes that it is constant and dull in nature. Notes that the pelvic pain now occurs during her menses. Denies vaginal bleeding, discharge, urinary frequency, dysuria, fever/chills, N/V, CP/SOB.\par

## 2022-09-18 NOTE — DISCUSSION/SUMMARY
[FreeTextEntry1] : Assessment/Plan: 47 y/o  LMP 7/10/22 presents for annual GYN visit.\par \par #Amenorrhea\par - FSH\par - TSH\par - Instructed patient to keep a monthly menses diary \par - f/u in 6 months \par \par #Health Maintenance \par - HIV\par - Hep B/C\par - GC/Chlam \par - Syphillis \par - Mammogram: last 21, pt scheduled for screening mammogram\par - Pap smear: last \par - Colonscopy: last  \par \par Seen and evaluated w/ Dr. Aceves\par \par Jessenia Juarez, PGY1\par

## 2022-09-18 NOTE — REVIEW OF SYSTEMS
[Pelvic pain] : pelvic pain [Negative] : Heme/Lymph [Urgency] : no urgency [Frequency] : no frequency

## 2022-09-21 DIAGNOSIS — Z01.419 ENCOUNTER FOR GYNECOLOGICAL EXAMINATION (GENERAL) (ROUTINE) WITHOUT ABNORMAL FINDINGS: ICD-10-CM

## 2022-09-28 ENCOUNTER — OUTPATIENT (OUTPATIENT)
Dept: OUTPATIENT SERVICES | Facility: HOSPITAL | Age: 47
LOS: 1 days | End: 2022-09-28
Payer: SELF-PAY

## 2022-09-28 ENCOUNTER — RESULT REVIEW (OUTPATIENT)
Age: 47
End: 2022-09-28

## 2022-09-28 ENCOUNTER — APPOINTMENT (OUTPATIENT)
Dept: MAMMOGRAPHY | Facility: IMAGING CENTER | Age: 47
End: 2022-09-28

## 2022-09-28 ENCOUNTER — APPOINTMENT (OUTPATIENT)
Dept: ULTRASOUND IMAGING | Facility: IMAGING CENTER | Age: 47
End: 2022-09-28

## 2022-09-28 DIAGNOSIS — Z98.890 OTHER SPECIFIED POSTPROCEDURAL STATES: Chronic | ICD-10-CM

## 2022-09-28 DIAGNOSIS — D25.9 LEIOMYOMA OF UTERUS, UNSPECIFIED: Chronic | ICD-10-CM

## 2022-09-28 DIAGNOSIS — Z00.8 ENCOUNTER FOR OTHER GENERAL EXAMINATION: ICD-10-CM

## 2022-09-28 DIAGNOSIS — Z98.891 HISTORY OF UTERINE SCAR FROM PREVIOUS SURGERY: Chronic | ICD-10-CM

## 2022-09-28 DIAGNOSIS — Z98.51 TUBAL LIGATION STATUS: Chronic | ICD-10-CM

## 2022-09-28 LAB
C3 SERPL-MCNC: 114 MG/DL
C4 SERPL-MCNC: 30 MG/DL
CCP AB SER IA-ACNC: <8 UNITS
CHOLEST SERPL-MCNC: 208 MG/DL
DSDNA AB SER-ACNC: 12 IU/ML
ENA RNP AB SER IA-ACNC: <0.2 AL
ENA SCL70 IGG SER IA-ACNC: <0.2 AL
ENA SM AB SER IA-ACNC: <0.2 AL
ENA SS-A AB SER IA-ACNC: <0.2 AL
ENA SS-B AB SER IA-ACNC: <0.2 AL
ESTIMATED AVERAGE GLUCOSE: 120 MG/DL
HBA1C MFR BLD HPLC: 5.8 %
HDLC SERPL-MCNC: 51 MG/DL
HISTONE AB SER QL: 0.8 UNITS
LDLC SERPL CALC-MCNC: 136 MG/DL
NONHDLC SERPL-MCNC: 157 MG/DL
RF+CCP IGG SER-IMP: NEGATIVE
RNA POLYMERASE III IGG: 7 UNITS
TRIGL SERPL-MCNC: 107 MG/DL

## 2022-09-28 PROCEDURE — 77066 DX MAMMO INCL CAD BI: CPT | Mod: 26

## 2022-09-28 PROCEDURE — G0279: CPT | Mod: 26

## 2022-09-28 PROCEDURE — 76641 ULTRASOUND BREAST COMPLETE: CPT

## 2022-09-28 PROCEDURE — 76641 ULTRASOUND BREAST COMPLETE: CPT | Mod: 26,50

## 2022-09-28 PROCEDURE — G0279: CPT

## 2022-09-28 PROCEDURE — 77066 DX MAMMO INCL CAD BI: CPT

## 2022-10-05 ENCOUNTER — APPOINTMENT (OUTPATIENT)
Dept: OBGYN | Facility: HOSPITAL | Age: 47
End: 2022-10-05

## 2022-10-07 DIAGNOSIS — N64.52 NIPPLE DISCHARGE: ICD-10-CM

## 2022-10-17 ENCOUNTER — OUTPATIENT (OUTPATIENT)
Dept: OUTPATIENT SERVICES | Facility: HOSPITAL | Age: 47
LOS: 1 days | Discharge: HOME | End: 2022-10-17

## 2022-10-17 ENCOUNTER — APPOINTMENT (OUTPATIENT)
Dept: OTOLARYNGOLOGY | Facility: CLINIC | Age: 47
End: 2022-10-17

## 2022-10-17 VITALS
WEIGHT: 189 LBS | HEIGHT: 64 IN | HEART RATE: 82 BPM | SYSTOLIC BLOOD PRESSURE: 116 MMHG | BODY MASS INDEX: 32.27 KG/M2 | DIASTOLIC BLOOD PRESSURE: 78 MMHG

## 2022-10-17 DIAGNOSIS — J34.89 OTHER SPECIFIED DISORDERS OF NOSE AND NASAL SINUSES: ICD-10-CM

## 2022-10-17 DIAGNOSIS — Z98.890 OTHER SPECIFIED POSTPROCEDURAL STATES: Chronic | ICD-10-CM

## 2022-10-17 DIAGNOSIS — Z98.891 HISTORY OF UTERINE SCAR FROM PREVIOUS SURGERY: Chronic | ICD-10-CM

## 2022-10-17 DIAGNOSIS — D25.9 LEIOMYOMA OF UTERUS, UNSPECIFIED: Chronic | ICD-10-CM

## 2022-10-17 DIAGNOSIS — Z98.51 TUBAL LIGATION STATUS: Chronic | ICD-10-CM

## 2022-10-17 PROCEDURE — 99214 OFFICE O/P EST MOD 30 MIN: CPT

## 2022-10-24 NOTE — HISTORY OF PRESENT ILLNESS
[de-identified] : 47 year old female presents for initial consultation for sleep apnea, nasal congestion\par History of swollen submental lymph nodes- FNA 2020. \par Patient reports throat pain after uterine polyp removal 11/21 \par Patient states nasal congestion has been persistent for years. However patient is not consistent with any nasal sprays or sinus rinses\par History of tonsils stones \par Patient able to express stones own her own. \par \par History of sleep apnea. \par Reports snoring has gotten worse since last visit. \par Reports daytime fatigue concentration issues and does not feel rested when awake. \par Completed Home study 10/25/21- moderate obstructive sleep apnea associated with severe oxygen desaturation

## 2022-10-24 NOTE — REASON FOR VISIT
[Initial Consultation] : an initial consultation for [FreeTextEntry2] :  sleep apnea, nasal congestion

## 2022-10-24 NOTE — ASSESSMENT
[FreeTextEntry1] : 47Y F present for evaluation of sleep apnea, nasal congestion, and cervical lymphadenopathy. Patient states she home study was not precise because she barely slept interested completely sleep study in facility. 10/17/22 Previous US neck shows lymph nodes stable. Nasal congestion naive to nasal rinse or spray\par \par Snoring\par -Discussed snoring vs Upper Airway Resistance Syndrome vs Sleep Disordered Breathing vs Obstructed Sleep Apnea. \par -Discussed that primary snoring is not harmful in and off itself but sleep apnea is different. DWIGHT needs treatment due to long term risk heart and lung problems. \par -Weight Loss and Lifestyle changes is known to improved DWIGHT. Avoid alcohol in the hours before bedtime.\par -Quit smoking. Don't sleep on your back.\par -Avoid taking sedative medications such as anti-anxiety drugs or sleeping pills.\par -Polysomnography was ordered to further evaluate for DWIGHT. Patient will need to schedule with sleep center for test to be completed\par \par Nasal Congestion\par -Send to pharmacy Flonase nasal spray to be used after completing daily Sinus Rinse\par -Discussed the importance of rinsing the sinus before using nasal spray so that excess mucus lining the nasal cavity can be wash away so medication can penetration the nose.\par -If normal saline sinus rinse + nasal spray is not effective can discuss medicated nasal rinses and imaging for additional evaluation\par -Referral Allergist\par -Patient refused Nasal endoscopy at this time\par \par Cervical Lymph nodes\par -Repeat Ultrasound as patient feels lymph node has gotten bigger\par \par -Return to clinic in 2 months or sooner if new/worsen symptoms present\par \par

## 2022-10-25 ENCOUNTER — OUTPATIENT (OUTPATIENT)
Dept: OUTPATIENT SERVICES | Facility: HOSPITAL | Age: 47
LOS: 1 days | End: 2022-10-25
Payer: SELF-PAY

## 2022-10-25 ENCOUNTER — APPOINTMENT (OUTPATIENT)
Dept: ULTRASOUND IMAGING | Facility: HOSPITAL | Age: 47
End: 2022-10-25

## 2022-10-25 DIAGNOSIS — Z98.891 HISTORY OF UTERINE SCAR FROM PREVIOUS SURGERY: Chronic | ICD-10-CM

## 2022-10-25 DIAGNOSIS — Z98.890 OTHER SPECIFIED POSTPROCEDURAL STATES: Chronic | ICD-10-CM

## 2022-10-25 DIAGNOSIS — D25.9 LEIOMYOMA OF UTERUS, UNSPECIFIED: Chronic | ICD-10-CM

## 2022-10-25 DIAGNOSIS — R59.0 LOCALIZED ENLARGED LYMPH NODES: ICD-10-CM

## 2022-10-25 DIAGNOSIS — Z98.51 TUBAL LIGATION STATUS: Chronic | ICD-10-CM

## 2022-10-25 PROCEDURE — 76536 US EXAM OF HEAD AND NECK: CPT | Mod: 26

## 2022-10-25 PROCEDURE — 76536 US EXAM OF HEAD AND NECK: CPT

## 2022-10-26 DIAGNOSIS — J34.89 OTHER SPECIFIED DISORDERS OF NOSE AND NASAL SINUSES: ICD-10-CM

## 2022-10-26 DIAGNOSIS — R09.81 NASAL CONGESTION: ICD-10-CM

## 2022-10-26 DIAGNOSIS — R59.0 LOCALIZED ENLARGED LYMPH NODES: ICD-10-CM

## 2022-10-26 DIAGNOSIS — G47.33 OBSTRUCTIVE SLEEP APNEA (ADULT) (PEDIATRIC): ICD-10-CM

## 2022-11-21 ENCOUNTER — APPOINTMENT (OUTPATIENT)
Dept: RHEUMATOLOGY | Facility: CLINIC | Age: 47
End: 2022-11-21

## 2022-11-22 ENCOUNTER — APPOINTMENT (OUTPATIENT)
Dept: NEUROLOGY | Facility: HOSPITAL | Age: 47
End: 2022-11-22

## 2022-12-06 ENCOUNTER — APPOINTMENT (OUTPATIENT)
Dept: INTERNAL MEDICINE | Facility: CLINIC | Age: 47
End: 2022-12-06

## 2022-12-06 ENCOUNTER — OUTPATIENT (OUTPATIENT)
Dept: OUTPATIENT SERVICES | Facility: HOSPITAL | Age: 47
LOS: 1 days | End: 2022-12-06

## 2022-12-06 VITALS
HEART RATE: 84 BPM | TEMPERATURE: 98 F | DIASTOLIC BLOOD PRESSURE: 84 MMHG | WEIGHT: 189 LBS | HEIGHT: 64 IN | BODY MASS INDEX: 32.27 KG/M2 | SYSTOLIC BLOOD PRESSURE: 120 MMHG | OXYGEN SATURATION: 97 %

## 2022-12-06 DIAGNOSIS — G62.9 POLYNEUROPATHY, UNSPECIFIED: ICD-10-CM

## 2022-12-06 DIAGNOSIS — M54.6 PAIN IN THORACIC SPINE: ICD-10-CM

## 2022-12-06 DIAGNOSIS — D25.9 LEIOMYOMA OF UTERUS, UNSPECIFIED: Chronic | ICD-10-CM

## 2022-12-06 DIAGNOSIS — Z98.890 OTHER SPECIFIED POSTPROCEDURAL STATES: Chronic | ICD-10-CM

## 2022-12-06 DIAGNOSIS — Z00.00 ENCOUNTER FOR GENERAL ADULT MEDICAL EXAMINATION W/OUT ABNORMAL FINDINGS: ICD-10-CM

## 2022-12-06 DIAGNOSIS — Z98.891 HISTORY OF UTERINE SCAR FROM PREVIOUS SURGERY: Chronic | ICD-10-CM

## 2022-12-06 DIAGNOSIS — F32.A DEPRESSION, UNSPECIFIED: ICD-10-CM

## 2022-12-06 DIAGNOSIS — Z98.51 TUBAL LIGATION STATUS: Chronic | ICD-10-CM

## 2022-12-06 PROCEDURE — ZZZZZ: CPT | Mod: GC

## 2022-12-07 NOTE — HISTORY OF PRESENT ILLNESS
[FreeTextEntry1] : follow-up  [de-identified] : The patient is a 47-year-old woman who is followed for lymphadenopathy, obesity, back pain, and uterine fibroids who is presenting to clinic today for follow-up. In the time since the patient's most recent visit with me, she had an acute visit to the clinic complaining of myalgias and persistent lymphadenopathy. At the time, an extensive rheumatologic workup was sent, and the patient was noted to have a positive KRISTIN (1:320). Notably, the patient also previously also had a positive c anca level. The patient notes pain in several metacarpophalangeal joints, and she notes stiffness that occurs throughout the day. She has had several recent episodes of rhinorrhea. Additionally, she previously has noted alopecia and intermittent rashes in sun-exposed areas. However, she has not noted any dry eyes or dry mouth. She has never noted oral ulcers. The patient has never suffered from any clotting or from any cytopenias. \par \par Per report from the patient today, she underwent uterine artery embolization in the  of . For approximately the next six months, the patient felt well, but she subsequently noticed severe discomfort in her abdomen with missed menstrual periods and subsequent passage of dark-colored blood. She underwent a dilation and curettage with the gynecology team a few months ago, but she has noted persistence of her pain. She has a history of two  sections, and she had an ovarian cyst removed several years ago. She has not had any weight loss, and she has no known history of endometrial polyps. She has never taken any blood thinners. She has no known history of sexually transmitted infections. \par \par Also in the time since the patient's most recent visit, she has noted several episodes of headache. Her headaches occur most days and last about three hours. They are located mostly on the right side of the head in the area of the right temporalis. She notes tenderness to palpation and pain that is worst when she is stressed and that sometimes extends to both sides of the head. Her pain remits after administration of acetaminophen. It is not associated with lying down or with nausea or vomiting. She has not noted any associated rhinorrhea or coryza. She has not suffered from any trauma to the head, and she does not use caffeine.

## 2022-12-07 NOTE — ASSESSMENT
[FreeTextEntry1] : The patient is a 47-year-old woman who is followed for unexplained lymphadenopathy, obesity, and uterine fibroids, who is presenting for follow-up, and who is noted to have an elevated serum isidra, a positive serum c anca, alopecia, skin pigmentation changes, arthralgias, and proteinuria, concerning for the development of lupus, granulomatosis with polyangiitis, or other autoimmune disease. \par \par 1. Rule-out systemic lupus erythematosus: -In the context of persistent lymphadenopathy, patient is noted to have positive isidra, joint pains with stiffness, proteinuria, and skin and hair changes, all concerning for the development of lupus \par -Anti dsdna, c3, and c4 previously negative; in an attempt to make diagnosis in the setting of what may represent a active inflammation (with evident arthralgias and lower extremity edema) will re-order anti dsdna, c3, c4; also will order lupus anticoagulant, beta 2 microglobulin, and anti cardiolipin and anti-smith\par -As patient had proteinuria evident on previous urinalysis, will order spot microalbuin/creatinine ratio and spot urine protein/creatinine ratio\par -Patient has follow-up appointment with rheumatology scheduled for February \par \par 2. Rule-out granlomatosis with polyangitis: -Positive c-anca noted with noted epistaxis\par -No previous documentation of hematuria or of pulmonary involvement; would need evidence of other organ involvement to make diagnosis \par -Repeat urinalysis today to look for microscopic hematuria \par -Suspect lymphadenopathy may be secondary to auto-immune pathology; patient has had stable size of lymphadenopathy documented with normal fine needle aspiration; if above studies negative, would refer back to ent or to general surgery for excisional lymph node biopsy \par \par 3. Headaches: -Headaches mostly on right side of head, but occasionally on left, and located in area of temporalis muscle; associated with stress and most concerning for tension headaches \par -No red flags concerning for elevated intracranial pressure; no evidence of focal neurologic deficit \par -Patient will continue to manage symptomatically; has neurology referral scheduled \par \par 4. Menorrhagia and dysmenorrhea: -Ongoing pelvic discomfort with dark-colored bleeding in setting of recent procedures\par -Suspect discomfort is most likely secondary to scarring in the setting of recent procedure\par -Patient has follow-up scheduled with gynecology to determine if further intervention is indicated \par \par 5. Healthcare maintenance: -Patient is 47 and is due for colonoscopy; will discuss at next visit; mammogram this year normal; pap/hpv negative in 2020 \par -HCV and HIV negative \par -Due for flu vaccine; will discuss covid vaccine, pcv, and hepatitis b at next visit \par -Patient to follow up with me after her next visit to rheumatology

## 2022-12-07 NOTE — HEALTH RISK ASSESSMENT
[3] : 1) Little interest or pleasure doing things for nearly every day (3) [2] : 2) Feeling down, depressed, or hopeless for more than half of the days (2) [PHQ-2 Positive] : PHQ-2 Positive [Not at All (0)] : 5.) Poor appetite or overeating? Not at all [1/2 of Days or More (2)] : 6.) Feeling bad about yourself, or that you are a failure, or have let yourself or your family down? Half the days or more [Nearly Every Day (3)] : 7.) Trouble concentrating on things, such as reading a newspaper or watching television? Nearly every day [Several Days (1)] : 8.) Moving or speaking so slowly that other people could have noticed, or the opposite, moving or speaking faster than usual? Several days [Moderate] : severity of depression is moderate [Very Difficult] : How difficult have these problems made it for you to do your work, take care of things at home, or get along with people? Very difficult [[id number]] : 5 [[id number]] : 12

## 2022-12-07 NOTE — PHYSICAL EXAM
[Normal] : normal gait, coordination grossly intact, no focal deficits and deep tendon reflexes were 2+ and symmetric [de-identified] : submental and posterior cervical lymphadenopathy evident; lymph nodes soft and mobile  [de-identified] : tender mcp joints noted bilaterally  [de-identified] : appears stated age, intermittently tearful, no psychomotor retardation evident, speech audible; mood depressed; affect flat; thought process coherent; thought content goal directed; no auditory, visual, or tactile hallucinations; judgement and impulse control intact

## 2022-12-07 NOTE — REVIEW OF SYSTEMS
[Fatigue] : fatigue [Joint Pain] : joint pain [Headache] : headache [Depression] : depression [Negative] : Genitourinary [Fever] : no fever [Recent Change In Weight] : ~T no recent weight change [FreeTextEntry4] : Intermittent hair loss noted  [de-identified] : Loss of pigmentation and intermittent rashes noted

## 2022-12-08 DIAGNOSIS — Z00.00 ENCOUNTER FOR GENERAL ADULT MEDICAL EXAMINATION WITHOUT ABNORMAL FINDINGS: ICD-10-CM

## 2022-12-08 DIAGNOSIS — F32.A DEPRESSION, UNSPECIFIED: ICD-10-CM

## 2022-12-08 DIAGNOSIS — E66.9 OBESITY, UNSPECIFIED: ICD-10-CM

## 2022-12-08 DIAGNOSIS — R76.8 OTHER SPECIFIED ABNORMAL IMMUNOLOGICAL FINDINGS IN SERUM: ICD-10-CM

## 2022-12-20 ENCOUNTER — RESULT REVIEW (OUTPATIENT)
Age: 47
End: 2022-12-20

## 2022-12-20 ENCOUNTER — APPOINTMENT (OUTPATIENT)
Dept: OBGYN | Facility: HOSPITAL | Age: 47
End: 2022-12-20
Payer: COMMERCIAL

## 2022-12-20 ENCOUNTER — OUTPATIENT (OUTPATIENT)
Dept: OUTPATIENT SERVICES | Facility: HOSPITAL | Age: 47
LOS: 1 days | End: 2022-12-20

## 2022-12-20 VITALS
BODY MASS INDEX: 33.97 KG/M2 | TEMPERATURE: 98.2 F | DIASTOLIC BLOOD PRESSURE: 80 MMHG | HEIGHT: 64 IN | WEIGHT: 199 LBS | SYSTOLIC BLOOD PRESSURE: 127 MMHG | HEART RATE: 84 BPM

## 2022-12-20 DIAGNOSIS — N93.9 ABNORMAL UTERINE AND VAGINAL BLEEDING, UNSPECIFIED: ICD-10-CM

## 2022-12-20 DIAGNOSIS — D25.9 LEIOMYOMA OF UTERUS, UNSPECIFIED: ICD-10-CM

## 2022-12-20 PROCEDURE — 99213 OFFICE O/P EST LOW 20 MIN: CPT | Mod: GC

## 2022-12-20 NOTE — HISTORY OF PRESENT ILLNESS
[FreeTextEntry1] : 47 yof, , PMHx of UFE in 3/2021 for fibroid uterus and [facility] [place] D&C myomectomy on 10/21 for degenerating fibroid (77g), LMP 22 presents to clinic with complaints of dark menstrual bleeding and concern for "trapped blood" in her uterus. Patient states she has regular periods (except for 2 months of missed periods in July and Aug), and her bleeding is described as dark and "black". She also reports that since 10/2021, she has had approx 4 instances of bleeding in between her cycles. \par She is concerned because she was told she had "necrotic tissue" in her uterus when she had her D&C in Oct 2021 - worried that this is repeating. She also reports bloating and menstrual cramping. Denies any fevers, chills, N/V or significant abdominal pain. \par Patient was seen 9/15/22 during which time she had TSH and FSH testing for reported amenorrhea for 2 cycles. TSH 2.02, Free T4 1 and FSH 4.6 at that time. \par \par Per review of records, patient was deemed a poor surgical candidate for myomectomy in 3/2021 and offered UFE.\par \par \par ObHx: full-term CS , full-term CS \par GynHx: fibroids\par PMH: gastritis\par Meds: Omeprazole\par SurgHx: CSx2 (/), L cystectomy lap converted to open (), D&Cx2 for polyps

## 2022-12-20 NOTE — PHYSICAL EXAM
[Chaperone Present] : A chaperone was present in the examining room during all aspects of the physical examination [Appropriately responsive] : appropriately responsive [Alert] : alert [No Acute Distress] : no acute distress [No Lymphadenopathy] : no lymphadenopathy [Soft] : soft [Oriented x3] : oriented x3 [Moderate] : There was moderate vaginal bleeding [Normal] : normal [Tenderness] : tender [Anteversion] : anteverted [Uterine Adnexae] : normal [FreeTextEntry7] : Scar tissue palpable in lower abdomen; well-healed Pfannenstiel incision, diffusely tender in lower abdomen

## 2022-12-21 ENCOUNTER — APPOINTMENT (OUTPATIENT)
Dept: INTERNAL MEDICINE | Facility: CLINIC | Age: 47
End: 2022-12-21

## 2022-12-21 DIAGNOSIS — N93.9 ABNORMAL UTERINE AND VAGINAL BLEEDING, UNSPECIFIED: ICD-10-CM

## 2022-12-21 DIAGNOSIS — D25.9 LEIOMYOMA OF UTERUS, UNSPECIFIED: ICD-10-CM

## 2023-01-04 ENCOUNTER — OUTPATIENT (OUTPATIENT)
Dept: OUTPATIENT SERVICES | Facility: HOSPITAL | Age: 48
LOS: 1 days | End: 2023-01-04
Payer: SELF-PAY

## 2023-01-04 ENCOUNTER — APPOINTMENT (OUTPATIENT)
Dept: SLEEP CENTER | Facility: CLINIC | Age: 48
End: 2023-01-04
Payer: COMMERCIAL

## 2023-01-04 ENCOUNTER — APPOINTMENT (OUTPATIENT)
Dept: ULTRASOUND IMAGING | Facility: CLINIC | Age: 48
End: 2023-01-04
Payer: COMMERCIAL

## 2023-01-04 DIAGNOSIS — Z98.890 OTHER SPECIFIED POSTPROCEDURAL STATES: Chronic | ICD-10-CM

## 2023-01-04 DIAGNOSIS — Z98.51 TUBAL LIGATION STATUS: Chronic | ICD-10-CM

## 2023-01-04 DIAGNOSIS — Z98.891 HISTORY OF UTERINE SCAR FROM PREVIOUS SURGERY: Chronic | ICD-10-CM

## 2023-01-04 DIAGNOSIS — N93.9 ABNORMAL UTERINE AND VAGINAL BLEEDING, UNSPECIFIED: ICD-10-CM

## 2023-01-04 DIAGNOSIS — D25.9 LEIOMYOMA OF UTERUS, UNSPECIFIED: Chronic | ICD-10-CM

## 2023-01-04 PROCEDURE — 76830 TRANSVAGINAL US NON-OB: CPT

## 2023-01-04 PROCEDURE — 76856 US EXAM PELVIC COMPLETE: CPT | Mod: 26

## 2023-01-04 PROCEDURE — 76856 US EXAM PELVIC COMPLETE: CPT

## 2023-01-04 PROCEDURE — 95811 POLYSOM 6/>YRS CPAP 4/> PARM: CPT | Mod: 26

## 2023-01-04 PROCEDURE — 76830 TRANSVAGINAL US NON-OB: CPT | Mod: 26

## 2023-01-04 PROCEDURE — 95811 POLYSOM 6/>YRS CPAP 4/> PARM: CPT

## 2023-01-09 ENCOUNTER — LABORATORY RESULT (OUTPATIENT)
Age: 48
End: 2023-01-09

## 2023-01-09 ENCOUNTER — NON-APPOINTMENT (OUTPATIENT)
Age: 48
End: 2023-01-09

## 2023-01-09 ENCOUNTER — APPOINTMENT (OUTPATIENT)
Dept: RHEUMATOLOGY | Facility: CLINIC | Age: 48
End: 2023-01-09
Payer: COMMERCIAL

## 2023-01-09 ENCOUNTER — OUTPATIENT (OUTPATIENT)
Dept: OUTPATIENT SERVICES | Facility: HOSPITAL | Age: 48
LOS: 1 days | End: 2023-01-09

## 2023-01-09 VITALS
OXYGEN SATURATION: 98 % | DIASTOLIC BLOOD PRESSURE: 80 MMHG | SYSTOLIC BLOOD PRESSURE: 124 MMHG | HEIGHT: 64 IN | HEART RATE: 72 BPM | WEIGHT: 195 LBS | BODY MASS INDEX: 33.29 KG/M2

## 2023-01-09 DIAGNOSIS — Z98.890 OTHER SPECIFIED POSTPROCEDURAL STATES: Chronic | ICD-10-CM

## 2023-01-09 DIAGNOSIS — D25.9 LEIOMYOMA OF UTERUS, UNSPECIFIED: Chronic | ICD-10-CM

## 2023-01-09 DIAGNOSIS — Z98.51 TUBAL LIGATION STATUS: Chronic | ICD-10-CM

## 2023-01-09 DIAGNOSIS — Z98.891 HISTORY OF UTERINE SCAR FROM PREVIOUS SURGERY: Chronic | ICD-10-CM

## 2023-01-09 PROCEDURE — ZZZZZ: CPT | Mod: GC

## 2023-01-10 DIAGNOSIS — R04.0 EPISTAXIS: ICD-10-CM

## 2023-01-10 DIAGNOSIS — E04.9 NONTOXIC GOITER, UNSPECIFIED: ICD-10-CM

## 2023-01-10 DIAGNOSIS — R59.1 GENERALIZED ENLARGED LYMPH NODES: ICD-10-CM

## 2023-01-10 NOTE — ASSESSMENT
[FreeTextEntry1] : 47-year-old  F w/hx of uterine fibroids and chronic cervical lymphadenopathies presented as follow up\par \par #Cervical lymphadenopathies\par #Lymphadenopathies \par  Incidental finding on 9/25/2020 showed small lymph nodes in the bilateral cervical chains including right V node measuring 7 x 10 mm, and periparotid lymph nodes, all nodes are less than 1.5 cm short axis, too small to characterize on CT. \par Bilateral Ia nodes (4:62) measuring approximately 9 x 9 mm on the right, and 10 x 6 mm on the left.\par Small Ib nodes, (4:57) measuring approximately 9 x 3 mm on the right, and 10 x 4 mm on the left.\par Small IIa nodes, (4:57) measuring 9 x 6 mm on the right, and 7 x 8 mm on the left.\par Right V node, (4:66), measuring 7 x 10 mm\par CT abdomen 1/14/21 negative retroperitoneal lymph node\par Followed w/Dr. Goldberg (Hem/Onc) for anemia and Kappa/Lambda normal \par Patient first seen by our team on 12/28/20 and differentials were sarcoidosis, AAV, SJS\par Bx by ENT R/submental lymph node fine needle aspiration lymphocyte predominant (80%), T cell population, polytypic B cells. No dx w/abnormalities \par Cytology report showed polymorphous lymphoid population w/rare lymphohistiocytic aggregates and lymphoid tangles. \par Autoimmune serologies: negative for KRISTIN, SJS, RF, C3, C4, SCL, anti-histone, CCP, RNA polymerase III, dsDNA, ANCA (MPO/PR3). positive for KRISTIN 1:320 and speckled. C-ANCA 1:160, ACE normal range, vitamin D 25 low\par Infectious workup: HIV, chlamydia/GC, syphilis, hepatitis B, C, EBV, CMV\par Repeat cervical US showed stable 9 mm benign appearing submental lymph node on 10/25/22\par Patient stated that she developed her submentonian lymph nodes after MMR vaccines in 2019  \par Differentials: IgG4 RD, SJS, sarcoidosis?, AAV?, neoplasia?\par \par Plan:\par -Recommend to follow with Hem/Onc since it has been a while w/o follow up\par -Check IgG subtypes, TSH\par -Check US thyroid \par - derm follow up acne\par \par RTC 3 months\par SEBASTIAN Ruelas\par Darío Mari MD\par Rheumatology fellow

## 2023-01-10 NOTE — HISTORY OF PRESENT ILLNESS
[FreeTextEntry1] : 1/9/23\par Patient comes as re-referral after one C-ANCA and KRISTIN positive. Patient remains with cervical lymphadenopathies

## 2023-01-10 NOTE — REVIEW OF SYSTEMS
[Nosebleeds] : nosebleeds [Arthralgias] : arthralgias [Swollen Glands In The Neck] : swollen glands in the neck [Fever] : no fever [Chills] : no chills [Feeling Poorly] : not feeling poorly [Feeling Tired] : not feeling tired [Eye Pain] : no eye pain [Red Eyes] : eyes not red [Eyesight Problems] : no eyesight problems [Discharge From Eyes] : no purulent discharge from the eyes [Earache] : no earache [Loss Of Hearing] : no hearing loss [Nasal Discharge] : no nasal discharge [Heart Rate Is Slow] : the heart rate was not slow [Heart Rate Is Fast] : the heart rate was not fast [Chest Pain] : no chest pain [Palpitations] : no palpitations [Shortness Of Breath] : no shortness of breath [Wheezing] : no wheezing [Cough] : no cough [SOB on Exertion] : no shortness of breath during exertion [Abdominal Pain] : no abdominal pain [Vomiting] : no vomiting [Constipation] : no constipation [Diarrhea] : no diarrhea [Dysuria] : no dysuria [Incontinence] : no incontinence [Pelvic Pain] : no pelvic pain [Dysmenorrhea] : no dysmenorrhea [Joint Pain] : no joint pain [Joint Swelling] : no joint swelling [Joint Stiffness] : no joint stiffness [Limb Pain] : no limb pain [Limb Swelling] : no limb swelling [Skin Lesions] : no skin lesions [Skin Wound] : no skin wound [Itching] : no itching [Change In A Mole] : no change in a mole [Proptosis] : no proptosis [Hot Flashes] : no hot flashes [Muscle Weakness] : no muscle weakness [Deepening Of The Voice] : no deepening of the voice [Easy Bleeding] : no tendency for easy bleeding [Easy Bruising] : no tendency for easy bruising [Swollen Glands] : no swollen glands [FreeTextEntry9] : back pain

## 2023-01-10 NOTE — PHYSICAL EXAM
[General Appearance - Alert] : alert [General Appearance - In No Acute Distress] : in no acute distress [Sclera] : the sclera and conjunctiva were normal [PERRL With Normal Accommodation] : pupils were equal in size, round, and reactive to light [Extraocular Movements] : extraocular movements were intact [Strabismus] : no strabismus was seen [Optic Disc Abnormality] : the optic disc were normal in size and color [Outer Ear] : the ears and nose were normal in appearance [Hearing Threshold Finger Rub Not Larimer] : hearing was normal [Examination Of The Oral Cavity] : the lips and gums were normal [Both Tympanic Membranes Were Examined] : both tympanic membranes were normal [Oropharynx] : the oropharynx was normal [] : no respiratory distress [Respiration, Rhythm And Depth] : normal respiratory rhythm and effort [Exaggerated Use Of Accessory Muscles For Inspiration] : no accessory muscle use [Auscultation Breath Sounds / Voice Sounds] : lungs were clear to auscultation bilaterally [Apical Impulse] : the apical impulse was normal [Heart Rate And Rhythm] : heart rate was normal and rhythm regular [Heart Sounds] : normal S1 and S2 [Arterial Pulses Carotid] : carotid pulses were normal with no bruits [Abdominal Aorta] : the abdominal aorta was normal [Arterial Pulses Femoral] : femoral pulses were normal without bruits [Bowel Sounds] : normal bowel sounds [Abnormal Walk] : normal gait [Musculoskeletal - Swelling] : no joint swelling seen [Cranial Nerves] : cranial nerves 2-12 were intact [Deep Tendon Reflexes (DTR)] : deep tendon reflexes were 2+ and symmetric [Sensation] : the sensory exam was normal to light touch and pinprick [Oriented To Time, Place, And Person] : oriented to person, place, and time [FreeTextEntry1] : acne and redness forehead

## 2023-01-11 LAB
APPEARANCE: CLEAR
BACTERIA: NEGATIVE
BILIRUBIN URINE: NEGATIVE
BLOOD URINE: ABNORMAL
COLOR: YELLOW
CREAT SPEC-SCNC: 172 MG/DL
CREAT SPEC-SCNC: 177 MG/DL
CREAT/PROT UR: 0 RATIO
GLUCOSE QUALITATIVE U: NEGATIVE
HYALINE CASTS: 1 /LPF
KETONES URINE: NEGATIVE
LEUKOCYTE ESTERASE URINE: NEGATIVE
MICROALBUMIN 24H UR DL<=1MG/L-MCNC: <1.2 MG/DL
MICROALBUMIN/CREAT 24H UR-RTO: NORMAL MG/G
MICROSCOPIC-UA: NORMAL
NITRITE URINE: NEGATIVE
PH URINE: 5.5
PROT UR-MCNC: 7 MG/DL
PROTEIN URINE: NORMAL
RED BLOOD CELLS URINE: 5 /HPF
SPECIFIC GRAVITY URINE: 1.02
SQUAMOUS EPITHELIAL CELLS: 2 /HPF
TSH SERPL-ACNC: 3.53 UIU/ML
UROBILINOGEN URINE: NORMAL
WHITE BLOOD CELLS URINE: 0 /HPF

## 2023-01-19 LAB
APPEARANCE: CLEAR
B2 MICROGLOB SERPL-MCNC: 1.6 MG/L
BILIRUBIN URINE: NEGATIVE
BLOOD URINE: ABNORMAL
C3 SERPL-MCNC: 127 MG/DL
C4 SERPL-MCNC: 30 MG/DL
CARDIOLIPIN AB SER IA-ACNC: NEGATIVE
COLOR: YELLOW
CREAT SPEC-SCNC: 172 MG/DL
CREAT/PROT UR: 0.1 RATIO
DSDNA AB SER-ACNC: <12 IU/ML
GLUCOSE QUALITATIVE U: NEGATIVE
KETONES URINE: NEGATIVE
LEUKOCYTE ESTERASE URINE: NEGATIVE
M TB IFN-G BLD-IMP: NEGATIVE
NITRITE URINE: NEGATIVE
PH URINE: 5.5
PROT UR-MCNC: 8 MG/DL
PROTEIN URINE: NEGATIVE
QUANTIFERON TB PLUS MITOGEN MINUS NIL: >10 IU/ML
QUANTIFERON TB PLUS NIL: 0.02 IU/ML
QUANTIFERON TB PLUS TB1 MINUS NIL: 0.02 IU/ML
QUANTIFERON TB PLUS TB2 MINUS NIL: 0.01 IU/ML
SPECIFIC GRAVITY URINE: 1.02
T PALLIDUM AB SER QL IA: NEGATIVE
UROBILINOGEN URINE: NORMAL

## 2023-01-23 ENCOUNTER — APPOINTMENT (OUTPATIENT)
Dept: OTOLARYNGOLOGY | Facility: CLINIC | Age: 48
End: 2023-01-23
Payer: COMMERCIAL

## 2023-01-23 VITALS
DIASTOLIC BLOOD PRESSURE: 80 MMHG | BODY MASS INDEX: 32.78 KG/M2 | SYSTOLIC BLOOD PRESSURE: 117 MMHG | HEART RATE: 89 BPM | HEIGHT: 64 IN | WEIGHT: 192 LBS

## 2023-01-23 PROCEDURE — 99214 OFFICE O/P EST MOD 30 MIN: CPT | Mod: 25

## 2023-01-23 PROCEDURE — 31575 DIAGNOSTIC LARYNGOSCOPY: CPT

## 2023-01-23 RX ORDER — FAMOTIDINE 20 MG/1
20 TABLET, FILM COATED ORAL TWICE DAILY
Qty: 60 | Refills: 3 | Status: DISCONTINUED | COMMUNITY
Start: 2022-09-14 | End: 2023-01-23

## 2023-01-24 RX ORDER — FLUTICASONE PROPIONATE 50 UG/1
50 SPRAY, METERED NASAL TWICE DAILY
Qty: 1 | Refills: 3 | Status: DISCONTINUED | COMMUNITY
Start: 2022-09-14 | End: 2023-01-24

## 2023-01-24 NOTE — HISTORY OF PRESENT ILLNESS
[de-identified] : 47Y F follow up for sleep apnea, nasal congestion, and cervical lymphadenopathy\par Patient used sinus rinse and Flonase with some relief. However did not continue to use after finishing first set\par Patient states has noticed some residual globus sensation. Does have history of reflux\par Patient completed US of neck for chronic cervical lymphadenopathy\par Patient Completed sleep study\par Patient denies any unintentional weight loss, no dysphagia, no fevers, no night sweats

## 2023-01-24 NOTE — ASSESSMENT
[FreeTextEntry1] : 47Y F follow up for sleep apnea, nasal congestion, and cervical lymphadenopathy. Previously Patient states she home study was not precise patient was completed sleep study in facility. 10/17/22. Nasal congestion improved when she was using sinus rinse and nasal spray. Patient with history of GERD and Globus sensation. Laryngoscopy shows moderate postcricoid edema, mild arytenoids edema and mild mucus production coating nasal cavity\par \par 1/4/2023 Polysomnography: AHI 45.7/hr Severe DWIGHT. Sleep Efficiency 52.2%\par 10/26/22  Neck: Stable Benign-appearing cervical lymph nodes compare to prior studies 9/10/2020 and 3/16/2021\par \par Recommend\par Obstructive Sleep Apnea\par -Discussed that primary snoring is not harmful in and off itself but sleep apnea is different. DWIGHT needs treatment due to long term risk heart and lung problems. \par -Weight Loss and Lifestyle changes is known to improved DWIGHT. Avoid alcohol in the hours before bedtime.\par -Quit smoking if still smoking. Don't sleep on your back.\par -Avoid taking sedative medications such as anti-anxiety drugs or sleeping pills.\par -Referral to Pulmonology Dr. Jen Kirk for evaluation of CPAP\par \par Nasal Congestion -Improved\par -Continue Flonase nasal spray to be used after completing daily Sinus Rinse\par -Discussed the importance of rinsing the sinus before using nasal spray so that excess mucus lining the nasal cavity can be wash away so medication can penetration the nose.\par -If normal saline sinus rinse + nasal spray is not effective can discuss medicated nasal rinses and imaging for additional evaluation\par -Referral to Allergist Dr. Elise Swanson for general allergy evaluation \par \par Laryngopharyngeal Reflux\par - Discussed Lifestyle changes as the most effective methods to improvement LPR. Weight Loss if overweight. Avoid foods that increase the level of acid in your stomach, including caffeinated/carbonated drinks. \par - Avoid foods that decrease the pressure in the lower esophagus, such as fatty foods, alcohol and peppermint.\par - Avoid large meals.Try to have an empty stomach 2 hours before going to bed. If still smoking please Quit.\par - Head of bed elevation when you lying down\par -LPR Handout Given\par - Discussed with Patient if they have not seen Gastroenterolgy in the past for endoscopy they should follow up as chronic reflux can causes mucosal changes in the lining of the lower esophagus \par - Send to Pharmacy Famotidine 20mg at bedtime\par \par Cervical Lymph nodes- Stable\par -Continue to monitor if patient states there are any changes in size can repeat US. Patient states she is currently getting further work up from Rheumatology\par \par -Return to clinic in 4 months or sooner if new/worsen symptoms present\par \par

## 2023-01-25 ENCOUNTER — RESULT REVIEW (OUTPATIENT)
Age: 48
End: 2023-01-25

## 2023-01-25 ENCOUNTER — OUTPATIENT (OUTPATIENT)
Dept: OUTPATIENT SERVICES | Facility: HOSPITAL | Age: 48
LOS: 1 days | End: 2023-01-25
Payer: COMMERCIAL

## 2023-01-25 ENCOUNTER — APPOINTMENT (OUTPATIENT)
Dept: OBGYN | Facility: HOSPITAL | Age: 48
End: 2023-01-25
Payer: COMMERCIAL

## 2023-01-25 VITALS
TEMPERATURE: 97 F | BODY MASS INDEX: 32.78 KG/M2 | SYSTOLIC BLOOD PRESSURE: 131 MMHG | HEIGHT: 64 IN | HEART RATE: 101 BPM | WEIGHT: 192 LBS | DIASTOLIC BLOOD PRESSURE: 86 MMHG

## 2023-01-25 DIAGNOSIS — Z98.51 TUBAL LIGATION STATUS: Chronic | ICD-10-CM

## 2023-01-25 DIAGNOSIS — Z98.890 OTHER SPECIFIED POSTPROCEDURAL STATES: Chronic | ICD-10-CM

## 2023-01-25 DIAGNOSIS — Z98.891 HISTORY OF UTERINE SCAR FROM PREVIOUS SURGERY: Chronic | ICD-10-CM

## 2023-01-25 DIAGNOSIS — D25.9 LEIOMYOMA OF UTERUS, UNSPECIFIED: Chronic | ICD-10-CM

## 2023-01-25 DIAGNOSIS — N92.3 OVULATION BLEEDING: ICD-10-CM

## 2023-01-25 LAB
HCG UR QL: NEGATIVE
QUALITY CONTROL: YES

## 2023-01-25 PROCEDURE — 88112 CYTOPATH CELL ENHANCE TECH: CPT | Mod: 26

## 2023-01-25 PROCEDURE — 99213 OFFICE O/P EST LOW 20 MIN: CPT | Mod: GC

## 2023-01-25 RX ORDER — FLUTICASONE PROPIONATE 50 UG/1
50 SPRAY, METERED NASAL DAILY
Qty: 1 | Refills: 3 | Status: DISCONTINUED | COMMUNITY
Start: 2022-10-17 | End: 2023-01-25

## 2023-01-25 RX ORDER — LIDOCAINE 3.5 G/1
3.5 PATCH TOPICAL
Qty: 1 | Refills: 0 | Status: DISCONTINUED | COMMUNITY
Start: 2022-09-14 | End: 2023-01-25

## 2023-01-25 RX ORDER — SOD CHLOR,BICARB/SQUEEZ BOTTLE
PACKET, WITH RINSE DEVICE NASAL
Qty: 1 | Refills: 3 | Status: DISCONTINUED | COMMUNITY
Start: 2022-10-17 | End: 2023-01-25

## 2023-01-26 DIAGNOSIS — R09.89 OTHER SPECIFIED SYMPTOMS AND SIGNS INVOLVING THE CIRCULATORY AND RESPIRATORY SYSTEMS: ICD-10-CM

## 2023-01-26 DIAGNOSIS — D25.9 LEIOMYOMA OF UTERUS, UNSPECIFIED: ICD-10-CM

## 2023-01-26 DIAGNOSIS — K21.9 GASTRO-ESOPHAGEAL REFLUX DISEASE WITHOUT ESOPHAGITIS: ICD-10-CM

## 2023-01-27 LAB — NON-GYNECOLOGICAL CYTOLOGY STUDY: SIGNIFICANT CHANGE UP

## 2023-01-28 PROBLEM — N92.3 INTERMENSTRUAL BLEEDING: Status: ACTIVE | Noted: 2023-01-28

## 2023-01-28 NOTE — PHYSICAL EXAM
[Appropriately responsive] : appropriately responsive [Alert] : alert [No Acute Distress] : no acute distress [Regular Rate Rhythm] : regular rate rhythm [No Murmurs] : no murmurs [Clear to Auscultation B/L] : clear to auscultation bilaterally [Soft] : soft [Non-tender] : non-tender [Non-distended] : non-distended [No HSM] : No HSM [No Lesions] : no lesions [No Mass] : no mass [Oriented x3] : oriented x3 [Chaperone Present] : A chaperone was present in the examining room during all aspects of the physical examination

## 2023-01-28 NOTE — PLAN
[FreeTextEntry1] : 46 yo, , PMHx of UFE in 3/2021 for fibroid uterus and [facility] D&C myomectomy on 10/21 for degenerating fibroid (77g), LMP 23 presents to clinic for followup of intermenstrual bleeding, c/w ovulation, and bothersome bulk symptoms.\par \par #Fibroid uterus\par - Reviewed imaging of uterus with patient and discussed interval decrease in size of fibroid uterus. Discussed that this may not be the cause of her bulk symptoms. Patient understands but still would like to discuss possibility of hysterectomy. Will refer to booking clinic. \par - Discussed bleeding and likely etiology of ovulation given timing in cycle and regular nature. Discussed black and dark brown blood. Discussed benign pathology with patient and low risk of cancer given normal pathology, normal bleeding pattern and normal findings on TVUS. Pt reassured and does not desire management for bleeding. \par \par #Hematuria\par - 4prbc / hpf on microscopy\par - urine cytology sent\par - if positive, refer to urology for possible cysto \par \par Lynne Hadley PGY3\par d/w Dr Lobo \par

## 2023-01-28 NOTE — HISTORY OF PRESENT ILLNESS
[FreeTextEntry1] : 48 yo, , PMHx of UFE in 3/2021 for fibroid uterus and [facility] D&C myomectomy on 10/21 for degenerating fibroid (77g), LMP 23 presents to clinic for followup of dark menstrual bleeding and intermenstrual bleeding. Since she was last seen she reports continued intermenstrual bleeding. She notes dark brown/black/ bright red blood spotting that occurs 2 weeks before her menses each month ( & ). She still notes black blood during menses, though significantly lighter than prior only last 2-3d with few pads used. Aside from bleeding, she has pressure and bloating symptoms in her pelvis which are constant in nature. Notes occasional constipation but no urinary concerns. \par \par She is bothered by the bulk symptoms more than her bleeding. Her only concern with bleeding is that it could be from necrotic tissue and cause cancer. Notes she was undergoing workup for hysterectomy prior but given her surgical history recommendation was made to attempt less invasive intervention first. Given patient is still having bothersome symptoms, she is interested in revisiting possibility of hysterectomy.\par \par ObHx: full-term CS , full-term CS \par GynHx: fibroids\par \par PMH: gastritis\par SurgHx: CSx2 (/), L cystectomy lap converted to open (), abdominoplasty with mesh placement

## 2023-01-31 ENCOUNTER — APPOINTMENT (OUTPATIENT)
Dept: NEUROLOGY | Facility: HOSPITAL | Age: 48
End: 2023-01-31
Payer: COMMERCIAL

## 2023-01-31 ENCOUNTER — OUTPATIENT (OUTPATIENT)
Dept: OUTPATIENT SERVICES | Facility: HOSPITAL | Age: 48
LOS: 1 days | End: 2023-01-31
Payer: SELF-PAY

## 2023-01-31 VITALS
TEMPERATURE: 97.5 F | DIASTOLIC BLOOD PRESSURE: 80 MMHG | RESPIRATION RATE: 14 BRPM | SYSTOLIC BLOOD PRESSURE: 119 MMHG | HEART RATE: 80 BPM | BODY MASS INDEX: 32.78 KG/M2 | WEIGHT: 192 LBS | HEIGHT: 64 IN

## 2023-01-31 DIAGNOSIS — R53.1 WEAKNESS: ICD-10-CM

## 2023-01-31 DIAGNOSIS — R51.9 HEADACHE, UNSPECIFIED: ICD-10-CM

## 2023-01-31 PROCEDURE — G0463: CPT

## 2023-01-31 PROCEDURE — 99212 OFFICE O/P EST SF 10 MIN: CPT

## 2023-02-01 DIAGNOSIS — R20.0 ANESTHESIA OF SKIN: ICD-10-CM

## 2023-02-01 DIAGNOSIS — R42 DIZZINESS AND GIDDINESS: ICD-10-CM

## 2023-02-01 PROBLEM — R53.1 WEAKNESS: Status: ACTIVE | Noted: 2023-02-01

## 2023-02-02 ENCOUNTER — APPOINTMENT (OUTPATIENT)
Dept: ULTRASOUND IMAGING | Facility: CLINIC | Age: 48
End: 2023-02-02
Payer: COMMERCIAL

## 2023-02-02 ENCOUNTER — APPOINTMENT (OUTPATIENT)
Dept: INTERNAL MEDICINE | Facility: CLINIC | Age: 48
End: 2023-02-02

## 2023-02-02 ENCOUNTER — OUTPATIENT (OUTPATIENT)
Dept: OUTPATIENT SERVICES | Facility: HOSPITAL | Age: 48
LOS: 1 days | End: 2023-02-02
Payer: COMMERCIAL

## 2023-02-02 DIAGNOSIS — R04.0 EPISTAXIS: ICD-10-CM

## 2023-02-02 PROCEDURE — 76536 US EXAM OF HEAD AND NECK: CPT | Mod: 26

## 2023-02-02 PROCEDURE — 76536 US EXAM OF HEAD AND NECK: CPT
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
## 2023-02-02 NOTE — PHYSICAL EXAM
[General Appearance - Alert] : alert [Oriented To Time, Place, And Person] : oriented to person, place, and time [Impaired Insight] : insight and judgment were intact [Affect] : the affect was normal [Memory Recent] : recent memory was not impaired [Person] : oriented to person [Place] : oriented to place [Time] : oriented to time [Concentration Intact] : normal concentrating ability [Visual Intact] : visual attention was ~T not ~L decreased [Naming Objects] : no difficulty naming common objects [Repeating Phrases] : no difficulty repeating a phrase [Writing A Sentence] : no difficulty writing a sentence [Fluency] : fluency intact [Comprehension] : comprehension intact [Reading] : reading intact [Past History] : adequate knowledge of personal past history [Cranial Nerves Optic (II)] : visual acuity intact bilaterally,  visual fields full to confrontation, pupils equal round and reactive to light [Cranial Nerves Oculomotor (III)] : extraocular motion intact [Cranial Nerves Trigeminal (V)] : facial sensation intact symmetrically [Cranial Nerves Facial (VII)] : face symmetrical [Cranial Nerves Vestibulocochlear (VIII)] : hearing was intact bilaterally [Cranial Nerves Glossopharyngeal (IX)] : tongue and palate midline [Cranial Nerves Accessory (XI - Cranial And Spinal)] : head turning and shoulder shrug symmetric [Cranial Nerves Hypoglossal (XII)] : there was no tongue deviation with protrusion [Motor Tone] : muscle tone was normal in all four extremities [Motor Strength] : muscle strength was normal in all four extremities [No Muscle Atrophy] : normal bulk in all four extremities [Sensation Tactile Decrease] : light touch was intact [Abnormal Walk] : normal gait [Balance] : balance was intact [2+] : Ankle jerk left 2+ [Past-pointing] : there was no past-pointing [Tremor] : no tremor present [Plantar Reflex Right Only] : normal on the right [Plantar Reflex Left Only] : normal on the left

## 2023-02-02 NOTE — END OF VISIT
[] : Resident [FreeTextEntry3] : I discussed with resident. No pathology on brain MRI to explain subjective R face,arm,leg numbness. Dizziness is nonspecific.

## 2023-02-02 NOTE — DISCUSSION/SUMMARY
[FreeTextEntry1] : 47 year old female presented as a follow up for imbalance. Endorses some subjective right sided face, arm, leg numbness and headache that is alleviated by Advil. Normal neurological exam. 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

## 2023-02-03 ENCOUNTER — APPOINTMENT (OUTPATIENT)
Dept: INTERNAL MEDICINE | Facility: CLINIC | Age: 48
End: 2023-02-03
Payer: COMMERCIAL

## 2023-02-03 ENCOUNTER — OUTPATIENT (OUTPATIENT)
Dept: OUTPATIENT SERVICES | Facility: HOSPITAL | Age: 48
LOS: 1 days | End: 2023-02-03

## 2023-02-03 VITALS
BODY MASS INDEX: 32.78 KG/M2 | SYSTOLIC BLOOD PRESSURE: 114 MMHG | WEIGHT: 192 LBS | OXYGEN SATURATION: 97 % | HEIGHT: 64 IN | HEART RATE: 83 BPM | DIASTOLIC BLOOD PRESSURE: 80 MMHG

## 2023-02-03 DIAGNOSIS — Z98.891 HISTORY OF UTERINE SCAR FROM PREVIOUS SURGERY: Chronic | ICD-10-CM

## 2023-02-03 DIAGNOSIS — Z98.890 OTHER SPECIFIED POSTPROCEDURAL STATES: Chronic | ICD-10-CM

## 2023-02-03 DIAGNOSIS — Z98.51 TUBAL LIGATION STATUS: Chronic | ICD-10-CM

## 2023-02-03 DIAGNOSIS — D25.9 LEIOMYOMA OF UTERUS, UNSPECIFIED: Chronic | ICD-10-CM

## 2023-02-03 PROCEDURE — ZZZZZ: CPT | Mod: GE

## 2023-02-05 NOTE — ASSESSMENT
[FreeTextEntry1] : Pt seen for acute visit for R knee pain.Suspect inflammatory iso ongoing rheum workup. Emailed rheumatology office to express concern and request followup ASAP\par \par RTC in 1 week with Dr. Henriquez. \par \par Encouraged NSAIDs, ice, elevation. \par \par CDW Dr. Laura.\par David Husain PGY1\par [employer] [facility]

## 2023-02-05 NOTE — END OF VISIT
[] : Resident [FreeTextEntry3] : concern for systemic inflammatory process manifesting as migratory arthritis - notable recent knee pain and swelling with no specific provocation, positive c-anca, and rbc's in urine raise suspicion.  diff dx includes gpa, aav, sarcoid.  ln bx approx two years ago negative for malignant dz, and ln's stable in size and appearance since that time.  communicating with our Rheumatology colleagues to dustin diff dx and approach.  Sx tx for resolving knee flare-up; she will rtc 1.5 weeks to reassess.

## 2023-02-05 NOTE — HISTORY OF PRESENT ILLNESS
[FreeTextEntry1] : R knee pain  [de-identified] : Pt is a 47-year-old woman who is followed for lymphadenopathy, obesity, back pain, and uterine fibroids who is presenting to clinic today for R knee pain and swelling. The pain started on 1/26 out of nowhere and progressed over the next day or two to be red, swollen , and painful to weight bear. No trauma, kneeling, twisting, etc. Aleve helped just a little bit. 3 days ago the swelling started to go down but a bump remained that still hurts to touch. Overall the knee still hurts but much less than before. She is also having migratory pain of other joints for 2-3 years now, including in her hands, wrists, and R hip. She saw a rheumatologist recently but they focused moreso on her lymphadenopathy and told her to follow up again after her labs result. She denies any other symptoms including fever, chest pain, mouth pain/ulcers, urinary changes, abd pain, n/v/d, shortness of breath.

## 2023-02-05 NOTE — REVIEW OF SYSTEMS
[Joint Pain] : joint pain [Joint Swelling] : joint swelling [Headache] : headache [Negative] : Integumentary [Fever] : no fever [Chills] : no chills [Fatigue] : no fatigue [Sore Throat] : no sore throat [Chest Pain] : no chest pain [Palpitations] : no palpitations [Leg Claudication] : no leg claudication [Lower Ext Edema] : no lower extremity edema [Shortness Of Breath] : no shortness of breath [Dysuria] : no dysuria [Hematuria] : no hematuria [Skin Rash] : no skin rash [FreeTextEntry9] : +R knee pain and swelling x2 weeks, improving. Migratory joint pains mostly in fingers and wrists [de-identified] : daily headache

## 2023-02-05 NOTE — PHYSICAL EXAM
[No Acute Distress] : no acute distress [Well Nourished] : well nourished [Well Developed] : well developed [Well-Appearing] : well-appearing [Normal Sclera/Conjunctiva] : normal sclera/conjunctiva [EOMI] : extraocular movements intact [Normal Outer Ear/Nose] : the outer ears and nose were normal in appearance [Normal Oropharynx] : the oropharynx was normal [No Respiratory Distress] : no respiratory distress  [No Accessory Muscle Use] : no accessory muscle use [Clear to Auscultation] : lungs were clear to auscultation bilaterally [Normal Rate] : normal rate  [Regular Rhythm] : with a regular rhythm [Normal S1, S2] : normal S1 and S2 [No Murmur] : no murmur heard [Soft] : abdomen soft [Non Tender] : non-tender [Non-distended] : non-distended [Normal Bowel Sounds] : normal bowel sounds [No Rash] : no rash [Normal] : normal gait, coordination grossly intact, no focal deficits and deep tendon reflexes were 2+ and symmetric [de-identified] : +cervical lymphadenopathy [de-identified] : palpable ant/post cervical and supraclavicular lymph nodes  [de-identified] : +swelling, warmth of R knee. TTP of lateral R knee. Palpable 2cm nonbony bump anterior R knee, TTP. Normal strength and ROM

## 2023-02-06 ENCOUNTER — RESULT REVIEW (OUTPATIENT)
Age: 48
End: 2023-02-06

## 2023-02-06 ENCOUNTER — APPOINTMENT (OUTPATIENT)
Dept: RHEUMATOLOGY | Facility: CLINIC | Age: 48
End: 2023-02-06
Payer: COMMERCIAL

## 2023-02-06 ENCOUNTER — OUTPATIENT (OUTPATIENT)
Dept: OUTPATIENT SERVICES | Facility: HOSPITAL | Age: 48
LOS: 1 days | End: 2023-02-06

## 2023-02-06 VITALS
DIASTOLIC BLOOD PRESSURE: 76 MMHG | OXYGEN SATURATION: 96 % | HEIGHT: 64 IN | HEART RATE: 85 BPM | WEIGHT: 190 LBS | RESPIRATION RATE: 14 BRPM | SYSTOLIC BLOOD PRESSURE: 110 MMHG | BODY MASS INDEX: 32.44 KG/M2

## 2023-02-06 DIAGNOSIS — M25.561 PAIN IN RIGHT KNEE: ICD-10-CM

## 2023-02-06 LAB
ALBUMIN SERPL ELPH-MCNC: 4.3 G/DL
ALP BLD-CCNC: 54 U/L
ALT SERPL-CCNC: 35 U/L
ANION GAP SERPL CALC-SCNC: 14 MMOL/L
APPEARANCE: CLEAR
AST SERPL-CCNC: 21 U/L
BACTERIA: NEGATIVE
BASOPHILS # BLD AUTO: 0.04 K/UL
BASOPHILS NFR BLD AUTO: 0.9 %
BILIRUB SERPL-MCNC: 0.3 MG/DL
BILIRUBIN URINE: NEGATIVE
BLOOD URINE: NEGATIVE
BUN SERPL-MCNC: 20 MG/DL
CALCIUM SERPL-MCNC: 9.6 MG/DL
CHLORIDE SERPL-SCNC: 102 MMOL/L
CO2 SERPL-SCNC: 24 MMOL/L
COLOR: YELLOW
CREAT SERPL-MCNC: 0.87 MG/DL
CREAT SPEC-SCNC: 226 MG/DL
CREAT/PROT UR: 0 RATIO
CRP SERPL-MCNC: <3 MG/L
EGFR: 84 ML/MIN/1.73M2
EOSINOPHIL # BLD AUTO: 0.09 K/UL
EOSINOPHIL NFR BLD AUTO: 2 %
ERYTHROCYTE [SEDIMENTATION RATE] IN BLOOD BY WESTERGREN METHOD: 27 MM/HR
GLUCOSE QUALITATIVE U: NEGATIVE
GLUCOSE SERPL-MCNC: 105 MG/DL
HCT VFR BLD CALC: 44 %
HGB BLD-MCNC: 14.4 G/DL
HYALINE CASTS: 3 /LPF
IMM GRANULOCYTES NFR BLD AUTO: 0.2 %
KETONES URINE: NEGATIVE
LEUKOCYTE ESTERASE URINE: NEGATIVE
LYMPHOCYTES # BLD AUTO: 1.5 K/UL
LYMPHOCYTES NFR BLD AUTO: 33.3 %
MAN DIFF?: NORMAL
MCHC RBC-ENTMCNC: 29.6 PG
MCHC RBC-ENTMCNC: 32.7 GM/DL
MCV RBC AUTO: 90.5 FL
MICROSCOPIC-UA: NORMAL
MONOCYTES # BLD AUTO: 0.34 K/UL
MONOCYTES NFR BLD AUTO: 7.6 %
NEUTROPHILS # BLD AUTO: 2.52 K/UL
NEUTROPHILS NFR BLD AUTO: 56 %
NITRITE URINE: NEGATIVE
PH URINE: 5.5
PLATELET # BLD AUTO: 249 K/UL
POTASSIUM SERPL-SCNC: 4.4 MMOL/L
PROT SERPL-MCNC: 7 G/DL
PROT UR-MCNC: 8 MG/DL
PROTEIN URINE: NORMAL
RBC # BLD: 4.86 M/UL
RBC # FLD: 14.6 %
RED BLOOD CELLS URINE: 3 /HPF
SODIUM SERPL-SCNC: 140 MMOL/L
SPECIFIC GRAVITY URINE: 1.03
SQUAMOUS EPITHELIAL CELLS: 2 /HPF
UROBILINOGEN URINE: NORMAL
WBC # FLD AUTO: 4.5 K/UL
WHITE BLOOD CELLS URINE: 1 /HPF

## 2023-02-06 PROCEDURE — ZZZZZ: CPT | Mod: GC

## 2023-02-07 ENCOUNTER — NON-APPOINTMENT (OUTPATIENT)
Age: 48
End: 2023-02-07

## 2023-02-07 ENCOUNTER — APPOINTMENT (OUTPATIENT)
Dept: RADIOLOGY | Facility: IMAGING CENTER | Age: 48
End: 2023-02-07
Payer: COMMERCIAL

## 2023-02-07 ENCOUNTER — OUTPATIENT (OUTPATIENT)
Dept: OUTPATIENT SERVICES | Facility: HOSPITAL | Age: 48
LOS: 1 days | End: 2023-02-07
Payer: COMMERCIAL

## 2023-02-07 DIAGNOSIS — Z98.891 HISTORY OF UTERINE SCAR FROM PREVIOUS SURGERY: Chronic | ICD-10-CM

## 2023-02-07 DIAGNOSIS — Z98.890 OTHER SPECIFIED POSTPROCEDURAL STATES: Chronic | ICD-10-CM

## 2023-02-07 DIAGNOSIS — D25.9 LEIOMYOMA OF UTERUS, UNSPECIFIED: Chronic | ICD-10-CM

## 2023-02-07 DIAGNOSIS — M25.561 PAIN IN RIGHT KNEE: ICD-10-CM

## 2023-02-07 DIAGNOSIS — Z98.51 TUBAL LIGATION STATUS: Chronic | ICD-10-CM

## 2023-02-07 LAB
MYELOPEROXIDASE AB SER QL IA: <5 UNITS
MYELOPEROXIDASE CELLS FLD QL: NEGATIVE
PROTEINASE3 AB SER IA-ACNC: <5 UNITS
PROTEINASE3 AB SER-ACNC: NEGATIVE

## 2023-02-07 PROCEDURE — 73562 X-RAY EXAM OF KNEE 3: CPT

## 2023-02-07 PROCEDURE — 73562 X-RAY EXAM OF KNEE 3: CPT | Mod: 26,50

## 2023-02-08 ENCOUNTER — APPOINTMENT (OUTPATIENT)
Dept: OBGYN | Facility: HOSPITAL | Age: 48
End: 2023-02-08
Payer: COMMERCIAL

## 2023-02-08 ENCOUNTER — OUTPATIENT (OUTPATIENT)
Dept: OUTPATIENT SERVICES | Facility: HOSPITAL | Age: 48
LOS: 1 days | End: 2023-02-08

## 2023-02-08 ENCOUNTER — NON-APPOINTMENT (OUTPATIENT)
Age: 48
End: 2023-02-08

## 2023-02-08 VITALS
DIASTOLIC BLOOD PRESSURE: 87 MMHG | SYSTOLIC BLOOD PRESSURE: 116 MMHG | HEIGHT: 64 IN | WEIGHT: 195 LBS | BODY MASS INDEX: 33.29 KG/M2 | HEART RATE: 74 BPM | TEMPERATURE: 98 F

## 2023-02-08 DIAGNOSIS — G47.33 OBSTRUCTIVE SLEEP APNEA (ADULT) (PEDIATRIC): ICD-10-CM

## 2023-02-08 PROCEDURE — 99213 OFFICE O/P EST LOW 20 MIN: CPT | Mod: GC

## 2023-02-09 DIAGNOSIS — Z32.00 ENCOUNTER FOR PREGNANCY TEST, RESULT UNKNOWN: ICD-10-CM

## 2023-02-12 NOTE — ASSESSMENT
[FreeTextEntry1] : 47yF with hx of uterine fibroid,  daily headache, chronic cervical lymphadenopathies (since 2020) presented as urgent visit for R. knee pain \par \par # acute monoarthritis involving R. knee x 1 week \par = pain/swelling now mostly resolved , no prior episode, improved with NSAID  \par = differential: crystalline arthropathy (likely pseudogout), inflammatory arthropathy (has + c-ANCA, but other serologies have been negative), also suspect underlying \par = X-rays hands 2021 reviewed: no chondrocalcinosis \par = obtain Xrays of b/l knees\par = start Naproxen 500mg BID for 2 weeks \par = check inflam markers \par \par # b/l hand pain (chronic) \par = suspect underlying OA vs overuse  \par = Naproxen as above \par \par # Thyroid nodule \par = subcm thyroid nodules on US, advise f/u PCP or endocrinology \par \par #Cervical lymphadenopathy \par = Incidental finding on 9/25/2020 showed small lymph nodes in the bilateral cervical chains and periparotid lymph nodes, all nodes are less than 1.5 cm short axis, too small to characterize on CT. \par = CT abdomen 1/14/21 negative retroperitoneal lymph node\par = Followed w/Dr. Goldberg (Hem/Onc) for anemia and Kappa/Lambda normal \par = Bx of R/submental lymph node fine needle aspiration lymphocyte predominant (80%), T cell population, polytypic B cells. No dx w/abnormalities \par = Autoimmune serologies: negative for KRISTIN, SSA/SSB, RF, CCP,  C3, C4, SCL, anti-histone,, RNA polymerase III, dsDNA,MPO/PR3. ACE normal range, vitamin D 25 low\par = positive for KRISTIN 1:320 and speckled. C-ANCA 1:160, \par = Infectious workup: HIV, chlamydia/GC, syphilis, hepatitis B, C, EBV, CMV negative \par = Repeat cervical US showed stable 9 mm benign appearing submental lymph node on 10/25/22 (has not changed) \par = UA: +5 RBC -> will repeat today \par \par Case discussed with Dr. Purdy \par f/u in 1 month \par

## 2023-02-12 NOTE — PHYSICAL EXAM
[General Appearance - Alert] : alert [General Appearance - In No Acute Distress] : in no acute distress [Sclera] : the sclera and conjunctiva were normal [PERRL With Normal Accommodation] : pupils were equal in size, round, and reactive to light [Extraocular Movements] : extraocular movements were intact [Strabismus] : no strabismus was seen [Optic Disc Abnormality] : the optic disc were normal in size and color [Outer Ear] : the ears and nose were normal in appearance [Hearing Threshold Finger Rub Not [place]] : hearing was normal [Examination Of The Oral Cavity] : the lips and gums were normal [Both Tympanic Membranes Were Examined] : both tympanic membranes were normal [Oropharynx] : the oropharynx was normal [] : no respiratory distress [Respiration, Rhythm And Depth] : normal respiratory rhythm and effort [Exaggerated Use Of Accessory Muscles For Inspiration] : no accessory muscle use [Auscultation Breath Sounds / Voice Sounds] : lungs were clear to auscultation bilaterally [Apical Impulse] : the apical impulse was normal [Heart Rate And Rhythm] : heart rate was normal and rhythm regular [Heart Sounds] : normal S1 and S2 [Arterial Pulses Carotid] : carotid pulses were normal with no bruits [Abdominal Aorta] : the abdominal aorta was normal [Arterial Pulses Femoral] : femoral pulses were normal without bruits [Bowel Sounds] : normal bowel sounds [Abnormal Walk] : normal gait [Cranial Nerves] : cranial nerves 2-12 were intact [Deep Tendon Reflexes (DTR)] : deep tendon reflexes were 2+ and symmetric [Sensation] : the sensory exam was normal to light touch and pinprick [Oriented To Time, Place, And Person] : oriented to person, place, and time [FreeTextEntry1] : acne and redness forehead

## 2023-02-13 ENCOUNTER — APPOINTMENT (OUTPATIENT)
Dept: RHEUMATOLOGY | Facility: CLINIC | Age: 48
End: 2023-02-13

## 2023-02-13 ENCOUNTER — APPOINTMENT (OUTPATIENT)
Dept: INTERNAL MEDICINE | Facility: CLINIC | Age: 48
End: 2023-02-13

## 2023-02-13 DIAGNOSIS — M17.9 OSTEOARTHRITIS OF KNEE, UNSPECIFIED: ICD-10-CM

## 2023-02-13 DIAGNOSIS — M19.90 UNSPECIFIED OSTEOARTHRITIS, UNSPECIFIED SITE: ICD-10-CM

## 2023-02-13 DIAGNOSIS — M25.561 PAIN IN RIGHT KNEE: ICD-10-CM

## 2023-02-13 DIAGNOSIS — R31.29 OTHER MICROSCOPIC HEMATURIA: ICD-10-CM

## 2023-02-13 DIAGNOSIS — R59.0 LOCALIZED ENLARGED LYMPH NODES: ICD-10-CM

## 2023-02-13 DIAGNOSIS — R59.1 GENERALIZED ENLARGED LYMPH NODES: ICD-10-CM

## 2023-02-15 DIAGNOSIS — D25.9 LEIOMYOMA OF UTERUS, UNSPECIFIED: ICD-10-CM

## 2023-02-15 DIAGNOSIS — Z00.00 ENCOUNTER FOR GENERAL ADULT MEDICAL EXAMINATION WITHOUT ABNORMAL FINDINGS: ICD-10-CM

## 2023-02-15 LAB
HCG UR QL: NEGATIVE
QUALITY CONTROL: YES

## 2023-02-15 NOTE — DISCUSSION/SUMMARY
[FreeTextEntry1] : 48 yo, , PMHx of UFE in 3/2021 for fibroid uterus and [facility] D&C myomectomy on 10/2021 for degenerating fibroid,  LMP 23 presents to booking clinic for consultation for hysterectomy. \par - Recent imaging including MRI and TVUS reviewed with patient. In setting of extensive surgical history and patient's most bothersome symptom being irregular but not heavy bleeding, interval decrease in size of fibroids since undergoing UFE in 3/2021, would not recommend hysterectomy at this time. \par - Patient counseled regarding undergoing hysteroscopic myomectomy and resection of submucosal components of degenerating fibroid, possibly requiring a 2 part procedure if unable to fully resect the mass at one time. Patient reassured regarding concern for malignancy and agrees to plan. \par - Surgical booking sheet submitted for EUA, D&C hysteroscopy, myomectomy with possible Bipolar resectoscope, possible Myosure\par - Risks, benefits, alternatives of the procedure were discussed with the patient and she expressed understanding. \par \par Pt seen and d/w Dr Glover\par S Varela-Friedman PGY2\par \par MIGS FELLOW NOTE\par \par Ms. Falcon is a 48 yo P2 with a known hx of fibroid uterus. PSH significant for c/s x2, ex-lap for L ovarian cystectomy (pt reports 2/2 adhesions), abdominoplasty (with revision, with mesh for hernia?). She had a UFE in . She was deemed not a candidate for a UAE in . She reports regular menses but that when she has her menses she has dark blood (almost black). She reports no bleeding in btw menses. She has some pelvic pressure but overall she reports improvement of bulk symptoms after UFE. MRI images reviewed ( and ) with patient. Pt states she would like surgery to improve the passage of "black blood" that she feels "pools up inside her uterus during menses". MRI shows intracavitary fibroids. Given this, pt offered a d+c hysteroscopic myomectomy vs hysterectomy. Given pt's extensive surgical hx and potential intra-op or post op complication pt at this time would like to proceed with D+C hysteroscopic myomectomy. Pt counseled that due to the size of intracavitary fibroids, more then one surgery may be necessary to remove a significant amount of fibroid.  Pt counseled that dark blood most likely 2/2 degenerating fibroids and that surgery would involved uterine sampling for pathology. Pt was also counseled that if this surgery does not alleviate the sxs then a hysterectomy may be the next step. \par \par Discussed plan for operative hysteroscopy, SM resection. Discussed risks of procedure including but not limited to VTE, SSI, damage to bowel, bladder, uterine perforation, fluid overload, interval procedure. ll questions answered, tasked to be booked\par \par D/w Dr. Ferguson\par RShibata, pgy-6/fmigs-2\par \par

## 2023-02-15 NOTE — HISTORY OF PRESENT ILLNESS
[FreeTextEntry1] : 48 yo, , PMHx of UFE in 3/2021 for fibroid uterus and [facility] D&C myomectomy on 10/2021 for degenerating fibroid, LMP 23 presents to booking clinic for consultation for hysterectomy. \par \par Pt is a longstanding GYN and booking clinic patient. Patient was seen in GYN clinic on  for followup of dark menstrual bleeding and intermenstrual bleeding. She is extremely bothered by dark brown/black discharge and spotting that occurs in between her menses each month, lasting for a few days up to 1 week. This dark blood is bothersome to her as she associates it with necrosis and is concerned that it could become cancer.  Her periods are much lighter than before her UFE, only lasting 3-5 days with minimal pads used. She reports bloating and pressure symptoms during her periods only - like the blood "wants to get out but it can't". Notes constipation with 2 bowel movements per week. Denies urinary symptoms. \par \par She desired a hysterectomy in the past but in setting of surgical history was counseled and ultimately underwent UFE in 2021 and then [facility] myomectomy in Oct 2021 for degenerating fibroid.  She presents again today for hysterectomy consultation and she is concerned regarding her dark bleeding, bulk symptoms and does not want this to become an ongoing/bigger issue for her in the future. \par \par ObHx: full-term CS , full-term CS , \par GynHx: fibroids\par \par PMH: Gastritis, DWIGHT (has appt with Pulm in 2023)\par Meds: Famotidine 20mg qhs\par SurgHx: CSx2 (), BTL, L cystectomy LSC converted to open (), abdominoplasty followed by revision for ?incisional vs fascial dehiscence with mesh placement, D&Cx2 for uterine polyps, [facility] D&C myomectomy on 10/2021 for degenerating fibroid after UFE in 3/2021. \par \par MRI 2022:  Further decrease in size of anterior myoma which is intracavitary in location, measuring 2.4 x 1.8 x 2.7 cm, previously 5.2 x 5.0 x 4.4 cm. No enhancement is seen within this fibroid.   Small right-sided intramural myoma measures 1.3 cm.\par \par TVUS 2023:  Uterus: 9.4 cm x 4.8 cm x 6.8 cm.\par *  Calcified anterior body intramural myoma measures 1.7 x 1.5 x 2.2 cm, abutting the endometrium\par *  Calcified fundal intramural myoma measures 1.3 x 1.3 x 1.1 cm.\par Endometrium: 5 mm. Hemorrhagic fluid is present in the endometrial cavity.\par \par Right ovary: 2.7 cm x 1.4 cm x 2.6 cm. Within normal limits.\par Left ovary: 2.9 cm x 2.4 cm x 2.1 cm. Within normal limits.

## 2023-02-17 ENCOUNTER — APPOINTMENT (OUTPATIENT)
Dept: PEDIATRIC ALLERGY IMMUNOLOGY | Facility: CLINIC | Age: 48
End: 2023-02-17
Payer: COMMERCIAL

## 2023-02-17 ENCOUNTER — OUTPATIENT (OUTPATIENT)
Dept: OUTPATIENT SERVICES | Facility: HOSPITAL | Age: 48
LOS: 1 days | End: 2023-02-17
Payer: COMMERCIAL

## 2023-02-17 VITALS — OXYGEN SATURATION: 95 % | BODY MASS INDEX: 34.43 KG/M2 | WEIGHT: 200.6 LBS | HEART RATE: 88 BPM

## 2023-02-17 DIAGNOSIS — J30.9 ALLERGIC RHINITIS, UNSPECIFIED: ICD-10-CM

## 2023-02-17 DIAGNOSIS — Z98.890 OTHER SPECIFIED POSTPROCEDURAL STATES: Chronic | ICD-10-CM

## 2023-02-17 DIAGNOSIS — Z01.82 ENCOUNTER FOR ALLERGY TESTING: ICD-10-CM

## 2023-02-17 DIAGNOSIS — D25.9 LEIOMYOMA OF UTERUS, UNSPECIFIED: Chronic | ICD-10-CM

## 2023-02-17 DIAGNOSIS — Z98.891 HISTORY OF UTERINE SCAR FROM PREVIOUS SURGERY: Chronic | ICD-10-CM

## 2023-02-17 DIAGNOSIS — Z98.51 TUBAL LIGATION STATUS: Chronic | ICD-10-CM

## 2023-02-17 PROCEDURE — 99203 OFFICE O/P NEW LOW 30 MIN: CPT

## 2023-02-17 PROCEDURE — G0463: CPT

## 2023-02-17 NOTE — HISTORY OF PRESENT ILLNESS
[Asthma] : asthma [Eczematous rashes] : eczematous rashes [Food Allergies] : food allergies [de-identified] : 47 year old female with chronic lymphadenopathy (followed by rheumatology) presents for initial evaluation.  \par \par Patient reports perennial symptoms with sneezing, which worsened in Aug 2022. \par - her sneezing worsened\par - now have throat irritation with frequent throat clearing \par - occasional PND\par - endorses nasal congestion \par - itchy eyes when outdoors. \par - but overall symptoms are both indoors and outdoors \par - Benadryl does not really help with sneezing \par \par Cephalosporin - 7 years ago. throat closing. within 15 min. after surgery. oral. \par ciprofloxacin - throat swelling. 5 years ago. oral. an hour later. \par Lidocaine - used during uterine surgery. shaking and vomiting. last year. \par oxycodone - dizzy for two days. oral medication. immediately after.

## 2023-02-17 NOTE — CONSULT LETTER
[Dear  ___] : Dear  [unfilled], [Consult Letter:] : I had the pleasure of evaluating your patient, [unfilled]. [Please see my note below.] : Please see my note below. [Consult Closing:] : Thank you very much for allowing me to participate in the care of this patient.  If you have any questions, please do not hesitate to contact me. [Sincerely,] : Sincerely, [FreeTextEntry3] : Debbie Dickey MD\par Fellow, Division of Allergy and Immunology.

## 2023-02-17 NOTE — PHYSICAL EXAM
[Alert] : alert [Well Nourished] : well nourished [Healthy Appearance] : healthy appearance [No Acute Distress] : no acute distress [Well Developed] : well developed [Normal Pupil & Iris Size/Symmetry] : normal pupil and iris size and symmetry [No Discharge] : no discharge [No Photophobia] : no photophobia [Sclera Not Icteric] : sclera not icteric [Normal TMs] : both tympanic membranes were normal [Normal Nasal Mucosa] : the nasal mucosa was normal [Normal Lips/Tongue] : the lips and tongue were normal [Normal Outer Ear/Nose] : the ears and nose were normal in appearance [Normal Tonsils] : normal tonsils [No Thrush] : no thrush [Pale mucosa] : pale mucosa [Boggy Nasal Turbinates] : boggy and/or pale nasal turbinates [Pharyngeal erythema] : pharyngeal erythema [Supple] : the neck was supple [Normal Rate and Effort] : normal respiratory rhythm and effort [No Crackles] : no crackles [No Retractions] : no retractions [Bilateral Audible Breath Sounds] : bilateral audible breath sounds [Normal Rate] : heart rate was normal  [Normal S1, S2] : normal S1 and S2 [No murmur] : no murmur [Regular Rhythm] : with a regular rhythm [Soft] : abdomen soft [Not Tender] : non-tender [Not Distended] : not distended [No HSM] : no hepato-splenomegaly [Normal Cervical Lymph Nodes] : cervical [Skin Intact] : skin intact  [No Rash] : no rash [No Skin Lesions] : no skin lesions [No clubbing] : no clubbing [No Edema] : no edema [No Cyanosis] : no cyanosis [Normal Mood] : mood was normal [Normal Affect] : affect was normal [Alert, Awake, Oriented as Age-Appropriate] : alert, awake, oriented as age appropriate [Posterior Pharyngeal Cobblestoning] : no posterior pharyngeal cobblestoning [Clear Rhinorrhea] : no clear rhinorrhea was seen [Exudate] : no exudate [Wheezing] : no wheezing was heard

## 2023-02-17 NOTE — IMPRESSION
[_____] : grasses ([unfilled]) [________] : [unfilled] [Allergy Testing Dust Mite] : dust mites [Allergy Testing Mixed Feathers] : feathers [Allergy Testing Dog] : dog [Allergy Testing Cat] : cat

## 2023-02-17 NOTE — REVIEW OF SYSTEMS
[Eye Itching] : itchy eyes [Nasal Congestion] : nasal congestion [Hoarseness] : hoarseness [Throat Itching] : throat itching [Post Nasal Drip] : post nasal drip [Sneezing] : sneezing [Dizziness] : dizziness [Nl] : Genitourinary [FreeTextEntry4] : throat closing with cephalosporin, ciprofloxacin

## 2023-02-17 NOTE — SOCIAL HISTORY
[House] : [unfilled] lives in a house  [Dry] : dry [None] : none [Cockroaches] : Patient states that there are no cockroaches in the home [Dust Mite Covers] : does not have dust mite covers [Feather Pillows] : does not have feather pillows [Feather Comforter] : does not have a feather comforter [Bedroom] : not in the bedroom [Basement] : not in the basement [Living Area] : not in the living area [Smokers in Household] : there are no smokers in the home

## 2023-02-21 DIAGNOSIS — T78.40XA ALLERGY, UNSPECIFIED, INITIAL ENCOUNTER: ICD-10-CM

## 2023-02-21 DIAGNOSIS — Z01.82 ENCOUNTER FOR ALLERGY TESTING: ICD-10-CM

## 2023-02-21 DIAGNOSIS — J30.1 ALLERGIC RHINITIS DUE TO POLLEN: ICD-10-CM

## 2023-02-21 DIAGNOSIS — J30.89 OTHER ALLERGIC RHINITIS: ICD-10-CM

## 2023-02-23 ENCOUNTER — NON-APPOINTMENT (OUTPATIENT)
Age: 48
End: 2023-02-23

## 2023-02-23 LAB
14-3-3 ETA AG SER IA-MCNC: <0.2 NG/ML
CCP ANTIBODIES IGG/IGA: <20 UNITS
RHEUMATOID FACTOR IDENTRA: <14 UNITS/ML

## 2023-02-27 ENCOUNTER — APPOINTMENT (OUTPATIENT)
Dept: RHEUMATOLOGY | Facility: CLINIC | Age: 48
End: 2023-02-27
Payer: COMMERCIAL

## 2023-02-27 ENCOUNTER — OUTPATIENT (OUTPATIENT)
Dept: OUTPATIENT SERVICES | Facility: HOSPITAL | Age: 48
LOS: 1 days | End: 2023-02-27

## 2023-02-27 VITALS
HEART RATE: 79 BPM | OXYGEN SATURATION: 98 % | DIASTOLIC BLOOD PRESSURE: 80 MMHG | WEIGHT: 200 LBS | HEIGHT: 64 IN | BODY MASS INDEX: 34.15 KG/M2 | SYSTOLIC BLOOD PRESSURE: 100 MMHG

## 2023-02-27 DIAGNOSIS — Z98.891 HISTORY OF UTERINE SCAR FROM PREVIOUS SURGERY: Chronic | ICD-10-CM

## 2023-02-27 DIAGNOSIS — D25.9 LEIOMYOMA OF UTERUS, UNSPECIFIED: Chronic | ICD-10-CM

## 2023-02-27 DIAGNOSIS — R06.02 SHORTNESS OF BREATH: ICD-10-CM

## 2023-02-27 DIAGNOSIS — Z98.890 OTHER SPECIFIED POSTPROCEDURAL STATES: Chronic | ICD-10-CM

## 2023-02-27 DIAGNOSIS — Z98.51 TUBAL LIGATION STATUS: Chronic | ICD-10-CM

## 2023-02-27 PROCEDURE — ZZZZZ: CPT | Mod: GC

## 2023-03-02 DIAGNOSIS — R59.0 LOCALIZED ENLARGED LYMPH NODES: ICD-10-CM

## 2023-03-02 DIAGNOSIS — M25.561 PAIN IN RIGHT KNEE: ICD-10-CM

## 2023-03-02 DIAGNOSIS — M70.41 PREPATELLAR BURSITIS, RIGHT KNEE: ICD-10-CM

## 2023-03-03 ENCOUNTER — APPOINTMENT (OUTPATIENT)
Age: 48
End: 2023-03-03

## 2023-03-03 NOTE — HISTORY OF PRESENT ILLNESS
[___ Day(s) Ago] : [unfilled] day(s) ago [de-identified] : 2/6/2023  [FreeTextEntry1] : INTERVAL HISTORY \par ------------------------\par = took Naproxen 500mg BID for about 3 weeks, R knee pain most resolved, was 10/10, now 3/10 \par = still endorse pain in the patella when exerting pressure on the knee such as kneeling \par = no joint erythema or warmth, but pt felt there is a bump on the R. lateral knee \par = Patient remains with cervical lymphadenopathies

## 2023-03-03 NOTE — ASSESSMENT
[FreeTextEntry1] : 47yF with hx of uterine fibroid,  daily headache, chronic cervical lymphadenopathies (since 2020) presented as urgent visit for R. knee pain \par \par # hx of R. knee monoarthritis x 1 week (unclear etiology)  \par = pain and swelling mostly resolved when seen by Rheumatology \par = no prior episode, improved with NSAID  \par = differential: crystalline arthropathy (likely pseudogout), inflammatory arthropathy (has + c-ANCA, but other serologies have been negative), OA \par = repeat PR3, MPO negative, CCP, RF, 14-3-3 negative ,inflam markers neg \par = X-rays hands 2021 reviewed: no chondrocalcinosis \par = Xrays  b/l knees 2023:  no chondrocalcinosis\par = in office US R. knee today negative for effusion,  possible bursitis in R. lateral knee (corresponding to area of tenderness) \par = will taper off Naproxen, take Naproxen 500mg qD x  3 weeks, then off \par \par # b/l hand pain (chronic) \par = suspect underlying OA vs overuse  \par = Naproxen as above \par \par # Thyroid nodule \par = subcm thyroid nodules on US,\par = f/u PCP or endocrinology \par \par # Cervical lymphadenopathy (stable size since 2020) \par = Incidental finding on 9/25/2020 showed small lymph nodes in the bilateral cervical chains and periparotid lymph nodes, all nodes are less than 1.5 cm short axis, too small to characterize on CT. \par = Bx of R/submental lymph node fine needle aspiration in 2020 neg for malignancy \par = US head and nec 10/2022 - > stable benign appearing cervical lymph nodes \par = serologies: negative for KRISTIN, SSA/SSB, RF, CCP,  C3, C4, SCL, anti-histone,, RNA polymerase III, dsDNA,MPO/PR3. ACE normal range, vitamin D 25 low\par = positive for KRISTIN 1:320, speckled. C-ANCA 1:160, however PR3 and MPO negative multiple times \par = UA negative for RBC \par = Infectious workup: HIV, chlamydia/GC, syphilis, hepatitis B, C, EBV, CMV negative \par \par Case discussed with Dr. Purdy \par f/u in 1 month for repeat R. knee US \par \par Can Hu\par PGY-5

## 2023-03-03 NOTE — PHYSICAL EXAM
[General Appearance - Alert] : alert [General Appearance - In No Acute Distress] : in no acute distress [Sclera] : the sclera and conjunctiva were normal [PERRL With Normal Accommodation] : pupils were equal in size, round, and reactive to light [Extraocular Movements] : extraocular movements were intact [Strabismus] : no strabismus was seen [Optic Disc Abnormality] : the optic disc were normal in size and color [Outer Ear] : the ears and nose were normal in appearance [Hearing Threshold Finger Rub Not Dunn] : hearing was normal [Examination Of The Oral Cavity] : the lips and gums were normal [Both Tympanic Membranes Were Examined] : both tympanic membranes were normal [Oropharynx] : the oropharynx was normal [] : no respiratory distress [Respiration, Rhythm And Depth] : normal respiratory rhythm and effort [Exaggerated Use Of Accessory Muscles For Inspiration] : no accessory muscle use [Auscultation Breath Sounds / Voice Sounds] : lungs were clear to auscultation bilaterally [Apical Impulse] : the apical impulse was normal [Heart Rate And Rhythm] : heart rate was normal and rhythm regular [Heart Sounds] : normal S1 and S2 [Arterial Pulses Carotid] : carotid pulses were normal with no bruits [Abdominal Aorta] : the abdominal aorta was normal [Arterial Pulses Femoral] : femoral pulses were normal without bruits [Bowel Sounds] : normal bowel sounds [Abnormal Walk] : normal gait [Cranial Nerves] : cranial nerves 2-12 were intact [Deep Tendon Reflexes (DTR)] : deep tendon reflexes were 2+ and symmetric [Sensation] : the sensory exam was normal to light touch and pinprick [Oriented To Time, Place, And Person] : oriented to person, place, and time [FreeTextEntry1] : acne and redness forehead

## 2023-03-20 ENCOUNTER — APPOINTMENT (OUTPATIENT)
Dept: OBGYN | Facility: HOSPITAL | Age: 48
End: 2023-03-20

## 2023-03-22 ENCOUNTER — RESULT CHARGE (OUTPATIENT)
Age: 48
End: 2023-03-22

## 2023-03-23 ENCOUNTER — NON-APPOINTMENT (OUTPATIENT)
Age: 48
End: 2023-03-23

## 2023-03-23 ENCOUNTER — OUTPATIENT (OUTPATIENT)
Dept: OUTPATIENT SERVICES | Facility: HOSPITAL | Age: 48
LOS: 1 days | End: 2023-03-23

## 2023-03-23 ENCOUNTER — APPOINTMENT (OUTPATIENT)
Dept: INTERNAL MEDICINE | Facility: CLINIC | Age: 48
End: 2023-03-23
Payer: COMMERCIAL

## 2023-03-23 ENCOUNTER — LABORATORY RESULT (OUTPATIENT)
Age: 48
End: 2023-03-23

## 2023-03-23 VITALS
SYSTOLIC BLOOD PRESSURE: 110 MMHG | HEART RATE: 87 BPM | DIASTOLIC BLOOD PRESSURE: 80 MMHG | HEIGHT: 64 IN | WEIGHT: 198 LBS | BODY MASS INDEX: 33.8 KG/M2 | OXYGEN SATURATION: 97 %

## 2023-03-23 DIAGNOSIS — Z98.890 OTHER SPECIFIED POSTPROCEDURAL STATES: Chronic | ICD-10-CM

## 2023-03-23 DIAGNOSIS — D50.0 IRON DEFICIENCY ANEMIA SECONDARY TO BLOOD LOSS (CHRONIC): ICD-10-CM

## 2023-03-23 DIAGNOSIS — Z01.818 ENCOUNTER FOR OTHER PREPROCEDURAL EXAMINATION: ICD-10-CM

## 2023-03-23 DIAGNOSIS — R10.12 LEFT UPPER QUADRANT PAIN: ICD-10-CM

## 2023-03-23 DIAGNOSIS — M25.561 PAIN IN RIGHT KNEE: ICD-10-CM

## 2023-03-23 DIAGNOSIS — R31.29 OTHER MICROSCOPIC HEMATURIA: ICD-10-CM

## 2023-03-23 DIAGNOSIS — R21 RASH AND OTHER NONSPECIFIC SKIN ERUPTION: ICD-10-CM

## 2023-03-23 DIAGNOSIS — Z98.51 TUBAL LIGATION STATUS: Chronic | ICD-10-CM

## 2023-03-23 DIAGNOSIS — Z98.891 HISTORY OF UTERINE SCAR FROM PREVIOUS SURGERY: Chronic | ICD-10-CM

## 2023-03-23 DIAGNOSIS — D25.9 LEIOMYOMA OF UTERUS, UNSPECIFIED: Chronic | ICD-10-CM

## 2023-03-23 DIAGNOSIS — M17.9 OSTEOARTHRITIS OF KNEE, UNSPECIFIED: ICD-10-CM

## 2023-03-23 DIAGNOSIS — D64.9 ANEMIA, UNSPECIFIED: ICD-10-CM

## 2023-03-23 LAB
HCG UR QL: NEGATIVE
QUALITY CONTROL: YES

## 2023-03-23 PROCEDURE — ZZZZZ: CPT

## 2023-03-24 ENCOUNTER — APPOINTMENT (OUTPATIENT)
Dept: ULTRASOUND IMAGING | Facility: IMAGING CENTER | Age: 48
End: 2023-03-24
Payer: COMMERCIAL

## 2023-03-24 ENCOUNTER — OUTPATIENT (OUTPATIENT)
Dept: OUTPATIENT SERVICES | Facility: HOSPITAL | Age: 48
LOS: 1 days | End: 2023-03-24
Payer: SELF-PAY

## 2023-03-24 ENCOUNTER — APPOINTMENT (OUTPATIENT)
Dept: UROLOGY | Facility: CLINIC | Age: 48
End: 2023-03-24

## 2023-03-24 DIAGNOSIS — Z98.890 OTHER SPECIFIED POSTPROCEDURAL STATES: Chronic | ICD-10-CM

## 2023-03-24 DIAGNOSIS — E66.9 OBESITY, UNSPECIFIED: ICD-10-CM

## 2023-03-24 DIAGNOSIS — G47.33 OBSTRUCTIVE SLEEP APNEA (ADULT) (PEDIATRIC): ICD-10-CM

## 2023-03-24 DIAGNOSIS — Z98.891 HISTORY OF UTERINE SCAR FROM PREVIOUS SURGERY: Chronic | ICD-10-CM

## 2023-03-24 DIAGNOSIS — D25.9 LEIOMYOMA OF UTERUS, UNSPECIFIED: Chronic | ICD-10-CM

## 2023-03-24 DIAGNOSIS — Z98.51 TUBAL LIGATION STATUS: Chronic | ICD-10-CM

## 2023-03-24 DIAGNOSIS — R76.8 OTHER SPECIFIED ABNORMAL IMMUNOLOGICAL FINDINGS IN SERUM: ICD-10-CM

## 2023-03-24 DIAGNOSIS — R10.9 UNSPECIFIED ABDOMINAL PAIN: ICD-10-CM

## 2023-03-24 DIAGNOSIS — Z00.8 ENCOUNTER FOR OTHER GENERAL EXAMINATION: ICD-10-CM

## 2023-03-24 DIAGNOSIS — R80.9 PROTEINURIA, UNSPECIFIED: ICD-10-CM

## 2023-03-24 DIAGNOSIS — R59.0 LOCALIZED ENLARGED LYMPH NODES: ICD-10-CM

## 2023-03-24 DIAGNOSIS — E04.1 NONTOXIC SINGLE THYROID NODULE: ICD-10-CM

## 2023-03-24 DIAGNOSIS — Z00.00 ENCOUNTER FOR GENERAL ADULT MEDICAL EXAMINATION WITHOUT ABNORMAL FINDINGS: ICD-10-CM

## 2023-03-24 LAB
ALBUMIN SERPL ELPH-MCNC: 4.2 G/DL
ALP BLD-CCNC: 56 U/L
ALT SERPL-CCNC: 31 U/L
ANION GAP SERPL CALC-SCNC: 13 MMOL/L
APPEARANCE: CLEAR
AST SERPL-CCNC: 23 U/L
BASOPHILS # BLD AUTO: 0.04 K/UL
BASOPHILS NFR BLD AUTO: 0.7 %
BILIRUB SERPL-MCNC: 0.3 MG/DL
BILIRUBIN URINE: NEGATIVE
BLOOD URINE: ABNORMAL
BUN SERPL-MCNC: 19 MG/DL
CALCIUM SERPL-MCNC: 9.4 MG/DL
CHLORIDE SERPL-SCNC: 100 MMOL/L
CO2 SERPL-SCNC: 25 MMOL/L
COLOR: NORMAL
CREAT SERPL-MCNC: 0.89 MG/DL
EGFR: 81 ML/MIN/1.73M2
EOSINOPHIL # BLD AUTO: 0.15 K/UL
EOSINOPHIL NFR BLD AUTO: 2.5 %
GLUCOSE QUALITATIVE U: NEGATIVE
GLUCOSE SERPL-MCNC: 93 MG/DL
HCT VFR BLD CALC: 43.5 %
HGB BLD-MCNC: 14 G/DL
IMM GRANULOCYTES NFR BLD AUTO: 0.2 %
KETONES URINE: NEGATIVE
LEUKOCYTE ESTERASE URINE: NEGATIVE
LPL SERPL-CCNC: 49 U/L
LYMPHOCYTES # BLD AUTO: 1.85 K/UL
LYMPHOCYTES NFR BLD AUTO: 30.5 %
MAN DIFF?: NORMAL
MCHC RBC-ENTMCNC: 29.7 PG
MCHC RBC-ENTMCNC: 32.2 GM/DL
MCV RBC AUTO: 92.4 FL
MONOCYTES # BLD AUTO: 0.41 K/UL
MONOCYTES NFR BLD AUTO: 6.8 %
NEUTROPHILS # BLD AUTO: 3.61 K/UL
NEUTROPHILS NFR BLD AUTO: 59.3 %
NITRITE URINE: NEGATIVE
PH URINE: 6
PLATELET # BLD AUTO: 265 K/UL
POTASSIUM SERPL-SCNC: 4.2 MMOL/L
PROT SERPL-MCNC: 7.2 G/DL
PROTEIN URINE: NEGATIVE
RBC # BLD: 4.71 M/UL
RBC # FLD: 14.9 %
SODIUM SERPL-SCNC: 137 MMOL/L
SPECIFIC GRAVITY URINE: 1.02
UROBILINOGEN URINE: NORMAL
WBC # FLD AUTO: 6.07 K/UL

## 2023-03-24 PROCEDURE — G0463: CPT

## 2023-03-24 PROCEDURE — 76705 ECHO EXAM OF ABDOMEN: CPT

## 2023-03-24 PROCEDURE — 76705 ECHO EXAM OF ABDOMEN: CPT | Mod: 26

## 2023-03-24 NOTE — PHYSICAL EXAM
[General Appearance - Well Developed] : well developed [General Appearance - Well Nourished] : well nourished [Normal Appearance] : normal appearance [Well Groomed] : well groomed [Edema] : no peripheral edema [General Appearance - In No Acute Distress] : no acute distress [Respiration, Rhythm And Depth] : normal respiratory rhythm and effort [Exaggerated Use Of Accessory Muscles For Inspiration] : no accessory muscle use [Abdomen Soft] : soft [Abdomen Tenderness] : non-tender [Costovertebral Angle Tenderness] : no ~M costovertebral angle tenderness [Urinary Bladder Findings] : the bladder was normal on palpation [Normal Station and Gait] : the gait and station were normal for the patient's age [] : no rash [No Focal Deficits] : no focal deficits [Oriented To Time, Place, And Person] : oriented to person, place, and time [Affect] : the affect was normal [Mood] : the mood was normal [Not Anxious] : not anxious [No Palpable Adenopathy] : no palpable adenopathy

## 2023-03-24 NOTE — HISTORY OF PRESENT ILLNESS
[FreeTextEntry1] : 47 yof with PMH significant for  lymphadenopathy, proteinuria, an elevated isidra and c-anca level, epistaxis, menorrhagia in the setting of uterine fibroids, a thyroid nodule, and obstructive sleep apnea who presents for microscopic hematuria.  In January of this year, pt,. had 5 rbc's per HPF and yesterday tested positive again with 8 rbc's/hpf.  Denies smoking, family history of  cancer, UTI, nephrolithiasis. Patient is currently experiencing no nausea and no anorexia no urinary retention, no urinary urgency, no urinary frequency, no nocturia, no straining, no weak stream, no dysuria, no gross hematuria, no bladder spasm, no abdominal pain, no flank pain, no fever and no fatigue.\par

## 2023-03-24 NOTE — ASSESSMENT
[FreeTextEntry1] : I advised the patient that patients with incidentally detected microscopic hematuria are at low risk of actual malignancy, especially given her lack of risk factors and age.  We discussed option including repeating her urinalysis, performing an ultrasound, and doing a cystoscopy.  Given she has had two UA's with microscopic hematuria in the past six months, we decided to proceed with cystoscopy and culture.\par \par - Cystoscopy\par - Will order renal/bladder US\par - Follow-up in 2-4 weeks for ultrasound\par - F/u after work-up\par \par

## 2023-03-24 NOTE — ASSESSMENT
[FreeTextEntry1] : The patient is a 47-year-old woman who is followed for lymphadenopathy, proteinuria, an elevated isidra and c-anca level, epistaxis, menorrhagia in the setting of uterine fibroids, a thyroid nodule, and obstructive sleep apnea who is presenting today for an acute visit for evaluation and management of right upper quadrant abdominal pain--concerning for hepatobiliary pathology. \par \par 1. Right upper quadrant abdominal pain: -Pain in right upper quadrant of abdomen associated with recent episode of nausea and vomiting in the context of a woman in her 40s and who is overweight raises significant concern for the development of hepatobiliary pathology \par -No fevers but cholecystitis possible; also concern for cholelithiasis, choledocolithiasis, pancreatitis; ascending cholangitis less likely given clinical stability \par -Cannot rule out lower lobe pulmonary pathology (right upper quadrant pain with pleuritic component could raise concern for embolus or pleurisy but more likely secondary to hepatobiliary pathology); less concern for genitourinary pathology as no dysuria or vaginal burning or discharge noted \par -Will order ultrasound of right upper quadrant to evaluate for biliary pathology; also will order cbc, cmp, lipase; will perform urinalysis to look for blood that may suggest stone (less likely given symptoms); also will perform urine pregnancy test (negative) ekg within normal limits \par -If above testing negative, will send for chest xray to look for pulmonary pathology \par \par 2. Rule-out systemic lupus erythematosus: -In the context of persistent lymphadenopathy, patient is noted to have positive isidra, joint pains with prolonged stiffness, proteinuria, and skin and hair changes, all concerning for the development of lupus \par -Anti dsdna, c3, and c4 previously negative; in an attempt to make diagnosis in the setting of what may represent active inflammation (with evident arthralgias and lower extremity edema) ordered anti dsdna, c3, c4 and lupus anticoagulant, beta 2 microglobulin, and anti cardiolipin; all negative; \par -As patient had proteinuria evident on urinalysis but none noted on spot analysis; worry that proteinuria may represent lupus nephritis; 24-hour urine protein ordered to quantify; if proteinuria noted, would obtain anti smith; hbv, hcv, hiv negative\par -Patient continues to follow with rheumatology; nephrology referral administered for consideration of renal biopsy \par \par 3. Thyroid nodule: -Sub-centimeter (tirads 3) nodule noted in right thyroid lobe; less likely to represent thyroid malignancy; patient will obtain follow-up ultrasound in two years \par \par 4. Sleep apnea: -Choking and snoring noted at night; sleep study notes elevated apnea hypopnea index highly concerning for diagnosis of sleep apnea \par -Patient to follow up with sleep medicine in August \par \par 5. Uterine fibroids with menorrhagia: -Patient is following with gynecology; plan for dilation and curettage \par \par 6. Healthcare maintenance: -Patient is 47 and is due for colonoscopy; will discuss at next visit; mammogram in 2022 normal; will discuss at next visit risks and benefits of yearly vs. eveyr other year testing; pap/hpv negative in 2020 \par -HCV and HIV negative \par -Due for flu vaccine; will discuss covid vaccine, pcv, and hepatitis b at next visit \par -Patient to follow up in three months or earlier if symptoms persist \par \par

## 2023-03-24 NOTE — HISTORY OF PRESENT ILLNESS
[FreeTextEntry8] : The patient is a 47-year-old woman who is followed for lymphadenopathy, proteinuria, an elevated isidra and c-anca level, epistaxis, menorrhagia in the setting of uterine fibroids, a thyroid nodule, and obstructive sleep apnea who is presenting today for an acute visit for evaluation and management of right upper quadrant abdominal pain. Her symptoms began approximately two days ago when she suddenly felt unwell and vomited. Subsequently, the patient experienced pain in the upper right quadrant of her abdomen that extended to her back. It did not extend to her right shoulder. The patient has not had much of an appetite, so she does not associate her pain with eating. It is fairly constant over the course of the day. The patient has not noted any hematuria or pain or burning with urination. She has not had any vaginal burning, bleeding, or discharge. She has had some discomfort in her abdomen and chest with deep inspiration, but she has not had any chest pain with exertion. The patient has not had any rashes on the skin.

## 2023-03-24 NOTE — PHYSICAL EXAM
[Soft] : abdomen soft [Non-distended] : non-distended [No Masses] : no abdominal mass palpated [No HSM] : no HSM [Normal Bowel Sounds] : normal bowel sounds [Normal] : affect was normal and insight and judgment were intact [de-identified] : Tenderness to deep palpation noted in right upper quadrant

## 2023-03-24 NOTE — HEALTH RISK ASSESSMENT
[0] : 2) Feeling down, depressed, or hopeless: Not at all (0) [PHQ-2 Negative - No further assessment needed] : PHQ-2 Negative - No further assessment needed [[id number]] : 0

## 2023-03-24 NOTE — REVIEW OF SYSTEMS
[Fatigue] : fatigue [Negative] : Heme/Lymph [Fever] : no fever [Chills] : no chills [Night Sweats] : no night sweats [FreeTextEntry7] : Abdominal pain with episode of vomiting as noted in hpi

## 2023-04-05 DIAGNOSIS — R31.29 OTHER MICROSCOPIC HEMATURIA: ICD-10-CM

## 2023-04-12 NOTE — H&P PST ADULT - PRIMARY CARE PROVIDER
----- Message from [facility] sent at 4/12/2023 12:57 PM CDT -----  Type:  Needs Medical Advice    Who Called: Pt   Would the patient rather a call back or a response via [facility]? Callback  Best Call Back Number: [phone]  Additional Information: Medicaid pt requesting callback from office to discuss scheduling NP appt for office. Pt is suffering with stomach pains while pregnant.         None

## 2023-04-18 NOTE — ASU PREOP CHECKLIST - SITE MARKED BY ANESTHESIOLOGIST
n/a Advancement-Rotation Flap Text: Due to geometric and functional constraints, a flap reconstruction was performed to reconstruct the defect. To that end, adjacent tissue was incised and carried over to close the defect in the following manner: The defect edges were debeveled with a #15 scalpel blade.  Given the location of the defect, shape of the defect and the proximity to free margins an advancement-rotation flap was deemed most appropriate.  Using a sterile surgical marker, an appropriate flap was drawn incorporating the defect and placing the expected incisions within the relaxed skin tension lines where possible. The area thus outlined was incised deep to adipose tissue with a #15 scalpel blade.  The skin margins were undermined to an appropriate distance in all directions utilizing iris scissors.

## 2023-04-21 ENCOUNTER — APPOINTMENT (OUTPATIENT)
Dept: UROLOGY | Facility: CLINIC | Age: 48
End: 2023-04-21

## 2023-04-21 ENCOUNTER — OUTPATIENT (OUTPATIENT)
Dept: OUTPATIENT SERVICES | Facility: HOSPITAL | Age: 48
LOS: 1 days | End: 2023-04-21
Payer: SELF-PAY

## 2023-04-21 VITALS
DIASTOLIC BLOOD PRESSURE: 65 MMHG | SYSTOLIC BLOOD PRESSURE: 120 MMHG | HEART RATE: 72 BPM | TEMPERATURE: 97.2 F | RESPIRATION RATE: 16 BRPM

## 2023-04-21 DIAGNOSIS — Z98.891 HISTORY OF UTERINE SCAR FROM PREVIOUS SURGERY: Chronic | ICD-10-CM

## 2023-04-21 DIAGNOSIS — Z98.51 TUBAL LIGATION STATUS: Chronic | ICD-10-CM

## 2023-04-21 DIAGNOSIS — Z98.890 OTHER SPECIFIED POSTPROCEDURAL STATES: Chronic | ICD-10-CM

## 2023-04-21 DIAGNOSIS — R35.0 FREQUENCY OF MICTURITION: ICD-10-CM

## 2023-04-21 DIAGNOSIS — D25.9 LEIOMYOMA OF UTERUS, UNSPECIFIED: Chronic | ICD-10-CM

## 2023-04-21 PROCEDURE — 52000 CYSTOURETHROSCOPY: CPT

## 2023-04-22 ENCOUNTER — APPOINTMENT (OUTPATIENT)
Dept: CT IMAGING | Facility: CLINIC | Age: 48
End: 2023-04-22
Payer: COMMERCIAL

## 2023-04-22 ENCOUNTER — OUTPATIENT (OUTPATIENT)
Dept: OUTPATIENT SERVICES | Facility: HOSPITAL | Age: 48
LOS: 1 days | End: 2023-04-22
Payer: SELF-PAY

## 2023-04-22 DIAGNOSIS — Z98.891 HISTORY OF UTERINE SCAR FROM PREVIOUS SURGERY: Chronic | ICD-10-CM

## 2023-04-22 DIAGNOSIS — Z98.890 OTHER SPECIFIED POSTPROCEDURAL STATES: Chronic | ICD-10-CM

## 2023-04-22 DIAGNOSIS — R35.0 FREQUENCY OF MICTURITION: ICD-10-CM

## 2023-04-22 DIAGNOSIS — Z98.51 TUBAL LIGATION STATUS: Chronic | ICD-10-CM

## 2023-04-22 DIAGNOSIS — D25.9 LEIOMYOMA OF UTERUS, UNSPECIFIED: Chronic | ICD-10-CM

## 2023-04-22 PROCEDURE — 74178 CT ABD&PLV WO CNTR FLWD CNTR: CPT

## 2023-04-22 PROCEDURE — 74178 CT ABD&PLV WO CNTR FLWD CNTR: CPT | Mod: 26

## 2023-04-27 ENCOUNTER — APPOINTMENT (OUTPATIENT)
Dept: INTERNAL MEDICINE | Facility: CLINIC | Age: 48
End: 2023-04-27
Payer: COMMERCIAL

## 2023-04-27 ENCOUNTER — OUTPATIENT (OUTPATIENT)
Dept: OUTPATIENT SERVICES | Facility: HOSPITAL | Age: 48
LOS: 1 days | End: 2023-04-27

## 2023-04-27 VITALS
OXYGEN SATURATION: 98 % | HEART RATE: 90 BPM | BODY MASS INDEX: 32.61 KG/M2 | SYSTOLIC BLOOD PRESSURE: 118 MMHG | DIASTOLIC BLOOD PRESSURE: 78 MMHG | RESPIRATION RATE: 16 BRPM | WEIGHT: 191 LBS | HEIGHT: 64 IN

## 2023-04-27 DIAGNOSIS — D25.9 LEIOMYOMA OF UTERUS, UNSPECIFIED: Chronic | ICD-10-CM

## 2023-04-27 DIAGNOSIS — R80.9 PROTEINURIA, UNSPECIFIED: ICD-10-CM

## 2023-04-27 DIAGNOSIS — Z98.890 OTHER SPECIFIED POSTPROCEDURAL STATES: Chronic | ICD-10-CM

## 2023-04-27 DIAGNOSIS — Z00.00 ENCOUNTER FOR GENERAL ADULT MEDICAL EXAMINATION W/OUT ABNORMAL FINDINGS: ICD-10-CM

## 2023-04-27 DIAGNOSIS — R76.8 OTHER SPECIFIED ABNORMAL IMMUNOLOGICAL FINDINGS IN SERUM: ICD-10-CM

## 2023-04-27 DIAGNOSIS — Z98.891 HISTORY OF UTERINE SCAR FROM PREVIOUS SURGERY: Chronic | ICD-10-CM

## 2023-04-27 DIAGNOSIS — Z98.51 TUBAL LIGATION STATUS: Chronic | ICD-10-CM

## 2023-04-27 DIAGNOSIS — R59.0 LOCALIZED ENLARGED LYMPH NODES: ICD-10-CM

## 2023-04-27 DIAGNOSIS — R10.9 UNSPECIFIED ABDOMINAL PAIN: ICD-10-CM

## 2023-04-27 PROCEDURE — ZZZZZ: CPT | Mod: GC

## 2023-04-27 RX ORDER — NAPROXEN 500 MG/1
500 TABLET ORAL DAILY
Qty: 30 | Refills: 0 | Status: DISCONTINUED | COMMUNITY
Start: 2023-02-06 | End: 2023-04-27

## 2023-04-27 RX ORDER — FAMOTIDINE 20 MG/1
20 TABLET, FILM COATED ORAL
Qty: 60 | Refills: 0 | Status: DISCONTINUED | COMMUNITY
Start: 2023-01-24 | End: 2023-04-27

## 2023-04-27 NOTE — REVIEW OF SYSTEMS
[Abdominal Pain] : abdominal pain [Constipation] : constipation [Diarrhea] : diarrhea [Joint Pain] : joint pain [Joint Stiffness] : joint stiffness [Negative] : Heme/Lymph

## 2023-04-28 DIAGNOSIS — R80.9 PROTEINURIA, UNSPECIFIED: ICD-10-CM

## 2023-04-28 DIAGNOSIS — R31.29 OTHER MICROSCOPIC HEMATURIA: ICD-10-CM

## 2023-04-28 DIAGNOSIS — G47.33 OBSTRUCTIVE SLEEP APNEA (ADULT) (PEDIATRIC): ICD-10-CM

## 2023-04-28 DIAGNOSIS — R76.8 OTHER SPECIFIED ABNORMAL IMMUNOLOGICAL FINDINGS IN SERUM: ICD-10-CM

## 2023-04-28 DIAGNOSIS — R10.9 UNSPECIFIED ABDOMINAL PAIN: ICD-10-CM

## 2023-04-28 DIAGNOSIS — R59.0 LOCALIZED ENLARGED LYMPH NODES: ICD-10-CM

## 2023-04-28 DIAGNOSIS — M25.569 PAIN IN UNSPECIFIED KNEE: ICD-10-CM

## 2023-04-28 DIAGNOSIS — Z00.00 ENCOUNTER FOR GENERAL ADULT MEDICAL EXAMINATION WITHOUT ABNORMAL FINDINGS: ICD-10-CM

## 2023-04-28 NOTE — ASSESSMENT
[FreeTextEntry1] : The patient is a 47-year-old woman who is followed for lymphadenopathy, hematuria, trace proteinuria, an elevated isidra and c-anca level, epistaxis, menorrhagia in the setting of uterine fibroids, a thyroid nodule, and obstructive sleep apnea who is presenting today for a follow-up visit and notes persistent abdominal discomfort of uncertain etiology. \par \par 1. Epigastric and right upper quadrant abdominal pain: -Pain in abdomen persistent over past month; initially raised concern for the development of hepatobiliary pathology but right upper quadrant ultrasound negative \par -As abdominal pain persistent and noted to be worst in upper abdomen and with food ingestion and characterized by bloating, concern for dyspepsia;\par -Cannot rule out lower lobe pulmonary pathology (right upper quadrant pain with pleuritic component could raise concern for embolus or pleurisy but less likely ); less concern for genitourinary pathology as no dysuria or vaginal burning or discharge noted \par -right upper quadrant ultrasound, cbc, cmp, lipase within normal limits urine pregnancy test negative, ekg within normal limits, ct abdomen pelvis does not suggest nephrolithiasis or other renal pathology \par -In context of dyspepsia and intermittent constipation/diarrhea, will send for stool h pylori and serum celiac tests; referred to gastroenterology; if these tests negative, would ask gi to pursue structural evaluation\par \par 2. Rule-out systemic lupus erythematosus: -In the context of persistent lymphadenopathy, patient is noted to have positive isidra, joint pains with prolonged stiffness, proteinuria, and skin and hair changes, all concerning for the development of lupus \par -Anti dsdna, c3, and c4 previously negative; in an attempt to make diagnosis in the setting of what may represent active inflammation (with evident arthralgias and lower extremity edema) ordered anti dsdna, c3, c4 and lupus anticoagulant, beta 2 microglobulin, and anti cardiolipin; all negative\par -Broad infectious and inflammatory workup of lymphadenopathy negative thus far; previous fna negative; referred to surgery for excisional lymph node biopsy \par -As patient had proteinuria with hematuria evident on urinalysis but none noted on spot analysis and no evident structural bladder or renal pathology on urology evaluation, worry that urinary findings may represent lupus nephritis; cannot locate anti smith in order set; hbv, hcv, hiv negative\par -Patient continues to follow with rheumatology; nephrology referral administered for consideration of renal biopsy \par \par 3. Thyroid nodule: -Sub-centimeter (tirads 3) nodule noted in right thyroid lobe; less likely to represent thyroid malignancy; patient will obtain follow-up ultrasound in two years \par \par 4. Sleep apnea: -Choking and snoring noted at night; sleep study notes elevated apnea hypopnea index highly concerning for diagnosis of sleep apnea \par -Patient to follow up with sleep medicine in August \par \par 5. Uterine fibroids with menorrhagia: -Patient is following with gynecology; plan for dilation and curettage \par \par 6. Healthcare maintenance: -Patient is 47 and is due for colonoscopy; referred to gastroenterology; mammogram in 2022 normal; will discuss at next visit risks and benefits of yearly vs. every other year testing; pap/hpv negative in 2020 \par -HCV and HIV negative \par -Due for flu vaccine; will discuss covid vaccine, pcv, and hepatitis b at next visit \par -Patient to follow up in three months or earlier if symptoms persist \par \par Discussed with Dr. Villegas

## 2023-04-28 NOTE — HISTORY OF PRESENT ILLNESS
[FreeTextEntry1] : follow-up  [de-identified] : The patient is a 47-year-old woman who is followed for lymphadenopathy, an elevated serum isidra, trace proteinuria and hematuria, and a thyroid nodule who is presenting today for follow-up. At the time of her most recent visit, the patient noted severe discomfort in her epigastrium and in her right upper quadrant of her abdomen. She underwent an abdominal ultrasound, which demonstrated no evidence of hepatobiliary disease and a urinalysis, which demonstrated some hematuria. Because of her hematuria, the patient was seen by a urologist who performed a cystoscopy (no results evident in chart) and noted a renal mass on ultrasound that was to be evaluated with a ct scan of the abdomen and pelvis with iv contrast. Her abdominal discomfort has persisted in the same location, and it is worse with eating. It has not been associated with any weight loss, but it has been associated with symptoms of rare diarrhea.

## 2023-04-28 NOTE — HEALTH RISK ASSESSMENT
[0] : 2) Feeling down, depressed, or hopeless: Not at all (0) [PHQ-2 Negative - No further assessment needed] : PHQ-2 Negative - No further assessment needed [[id number]] : 0

## 2023-05-02 DIAGNOSIS — R31.29 OTHER MICROSCOPIC HEMATURIA: ICD-10-CM

## 2023-05-02 DIAGNOSIS — R31.1 BENIGN ESSENTIAL MICROSCOPIC HEMATURIA: ICD-10-CM

## 2023-05-12 ENCOUNTER — APPOINTMENT (OUTPATIENT)
Dept: PEDIATRIC ALLERGY IMMUNOLOGY | Facility: CLINIC | Age: 48
End: 2023-05-12
Payer: COMMERCIAL

## 2023-05-12 ENCOUNTER — OUTPATIENT (OUTPATIENT)
Dept: OUTPATIENT SERVICES | Facility: HOSPITAL | Age: 48
LOS: 1 days | End: 2023-05-12
Payer: SELF-PAY

## 2023-05-12 VITALS
DIASTOLIC BLOOD PRESSURE: 82 MMHG | HEART RATE: 87 BPM | TEMPERATURE: 97.3 F | BODY MASS INDEX: 33.12 KG/M2 | HEIGHT: 64 IN | WEIGHT: 194 LBS | OXYGEN SATURATION: 98 % | SYSTOLIC BLOOD PRESSURE: 123 MMHG

## 2023-05-12 DIAGNOSIS — Z98.51 TUBAL LIGATION STATUS: Chronic | ICD-10-CM

## 2023-05-12 DIAGNOSIS — Z98.891 HISTORY OF UTERINE SCAR FROM PREVIOUS SURGERY: Chronic | ICD-10-CM

## 2023-05-12 DIAGNOSIS — Z98.890 OTHER SPECIFIED POSTPROCEDURAL STATES: Chronic | ICD-10-CM

## 2023-05-12 DIAGNOSIS — J30.9 ALLERGIC RHINITIS, UNSPECIFIED: ICD-10-CM

## 2023-05-12 DIAGNOSIS — D25.9 LEIOMYOMA OF UTERUS, UNSPECIFIED: Chronic | ICD-10-CM

## 2023-05-12 PROCEDURE — 99213 OFFICE O/P EST LOW 20 MIN: CPT | Mod: GC

## 2023-05-12 PROCEDURE — G0463: CPT

## 2023-05-12 RX ORDER — CETIRIZINE HYDROCHLORIDE 10 MG/1
10 TABLET, COATED ORAL
Qty: 1 | Refills: 3 | Status: ACTIVE | COMMUNITY
Start: 2023-05-12 | End: 1900-01-01

## 2023-05-12 NOTE — HISTORY OF PRESENT ILLNESS
[de-identified] : Patient is a 46 yo with chronic LAD, allergic rhinitis, drug allergies, here for follow up.\par \par ALLERGIC RHINITIS:\par Since last appointment, has started nasal spray fluticasone. Has only minimally helped. As soon as she goes outside, she gets watery eyes and runny nose, and hoarse voice. Started using Xyzal as needed, and reports improvement. \par In Feb 2023, SPT positive to cockroach, mold, grass, weed, trees and mouse. 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 Has mentioned it to boss, but nothing has been done. \par \par \par DRUG ALLERGIES\par Cephalosporin - 7 years ago. throat closing. within 15 min. after surgery. oral. \par ciprofloxacin - throat swelling. 5 years ago. oral. an hour later. \par Lidocaine - used during uterine surgery. shaking and vomiting. last year. \par oxycodone - dizzy for two days. oral medication. immediately after. \par No history or symptoms of asthma, eczematous rashes, food allergies.

## 2023-05-12 NOTE — END OF VISIT
[] : Fellow [FreeTextEntry3] : Plan as above.\par Discussed starting allergy meds early next spring (end of March) and consistent use throughout spring to help alleviate symptoms.\par Discussed consideration of allergen immunotherapy. Patient handout on IT provided for review and thorough discussion at next visit.

## 2023-05-12 NOTE — PHYSICAL EXAM
[Alert] : alert [Well Nourished] : well nourished [Healthy Appearance] : healthy appearance [No Acute Distress] : no acute distress [Well Developed] : well developed [Normal Pupil & Iris Size/Symmetry] : normal pupil and iris size and symmetry [No Discharge] : no discharge [No Photophobia] : no photophobia [Sclera Not Icteric] : sclera not icteric [Normal TMs] : both tympanic membranes were normal [Normal Nasal Mucosa] : the nasal mucosa was normal [Normal Lips/Tongue] : the lips and tongue were normal [Normal Outer Ear/Nose] : the ears and nose were normal in appearance [Normal Tonsils] : normal tonsils [No Thrush] : no thrush [Pale mucosa] : pale mucosa [Supple] : the neck was supple [Normal Rate and Effort] : normal respiratory rhythm and effort [No Crackles] : no crackles [No Retractions] : no retractions [Bilateral Audible Breath Sounds] : bilateral audible breath sounds [Normal Rate] : heart rate was normal  [Normal S1, S2] : normal S1 and S2 [No murmur] : no murmur [Regular Rhythm] : with a regular rhythm [Soft] : abdomen soft [Not Tender] : non-tender [Not Distended] : not distended [No HSM] : no hepato-splenomegaly [Normal Cervical Lymph Nodes] : cervical [Skin Intact] : skin intact  [No Rash] : no rash [No Skin Lesions] : no skin lesions [No clubbing] : no clubbing [No Cyanosis] : no cyanosis [No Edema] : no edema [Normal Mood] : mood was normal [Normal Affect] : affect was normal [Alert, Awake, Oriented as Age-Appropriate] : alert, awake, oriented as age appropriate [Boggy Nasal Turbinates] : boggy and/or pale nasal turbinates [Posterior Pharyngeal Cobblestoning] : posterior pharyngeal cobblestoning [Pharyngeal erythema] : no pharyngeal erythema [Clear Rhinorrhea] : no clear rhinorrhea was seen [Wheezing] : no wheezing was heard [de-identified] : palpable submandibular LNs b/l

## 2023-05-15 DIAGNOSIS — J30.89 OTHER ALLERGIC RHINITIS: ICD-10-CM

## 2023-05-15 DIAGNOSIS — J30.81 ALLERGIC RHINITIS DUE TO ANIMAL (CAT) (DOG) HAIR AND DANDER: ICD-10-CM

## 2023-05-15 DIAGNOSIS — J30.1 ALLERGIC RHINITIS DUE TO POLLEN: ICD-10-CM

## 2023-05-15 DIAGNOSIS — T78.40XA ALLERGY, UNSPECIFIED, INITIAL ENCOUNTER: ICD-10-CM

## 2023-05-15 DIAGNOSIS — H10.13 ACUTE ATOPIC CONJUNCTIVITIS, BILATERAL: ICD-10-CM

## 2023-05-16 ENCOUNTER — OUTPATIENT (OUTPATIENT)
Dept: OUTPATIENT SERVICES | Facility: HOSPITAL | Age: 48
LOS: 1 days | End: 2023-05-16

## 2023-05-16 ENCOUNTER — APPOINTMENT (OUTPATIENT)
Dept: INTERNAL MEDICINE | Facility: CLINIC | Age: 48
End: 2023-05-16

## 2023-05-16 DIAGNOSIS — Z98.890 OTHER SPECIFIED POSTPROCEDURAL STATES: Chronic | ICD-10-CM

## 2023-05-16 DIAGNOSIS — Z98.891 HISTORY OF UTERINE SCAR FROM PREVIOUS SURGERY: Chronic | ICD-10-CM

## 2023-05-16 DIAGNOSIS — Z98.51 TUBAL LIGATION STATUS: Chronic | ICD-10-CM

## 2023-05-16 DIAGNOSIS — D25.9 LEIOMYOMA OF UTERUS, UNSPECIFIED: Chronic | ICD-10-CM

## 2023-05-17 DIAGNOSIS — R10.9 UNSPECIFIED ABDOMINAL PAIN: ICD-10-CM

## 2023-05-17 LAB
TSH SERPL-ACNC: 3.57 UIU/ML
TTG IGA SER IA-ACNC: <1.2 U/ML
TTG IGA SER-ACNC: NEGATIVE
TTG IGG SER IA-ACNC: 3.9 U/ML
TTG IGG SER IA-ACNC: NEGATIVE

## 2023-05-18 ENCOUNTER — NON-APPOINTMENT (OUTPATIENT)
Age: 48
End: 2023-05-18

## 2023-05-18 LAB — H PYLORI AG STL QL: NEGATIVE

## 2023-05-22 ENCOUNTER — APPOINTMENT (OUTPATIENT)
Dept: INTERNAL MEDICINE | Facility: CLINIC | Age: 48
End: 2023-05-22
Payer: COMMERCIAL

## 2023-05-22 ENCOUNTER — OUTPATIENT (OUTPATIENT)
Dept: OUTPATIENT SERVICES | Facility: HOSPITAL | Age: 48
LOS: 1 days | End: 2023-05-22

## 2023-05-22 ENCOUNTER — APPOINTMENT (OUTPATIENT)
Dept: INTERNAL MEDICINE | Facility: CLINIC | Age: 48
End: 2023-05-22

## 2023-05-22 VITALS — TEMPERATURE: 98.5 F

## 2023-05-22 VITALS
BODY MASS INDEX: 33.12 KG/M2 | HEART RATE: 108 BPM | RESPIRATION RATE: 16 BRPM | WEIGHT: 194 LBS | HEIGHT: 64 IN | OXYGEN SATURATION: 98 % | SYSTOLIC BLOOD PRESSURE: 134 MMHG | DIASTOLIC BLOOD PRESSURE: 80 MMHG

## 2023-05-22 DIAGNOSIS — Z98.890 OTHER SPECIFIED POSTPROCEDURAL STATES: Chronic | ICD-10-CM

## 2023-05-22 DIAGNOSIS — Z98.891 HISTORY OF UTERINE SCAR FROM PREVIOUS SURGERY: Chronic | ICD-10-CM

## 2023-05-22 DIAGNOSIS — J02.9 ACUTE PHARYNGITIS, UNSPECIFIED: ICD-10-CM

## 2023-05-22 DIAGNOSIS — Z98.51 TUBAL LIGATION STATUS: Chronic | ICD-10-CM

## 2023-05-22 PROCEDURE — ZZZZZ: CPT

## 2023-05-23 DIAGNOSIS — J02.9 ACUTE PHARYNGITIS, UNSPECIFIED: ICD-10-CM

## 2023-05-23 NOTE — PHYSICAL EXAM
[No Acute Distress] : no acute distress [Well Nourished] : well nourished [Well Developed] : well developed [Well-Appearing] : well-appearing [Normal Sclera/Conjunctiva] : normal sclera/conjunctiva [EOMI] : extraocular movements intact [Normal Outer Ear/Nose] : the outer ears and nose were normal in appearance [Supple] : supple [Thyroid Normal, No Nodules] : the thyroid was normal and there were no nodules present [No Respiratory Distress] : no respiratory distress  [No Accessory Muscle Use] : no accessory muscle use [Clear to Auscultation] : lungs were clear to auscultation bilaterally [Normal Rate] : normal rate  [Regular Rhythm] : with a regular rhythm [Normal S1, S2] : normal S1 and S2 [No Murmur] : no murmur heard [No Extremity Clubbing/Cyanosis] : no extremity clubbing/cyanosis [Soft] : abdomen soft [Non Tender] : non-tender [No Joint Swelling] : no joint swelling [Grossly Normal Strength/Tone] : grossly normal strength/tone [Coordination Grossly Intact] : coordination grossly intact [No Focal Deficits] : no focal deficits [Normal Gait] : normal gait [Normal Affect] : the affect was normal [Normal Insight/Judgement] : insight and judgment were intact [de-identified] : +LAD ant cervical [de-identified] : neg monica

## 2023-05-23 NOTE — HISTORY OF PRESENT ILLNESS
[FreeTextEntry8] : 47-year-old woman who is followed for lymphadenopathy, an elevated serum isidra, trace proteinuria and hematuria, and a thyroid nodule presents for sore throat\par \par # Sore throat\par - Since Saturday night (<48 hrs ago) started having sore throat with sneezing, cough productive of yellow sputum, worsening postnasal drip, burning at the back of the throat at all times, pain with swallowing, myalgia, holocephalic headache and subjective chills.\par - Have taken Zyrtec to no improvement. Took Tylenol this morning.\par - Able to keep PO intake.\par - Denies sick contact, GI sx, anosmia.\par - COVID vaccine x 2, last vaccination in 2021.\par - Did not receive flu vaccine last year.\par - In-office rapid strep test neg

## 2023-05-23 NOTE — ASSESSMENT
[FreeTextEntry1] : 47-year-old woman who is followed for lymphadenopathy, an elevated serum isidra, trace proteinuria and hematuria, and a thyroid nodule presents for sore throat\par \par # Sore throat \par - Neg rapid strep test in office\par - Likely viral, influenza vs COVID. VSS including O2 sat 98% on RA, moving air well, lung CTAB. No covid vax since 2021, no flu vaccine for years.\par - f/u respiratory viral panel, if +flu, start Tamiflu x 5 days\par - PRN Cepacol for sore throat\par - Instructed pt to stay away from crowds, continue to mask up.\par - Instructed pt to present to ED if + dyspnea or respiratory distress, fever persists longer than three days, or >103'F

## 2023-05-23 NOTE — REVIEW OF SYSTEMS
[Chills] : chills [Pain] : pain [Hoarseness] : hoarseness [Sore Throat] : sore throat [Postnasal Drip] : postnasal drip [Cough] : cough [Muscle Pain] : muscle pain [Headache] : headache [Fever] : no fever [Discharge] : no discharge [Nasal Discharge] : no nasal discharge [Chest Pain] : no chest pain [Shortness Of Breath] : no shortness of breath [Abdominal Pain] : no abdominal pain [Constipation] : no constipation [Diarrhea] : diarrhea [Dysuria] : no dysuria [Skin Rash] : no skin rash [Suicidal] : not suicidal

## 2023-05-23 NOTE — END OF VISIT
[] : Resident [FreeTextEntry3] : Pt with major c/o or dysphagia, painful throat, no cough or fever, symptoms 2-3 days. On exam, TMs, sinuses, pharynx all WNL, bilat tender anterior cervical adenopathy, voice slightly hoarse. Agree this is likely viral, although with no cough and other findings need to r/o strept (low suspicion). Discussed with pt options for symptomatic treatment.

## 2023-05-25 LAB
RAPID RVP RESULT: DETECTED
RV+EV RNA SPEC QL NAA+PROBE: DETECTED
SARS-COV-2 RNA PNL RESP NAA+PROBE: NOT DETECTED

## 2023-06-06 ENCOUNTER — APPOINTMENT (OUTPATIENT)
Dept: CV DIAGNOSITCS | Facility: HOSPITAL | Age: 48
End: 2023-06-06

## 2023-07-07 ENCOUNTER — NON-APPOINTMENT (OUTPATIENT)
Age: 48
End: 2023-07-07

## 2023-07-12 ENCOUNTER — APPOINTMENT (OUTPATIENT)
Dept: NEPHROLOGY | Facility: CLINIC | Age: 48
End: 2023-07-12

## 2023-08-01 ENCOUNTER — APPOINTMENT (OUTPATIENT)
Dept: PULMONOLOGY | Facility: CLINIC | Age: 48
End: 2023-08-01
Payer: COMMERCIAL

## 2023-08-01 VITALS
TEMPERATURE: 98 F | BODY MASS INDEX: 33.12 KG/M2 | WEIGHT: 194 LBS | OXYGEN SATURATION: 97 % | HEIGHT: 64 IN | HEART RATE: 91 BPM | RESPIRATION RATE: 15 BRPM | SYSTOLIC BLOOD PRESSURE: 135 MMHG | DIASTOLIC BLOOD PRESSURE: 83 MMHG

## 2023-08-01 PROCEDURE — 99214 OFFICE O/P EST MOD 30 MIN: CPT

## 2023-08-01 RX ORDER — MINOCYCLINE HYDROCHLORIDE 115 MG/1
115 TABLET, FILM COATED, EXTENDED RELEASE ORAL
Refills: 0 | Status: ACTIVE | COMMUNITY

## 2023-08-01 RX ORDER — CALCIUM CARBONATE/VITAMIN D3 600 MG-10
TABLET ORAL
Refills: 0 | Status: ACTIVE | COMMUNITY

## 2023-08-01 RX ORDER — LEVOCETIRIZINE DIHYDROCHLORIDE 5 MG/1
TABLET ORAL
Refills: 0 | Status: ACTIVE | COMMUNITY

## 2023-08-01 RX ORDER — OMEGA-3/DHA/EPA/FISH OIL 300-1000MG
CAPSULE ORAL
Refills: 0 | Status: ACTIVE | COMMUNITY

## 2023-08-01 RX ORDER — PNV NO.95/FERROUS FUM/FOLIC AC 28MG-0.8MG
TABLET ORAL
Refills: 0 | Status: ACTIVE | COMMUNITY

## 2023-08-01 RX ORDER — MELATONIN 3 MG
TABLET ORAL
Refills: 0 | Status: ACTIVE | COMMUNITY

## 2023-08-01 NOTE — HISTORY OF PRESENT ILLNESS
[Obstructive Sleep Apnea] : obstructive sleep apnea [Snoring] : snoring [Awakes Unrefreshed] : awakening unrefreshed [Awakes with Headache] : headache upon awakening [Recent  Weight Gain] : recent weight gain [Daytime Somnolence] : daytime somnolence [DIS] : DIS [DMS] : DMS [FreeTextEntry1] : 49 yo F was referred by her otolaryngologist Dr. Cadence Diaz for management of severe Obstructive Sleep Apnea.  CHIEF COMPLAINT:  "being tired all the time". SLEEP ROS:  awaken unrefreshed, loud snoring, morning headache, and difficulty falling asleep. Also 23-lb weight gain with difficulty losing weight. SLEEP:  TST 7-hours between 11pm-8am.  No daytime sleep.  DIS 1-1.5 hours.  2-3 awakenings for unclear reasons, totaling approximately 1-hour.  SLEEP STUDY: Dx PSG 23: Severe DWIGHT with moderate-severe oxygen desaturation.  AHI 45.7.  T<90 10.1%.  Lowest SaO2 72%.  PMH:  reflux, class 1 obesity, herniated lumbar discs, acne. MEDS:  cetirizine, fluticasone, minocycline, olopatadine, xyzal, magnesium, omega 3, vitamins B12 and D3... ALLERGIES:  Cephalosporins, lidocaine soln, oxycodone, ciprofloxacin. PSH:  myomectomy, uterine polypectomy,  x2, ovarian cystectomy. SHX: Never: smoked, alcohol, or illicit drugs.  Rare caffeine.  Occupation:  . FHX:  Cardiomegaly, obesity, HTN, cardiorespiratory failure (M ).  Renal failure, lifelong smoker (F ). 2 children ages 24 and 26 (A+W). [Witnessed Apneas] : no witnessed sleep apnea [Frequent Nocturnal Awakening] : no nocturnal awakening [Unintentional Sleep while Active] : no unintentional sleep while active [Unintentional Sleep While Inactive] : no unintentional sleep while inactive [Awakening With Dry Mouth] : no dry mouth upon awakening [Unusual Sleep Behavior] : no unusual sleep behavior [Lower Extremity Discomfort] : no lower extremity discomfort in evening or at bedtime [To Bed: ___] : ~he/she~ goes to bed at [unfilled] [Arises: ___] : arises at [unfilled] [Sleep Onset Latency: ___ minutes] : sleep onset latency of [unfilled] minutes reported [Nocturnal Awakenings: ___] : ~he/she~ typically has [unfilled] nocturnal awakenings [WASO: ___] : Wake time after sleep onset is [unfilled] [TST: ___] : Total sleep time is [unfilled] [Daytime Sleep: ___] : daytime sleep: [unfilled]

## 2023-08-01 NOTE — REVIEW OF SYSTEMS
[Fatigue] : fatigue [Recent Wt Gain (___ Lbs)] : recent [unfilled] ~Ulb weight gain [Nasal Congestion] : nasal congestion [Postnasal Drip] : postnasal drip [Obesity] : obesity [Heartburn] : heartburn [Arthralgias] : arthralgias [Negative] : Neurologic [Shortness Of Breath] : no shortness of breath [Thyroid Disease] : no thyroid disease [Diabetes] : no diabetes  [Anemia] : no anemia [History of Iron Deficiency] : no history of iron deficiency [Depression] : no depression [Anxious] : not anxious [FreeTextEntry9] : bilateral leg swelling [de-identified] : arachnoid cyst [FreeTextEntry5] : fibroid

## 2023-08-01 NOTE — ASSESSMENT
[FreeTextEntry1] :  48 year old BRENDA LAW with Class 1 obesity and reflux, was referred by otolaryngologist Dr. Cadence Diaz for management of severe obstructive sleep apnea.    Apnea-related signs and symptoms include BMI 33, crowded oropharynx, 15.75" neck circumference, nonrestorative sleep, morning headaches, and loud snoring.   Discussed ramifications of severe DWIGHT and therapeutic options with the patient including CPAP, weight loss, surgery.  Recommended CPAP as first line therapy for severe DWIGHT.   The patient opts to schedule a mask fitting and start APAP therapy, bypassing a titration study. Advised patient to utilize CPAP whenever she sleeps for optimal benefit. Copy of APAP order was provided with Nieto's contact information. Follow up 2-months after starting therapy. Patient was referred to [facility].

## 2023-08-01 NOTE — PHYSICAL EXAM
[Normal Appearance] : normal appearance [Well Groomed] : well groomed [General Appearance - In No Acute Distress] : no acute distress [Normal Conjunctiva] : the conjunctiva exhibited no abnormalities [Neck Appearance] : the appearance of the neck was normal [Heart Rate And Rhythm] : heart rate was normal and rhythm regular [Murmurs] : no murmurs [] : no respiratory distress [Auscultation Breath Sounds / Voice Sounds] : lungs were clear to auscultation bilaterally [Involuntary Movements] : no involuntary movements were seen [Non-Pitting] : non-pitting [No Focal Deficits] : no focal deficits [Affect] : the affect was normal [Mood] : the mood was normal [Low Lying Soft Palate] : low lying soft palate [Enlarged Base of the Tongue] : enlargement of the base of the tongue [Neck Circumference: ___] : neck circumference is [unfilled]

## 2023-08-02 NOTE — REVIEW OF SYSTEMS
[Negative] : Allergic/Immunologic 1. No signs of emergency medical condition on today's workup.  Presumptive diagnosis made, but further evaluation may be required by your primary care doctor or specialist for a definitive diagnosis.  Therefore, follow up as directed and if symptoms change/worsen or any emergency conditions, please return to the ER.   2. Presumptive diagnosis is excess vomiting from cannabis use.  3. Recommend stop using cannabis, follow up PCP and Hematologist for further eval and treatment for ab pain, vomiting, and elevated WBC count.  4. Return for any concerns.

## 2023-08-03 ENCOUNTER — OUTPATIENT (OUTPATIENT)
Dept: OUTPATIENT SERVICES | Facility: HOSPITAL | Age: 48
LOS: 1 days | End: 2023-08-03

## 2023-08-03 ENCOUNTER — APPOINTMENT (OUTPATIENT)
Dept: GASTROENTEROLOGY | Facility: CLINIC | Age: 48
End: 2023-08-03
Payer: COMMERCIAL

## 2023-08-03 VITALS
BODY MASS INDEX: 32.78 KG/M2 | HEART RATE: 87 BPM | RESPIRATION RATE: 16 BRPM | DIASTOLIC BLOOD PRESSURE: 72 MMHG | WEIGHT: 192 LBS | OXYGEN SATURATION: 97 % | HEIGHT: 64 IN | TEMPERATURE: 97.4 F | SYSTOLIC BLOOD PRESSURE: 108 MMHG

## 2023-08-03 DIAGNOSIS — Z98.891 HISTORY OF UTERINE SCAR FROM PREVIOUS SURGERY: Chronic | ICD-10-CM

## 2023-08-03 DIAGNOSIS — K92.1 MELENA: ICD-10-CM

## 2023-08-03 DIAGNOSIS — Z98.890 OTHER SPECIFIED POSTPROCEDURAL STATES: Chronic | ICD-10-CM

## 2023-08-03 DIAGNOSIS — Z98.51 TUBAL LIGATION STATUS: Chronic | ICD-10-CM

## 2023-08-03 DIAGNOSIS — K59.09 OTHER CONSTIPATION: ICD-10-CM

## 2023-08-03 DIAGNOSIS — K92.0 HEMATEMESIS: ICD-10-CM

## 2023-08-03 DIAGNOSIS — D25.9 LEIOMYOMA OF UTERUS, UNSPECIFIED: Chronic | ICD-10-CM

## 2023-08-03 PROCEDURE — ZZZZZ: CPT | Mod: GC

## 2023-08-03 NOTE — REVIEW OF SYSTEMS
[Abdominal Pain] : abdominal pain [Vomiting] : vomiting [Constipation] : constipation [Heartburn] : heartburn [Bleeding] : bleeding [Bloating (gassiness)] : bloating [Fever] : no fever [Chills] : no chills [Feeling Poorly] : not feeling poorly [Loss Of Hearing] : no hearing loss [Shortness Of Breath] : no shortness of breath [SOB on Exertion] : no shortness of breath during exertion [Diarrhea] : no diarrhea [Joint Swelling] : no joint swelling [Joint Stiffness] : no joint stiffness [Limb Swelling] : no limb swelling [Confused] : no confusion [Fainting] : no fainting

## 2023-08-03 NOTE — HISTORY OF PRESENT ILLNESS
[FreeTextEntry1] : 49 yo F who presents for colon cancer screening.   Patient has an endoscopy/colonoscopy 2-3 years ago and another endoscopy 5 years ago. She reports that endoscopy was done for vomiting and blood in emesis. She reports constipation, BM every 2 days, hard stools. Denies melena, hemochezia. Reports that she has had hemorrhoidal surgery and will have some blood on toilet when straining very hard.   Patient reports that she has vomited last week with some red blood. Patient reports that she was trying to force herself to vomit and then had a little bit of blood. She has had bloody emesis for 5 years. She reports she has emesis 4-5x/year. No weight loss. Patient occasionally has heartburn.   SH: No smoking or alcohol use. FH: no family history of colon cancer or stomach cancer. Aunt (maternal) with ob/gyn malignancy.   GI hx:  - EGD (6/2017) that is normal  - EGD (8/2021) with gastritis  - colonoscopy 2021 Non-bleeding internal hemorrhoids, due for repeat colonoscopy 2031

## 2023-08-03 NOTE — END OF VISIT
[] : Fellow [FreeTextEntry3] : As modified and discussed with patient MD JEREMIAH Liz [facility] Associate Professor of Medicine [facility]

## 2023-08-03 NOTE — ASSESSMENT
[FreeTextEntry1] : Impression:   #hematemesis - chronic, has been having emesis 4-5x a year for 5 years with scant blood.  - EGD (6/2017) that is normal  - EGD (8/2021) with gastritis  #constipation - BM every other day to every 3 days with straining #bloating- may be due to constipation; will assess initially with EGD  #colon cancer screening - colonoscopy 2021 Non-bleeding internal hemorrhoids, due for repeat colonoscopy 2031   Recommendation:  - metamucil and miralax for constipation  - counseled on taking miralax daily, can uptitrate for 1-2 soft BM  - colonoscopy next due 2031 - plan for EGD given hematemesis; patient would prefer minimal sedation given previous vomiting with anesthesia  - presurgical testing for reaction to anesthesia previously   Tiana Andre MD, PGY5 GI fellow

## 2023-08-03 NOTE — PHYSICAL EXAM
[Alert] : alert [Normal Voice/Communication] : normal voice/communication [Well Developed] : well developed [Sclera] : the sclera and conjunctiva were normal [Hearing Threshold Finger Rub Not [place]] : hearing was normal [Normal Appearance] : the appearance of the neck was normal [No Respiratory Distress] : no respiratory distress [No Acc Muscle Use] : no accessory muscle use [None] : no edema [Abdomen Tenderness] : non-tender [No Masses] : no abdominal mass palpated [Abdomen Soft] : soft [Abnormal Walk] : normal gait [Normal Color / Pigmentation] : normal skin color and pigmentation [Oriented To Time, Place, And Person] : oriented to person, place, and time [Normal Affect] : the affect was normal [Normal Mood] : the mood was normal

## 2023-08-04 ENCOUNTER — APPOINTMENT (OUTPATIENT)
Dept: DERMATOLOGY | Facility: CLINIC | Age: 48
End: 2023-08-04

## 2023-08-04 DIAGNOSIS — K92.1 MELENA: ICD-10-CM

## 2023-08-04 DIAGNOSIS — K59.09 OTHER CONSTIPATION: ICD-10-CM

## 2023-08-04 DIAGNOSIS — K92.0 HEMATEMESIS: ICD-10-CM

## 2023-08-08 ENCOUNTER — OUTPATIENT (OUTPATIENT)
Dept: OUTPATIENT SERVICES | Facility: HOSPITAL | Age: 48
LOS: 1 days | End: 2023-08-08

## 2023-08-08 ENCOUNTER — APPOINTMENT (OUTPATIENT)
Dept: ENDOCRINOLOGY | Facility: CLINIC | Age: 48
End: 2023-08-08

## 2023-08-08 ENCOUNTER — APPOINTMENT (OUTPATIENT)
Dept: ENDOCRINOLOGY | Facility: CLINIC | Age: 48
End: 2023-08-08
Payer: COMMERCIAL

## 2023-08-08 VITALS
HEART RATE: 88 BPM | TEMPERATURE: 97.3 F | RESPIRATION RATE: 16 BRPM | DIASTOLIC BLOOD PRESSURE: 68 MMHG | BODY MASS INDEX: 33.29 KG/M2 | SYSTOLIC BLOOD PRESSURE: 100 MMHG | WEIGHT: 195 LBS | HEIGHT: 64 IN | OXYGEN SATURATION: 98 %

## 2023-08-08 DIAGNOSIS — D25.9 LEIOMYOMA OF UTERUS, UNSPECIFIED: Chronic | ICD-10-CM

## 2023-08-08 DIAGNOSIS — Z98.890 OTHER SPECIFIED POSTPROCEDURAL STATES: Chronic | ICD-10-CM

## 2023-08-08 DIAGNOSIS — Z98.51 TUBAL LIGATION STATUS: Chronic | ICD-10-CM

## 2023-08-08 PROCEDURE — ZZZZZ: CPT | Mod: GC

## 2023-08-09 DIAGNOSIS — E66.9 OBESITY, UNSPECIFIED: ICD-10-CM

## 2023-08-09 DIAGNOSIS — E04.1 NONTOXIC SINGLE THYROID NODULE: ICD-10-CM

## 2023-08-09 NOTE — ASSESSMENT
[FreeTextEntry1] : This is a 47 yo F /w a pmh of obesity, severe laurie, arachnoid cyst who presents for evaluation of obesity. Patient also wants to make sure her hormones are in balance. Current irregular menses likely related to uterine fibroids. Will check prolactin given history of nipple discharge, though given no clear medication cause, and given prior MRI imaging without any self-reported pituitary masses, this is still unlikely. No evidence for adrenal insufficiency or Cushing's disease.  #Obesity: #pre-diabetes -check a1c, lipid panel -can start GLP1a with trulicity (given sliding scale insurance) if patient has diabetes -if not will refer to weight management as patient has some interest in consultation for bariatric surgery (would qualify with BMI >30 and severe LAURIE  #nipple discharge -check prolactin levels  #Subcentimeter nodule  -Subcentimeter right thyroid nodule (TIRADS 3). Does not meet criteria for FNA   -Consider follow-up ultrasound in 2 years.  Case discussed with Dr. Day Calles MD Endocrinology Fellow What Type Of Note Output Would You Prefer (Optional)?: Standard Output How Severe Are Your Spot(S)?: mild Have Your Spot(S) Been Treated In The Past?: has not been treated Hpi Title: Evaluation of Skin Lesions

## 2023-08-09 NOTE — REVIEW OF SYSTEMS
[Lower Ext Edema] : lower extremity edema [Abdominal Pain] : abdominal pain [Heartburn] : heartburn [Irregular Menses] : irregular menses [Negative] : Neurological [Fatigue] : no fatigue [Decreased Appetite] : appetite not decreased [Recent Weight Gain (___ Lbs)] : no recent weight gain [Recent Weight Loss (___ Lbs)] : no recent weight loss [Visual Field Defect] : no visual field defect [Dysphagia] : no dysphagia [Neck Pain] : no neck pain [Dysphonia] : no dysphonia [Chest Pain] : no chest pain [Palpitations] : no palpitations [Nausea] : no nausea [Constipation] : no constipation [Vomiting] : no vomiting [Diarrhea] : no diarrhea [Gas/Bloating] : no gas/bloating [Polyuria] : no polyuria [Acanthosis] : no acanthosis  [Polydipsia] : no polydipsia [Cold Intolerance] : no cold intolerance [Heat Intolerance] : no heat intolerance [Hot Flashes] : no hot flashes [Galactorrhea] : no galactorrhea  [Easy Bleeding] : no ~M tendency for easy bleeding [FreeTextEntry5] : reports intermittent swelling of lower extremities that improves with leg elevation. No pain or erythema

## 2023-08-09 NOTE — HISTORY OF PRESENT ILLNESS
[FreeTextEntry1] : This is a 47 yo F /w a pmh of obesity, severe laurie, arachnoid cyst who presents for evaluation of obesity. Patient also wants to make sure her hormones are ok.  Reports irregular menses every 2-4 months. LMP 7/30/23 and lasted 3-4 days. Has a history of fibroids and prior myomectomy with remaining leiomyoma. Patient reports 2-3 years of nipple discharge. Reports clear discharge from her nipples, bilaterally, around the time of her menses associated with breast tenderness and swelling. Has had MRI brain before for arachnoid cyst, not in our system but patient denies any prior pituitary disease. She does not take any atypical antipsychotics. Only medication is minocycline. Reports fatigue and dizziness. No recent steroid use. No easy bruising. Has had recent CT abd/pelvis with IV contrast with normal adrenal glands, no masses.  Patient reports around 30-pound weight gain in the last 2-3 years ago. Weight stable per chart since September 2022 Prediabetes 9/2022 with A1c 5.8%  Has thyroid US in 2/2023 which showed a 0.6cm thyroid nodule.  Patient reports she wants to lose weight BMI 35 Reports knee pain bilaterally  24 hr diet recall:  Breakfast: pineapple and dragon fruit with aloe vera Lunch: skipped Dinner: ribeye with potato salad Snacks: jello Drinks: no soda, juice, or alcohol  Patient walks 30 minutes every day   current medications: minocylcine 150mg  omeprazole  PMH: severe LAURIE, gastritis

## 2023-08-09 NOTE — PHYSICAL EXAM
[Alert] : alert [Well Nourished] : well nourished [Healthy Appearance] : healthy appearance [Obese] : obese [No Acute Distress] : no acute distress [Well Developed] : well developed [Normal Voice/Communication] : normal voice communication [Normal Sclera/Conjunctiva] : normal sclera/conjunctiva [Normal Outer Ear/Nose] : the ears and nose were normal in appearance [No Neck Mass] : no neck mass was observed [No LAD] : no lymphadenopathy [Thyroid Not Enlarged] : the thyroid was not enlarged [No Thyroid Nodules] : no palpable thyroid nodules [No Accessory Muscle Use] : no accessory muscle use [Normal Rate and Effort] : normal respiratory rate and effort [Normal Rate] : heart rate was normal [Regular Rhythm] : with a regular rhythm [No Edema] : no peripheral edema [No Nipple Discharge] : no nipple discharge [Not Tender] : non-tender [Not Distended] : not distended [Soft] : abdomen soft [No HSM] : no hepato-splenomegaly [No Stigmata of Cushings Syndrome] : no stigmata of Cushings Syndrome [Normal Gait] : normal gait [Acanthosis Nigricans] : acanthosis nigricans present [Oriented x3] : oriented to person, place, and time [Normal Affect] : the affect was normal [Normal Insight/Judgement] : insight and judgment were intact [Normal Mood] : the mood was normal [Abdominal Striae] : no abdominal striae [Acne] : no acne [Hirsutism] : no hirsutism

## 2023-08-09 NOTE — END OF VISIT
[] : Fellow [FreeTextEntry3] : his is a 47 yo F /w a pmh of obesity, severe laurie, arachnoid cyst who presents for evaluation of obesity. No clinical history or examination findings concerning for underlying hormonal excess/deficiency. Given self-reported galactorrhea in the setting of irregular menstrual cycles will check prolactin level. Given history of pre diabetes, will recheck HbA1c, if diabetic, would be a good candidate for GLP-1 agonist. Patient eager for weight loss, and open to the idea of referral to [facility] weight management clinic. Will organize referral.

## 2023-08-10 LAB
CHOLEST SERPL-MCNC: 230 MG/DL
ESTIMATED AVERAGE GLUCOSE: 117 MG/DL
HBA1C MFR BLD HPLC: 5.7 %
HDLC SERPL-MCNC: 53 MG/DL
LDLC SERPL CALC-MCNC: 157 MG/DL
NONHDLC SERPL-MCNC: 177 MG/DL
PROLACTIN SERPL-MCNC: 10.3 NG/ML
TRIGL SERPL-MCNC: 116 MG/DL

## 2023-08-14 ENCOUNTER — OUTPATIENT (OUTPATIENT)
Dept: OUTPATIENT SERVICES | Facility: HOSPITAL | Age: 48
LOS: 1 days | End: 2023-08-14

## 2023-08-14 ENCOUNTER — RESULT REVIEW (OUTPATIENT)
Age: 48
End: 2023-08-14

## 2023-08-14 ENCOUNTER — APPOINTMENT (OUTPATIENT)
Dept: OBGYN | Facility: HOSPITAL | Age: 48
End: 2023-08-14
Payer: COMMERCIAL

## 2023-08-14 VITALS
DIASTOLIC BLOOD PRESSURE: 85 MMHG | WEIGHT: 197 LBS | TEMPERATURE: 97.9 F | HEART RATE: 87 BPM | SYSTOLIC BLOOD PRESSURE: 130 MMHG | HEIGHT: 64 IN | BODY MASS INDEX: 33.63 KG/M2

## 2023-08-14 DIAGNOSIS — Z01.419 ENCOUNTER FOR GYNECOLOGICAL EXAMINATION (GENERAL) (ROUTINE) W/OUT ABNORMAL FINDINGS: ICD-10-CM

## 2023-08-14 DIAGNOSIS — Z98.890 OTHER SPECIFIED POSTPROCEDURAL STATES: Chronic | ICD-10-CM

## 2023-08-14 LAB — HIV 1+2 AB+HIV1 P24 AG SERPL QL IA: SIGNIFICANT CHANGE UP

## 2023-08-14 PROCEDURE — 99396 PREV VISIT EST AGE 40-64: CPT | Mod: GC

## 2023-08-15 DIAGNOSIS — D21.9 BENIGN NEOPLASM OF CONNECTIVE AND OTHER SOFT TISSUE, UNSPECIFIED: ICD-10-CM

## 2023-08-15 DIAGNOSIS — Z01.419 ENCOUNTER FOR GYNECOLOGICAL EXAMINATION (GENERAL) (ROUTINE) WITHOUT ABNORMAL FINDINGS: ICD-10-CM

## 2023-08-16 ENCOUNTER — APPOINTMENT (OUTPATIENT)
Dept: PULMONOLOGY | Facility: CLINIC | Age: 48
End: 2023-08-16
Payer: COMMERCIAL

## 2023-08-16 VITALS
WEIGHT: 193 LBS | SYSTOLIC BLOOD PRESSURE: 111 MMHG | TEMPERATURE: 98 F | HEIGHT: 64 IN | DIASTOLIC BLOOD PRESSURE: 75 MMHG | HEART RATE: 80 BPM | RESPIRATION RATE: 15 BRPM | OXYGEN SATURATION: 94 % | BODY MASS INDEX: 32.95 KG/M2

## 2023-08-16 DIAGNOSIS — G47.33 OBSTRUCTIVE SLEEP APNEA (ADULT) (PEDIATRIC): ICD-10-CM

## 2023-08-16 LAB — CYTOLOGY SPEC DOC CYTO: SIGNIFICANT CHANGE UP

## 2023-08-16 PROCEDURE — 94660 CPAP INITIATION&MGMT: CPT

## 2023-08-16 NOTE — PLAN
[FreeTextEntry1] : 47 y.o.  presents for annual preventative care visit. No acute concerns at this time.  #Fibroids - Patient with long standing history of fibroids s/p HSC d/c myomectomy. Would like to discuss further surgical options. Previously seen at gyn booking clinic however lost to follow-up. - Refer to gyn booking clinic for surgical consultation  #Health care maintenance - Mammogram ordered - PAP and G/C CT, HIV performed  d/w Dr. Víctor Chanel, PGY-1

## 2023-08-16 NOTE — HISTORY OF PRESENT ILLNESS
[FreeTextEntry1] : 46 yo, , PMHx of UFE in 3/2021 for fibroid uterus and [facility] D&C myomectomy on 10/2021 for degenerating fibroid, LMP 23. No acute concerns at this time.   Pt is a longstanding GYN and booking clinic patient. Patient was seen in GYN clinic on  for follow up of dark menstrual bleeding and intermenstrual bleeding. She is extremely bothered by dark brown/black discharge and spotting that occurs in between her menses each month, lasting for a few days up to 1 week. This dark blood is bothersome to her as she associates it with necrosis and is concerned that it could become cancer. Her periods are much lighter than before her UFE, only lasting 3-5 days with minimal pads used. She reports bloating and pressure symptoms during her periods only - like the blood "wants to get out but it can't". Notes constipation with 2 bowel movements per week. Denies urinary symptoms.  She desired a hysterectomy in the past but in setting of surgical history was counseled and ultimately underwent UFE in 2021 and then [facility] myomectomy in Oct 2021 for degenerating fibroid. She was seen at gyn booking clinic in February for possible operative hysteroscopy and submucosal fibroid resection, however patient decided to defer surgery at that time.    ObHx: full-term CS , full-term CS , GynHx: fibroids, monogamous, currently sexually active w/ 1 male partner. Contraception with BTL. LMP 23. PMH: Gastritis, DWIGHT (on CPAP) Meds: Famotidine 20mg qhs SurgHx: CSx2 (), BTL, L cystectomy LSC converted to open (), abdominoplasty followed by revision for ?incisional vs fascial dehiscence with mesh placement, D&Cx2 for uterine polyps, [facility] D&C myomectomy on 10/2021 for degenerating fibroid after UFE in 3/2021.  HCM: - PAP: performed today - Mammo: never had - STD: GC CT, HIV today  MRI 2022: Further decrease in size of anterior myoma which is intracavitary in location, measuring 2.4 x 1.8 x 2.7 cm, previously 5.2 x 5.0 x 4.4 cm. No enhancement is seen within this fibroid. Small right-sided intramural myoma measures 1.3 cm.  TVUS 2023: Uterus: 9.4 cm x 4.8 cm x 6.8 cm. * Calcified anterior body intramural myoma measures 1.7 x 1.5 x 2.2 cm, abutting the endometrium * Calcified fundal intramural myoma measures 1.3 x 1.3 x 1.1 cm. Endometrium: 5 mm. Hemorrhagic fluid is present in the endometrial cavity. Right ovary: 2.7 cm x 1.4 cm x 2.6 cm. Within normal limits. Left ovary: 2.9 cm x 2.4 cm x 2.1 cm. Within normal limits.

## 2023-08-17 PROBLEM — G47.33 OBSTRUCTIVE SLEEP APNEA, ADULT: Status: ACTIVE | Noted: 2021-09-28

## 2023-08-17 LAB — ACE BLD-CCNC: 45 U/L

## 2023-08-17 NOTE — PROCEDURE
[FreeTextEntry1] : Patient presents for mask fitting because she is bypassing a titration study and will be purchasing an APAP out-of-pocket. Patient has never used CPAP before. She was educated at length on DWIGHT, PAP therapy and different types of mask interfaces. Patient states she was referred for sleep study by her ENT, who noted inflammation from severe snoring. She is concerned that she breathes through her mouth during the night, but denies deviated septum or other nasal obstruction. She does have seasonal allergies/congestion.   Patient was fitted today with a RESMED AIRFIT N20 MEDIUM NASAL MASK, RESMED AIRFIT F30i SMALL w/ SMALL-WIDE CUSHION FFM, and RESMED AIRFIT F20 MEDIUM FFM. All masks appeared to have a good fit. She declined to try a nasal pillow. She found the F30i to be uncomfortable with the tubing connection on top of her head. She also found the F20 uncomfortable as it was larger and covered more of her face. She prefers the N20 but is unsure if she will be able to tolerate it. She was advised to order the machine and try each mask to decide which one she prefers, before notifying the DME ([employer]). Patient was educated on DME practices, supply replacement intervals and compliance criteria. She was advised to make a follow up appointment within 90 days of receiving her machine.

## 2023-08-28 ENCOUNTER — APPOINTMENT (OUTPATIENT)
Dept: OTOLARYNGOLOGY | Facility: CLINIC | Age: 48
End: 2023-08-28
Payer: COMMERCIAL

## 2023-08-28 ENCOUNTER — OUTPATIENT (OUTPATIENT)
Dept: OUTPATIENT SERVICES | Facility: HOSPITAL | Age: 48
LOS: 1 days | Discharge: HOME | End: 2023-08-28

## 2023-08-28 VITALS
WEIGHT: 194 LBS | DIASTOLIC BLOOD PRESSURE: 73 MMHG | SYSTOLIC BLOOD PRESSURE: 106 MMHG | HEART RATE: 82 BPM | HEIGHT: 64 IN | BODY MASS INDEX: 33.12 KG/M2

## 2023-08-28 DIAGNOSIS — Z98.890 OTHER SPECIFIED POSTPROCEDURAL STATES: Chronic | ICD-10-CM

## 2023-08-28 DIAGNOSIS — D25.9 LEIOMYOMA OF UTERUS, UNSPECIFIED: Chronic | ICD-10-CM

## 2023-08-28 DIAGNOSIS — R09.89 OTHER SPECIFIED SYMPTOMS AND SIGNS INVOLVING THE CIRCULATORY AND RESPIRATORY SYSTEMS: ICD-10-CM

## 2023-08-28 DIAGNOSIS — G47.33 OBSTRUCTIVE SLEEP APNEA (ADULT) (PEDIATRIC): ICD-10-CM

## 2023-08-28 DIAGNOSIS — R09.81 NASAL CONGESTION: ICD-10-CM

## 2023-08-28 DIAGNOSIS — K21.9 GASTRO-ESOPHAGEAL REFLUX DISEASE W/OUT ESOPHAGITIS: ICD-10-CM

## 2023-08-28 DIAGNOSIS — Z98.51 TUBAL LIGATION STATUS: Chronic | ICD-10-CM

## 2023-08-28 DIAGNOSIS — E04.1 NONTOXIC SINGLE THYROID NODULE: ICD-10-CM

## 2023-08-28 DIAGNOSIS — Z98.891 HISTORY OF UTERINE SCAR FROM PREVIOUS SURGERY: Chronic | ICD-10-CM

## 2023-08-28 PROCEDURE — 99214 OFFICE O/P EST MOD 30 MIN: CPT | Mod: 25

## 2023-08-28 PROCEDURE — 31575 DIAGNOSTIC LARYNGOSCOPY: CPT

## 2023-08-28 NOTE — PHYSICAL EXAM
[] : septum deviated to the left [Normal] : mucosa is normal [Midline] : trachea located in midline position [Laryngoscopy Performed] : laryngoscopy was performed, see procedure section for findings

## 2023-08-28 NOTE — ASSESSMENT
[FreeTextEntry1] : 47Y F follow up for sleep apnea, nasal congestion, and Thyroid nodule/cervical lymphadenopathy. Previously Patient states she home study was not precise patient completed sleep study in facility. 10/17/22.   Nasal congestion improved when she was using sinus rinse and nasal spray. Patient with history of GERD and Globus sensation.   1/23/2023 Laryngoscopy shows moderate postcricoid edema, mild arytenoids edema and mild mucus production coating nasal cavity  1/4/2023 Polysomnography: AHI 45.7/hr Severe DWIGHT. Sleep Efficiency 52.2% 10/26/22  Neck: Stable Benign-appearing cervical lymph nodes compare to prior studies 9/10/2020 and 3/16/2021.  Laryngoscopy shows mild postcricoid edema, mild arytenoids edema and mild mucus production coating nasal cavity. Cobbstoneing in the posterior oral pharynx  Recommend Laryngopharyngeal Reflux / Globus sensation - Discussed Lifestyle changes as the most effective methods to improvement LPR. Weight Loss if overweight. Avoid foods that increase the level of acid in your stomach, including caffeinated/carbonated drinks.  - Avoid foods that decrease the pressure in the lower esophagus, such as fatty foods, alcohol and peppermint. - Avoid large meals.Try to have an empty stomach 2 hours before going to bed. If still smoking please Quit. - Head of bed elevation when you lying down -LPR Handout Given - Discussed with Patient if they have not seen Gastroenterolgy in the past for endoscopy they should follow up as chronic reflux can causes mucosal changes in the lining of the lower esophagus  - Patient stopped Famotidine 20mg at bedtime for upcoming EDG - CT Neck with contrast for persistent globus sensation despite medication regiment  Obstructive Sleep Apnea -Discussed that primary snoring is not harmful in and off itself but sleep apnea is different. DWIGHT needs treatment due to long term risk heart and lung problems.  -Weight Loss and Lifestyle changes is known to improved DWIGHT. Avoid alcohol in the hours before bedtime. -Quit smoking if still smoking. Don't sleep on your back. -Avoid taking sedative medications such as anti-anxiety drugs or sleeping pills. -Patient following Pulmonology in the process of getting CPAP  Nasal Congestion -Improved -Continue Daily Sinus Rinse with Flonase -Discussed the importance of rinsing the sinus before using nasal spray so that excess mucus lining the nasal cavity can be wash away so medication can penetration the nose. -If normal saline sinus rinse + nasal spray is not effective can discuss medicated nasal rinses and imaging for additional evaluation -Patient following Allergist got allergy testing  Thyroid Nodule / Cervical Lymph nodes- Stable -Continue to monitor if patient states there are any changes in size can repeat US. Patient states she is currently getting further work up from Rheumatology.  -Return to clinic in 4 months or sooner if new/worsen symptoms present

## 2023-08-28 NOTE — REASON FOR VISIT
[Subsequent Evaluation] : a subsequent evaluation for [FreeTextEntry2] : nasal congestion, sleep apnea

## 2023-08-28 NOTE — HISTORY OF PRESENT ILLNESS
[de-identified] : 48 year old female presents for follow up for sleep apnea, nasal congestion, and cervical lymphadenopathy. Following up with Allergist and Rheumatologist. She is still pending getting the CPAP machine.  Reflux improving with famotidine use. She had about 3 vomiting episodes since last seen - plans for endoscopy with GI in October for gastritis concerns. Reports voice hoarseness and some globus sensation remains the same since last seen in January.  Denies recent fevers or infections.

## 2023-08-30 ENCOUNTER — APPOINTMENT (OUTPATIENT)
Dept: OBGYN | Facility: HOSPITAL | Age: 48
End: 2023-08-30
Payer: COMMERCIAL

## 2023-08-30 ENCOUNTER — OUTPATIENT (OUTPATIENT)
Dept: OUTPATIENT SERVICES | Facility: HOSPITAL | Age: 48
LOS: 1 days | End: 2023-08-30

## 2023-08-30 VITALS
DIASTOLIC BLOOD PRESSURE: 82 MMHG | TEMPERATURE: 98 F | SYSTOLIC BLOOD PRESSURE: 123 MMHG | WEIGHT: 196 LBS | HEIGHT: 64 IN | HEART RATE: 78 BPM | BODY MASS INDEX: 33.46 KG/M2

## 2023-08-30 DIAGNOSIS — D25.0 INTRAMURAL LEIOMYOMA OF UTERUS: ICD-10-CM

## 2023-08-30 DIAGNOSIS — Z98.51 TUBAL LIGATION STATUS: Chronic | ICD-10-CM

## 2023-08-30 DIAGNOSIS — Z98.890 OTHER SPECIFIED POSTPROCEDURAL STATES: Chronic | ICD-10-CM

## 2023-08-30 DIAGNOSIS — D25.1 INTRAMURAL LEIOMYOMA OF UTERUS: ICD-10-CM

## 2023-08-30 DIAGNOSIS — Z98.891 HISTORY OF UTERINE SCAR FROM PREVIOUS SURGERY: Chronic | ICD-10-CM

## 2023-08-30 DIAGNOSIS — D25.9 LEIOMYOMA OF UTERUS, UNSPECIFIED: Chronic | ICD-10-CM

## 2023-08-30 PROCEDURE — 99213 OFFICE O/P EST LOW 20 MIN: CPT | Mod: GC

## 2023-08-31 DIAGNOSIS — E04.1 NONTOXIC SINGLE THYROID NODULE: ICD-10-CM

## 2023-08-31 DIAGNOSIS — K21.9 GASTRO-ESOPHAGEAL REFLUX DISEASE WITHOUT ESOPHAGITIS: ICD-10-CM

## 2023-08-31 DIAGNOSIS — R09.89 OTHER SPECIFIED SYMPTOMS AND SIGNS INVOLVING THE CIRCULATORY AND RESPIRATORY SYSTEMS: ICD-10-CM

## 2023-08-31 DIAGNOSIS — R09.81 NASAL CONGESTION: ICD-10-CM

## 2023-08-31 DIAGNOSIS — G47.33 OBSTRUCTIVE SLEEP APNEA (ADULT) (PEDIATRIC): ICD-10-CM

## 2023-09-05 NOTE — REVIEW OF SYSTEMS
No [Fatigue] : fatigue [Recent Change In Weight] : ~T recent weight change [Chest Pain] : chest pain [Palpitations] : palpitations [Lower Ext Edema] : lower extremity edema [Orthopnea] : orthopnea [Shortness Of Breath] : shortness of breath [Swollen Glands] : swollen glands [Negative] : Psychiatric [Fever] : no fever [Chills] : no chills [Paroxysmal Nocturnal Dyspnea] : no paroxysmal nocturnal dyspnea [Wheezing] : no wheezing [Cough] : no cough [Abdominal Pain] : no abdominal pain [Nausea] : no nausea [Constipation] : no constipation [Dysuria] : no dysuria [Incontinence] : no incontinence [Nocturia] : no nocturia [Frequency] : no frequency [Skin Rash] : no skin rash [Easy Bleeding] : no easy bleeding [Easy Bruising] : no easy bruising [FreeTextEntry2] : fatigue in the setting of anemia; ten pounds of weight gain in the past year  [FreeTextEntry4] : lymphadenopathy

## 2023-09-06 DIAGNOSIS — D25.9 LEIOMYOMA OF UTERUS, UNSPECIFIED: ICD-10-CM

## 2023-09-06 PROBLEM — D25.1 INTRAMURAL AND SUBMUCOUS LEIOMYOMA OF UTERUS: Status: ACTIVE | Noted: 2020-09-08

## 2023-09-06 NOTE — HISTORY OF PRESENT ILLNESS
[FreeTextEntry1] : 47 y/o , PMHx of UFE in 3/2021 for fibroid uterus and HSC D&C myomectomy on 10/2021 for degenerating fibroid, LMP 23, presenting to clinic for further management of AUB due to fibroids.   At this time, patient's primary concern is an incisional burning sensation in her right lower abdomen on her abdominal scar. Patient states that she has scant vaginal bleeding but her periods are not irregular coming every 2-3 months. She is worried that "blood is pooling in her uterus" since she is having minimal bleeding every month compared to the heavy bleeding she used to have before. She states she is still having breast tenderness and cramping related to menstruation, but with minimal bleeding.

## 2023-09-06 NOTE — DISCUSSION/SUMMARY
[FreeTextEntry1] : 47 y/o PMHx of UFE in 3/2021 for fibroid uterus and HSC D&C myomectomy on 10/2021 for degenerating fibroid, LMP 8/25/23, here for discussion of management of AUB and fibroid uterus, not desiring surgical management. - informed patient that based on prior imaging, blood is likely not remaining in uterus and that minimal vaginal bleeding after UFE is to be expected. Repeat TVUS ordered to reassure patient regarding endometrial cavity - extensive discussion w/ patient about possible surgical management with operative hysteroscopy or hysterectomy. Patient does not desire surgical intervention at this time and would like repeat imaging prior to any surgical management.  - patient given referral for plastic surgery and general surgery to evaluate incisional pain and burning.  Patient seen and examined w/ Dr. Duggan Discussed w/ Dr. Phyllis Orosco PGY-1  [facility] Addendum 49yo perimenopausal patient with hx of AUB-L s/p UAE presents for consultation regarding abnormal menses. States that periods have now become irregular and she is concerned that light bleeding may mean that blood is "pooling" in her uterus. She also complains of burning and discomfort near her abdominal scar revision. She also is concerned how the skin is being pulled in.  We went through her medical/surgical history and prior imaging and explained that light and irregular bleeding is the intended effect of the UAE and she is likely in perimenopause. To alleviate her concerns, will repeat TVUS to evaluate EMS. She does not want surgical intervention at this time. She would like referral to plastic surgery or general surgery regarding her scar. She agreed with this plan and all questions were answered to her satisfaction.  -review TVUS and will have patient return PRN depending on results JAVIER Duggan FMIGS-2/PGY-6

## 2023-09-06 NOTE — PHYSICAL EXAM
[Appropriately responsive] : appropriately responsive [Alert] : alert [No Acute Distress] : no acute distress [Soft] : soft [Non-tender] : non-tender [Non-distended] : non-distended [Oriented x3] : oriented x3 [FreeTextEntry7] : well-healed abdominoplasty incisional scar present, protrusion along right side of scar.

## 2023-09-07 LAB
24R-OH-CALCIDIOL SERPL-MCNC: 52.4 PG/ML
25(OH)D3 SERPL-MCNC: 25.5 NG/ML

## 2023-09-13 ENCOUNTER — OUTPATIENT (OUTPATIENT)
Dept: OUTPATIENT SERVICES | Facility: HOSPITAL | Age: 48
LOS: 1 days | End: 2023-09-13
Payer: COMMERCIAL

## 2023-09-13 ENCOUNTER — APPOINTMENT (OUTPATIENT)
Dept: CT IMAGING | Facility: IMAGING CENTER | Age: 48
End: 2023-09-13
Payer: SELF-PAY

## 2023-09-13 DIAGNOSIS — Z98.890 OTHER SPECIFIED POSTPROCEDURAL STATES: Chronic | ICD-10-CM

## 2023-09-13 DIAGNOSIS — R09.89 OTHER SPECIFIED SYMPTOMS AND SIGNS INVOLVING THE CIRCULATORY AND RESPIRATORY SYSTEMS: ICD-10-CM

## 2023-09-13 DIAGNOSIS — Z98.51 TUBAL LIGATION STATUS: Chronic | ICD-10-CM

## 2023-09-13 DIAGNOSIS — Z98.891 HISTORY OF UTERINE SCAR FROM PREVIOUS SURGERY: Chronic | ICD-10-CM

## 2023-09-13 DIAGNOSIS — D25.9 LEIOMYOMA OF UTERUS, UNSPECIFIED: Chronic | ICD-10-CM

## 2023-09-13 PROCEDURE — 70491 CT SOFT TISSUE NECK W/DYE: CPT

## 2023-09-13 PROCEDURE — 70491 CT SOFT TISSUE NECK W/DYE: CPT | Mod: 26

## 2023-09-15 ENCOUNTER — APPOINTMENT (OUTPATIENT)
Dept: CV DIAGNOSITCS | Facility: HOSPITAL | Age: 48
End: 2023-09-15

## 2023-09-15 ENCOUNTER — OUTPATIENT (OUTPATIENT)
Dept: OUTPATIENT SERVICES | Facility: HOSPITAL | Age: 48
LOS: 1 days | End: 2023-09-15
Payer: COMMERCIAL

## 2023-09-15 DIAGNOSIS — Z98.890 OTHER SPECIFIED POSTPROCEDURAL STATES: Chronic | ICD-10-CM

## 2023-09-15 DIAGNOSIS — D25.9 LEIOMYOMA OF UTERUS, UNSPECIFIED: Chronic | ICD-10-CM

## 2023-09-15 DIAGNOSIS — Z98.51 TUBAL LIGATION STATUS: Chronic | ICD-10-CM

## 2023-09-15 DIAGNOSIS — R06.02 SHORTNESS OF BREATH: ICD-10-CM

## 2023-09-15 PROCEDURE — 93306 TTE W/DOPPLER COMPLETE: CPT | Mod: 26

## 2023-09-16 ENCOUNTER — APPOINTMENT (OUTPATIENT)
Dept: OBGYN | Facility: HOSPITAL | Age: 48
End: 2023-09-16

## 2023-09-16 ENCOUNTER — APPOINTMENT (OUTPATIENT)
Dept: MAMMOGRAPHY | Facility: IMAGING CENTER | Age: 48
End: 2023-09-16

## 2023-09-25 ENCOUNTER — OUTPATIENT (OUTPATIENT)
Dept: OUTPATIENT SERVICES | Facility: HOSPITAL | Age: 48
LOS: 1 days | End: 2023-09-25

## 2023-09-25 ENCOUNTER — APPOINTMENT (OUTPATIENT)
Dept: PLASTIC SURGERY | Facility: HOSPITAL | Age: 48
End: 2023-09-25

## 2023-09-25 VITALS
SYSTOLIC BLOOD PRESSURE: 122 MMHG | HEART RATE: 94 BPM | TEMPERATURE: 98 F | DIASTOLIC BLOOD PRESSURE: 87 MMHG | WEIGHT: 198 LBS | BODY MASS INDEX: 33.8 KG/M2 | HEIGHT: 64 IN

## 2023-09-25 DIAGNOSIS — Z98.890 OTHER SPECIFIED POSTPROCEDURAL STATES: Chronic | ICD-10-CM

## 2023-09-25 DIAGNOSIS — Z98.891 HISTORY OF UTERINE SCAR FROM PREVIOUS SURGERY: Chronic | ICD-10-CM

## 2023-09-26 ENCOUNTER — RESULT REVIEW (OUTPATIENT)
Age: 48
End: 2023-09-26

## 2023-09-26 ENCOUNTER — OUTPATIENT (OUTPATIENT)
Dept: OUTPATIENT SERVICES | Facility: HOSPITAL | Age: 48
LOS: 1 days | End: 2023-09-26

## 2023-09-26 ENCOUNTER — APPOINTMENT (OUTPATIENT)
Dept: SURGERY | Facility: HOSPITAL | Age: 48
End: 2023-09-26

## 2023-09-26 VITALS
HEIGHT: 64 IN | TEMPERATURE: 97.9 F | HEART RATE: 85 BPM | WEIGHT: 196 LBS | DIASTOLIC BLOOD PRESSURE: 90 MMHG | BODY MASS INDEX: 33.46 KG/M2 | SYSTOLIC BLOOD PRESSURE: 125 MMHG

## 2023-09-26 DIAGNOSIS — D25.9 LEIOMYOMA OF UTERUS, UNSPECIFIED: Chronic | ICD-10-CM

## 2023-09-26 DIAGNOSIS — R10.31 RIGHT LOWER QUADRANT PAIN: ICD-10-CM

## 2023-09-26 DIAGNOSIS — Z98.51 TUBAL LIGATION STATUS: Chronic | ICD-10-CM

## 2023-09-26 DIAGNOSIS — Z98.890 OTHER SPECIFIED POSTPROCEDURAL STATES: Chronic | ICD-10-CM

## 2023-09-26 NOTE — PLAN
Advised of finding of sleep apnea on home study. We talked about automated CPAP as the first treatment to be tried. This treatment has already been ordered by the sleep physician. We will follow-up in the office after he has been on treatment for several weeks. [FreeTextEntry1] : 47 yof with h/o UFE and D&C hysteroscopy with myomectomy who presents with complaints of dark red blood and occasional bleeding in between cycles, and exam notable for fibroid uterus and normal cervix without dilation. \par \par #AUB\par - Transvaginal and transabdominal US ordered\par - Patient instructed to continue to keep menstrual diary and take note of bleeding outside of normal cycles\par - RTC in 5-6 weeks \par - Possible EMBx in office if patient continues to have bleeding in between cycles\par \par \par D/w Dr. Hare\par Varun Lyman, PGY1

## 2023-10-03 ENCOUNTER — RESULT REVIEW (OUTPATIENT)
Age: 48
End: 2023-10-03

## 2023-10-03 ENCOUNTER — APPOINTMENT (OUTPATIENT)
Dept: ULTRASOUND IMAGING | Facility: CLINIC | Age: 48
End: 2023-10-03
Payer: COMMERCIAL

## 2023-10-03 ENCOUNTER — APPOINTMENT (OUTPATIENT)
Dept: MAMMOGRAPHY | Facility: CLINIC | Age: 48
End: 2023-10-03
Payer: COMMERCIAL

## 2023-10-03 ENCOUNTER — APPOINTMENT (OUTPATIENT)
Dept: CT IMAGING | Facility: IMAGING CENTER | Age: 48
End: 2023-10-03
Payer: COMMERCIAL

## 2023-10-03 ENCOUNTER — OUTPATIENT (OUTPATIENT)
Dept: OUTPATIENT SERVICES | Facility: HOSPITAL | Age: 48
LOS: 1 days | End: 2023-10-03
Payer: COMMERCIAL

## 2023-10-03 DIAGNOSIS — R10.31 RIGHT LOWER QUADRANT PAIN: ICD-10-CM

## 2023-10-03 DIAGNOSIS — Z98.890 OTHER SPECIFIED POSTPROCEDURAL STATES: Chronic | ICD-10-CM

## 2023-10-03 DIAGNOSIS — D25.9 LEIOMYOMA OF UTERUS, UNSPECIFIED: Chronic | ICD-10-CM

## 2023-10-03 DIAGNOSIS — Z98.891 HISTORY OF UTERINE SCAR FROM PREVIOUS SURGERY: Chronic | ICD-10-CM

## 2023-10-03 DIAGNOSIS — Z01.419 ENCOUNTER FOR GYNECOLOGICAL EXAMINATION (GENERAL) (ROUTINE) WITHOUT ABNORMAL FINDINGS: ICD-10-CM

## 2023-10-03 DIAGNOSIS — R10.9 UNSPECIFIED ABDOMINAL PAIN: ICD-10-CM

## 2023-10-03 DIAGNOSIS — R92.30 DENSE BREASTS, UNSPECIFIED: ICD-10-CM

## 2023-10-03 PROCEDURE — 74177 CT ABD & PELVIS W/CONTRAST: CPT

## 2023-10-03 PROCEDURE — 77063 BREAST TOMOSYNTHESIS BI: CPT

## 2023-10-03 PROCEDURE — 77067 SCR MAMMO BI INCL CAD: CPT | Mod: 26

## 2023-10-03 PROCEDURE — 74177 CT ABD & PELVIS W/CONTRAST: CPT | Mod: 26

## 2023-10-03 PROCEDURE — 77063 BREAST TOMOSYNTHESIS BI: CPT | Mod: 26

## 2023-10-03 PROCEDURE — 76641 ULTRASOUND BREAST COMPLETE: CPT | Mod: 26,50

## 2023-10-03 PROCEDURE — 76641 ULTRASOUND BREAST COMPLETE: CPT

## 2023-10-03 PROCEDURE — 77067 SCR MAMMO BI INCL CAD: CPT

## 2023-10-05 ENCOUNTER — OUTPATIENT (OUTPATIENT)
Dept: OUTPATIENT SERVICES | Facility: HOSPITAL | Age: 48
LOS: 1 days | End: 2023-10-05

## 2023-10-05 VITALS
TEMPERATURE: 97 F | RESPIRATION RATE: 18 BRPM | HEIGHT: 62.5 IN | OXYGEN SATURATION: 98 % | SYSTOLIC BLOOD PRESSURE: 123 MMHG | DIASTOLIC BLOOD PRESSURE: 85 MMHG | HEART RATE: 92 BPM | WEIGHT: 192.9 LBS

## 2023-10-05 DIAGNOSIS — Z98.890 OTHER SPECIFIED POSTPROCEDURAL STATES: Chronic | ICD-10-CM

## 2023-10-05 DIAGNOSIS — G47.33 OBSTRUCTIVE SLEEP APNEA (ADULT) (PEDIATRIC): ICD-10-CM

## 2023-10-05 DIAGNOSIS — K92.0 HEMATEMESIS: ICD-10-CM

## 2023-10-05 DIAGNOSIS — D25.9 LEIOMYOMA OF UTERUS, UNSPECIFIED: Chronic | ICD-10-CM

## 2023-10-05 DIAGNOSIS — Z98.891 HISTORY OF UTERINE SCAR FROM PREVIOUS SURGERY: Chronic | ICD-10-CM

## 2023-10-05 DIAGNOSIS — Z98.51 TUBAL LIGATION STATUS: Chronic | ICD-10-CM

## 2023-10-05 LAB
ANION GAP SERPL CALC-SCNC: 11 MMOL/L — SIGNIFICANT CHANGE UP (ref 7–14)
BUN SERPL-MCNC: 20 MG/DL — SIGNIFICANT CHANGE UP (ref 7–23)
CALCIUM SERPL-MCNC: 9.7 MG/DL — SIGNIFICANT CHANGE UP (ref 8.4–10.5)
CHLORIDE SERPL-SCNC: 101 MMOL/L — SIGNIFICANT CHANGE UP (ref 98–107)
CO2 SERPL-SCNC: 26 MMOL/L — SIGNIFICANT CHANGE UP (ref 22–31)
CREAT SERPL-MCNC: 0.88 MG/DL — SIGNIFICANT CHANGE UP (ref 0.5–1.3)
EGFR: 82 ML/MIN/1.73M2 — SIGNIFICANT CHANGE UP
GLUCOSE SERPL-MCNC: 206 MG/DL — HIGH (ref 70–99)
HCG SERPL-ACNC: <1 MIU/ML — SIGNIFICANT CHANGE UP
HCT VFR BLD CALC: 41.9 % — SIGNIFICANT CHANGE UP (ref 34.5–45)
HGB BLD-MCNC: 14.3 G/DL — SIGNIFICANT CHANGE UP (ref 11.5–15.5)
MCHC RBC-ENTMCNC: 30.4 PG — SIGNIFICANT CHANGE UP (ref 27–34)
MCHC RBC-ENTMCNC: 34.1 GM/DL — SIGNIFICANT CHANGE UP (ref 32–36)
MCV RBC AUTO: 89.1 FL — SIGNIFICANT CHANGE UP (ref 80–100)
NRBC # BLD: 0 /100 WBCS — SIGNIFICANT CHANGE UP (ref 0–0)
NRBC # FLD: 0 K/UL — SIGNIFICANT CHANGE UP (ref 0–0)
PLATELET # BLD AUTO: 235 K/UL — SIGNIFICANT CHANGE UP (ref 150–400)
POTASSIUM SERPL-MCNC: 3.7 MMOL/L — SIGNIFICANT CHANGE UP (ref 3.5–5.3)
POTASSIUM SERPL-SCNC: 3.7 MMOL/L — SIGNIFICANT CHANGE UP (ref 3.5–5.3)
RBC # BLD: 4.7 M/UL — SIGNIFICANT CHANGE UP (ref 3.8–5.2)
RBC # FLD: 14.6 % — HIGH (ref 10.3–14.5)
SODIUM SERPL-SCNC: 138 MMOL/L — SIGNIFICANT CHANGE UP (ref 135–145)
WBC # BLD: 4.13 K/UL — SIGNIFICANT CHANGE UP (ref 3.8–10.5)
WBC # FLD AUTO: 4.13 K/UL — SIGNIFICANT CHANGE UP (ref 3.8–10.5)

## 2023-10-05 RX ORDER — SODIUM CHLORIDE 9 MG/ML
1000 INJECTION, SOLUTION INTRAVENOUS
Refills: 0 | Status: DISCONTINUED | OUTPATIENT
Start: 2023-10-11 | End: 2023-10-26

## 2023-10-05 RX ORDER — OMEPRAZOLE 10 MG/1
1 CAPSULE, DELAYED RELEASE ORAL
Qty: 0 | Refills: 0 | DISCHARGE

## 2023-10-05 RX ORDER — ACETAMINOPHEN 500 MG
3 TABLET ORAL
Qty: 0 | Refills: 0 | DISCHARGE

## 2023-10-05 RX ORDER — IBUPROFEN 200 MG
1 TABLET ORAL
Qty: 0 | Refills: 0 | DISCHARGE

## 2023-10-05 NOTE — H&P PST ADULT - NSICDXPASTSURGICALHX_GEN_ALL_CORE_FT
PAST SURGICAL HISTORY:  Fibroid uterus embolization 3/2021    H/O abdominoplasty     H/O myomectomy     H/O ovarian cystectomy laparotomy    History of bilateral tubal ligation 22 years ago    History of D&C Polypectomy 2019    History of elbow surgery right; 2018    S/P  x2    S/P dilation and curettage

## 2023-10-05 NOTE — H&P PST ADULT - PROBLEM SELECTOR PLAN 1
Patient tentatively scheduled for Endoscopy with Anesthesia on 10/11/23.  Pre-op instructions provided. Pt given verbal and written instructions with teach back on pepcid. Pt verbalized understanding.    CBC, BMP, HCG were done at PST.  Copy of Sleep study, EKG and Echocardiogram in chart.  No further evaluations requested.

## 2023-10-05 NOTE — H&P PST ADULT - FUNCTIONAL STATUS
METS 4- Able climb up 2 flights of stairs, walking up hill, do house chores without symptoms./4-10 METS

## 2023-10-05 NOTE — H&P PST ADULT - NSICDXPASTMEDICALHX_GEN_ALL_CORE_FT
PAST MEDICAL HISTORY:  Carpal tunnel syndrome, unspecified laterality     Cyst of left ovary     Endometrial polyp     GERD (gastroesophageal reflux disease)     DWIGHT (obstructive sleep apnea) recesnt sleep study.  No CPAP yet    PUD (peptic ulcer disease)     Uterine leiomyoma, unspecified location

## 2023-10-05 NOTE — H&P PST ADULT - HISTORY OF PRESENT ILLNESS
48 year old female with pmhx of GERD, PUD, Left ovarian cyst s/p cystectomy, Uterine leiomyoma s/p myomectomy, presents for pre-op evaluation for diagnosis of Hematemesis. Patient is scheduled for Endoscopy with Anesthesia. Patient had 3 episodes of hematemesis in July.

## 2023-10-09 DIAGNOSIS — R10.9 UNSPECIFIED ABDOMINAL PAIN: ICD-10-CM

## 2023-10-11 ENCOUNTER — OUTPATIENT (OUTPATIENT)
Dept: OUTPATIENT SERVICES | Facility: HOSPITAL | Age: 48
LOS: 1 days | Discharge: HOME | End: 2023-10-11
Payer: COMMERCIAL

## 2023-10-11 ENCOUNTER — APPOINTMENT (OUTPATIENT)
Dept: GASTROENTEROLOGY | Facility: HOSPITAL | Age: 48
End: 2023-10-11

## 2023-10-11 ENCOUNTER — RESULT REVIEW (OUTPATIENT)
Age: 48
End: 2023-10-11

## 2023-10-11 VITALS
HEART RATE: 69 BPM | WEIGHT: 194.01 LBS | OXYGEN SATURATION: 98 % | TEMPERATURE: 96 F | DIASTOLIC BLOOD PRESSURE: 82 MMHG | SYSTOLIC BLOOD PRESSURE: 105 MMHG | HEIGHT: 64 IN | RESPIRATION RATE: 19 BRPM

## 2023-10-11 VITALS
OXYGEN SATURATION: 96 % | DIASTOLIC BLOOD PRESSURE: 78 MMHG | RESPIRATION RATE: 14 BRPM | SYSTOLIC BLOOD PRESSURE: 104 MMHG | HEART RATE: 79 BPM

## 2023-10-11 DIAGNOSIS — Z98.890 OTHER SPECIFIED POSTPROCEDURAL STATES: Chronic | ICD-10-CM

## 2023-10-11 DIAGNOSIS — Z98.51 TUBAL LIGATION STATUS: Chronic | ICD-10-CM

## 2023-10-11 DIAGNOSIS — D25.9 LEIOMYOMA OF UTERUS, UNSPECIFIED: Chronic | ICD-10-CM

## 2023-10-11 DIAGNOSIS — K92.0 HEMATEMESIS: ICD-10-CM

## 2023-10-11 DIAGNOSIS — Z98.891 HISTORY OF UTERINE SCAR FROM PREVIOUS SURGERY: Chronic | ICD-10-CM

## 2023-10-11 LAB — HCG SERPL-ACNC: <1 MIU/ML — SIGNIFICANT CHANGE UP

## 2023-10-11 PROCEDURE — 88305 TISSUE EXAM BY PATHOLOGIST: CPT | Mod: 26

## 2023-10-11 PROCEDURE — 43239 EGD BIOPSY SINGLE/MULTIPLE: CPT | Mod: GC

## 2023-10-11 NOTE — ASU PATIENT PROFILE, ADULT - FALL HARM RISK - ATTEMPT OOB
Procedure(s):  CYSTOSCOPY;  DILATION OF MEATAL STENOSIS; FULGURATION OF BLADDER NECK VARICES. general    Anesthesia Post Evaluation        Patient location during evaluation: PACU  Note status: Adequate. Level of consciousness: responsive to verbal stimuli and sleepy but conscious  Pain management: satisfactory to patient  Airway patency: patent  Anesthetic complications: no  Cardiovascular status: acceptable  Respiratory status: acceptable  Hydration status: acceptable  Comments: +Post-Anesthesia Evaluation and Assessment    Patient: Don Coma MRN: [id number]  SSN: [id number]   YOB: 1940  Age: 70 y.o. Sex: male      Cardiovascular Function/Vital Signs    BP 96/48   Pulse 68   Temp 37.3 °C (99.1 °F)   Resp 16   Ht 5' 10\" (1.778 m)   Wt 79.9 kg (176 lb 2.4 oz)   SpO2 93%   BMI 25.27 kg/m²     Patient is status post Procedure(s):  CYSTOSCOPY;  DILATION OF MEATAL STENOSIS; FULGURATION OF BLADDER NECK VARICES. Nausea/Vomiting: Controlled. Postoperative hydration reviewed and adequate. Pain:  Pain Scale 1: Numeric (0 - 10) (02/12/20 1535)  Pain Intensity 1: 0 (02/12/20 1535)   Managed. Neurological Status:   Neuro (WDL): Exceptions to WDL (02/12/20 1535)   At baseline. Mental Status and Level of Consciousness: Arousable. Pulmonary Status:   O2 Device: Room air (02/12/20 1547)   Adequate oxygenation and airway patent. Complications related to anesthesia: None    Post-anesthesia assessment completed. No concerns. Signed By: Promise Lima MD    2/12/2020  Post anesthesia nausea and vomiting:  controlled      Vitals Value Taken Time   BP 96/48 2/12/2020  4:05 PM   Temp 37.3 °C (99.1 °F) 2/12/2020  3:35 PM   Pulse 70 2/12/2020  4:12 PM   Resp 16 2/12/2020  4:12 PM   SpO2 94 % 2/12/2020  4:12 PM   Vitals shown include unvalidated device data. No

## 2023-10-11 NOTE — ASU PREOP CHECKLIST - PATIENT PROBLEMS/NEEDS
Left vm for patient to return call to schedule an appointment with Dr. Coleman for 09/28 at 10am or 10:30am at the [facility]   Patient expressed no known problems or needs

## 2023-10-11 NOTE — PRE PROCEDURE NOTE - PRE PROCEDURE EVALUATION
Attending Physician: Dr. Ogden                             Procedure: EGD    Indication for Procedure: hematemesis  ________________________________________________________  PAST MEDICAL & SURGICAL HISTORY:  PUD (peptic ulcer disease)      Uterine leiomyoma, unspecified location      Endometrial polyp      Cyst of left ovary      Carpal tunnel syndrome, unspecified laterality      DWIGHT (obstructive sleep apnea)  recesnt sleep study.  No CPAP yet      GERD (gastroesophageal reflux disease)      S/P   x2      History of bilateral tubal ligation  22 years ago      H/O ovarian cystectomy  laparotomy      S/P dilation and curettage      History of elbow surgery  right; 2018      H/O abdominoplasty      History of D&C  Polypectomy 2019      Fibroid uterus  embolization 3/2021      H/O myomectomy        ALLERGIES:  cephalexin (Anaphylaxis)  cephalosporins (Short breath; Swelling)  lidocaine (Vomiting; Other)  oxycodone (Other; Vomiting; Drowsiness)  adhesives (Rash)  Cipro (Anaphylaxis)  latex (Rash)  certain brand of iron infusion.  Pt does not remember brand (Anaphylaxis; Other)    HOME MEDICATIONS:    AICD/PPM: [ ] yes   [X] no    PERTINENT LAB DATA:                      PHYSICAL EXAMINATION:    T(C): --  HR: --  BP: --  RR: --  SpO2: --  See physical exam under H&P/Progress note      COMMENTS:    The patient is a suitable candidate for the planned procedure unless box checked [ ]  No, explain:

## 2023-10-13 LAB — SURGICAL PATHOLOGY STUDY: SIGNIFICANT CHANGE UP

## 2023-10-14 ENCOUNTER — APPOINTMENT (OUTPATIENT)
Dept: ULTRASOUND IMAGING | Facility: CLINIC | Age: 48
End: 2023-10-14
Payer: SELF-PAY

## 2023-10-14 ENCOUNTER — OUTPATIENT (OUTPATIENT)
Dept: OUTPATIENT SERVICES | Facility: HOSPITAL | Age: 48
LOS: 1 days | End: 2023-10-14
Payer: COMMERCIAL

## 2023-10-14 DIAGNOSIS — D25.9 LEIOMYOMA OF UTERUS, UNSPECIFIED: Chronic | ICD-10-CM

## 2023-10-14 DIAGNOSIS — Z98.51 TUBAL LIGATION STATUS: Chronic | ICD-10-CM

## 2023-10-14 DIAGNOSIS — Z98.891 HISTORY OF UTERINE SCAR FROM PREVIOUS SURGERY: Chronic | ICD-10-CM

## 2023-10-14 DIAGNOSIS — D25.9 LEIOMYOMA OF UTERUS, UNSPECIFIED: ICD-10-CM

## 2023-10-14 DIAGNOSIS — Z98.890 OTHER SPECIFIED POSTPROCEDURAL STATES: Chronic | ICD-10-CM

## 2023-10-14 PROCEDURE — 76856 US EXAM PELVIC COMPLETE: CPT | Mod: 26

## 2023-10-14 PROCEDURE — 76856 US EXAM PELVIC COMPLETE: CPT

## 2023-10-17 ENCOUNTER — NON-APPOINTMENT (OUTPATIENT)
Age: 48
End: 2023-10-17

## 2023-10-27 NOTE — ASSESSMENT
[FreeTextEntry1] : The patient is a 45-year-old woman who is being followed for symptomatic anemia secondary to menorrhagia in the setting of a uterine fibroid and unexplained cervical lymphadenopathy who is presenting to clinic today for three-month follow-up and has new complaints of bilateral lower extremity pitting edema and chest pain. \par \par 1. Symptomatic anemia: -Most recent hemoglobin count 10.3 with an MCV measuring in the 70s and with a transferrin saturation of about 12, all consistent with a diagnosis of iron-deficiency anemia \par -Symptoms of dizziness, shortness of breath and palpitations with exertion all likely secondary to iron-deficiency anemia \par -Iron-deficiency anemia likely secondary to menorrhagia in the setting of diagnosed uterine fibroid; no other obvious cause for menorrhagia (no obvious coagulopathy, previous endometrial polyps resected, no weight loss concerning for malignancy, no ovulatory insufficiency, adenomyosis possible in the setting of previous c-sections) \par -Patient did not tolerate oral iron so was previously managed with intravenous iron infusions but most recently had a presumed urticarial reaction in October of this year and has not had iron infusions since \par -The patient has decided to pursue a hysterectomy and will contact her gynecologist for a referral \par \par 2. Lymphadenopathy: -Differential diagnosis includes infection, malignancy, auto-immune/inflammatory condition\par -EBV and CMV IgG positive but IgM negative, indicating possible previous infection, and EBV and CMV unlikely to cause lymphadenopathy for 18 months; other possible infectious etiologies include bartonella hensleae (no known history of immunosuppression and no obvious rashes), hiv negative, toxoplasmosis (unlikely with no known history of immunosuppression) \par -No weight loss, night sweats concerning for malignancy; lymph nodes not fixed or firm\par -KRISTIN and anti ssa/ssb negative; other possible inflammatory etiologies include Castleman's disease (however patient does not have any known history of immunosuppression) and KIkuchi disease \par -Fine-needle aspiration consistent results with reactive lymph node; ENT follow-up in March to re-evaluate with head and neck ultrasound and consider excisional biopsy \par -Rheumatology referral provided \par \par 3. Bilateral lower extremity pitting edema: -No known history of cardiac pathology; shortness of breath most likely related to symptomatic anemia; \par -No previous laboratory evidence of liver dysfunction or hypoalbuminemia \par -Will send urine protein and creatinine spot levels in addition to urinalysis \par -Most likely related to lower extremity venous stasis in the setting of sedentary status secondary to anemia; no varicosities noted \par \par 4. Chest wall tenderness: -Reproducible with palpation; most likely related to muscular pain although unclear why related to drinking water \par -Will re-evaluate at next visit \par \par 5. Healthcare maintenance: -Patient denies flu vaccine today; tdap up to date \par -hcv and hiv negative \par -A1c and lipids within normal limits at recent visit \par -phq, dast, audit negative 
posterior cervical L/posterior cervical R/anterior cervical L/anterior cervical R/supraclavicular L/supraclavicular R

## 2023-11-03 ENCOUNTER — APPOINTMENT (OUTPATIENT)
Dept: DERMATOLOGY | Facility: CLINIC | Age: 48
End: 2023-11-03
Payer: COMMERCIAL

## 2023-11-03 ENCOUNTER — OUTPATIENT (OUTPATIENT)
Dept: OUTPATIENT SERVICES | Facility: HOSPITAL | Age: 48
LOS: 1 days | End: 2023-11-03

## 2023-11-03 VITALS
HEIGHT: 64 IN | TEMPERATURE: 97.8 F | SYSTOLIC BLOOD PRESSURE: 113 MMHG | WEIGHT: 198 LBS | RESPIRATION RATE: 16 BRPM | HEART RATE: 84 BPM | BODY MASS INDEX: 33.8 KG/M2 | DIASTOLIC BLOOD PRESSURE: 75 MMHG | OXYGEN SATURATION: 98 %

## 2023-11-03 DIAGNOSIS — Z98.891 HISTORY OF UTERINE SCAR FROM PREVIOUS SURGERY: Chronic | ICD-10-CM

## 2023-11-03 DIAGNOSIS — Z98.890 OTHER SPECIFIED POSTPROCEDURAL STATES: Chronic | ICD-10-CM

## 2023-11-03 DIAGNOSIS — L91.8 OTHER HYPERTROPHIC DISORDERS OF THE SKIN: ICD-10-CM

## 2023-11-03 DIAGNOSIS — D48.5 NEOPLASM OF UNCERTAIN BEHAVIOR OF SKIN: ICD-10-CM

## 2023-11-03 DIAGNOSIS — B07.8 OTHER VIRAL WARTS: ICD-10-CM

## 2023-11-03 DIAGNOSIS — Z98.51 TUBAL LIGATION STATUS: Chronic | ICD-10-CM

## 2023-11-03 DIAGNOSIS — E55.9 VITAMIN D DEFICIENCY, UNSPECIFIED: ICD-10-CM

## 2023-11-03 PROCEDURE — 17110 DESTRUCTION B9 LES UP TO 14: CPT

## 2023-11-03 PROCEDURE — 99203 OFFICE O/P NEW LOW 30 MIN: CPT | Mod: 25

## 2023-11-03 PROCEDURE — 11102 TANGNTL BX SKIN SINGLE LES: CPT | Mod: 59

## 2023-11-03 RX ORDER — CHOLECALCIFEROL (VITAMIN D3) 25 MCG
25 MCG TABLET ORAL DAILY
Qty: 90 | Refills: 0 | Status: ACTIVE | COMMUNITY
Start: 2023-11-03 | End: 1900-01-01

## 2023-11-09 LAB — CORE LAB BIOPSY: NORMAL

## 2023-11-17 ENCOUNTER — APPOINTMENT (OUTPATIENT)
Dept: PEDIATRIC ALLERGY IMMUNOLOGY | Facility: CLINIC | Age: 48
End: 2023-11-17

## 2023-11-20 ENCOUNTER — APPOINTMENT (OUTPATIENT)
Dept: PLASTIC SURGERY | Facility: HOSPITAL | Age: 48
End: 2023-11-20

## 2023-11-20 ENCOUNTER — OUTPATIENT (OUTPATIENT)
Dept: OUTPATIENT SERVICES | Facility: HOSPITAL | Age: 48
LOS: 1 days | End: 2023-11-20

## 2023-11-20 VITALS
HEART RATE: 82 BPM | HEIGHT: 64 IN | WEIGHT: 198 LBS | DIASTOLIC BLOOD PRESSURE: 87 MMHG | BODY MASS INDEX: 33.8 KG/M2 | SYSTOLIC BLOOD PRESSURE: 130 MMHG | TEMPERATURE: 98 F

## 2023-11-20 DIAGNOSIS — Z98.890 OTHER SPECIFIED POSTPROCEDURAL STATES: Chronic | ICD-10-CM

## 2023-11-20 DIAGNOSIS — D25.9 LEIOMYOMA OF UTERUS, UNSPECIFIED: Chronic | ICD-10-CM

## 2023-11-20 DIAGNOSIS — Z98.51 TUBAL LIGATION STATUS: Chronic | ICD-10-CM

## 2023-11-20 DIAGNOSIS — Z98.891 HISTORY OF UTERINE SCAR FROM PREVIOUS SURGERY: Chronic | ICD-10-CM

## 2023-11-21 DIAGNOSIS — L90.5 SCAR CONDITIONS AND FIBROSIS OF SKIN: ICD-10-CM

## 2023-11-22 ENCOUNTER — APPOINTMENT (OUTPATIENT)
Dept: INTERNAL MEDICINE | Facility: CLINIC | Age: 48
End: 2023-11-22
Payer: COMMERCIAL

## 2023-11-22 ENCOUNTER — OUTPATIENT (OUTPATIENT)
Dept: OUTPATIENT SERVICES | Facility: HOSPITAL | Age: 48
LOS: 1 days | End: 2023-11-22

## 2023-11-22 ENCOUNTER — APPOINTMENT (OUTPATIENT)
Dept: OBGYN | Facility: HOSPITAL | Age: 48
End: 2023-11-22

## 2023-11-22 VITALS
OXYGEN SATURATION: 97 % | BODY MASS INDEX: 33.46 KG/M2 | SYSTOLIC BLOOD PRESSURE: 122 MMHG | DIASTOLIC BLOOD PRESSURE: 82 MMHG | HEIGHT: 64 IN | HEART RATE: 74 BPM | WEIGHT: 196 LBS

## 2023-11-22 DIAGNOSIS — M25.569 PAIN IN UNSPECIFIED KNEE: ICD-10-CM

## 2023-11-22 DIAGNOSIS — M25.559 PAIN IN UNSPECIFIED HIP: ICD-10-CM

## 2023-11-22 DIAGNOSIS — Z98.890 OTHER SPECIFIED POSTPROCEDURAL STATES: Chronic | ICD-10-CM

## 2023-11-22 DIAGNOSIS — R31.29 OTHER MICROSCOPIC HEMATURIA: ICD-10-CM

## 2023-11-22 DIAGNOSIS — G89.29 PAIN IN UNSPECIFIED HIP: ICD-10-CM

## 2023-11-22 DIAGNOSIS — R20.0 ANESTHESIA OF SKIN: ICD-10-CM

## 2023-11-22 PROCEDURE — 99214 OFFICE O/P EST MOD 30 MIN: CPT | Mod: GC

## 2023-11-24 ENCOUNTER — NON-APPOINTMENT (OUTPATIENT)
Age: 48
End: 2023-11-24

## 2023-11-24 LAB
APPEARANCE: CLEAR
BACTERIA: NEGATIVE /HPF
BILIRUBIN URINE: NEGATIVE
BLOOD URINE: NEGATIVE
CAST: 0 /LPF
COLOR: NORMAL
EPITHELIAL CELLS: 2 /HPF
GLUCOSE QUALITATIVE U: NEGATIVE MG/DL
KETONES URINE: NEGATIVE MG/DL
LEUKOCYTE ESTERASE URINE: NEGATIVE
MICROSCOPIC-UA: NORMAL
NITRITE URINE: NEGATIVE
PH URINE: 5.5
PROTEIN URINE: NEGATIVE MG/DL
RED BLOOD CELLS URINE: 4 /HPF
SPECIFIC GRAVITY URINE: 1.03
UROBILINOGEN URINE: 0.2 MG/DL
WHITE BLOOD CELLS URINE: 0 /HPF

## 2023-11-27 DIAGNOSIS — R20.0 ANESTHESIA OF SKIN: ICD-10-CM

## 2023-11-27 DIAGNOSIS — M25.559 PAIN IN UNSPECIFIED HIP: ICD-10-CM

## 2023-11-27 DIAGNOSIS — R31.29 OTHER MICROSCOPIC HEMATURIA: ICD-10-CM

## 2023-11-27 DIAGNOSIS — M25.569 PAIN IN UNSPECIFIED KNEE: ICD-10-CM

## 2023-11-27 DIAGNOSIS — E66.9 OBESITY, UNSPECIFIED: ICD-10-CM

## 2023-11-29 ENCOUNTER — APPOINTMENT (OUTPATIENT)
Dept: OBGYN | Facility: HOSPITAL | Age: 48
End: 2023-11-29
Payer: COMMERCIAL

## 2023-11-29 ENCOUNTER — OUTPATIENT (OUTPATIENT)
Dept: OUTPATIENT SERVICES | Facility: HOSPITAL | Age: 48
LOS: 1 days | End: 2023-11-29

## 2023-11-29 VITALS
BODY MASS INDEX: 34.15 KG/M2 | TEMPERATURE: 97.7 F | WEIGHT: 200 LBS | HEIGHT: 64 IN | DIASTOLIC BLOOD PRESSURE: 88 MMHG | HEART RATE: 88 BPM | SYSTOLIC BLOOD PRESSURE: 129 MMHG

## 2023-11-29 DIAGNOSIS — Z98.890 OTHER SPECIFIED POSTPROCEDURAL STATES: Chronic | ICD-10-CM

## 2023-11-29 DIAGNOSIS — Z98.891 HISTORY OF UTERINE SCAR FROM PREVIOUS SURGERY: Chronic | ICD-10-CM

## 2023-11-29 DIAGNOSIS — Z98.51 TUBAL LIGATION STATUS: Chronic | ICD-10-CM

## 2023-11-29 DIAGNOSIS — D25.9 LEIOMYOMA OF UTERUS, UNSPECIFIED: Chronic | ICD-10-CM

## 2023-11-29 PROCEDURE — 99213 OFFICE O/P EST LOW 20 MIN: CPT | Mod: GC

## 2023-11-30 DIAGNOSIS — D25.9 LEIOMYOMA OF UTERUS, UNSPECIFIED: ICD-10-CM

## 2023-12-05 ENCOUNTER — APPOINTMENT (OUTPATIENT)
Dept: BARIATRICS/WEIGHT MGMT | Facility: CLINIC | Age: 48
End: 2023-12-05
Payer: COMMERCIAL

## 2023-12-05 ENCOUNTER — OUTPATIENT (OUTPATIENT)
Dept: OUTPATIENT SERVICES | Facility: HOSPITAL | Age: 48
LOS: 1 days | End: 2023-12-05

## 2023-12-05 VITALS
RESPIRATION RATE: 16 BRPM | HEART RATE: 96 BPM | SYSTOLIC BLOOD PRESSURE: 113 MMHG | OXYGEN SATURATION: 98 % | DIASTOLIC BLOOD PRESSURE: 79 MMHG | WEIGHT: 194.5 LBS | BODY MASS INDEX: 33.2 KG/M2 | HEIGHT: 64 IN

## 2023-12-05 DIAGNOSIS — E66.9 OBESITY, UNSPECIFIED: ICD-10-CM

## 2023-12-05 DIAGNOSIS — Z98.890 OTHER SPECIFIED POSTPROCEDURAL STATES: Chronic | ICD-10-CM

## 2023-12-05 DIAGNOSIS — Z98.51 TUBAL LIGATION STATUS: Chronic | ICD-10-CM

## 2023-12-05 DIAGNOSIS — D25.9 LEIOMYOMA OF UTERUS, UNSPECIFIED: Chronic | ICD-10-CM

## 2023-12-05 DIAGNOSIS — Z98.891 HISTORY OF UTERINE SCAR FROM PREVIOUS SURGERY: Chronic | ICD-10-CM

## 2023-12-05 PROCEDURE — 99205 OFFICE O/P NEW HI 60 MIN: CPT

## 2023-12-06 PROBLEM — E66.9 OBESITY, CLASS I, BMI 30-34.9: Status: ACTIVE | Noted: 2019-06-27

## 2023-12-07 DIAGNOSIS — E66.9 OBESITY, UNSPECIFIED: ICD-10-CM

## 2023-12-11 ENCOUNTER — APPOINTMENT (OUTPATIENT)
Dept: OTOLARYNGOLOGY | Facility: CLINIC | Age: 48
End: 2023-12-11

## 2023-12-12 ENCOUNTER — APPOINTMENT (OUTPATIENT)
Dept: ENDOCRINOLOGY | Facility: CLINIC | Age: 48
End: 2023-12-12

## 2023-12-13 ENCOUNTER — APPOINTMENT (OUTPATIENT)
Dept: PEDIATRIC ALLERGY IMMUNOLOGY | Facility: CLINIC | Age: 48
End: 2023-12-13
Payer: COMMERCIAL

## 2023-12-13 VITALS — WEIGHT: 200 LBS | OXYGEN SATURATION: 98 % | BODY MASS INDEX: 34.33 KG/M2 | HEART RATE: 98 BPM

## 2023-12-13 DIAGNOSIS — J30.89 OTHER ALLERGIC RHINITIS: ICD-10-CM

## 2023-12-13 DIAGNOSIS — J30.81 ALLERGIC RHINITIS DUE TO ANIMAL (CAT) (DOG) HAIR AND DANDER: ICD-10-CM

## 2023-12-13 DIAGNOSIS — H10.13 ACUTE ATOPIC CONJUNCTIVITIS, BILATERAL: ICD-10-CM

## 2023-12-13 DIAGNOSIS — T78.40XA ALLERGY, UNSPECIFIED, INITIAL ENCOUNTER: ICD-10-CM

## 2023-12-13 DIAGNOSIS — J30.1 ALLERGIC RHINITIS DUE TO POLLEN: ICD-10-CM

## 2023-12-13 PROCEDURE — 99213 OFFICE O/P EST LOW 20 MIN: CPT | Mod: GC

## 2023-12-13 RX ORDER — MOMETASONE FUROATE 50 UG/1
50 SPRAY NASAL DAILY
Qty: 1 | Refills: 5 | Status: ACTIVE | OUTPATIENT
Start: 2023-12-13

## 2023-12-13 RX ORDER — AZELASTINE HYDROCHLORIDE 205.5 UG/1
0.15 SPRAY, METERED NASAL
Qty: 2 | Refills: 4 | Status: ACTIVE | OUTPATIENT
Start: 2023-12-13

## 2023-12-13 RX ORDER — OLOPATADINE HCL 1 MG/ML
0.1 SOLUTION/ DROPS OPHTHALMIC
Qty: 1 | Refills: 5 | Status: ACTIVE | OUTPATIENT
Start: 2023-05-12

## 2023-12-13 RX ORDER — FLUTICASONE PROPIONATE 50 UG/1
50 SPRAY, METERED NASAL DAILY
Qty: 1 | Refills: 3 | Status: DISCONTINUED | COMMUNITY
Start: 2023-05-12 | End: 2023-12-13

## 2023-12-15 NOTE — PHYSICAL EXAM
[Alert] : alert [Well Nourished] : well nourished [Healthy Appearance] : healthy appearance [No Acute Distress] : no acute distress [Well Developed] : well developed [Normal Pupil & Iris Size/Symmetry] : normal pupil and iris size and symmetry [No Discharge] : no discharge [No Photophobia] : no photophobia [Sclera Not Icteric] : sclera not icteric [Normal TMs] : both tympanic membranes were normal [Normal Nasal Mucosa] : the nasal mucosa was normal [Normal Lips/Tongue] : the lips and tongue were normal [Normal Outer Ear/Nose] : the ears and nose were normal in appearance [Normal Tonsils] : normal tonsils [No Thrush] : no thrush [Pale mucosa] : pale mucosa [Boggy Nasal Turbinates] : boggy and/or pale nasal turbinates [Posterior Pharyngeal Cobblestoning] : posterior pharyngeal cobblestoning [Supple] : the neck was supple [Normal Rate and Effort] : normal respiratory rhythm and effort [No Crackles] : no crackles [No Retractions] : no retractions [Bilateral Audible Breath Sounds] : bilateral audible breath sounds [Normal Rate] : heart rate was normal  [Normal S1, S2] : normal S1 and S2 [No murmur] : no murmur [Regular Rhythm] : with a regular rhythm [Soft] : abdomen soft [Not Tender] : non-tender [Not Distended] : not distended [No HSM] : no hepato-splenomegaly [Normal Cervical Lymph Nodes] : cervical [Skin Intact] : skin intact  [No Rash] : no rash [No Skin Lesions] : no skin lesions [No clubbing] : no clubbing [No Edema] : no edema [No Cyanosis] : no cyanosis [Normal Mood] : mood was normal [Normal Affect] : affect was normal [Alert, Awake, Oriented as Age-Appropriate] : alert, awake, oriented as age appropriate [de-identified] : significantly swollen inferior turbinates, clear thin mucus r middle turbinate without purulent discharge [de-identified] : no cough or wheeze

## 2023-12-15 NOTE — HISTORY OF PRESENT ILLNESS
[de-identified] : Phuong is a 49 y/o female with chronic allergic rhinoconjunctivitis and multiple drug allergies who presents for follow-up.   Interval history:  - Has been using flonase 2 spray daily without significant relief. Has not been using nasal saline nor olopatadine eye drops. H as also noticed some blood tinged nasal discharge when blows her nose in the morning.  - Nasal symptoms are predominantly post nasal drip, clear anterior nasal discharge, cough at night and in the morning productive of scant thick sputum, itchy eyes, and frequent sneezing - No loss of smell, headaches, purulent nasal discharge - Admits to history of throat closure with cephalosporin, not sure which specific one. Required IV administration of medication which was presumably epinephrine. Has avoided them since.

## 2023-12-15 NOTE — REASON FOR VISIT
[Routine Follow-Up] : a routine follow-up visit for [FreeTextEntry2] : chronic rhinitis, drug allergy

## 2023-12-15 NOTE — ASSESSMENT
[FreeTextEntry1] : Phuong is a 47 y/o female with chronic allergic rhinoconjunctivitis and multiple drug allergies who presents for follow-up.   ALLERGIC RHINOCONJUNCTIVITIS - Continues to have significant nasal symptoms despite flonase. - Skin testing from several years ago was positive to indoor and outdoor allergens - DC flonase and switch to nasonex 2 spray once daily given bloody nasal discharge - Start olopatadine eye drops, saline nasal rinses bid, and azelastine 2 spray bid as needed - DC oral anti-histamines while taking these nasal / opthalmic medications  - Discussed at length risks and benefits of starting allergy immunotherapy. Patient is interested in exploring this option - Will have her return in 3-4 weeks once off anti-histamines for 1 week and repeat skin testing to evaluate for appropriate IT prescription  DRUG ALLERGY - Patient reports throat closing sensation immediately after cephalosporins and ciprofloxacin. - She is not sure which cephalosporin caused this reaction - Plan for penicillin skin testing at next visit to evaluate if she is candidate for penicillin challenge - Avoid beta-lactam antibiotics (PCN, cephalosporins) and ciprofloxacin  ADVERSE EFFECT OF DRUG - Patient reports prolonged dizziness with lidocaine and oxycodone

## 2024-01-09 ENCOUNTER — APPOINTMENT (OUTPATIENT)
Dept: BARIATRICS/WEIGHT MGMT | Facility: CLINIC | Age: 49
End: 2024-01-09

## 2024-01-17 ENCOUNTER — OUTPATIENT (OUTPATIENT)
Dept: OUTPATIENT SERVICES | Facility: HOSPITAL | Age: 49
LOS: 1 days | End: 2024-01-17

## 2024-01-17 ENCOUNTER — APPOINTMENT (OUTPATIENT)
Dept: INTERNAL MEDICINE | Facility: CLINIC | Age: 49
End: 2024-01-17
Payer: COMMERCIAL

## 2024-01-17 VITALS
HEIGHT: 64 IN | RESPIRATION RATE: 16 BRPM | HEART RATE: 85 BPM | DIASTOLIC BLOOD PRESSURE: 76 MMHG | BODY MASS INDEX: 34.15 KG/M2 | SYSTOLIC BLOOD PRESSURE: 112 MMHG | OXYGEN SATURATION: 97 % | WEIGHT: 200 LBS

## 2024-01-17 DIAGNOSIS — D25.9 LEIOMYOMA OF UTERUS, UNSPECIFIED: Chronic | ICD-10-CM

## 2024-01-17 DIAGNOSIS — Z98.890 OTHER SPECIFIED POSTPROCEDURAL STATES: Chronic | ICD-10-CM

## 2024-01-17 DIAGNOSIS — Z98.51 TUBAL LIGATION STATUS: Chronic | ICD-10-CM

## 2024-01-17 DIAGNOSIS — Z98.891 HISTORY OF UTERINE SCAR FROM PREVIOUS SURGERY: Chronic | ICD-10-CM

## 2024-01-17 DIAGNOSIS — R53.83 OTHER FATIGUE: ICD-10-CM

## 2024-01-17 PROCEDURE — 99213 OFFICE O/P EST LOW 20 MIN: CPT | Mod: GE

## 2024-01-18 DIAGNOSIS — R04.0 EPISTAXIS: ICD-10-CM

## 2024-01-18 DIAGNOSIS — R53.83 OTHER FATIGUE: ICD-10-CM

## 2024-01-18 LAB
ALBUMIN SERPL ELPH-MCNC: 4.1 G/DL
ALP BLD-CCNC: 56 U/L
ALT SERPL-CCNC: 25 U/L
ANION GAP SERPL CALC-SCNC: 11 MMOL/L
AST SERPL-CCNC: 24 U/L
BASOPHILS # BLD AUTO: 0.04 K/UL
BASOPHILS NFR BLD AUTO: 0.7 %
BILIRUB SERPL-MCNC: 0.3 MG/DL
BUN SERPL-MCNC: 17 MG/DL
CALCIUM SERPL-MCNC: 9.4 MG/DL
CHLORIDE SERPL-SCNC: 102 MMOL/L
CO2 SERPL-SCNC: 25 MMOL/L
CREAT SERPL-MCNC: 0.87 MG/DL
EGFR: 83 ML/MIN/1.73M2
EOSINOPHIL # BLD AUTO: 0.1 K/UL
EOSINOPHIL NFR BLD AUTO: 1.8 %
GLUCOSE SERPL-MCNC: 92 MG/DL
HCT VFR BLD CALC: 45 %
HGB BLD-MCNC: 14.8 G/DL
IMM GRANULOCYTES NFR BLD AUTO: 0.2 %
LYMPHOCYTES # BLD AUTO: 2.03 K/UL
LYMPHOCYTES NFR BLD AUTO: 35.8 %
MAN DIFF?: NORMAL
MCHC RBC-ENTMCNC: 29.4 PG
MCHC RBC-ENTMCNC: 32.9 GM/DL
MCV RBC AUTO: 89.5 FL
MONOCYTES # BLD AUTO: 0.44 K/UL
MONOCYTES NFR BLD AUTO: 7.8 %
NEUTROPHILS # BLD AUTO: 3.05 K/UL
NEUTROPHILS NFR BLD AUTO: 53.7 %
PLATELET # BLD AUTO: 259 K/UL
POTASSIUM SERPL-SCNC: 4.2 MMOL/L
PROT SERPL-MCNC: 7 G/DL
RBC # BLD: 5.03 M/UL
RBC # FLD: 15.1 %
SODIUM SERPL-SCNC: 137 MMOL/L
WBC # FLD AUTO: 5.67 K/UL

## 2024-01-18 NOTE — HISTORY OF PRESENT ILLNESS
[FreeTextEntry8] : 48yF with PMHx lymphadenopathy (negative biopsy and rheum work-up), multiple cesareans and hysterectomy complicated by mesh placement and chronic kemi-incisional pain, OA, DWIGHT unable to get CPAP machine presenting for 2 weeks daily nosebleeds and increased fatigue.   Pt has been noticing clots of blood when she blows her nose daily especially in the morning for the last 2 weeks, and sometimes feels the blood oozing down the back of her throat most days. She has never had nosebleeds before. Denies bleeding from anywhere else including heavier menses, hematuria, blood in the stool, gingival bleeding, easy bruising, new rash. She denies SOB, abd pain, chest pain, vomiting/diarrhea.

## 2024-01-18 NOTE — ASSESSMENT
[FreeTextEntry1] : 48yF with PMHx lymphadenopathy (negative biopsy and rheum work-up), multiple cesareans and hysterectomy complicated by mesh placement and chronic kemi-incisional pain, OA, DWIGHT unable to get CPAP machine presenting for 2 weeks daily nosebleeds and increased fatigue.   RTC 3 months for CPE Case discussed with Dr. Nelly Quijano MD EM/IM PGY-3

## 2024-01-18 NOTE — PHYSICAL EXAM
[Normal Oropharynx] : the oropharynx was normal [Coordination Grossly Intact] : coordination grossly intact [No Focal Deficits] : no focal deficits [Normal Gait] : normal gait [Normal] : affect was normal and insight and judgment were intact [de-identified] : visible vessel to the L sided inferior anterior turbinate, otherwise mucosa diffusely erythematous iwhtout obvious lesion or active bleeding

## 2024-01-31 ENCOUNTER — APPOINTMENT (OUTPATIENT)
Dept: PEDIATRIC ALLERGY IMMUNOLOGY | Facility: CLINIC | Age: 49
End: 2024-01-31
Payer: COMMERCIAL

## 2024-01-31 ENCOUNTER — APPOINTMENT (OUTPATIENT)
Dept: INTERNAL MEDICINE | Facility: CLINIC | Age: 49
End: 2024-01-31

## 2024-01-31 VITALS — SYSTOLIC BLOOD PRESSURE: 128 MMHG | HEART RATE: 84 BPM | DIASTOLIC BLOOD PRESSURE: 85 MMHG | OXYGEN SATURATION: 97 %

## 2024-01-31 DIAGNOSIS — Z88.1 ALLERGY STATUS TO OTHER ANTIBIOTIC AGENTS: ICD-10-CM

## 2024-01-31 DIAGNOSIS — Z88.9 ALLERGY STATUS TO UNSPECIFIED DRUGS, MEDICAMENTS AND BIOLOGICAL SUBSTANCES: ICD-10-CM

## 2024-01-31 DIAGNOSIS — Z88.2 ALLERGY STATUS TO SULFONAMIDES: ICD-10-CM

## 2024-01-31 DIAGNOSIS — R04.0 EPISTAXIS: ICD-10-CM

## 2024-01-31 DIAGNOSIS — Z88.0 ALLERGY STATUS TO PENICILLIN: ICD-10-CM

## 2024-01-31 DIAGNOSIS — H10.10 ALLERGIC RHINITIS, UNSPECIFIED: ICD-10-CM

## 2024-01-31 DIAGNOSIS — J30.9 ALLERGIC RHINITIS, UNSPECIFIED: ICD-10-CM

## 2024-01-31 PROCEDURE — 99214 OFFICE O/P EST MOD 30 MIN: CPT | Mod: GC,25

## 2024-01-31 NOTE — ASSESSMENT
[FreeTextEntry1] : Phuong is a 47 y/o female with chronic allergic rhinoconjunctivitis and multiple drug allergies who presents for follow-up.  ALLERGIC RHINOCONJUNCTIVITIS - Continues to have significant nasal symptoms. Was switched to azelastine and nasonex at last visit without much imrpovement in symptoms, but adherence was limited by epistaxis - Skin testing from several years ago was positive to indoor and outdoor allergens. However, repeat skin testing today was only positive to dust mite.  - Continue olopatadine eye drops, saline nasal rinses bid, and azelastine 2 spray bid as needed, nasonex 2 spray daily - Suspect epistaxis is due to improper technique with nasal sprays resulting in medication on septum. Reviewed proper technique. - Will get immunocaps to environmental allergens, if positive can start patient on allergy shots given desire to trial this treatment - Follow-up in 4-6 weeks to see if symptoms improved with better nasal spray technique and to review immunocap results. Additionally, if hoarseness continues will trial her on PPI for laryngopharyngeal reflux and request she schedules ENT follow-up.   DRUG ALLERGY - Patient reports throat closing sensation immediately after cephalosporins, ciprofloxacin, and bactrim - Skin testing in office 01/31/2024 positive to pre-penicillin - She should thus AVOID CEPHALOSPORINS, PENICILLINS, FLOUROQUINOLONES, AND SULFA ANTIBIOTICS - If she requires an antibiotic from one of these classes, she will need to be admitted to the hospital for a rapid desensitization

## 2024-01-31 NOTE — IMPRESSION
[________] : [unfilled] [_____] : dust mites ([unfilled]) [FreeTextEntry3] : Pen G negative x2 Pre-Pen positive x2 (10x11 mm, 13x12 mm)

## 2024-01-31 NOTE — REASON FOR VISIT
[Routine Follow-Up] : a routine follow-up visit for [FreeTextEntry2] : antibiotic allergy, allergic rhinitis

## 2024-01-31 NOTE — CONSULT LETTER
[Dear  ___] : Dear  [unfilled], [Consult Letter:] : I had the pleasure of evaluating your patient, [unfilled]. [Please see my note below.] : Please see my note below. [Consult Closing:] : Thank you very much for allowing me to participate in the care of this patient.  If you have any questions, please do not hesitate to contact me. [Sincerely,] : Sincerely, [FreeTextEntry1] : We performed skin testing for penicillin to see if she could tolerate penicillins given history of cephalosporin allergy. She unfortunately tested positive to penicillins as well. She should avoid penicillins and cephalosporins as well as sulfa antibiotics and flouroquinolones. If she requires any of these antibiotics, she will need to be admitted to the hospital for a rapid desensitization.  [FreeTextEntry3] : Sultan Austin DO Fellow in Allergy/Immunology [facility] at [facility] Phone: [phone] Fax: [phone]

## 2024-01-31 NOTE — CONSULT LETTER
[Dear  ___] : Dear  [unfilled], [Consult Letter:] : I had the pleasure of evaluating your patient, [unfilled]. [Please see my note below.] : Please see my note below. [Consult Closing:] : Thank you very much for allowing me to participate in the care of this patient.  If you have any questions, please do not hesitate to contact me. [Sincerely,] : Sincerely, [FreeTextEntry1] : We performed skin testing for penicillin to see if she could tolerate penicillins given history of cephalosporin allergy. She unfortunately tested positive to penicillins as well. She should avoid penicillins and cephalosporins as well as sulfa antibiotics and flouroquinolones. If she requires any of these antibiotics, she will need to be admitted to the hospital for a rapid desensitization.  [FreeTextEntry3] : Sultan Austin DO Fellow in Allergy/Immunology [facility] at [facility] Phone: [phone] Fax: [phone]

## 2024-01-31 NOTE — ASSESSMENT
[FreeTextEntry1] : Phuong is a 49 y/o female with chronic allergic rhinoconjunctivitis and multiple drug allergies who presents for follow-up.  ALLERGIC RHINOCONJUNCTIVITIS - Continues to have significant nasal symptoms. Was switched to azelastine and nasonex at last visit without much imrpovement in symptoms, but adherence was limited by epistaxis - Skin testing from several years ago was positive to indoor and outdoor allergens. However, repeat skin testing today was only positive to dust mite.  - Continue olopatadine eye drops, saline nasal rinses bid, and azelastine 2 spray bid as needed, nasonex 2 spray daily - Suspect epistaxis is due to improper technique with nasal sprays resulting in medication on septum. Reviewed proper technique. - Will get immunocaps to environmental allergens, if positive can start patient on allergy shots given desire to trial this treatment - Follow-up in 4-6 weeks to see if symptoms improved with better nasal spray technique and to review immunocap results. Additionally, if hoarseness continues will trial her on PPI for laryngopharyngeal reflux and request she schedules ENT follow-up.   DRUG ALLERGY - Patient reports throat closing sensation immediately after cephalosporins, ciprofloxacin, and bactrim - Skin testing in office 01/31/2024 positive to pre-penicillin - She should thus AVOID CEPHALOSPORINS, PENICILLINS, FLOUROQUINOLONES, AND SULFA ANTIBIOTICS - If she requires an antibiotic from one of these classes, she will need to be admitted to the hospital for a rapid desensitization

## 2024-01-31 NOTE — PHYSICAL EXAM

## 2024-02-07 NOTE — HISTORY OF PRESENT ILLNESS
[de-identified] : Phuong is a 47 y/o female with chronic allergic rhinoconjunctivitis and multiple drug allergies who presents for follow-up.  Interval history:  - Continued to have issues with slight bloody nasal discharge despite changing to nasonex. She had stopped both azelastine and nasonex for the past 2 weeks and the symptoms have resolved. When she uses nasal sprays she points it straight up her nose rather than inwards towards turbinates.  - Continues to have post nasal drip, itchy eyes, particularly when around smoker. Also reports hoarse voice.  - Says that she has seen ENT multiple times in the past year and has had 3 nasal endoscopies. She does not recall any definitive recommendations from these ENT visits.  - She reports in the past ~5ish years she had episodes of throat closing with ciprofloxacin, cephalosporin, IV iron infusion, and bactrim.  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   Has avoided anti-histamines, no recent fevers/illness, and would like to proceed with testing and challenge if possible.

## 2024-02-07 NOTE — PLAN
[FreeTextEntry1] : DRUG ALLERGY - Patient reports throat closing sensation immediately after cephalosporins and ciprofloxacin. - She is not sure which cephalosporin caused this reaction - Plan for penicillin skin testing at next visit to evaluate if she is candidate for penicillin challenge - Avoid beta-lactam antibiotics (PCN, cephalosporins) and ciprofloxacin

## 2024-02-07 NOTE — HISTORY OF PRESENT ILLNESS
[de-identified] : Phuong is a 47 y/o female with chronic allergic rhinoconjunctivitis and multiple drug allergies who presents for follow-up.  Interval history:  - Continued to have issues with slight bloody nasal discharge despite changing to nasonex. She had stopped both azelastine and nasonex for the past 2 weeks and the symptoms have resolved. When she uses nasal sprays she points it straight up her nose rather than inwards towards turbinates.  - Continues to have post nasal drip, itchy eyes, particularly when around smoker. Also reports hoarse voice.  - Says that she has seen ENT multiple times in the past year and has had 3 nasal endoscopies. She does not recall any definitive recommendations from these ENT visits.  - She reports in the past ~5ish years she had episodes of throat closing with ciprofloxacin, cephalosporin, IV iron infusion, and bactrim.  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   Has avoided anti-histamines, no recent fevers/illness, and would like to proceed with testing and challenge if possible.

## 2024-02-20 ENCOUNTER — RX RENEWAL (OUTPATIENT)
Age: 49
End: 2024-02-20

## 2024-02-20 DIAGNOSIS — E55.9 VITAMIN D DEFICIENCY, UNSPECIFIED: ICD-10-CM

## 2024-02-20 RX ORDER — CHOLECALCIFEROL (VITAMIN D3) 25 MCG
25 TABLET ORAL
Qty: 90 | Refills: 0 | Status: ACTIVE | COMMUNITY
Start: 2024-02-20 | End: 1900-01-01

## 2024-03-11 ENCOUNTER — APPOINTMENT (OUTPATIENT)
Dept: PLASTIC SURGERY | Facility: HOSPITAL | Age: 49
End: 2024-03-11

## 2024-07-09 ENCOUNTER — APPOINTMENT (OUTPATIENT)
Dept: INTERNAL MEDICINE | Facility: CLINIC | Age: 49
End: 2024-07-09

## 2024-08-20 NOTE — DISCHARGE NOTE PROVIDER - NSDCCPCAREPLAN_GEN_ALL_CORE_FT
PRINCIPAL DISCHARGE DIAGNOSIS  Diagnosis: Uterine leiomyoma, unspecified location  Assessment and Plan of Treatment: You had a Uterine Fibroid Embolization in Interventional Radiology (IR) with Dr. Duque on 3/24/2021.  You may experience Post Embolization Syndrome for the first 5-7 days which may include nausea, vomiting, severe crampy pelvic pain & a low grade fever.                                                                           There may be a small area of bruising around the right groin site - this is normal. You should not have a hardened area. If you have this please contact the doctor.                               Passage of clot, debris or tissue through the vagina may occur for the first few weeks to months & your menstrual period may be irregular for the first 3 cycles - this is normal.   To prevent infection, we instruct you to avoid anything in the vagina for 6 weeks ( no sex, douching, tampons, tub bathing or swimming)   - Antacid (like Protonix & stool softeners (like Miralax, Senna, Colace) must be taken as presecribed.   -A  narcotic, like Dilaudid, may be taken AS NEEDED.  For moderate severe nauseant medication, like Zofran, may be taken AS NEEDED for nausea or vomiting.  Discharge Follow Up Instructions:   -Please call your gynecologist to schedule a 2 week post op follow up visit.   -Please call the IR Office at [phone] to schedule a one month post op visit with Dr. Mccarty. At your 1 month visit, you will be given a presecription for follow up MRI to evaluate your fibroid.  ***IF YOU DEVELOP THE FOLLOWING SYMPTOMS***  FEVER of 101 F / PERSISTENT NAUSEA OR VOMITING or the inability to tolerate liquids or solid foods/ RIGHT GROIN SITE BLEEDING / expanding bruising / severe pain / hardened area (like a golf ball) / RIGHT LEG NUMBNESS, tingling or the inability to move your leg / PAIN or burning when urinating or foul-smelling vaginal discharge.     ***CALL INTERVENTIONAL RADIOLOGY at [phone] Monday to Friday before 6 pm   or If after hours / weekends/ holidays, call [phone] & ask for the IR Resident on call***         Opt out PRINCIPAL DISCHARGE DIAGNOSIS  Diagnosis: Uterine leiomyoma, unspecified location  Assessment and Plan of Treatment: You had a Uterine Fibroid Embolization in Interventional Radiology (IR) with Dr. Duque on 3/24/2021.  You may experience Post Embolization Syndrome for the first 5-7 days which may include nausea, vomiting, severe crampy pelvic pain & a low grade fever.                                                                           There may be a small area of bruising around the right groin site - this is normal. You should not have a hardened area. If you have this please contact the doctor.                               Passage of clot, debris or tissue through the vagina may occur for the first few weeks to months & your menstrual period may be irregular for the first 3 cycles - this is normal.   To prevent infection, we instruct you to avoid anything in the vagina for 6 weeks ( no sex, douching, tampons, tub bathing or swimming)   - Antacid (like Protonix & stool softeners (like Miralax, Senna, Colace) must be taken as presecribed.   -A  narcotic, like Dilaudid, may be taken AS NEEDED.  For moderate severe nauseant medication, like Zofran, may be taken AS NEEDED for nausea or vomiting.  Discharge Follow Up Instructions:   -Please call your gynecologist to schedule a 2 week post op follow up visit.   -Please call the IR Office at [phone] to schedule a one month post op visit with Dr. Mccarty. At your 1 month visit, you will be given a presecription for follow up MRI to evaluate your fibroid.  ***IF YOU DEVELOP THE FOLLOWING SYMPTOMS***  FEVER of 101 F / PERSISTENT NAUSEA OR VOMITING or the inability to tolerate liquids or solid foods/ RIGHT GROIN SITE BLEEDING / expanding bruising / severe pain / hardened area (like a golf ball) / RIGHT LEG NUMBNESS, tingling or the inability to move your leg / PAIN or burning when urinating or foul-smelling vaginal discharge.     ***CALL INTERVENTIONAL RADIOLOGY at [phone] Monday to Friday before 6 pm   or If after hours / weekends/ holidays, call [phone] & ask for the IR Resident on call***        SECONDARY DISCHARGE DIAGNOSES  Diagnosis: Gastroesophageal reflux disease, unspecified whether esophagitis present  Assessment and Plan of Treatment: Continue to take your omeprazole as prescribed.

## 2025-02-10 NOTE — PHYSICAL EXAM
[Alert] : alert [Well Nourished] : well nourished [Healthy Appearance] : healthy appearance [No Acute Distress] : no acute distress [Well Developed] : well developed [Normal Pupil & Iris Size/Symmetry] : normal pupil and iris size and symmetry [No Discharge] : no discharge [No Photophobia] : no photophobia normal (ped)... [Sclera Not Icteric] : sclera not icteric [Normal TMs] : both tympanic membranes were normal [Normal Nasal Mucosa] : the nasal mucosa was normal [Normal Lips/Tongue] : the lips and tongue were normal [Normal Outer Ear/Nose] : the ears and nose were normal in appearance [Normal Tonsils] : normal tonsils [No Thrush] : no thrush [Pale mucosa] : pale mucosa [Supple] : the neck was supple [Normal Rate and Effort] : normal respiratory rhythm and effort [No Crackles] : no crackles [No Retractions] : no retractions [Bilateral Audible Breath Sounds] : bilateral audible breath sounds [Normal Rate] : heart rate was normal  [Normal S1, S2] : normal S1 and S2 [No murmur] : no murmur [Regular Rhythm] : with a regular rhythm [Soft] : abdomen soft [Not Tender] : non-tender [Not Distended] : not distended [No HSM] : no hepato-splenomegaly [Normal Cervical Lymph Nodes] : cervical [Skin Intact] : skin intact  [No Rash] : no rash [No Skin Lesions] : no skin lesions [No clubbing] : no clubbing [No Edema] : no edema [No Cyanosis] : no cyanosis [Normal Mood] : mood was normal [Normal Affect] : affect was normal [Alert, Awake, Oriented as Age-Appropriate] : alert, awake, oriented as age appropriate [Boggy Nasal Turbinates] : boggy and/or pale nasal turbinates [Posterior Pharyngeal Cobblestoning] : posterior pharyngeal cobblestoning

## (undated) DEVICE — LINE BREATHE SAMPLNG

## (undated) DEVICE — TUBING IV SET GRAVITY 3Y 100" MACRO

## (undated) DEVICE — PACK IV START WITH CHG

## (undated) DEVICE — CATH IV SAFE BC 22G X 1" (BLUE)

## (undated) DEVICE — DRSG CURITY GAUZE SPONGE 4 X 4" 12-PLY NON-STERILE

## (undated) DEVICE — DENTURE CUP PINK

## (undated) DEVICE — UNDERPAD LINEN SAVER 17 X 24"

## (undated) DEVICE — BASIN EMESIS 10IN GRADUATED MAUVE

## (undated) DEVICE — CONTAINER FORMALIN 80ML YELLOW

## (undated) DEVICE — GOWN LG

## (undated) DEVICE — ELCTR ECG CONDUCTIVE ADHESIVE

## (undated) DEVICE — BITE BLOCK ADULT 20 X 27MM (GREEN)

## (undated) DEVICE — TUBING MEDI-VAC W MAXIGRIP CONNECTORS 1/4"X6'

## (undated) DEVICE — LUBRICATING JELLY HR ONE SHOT 3G

## (undated) DEVICE — DRSG BANDAID 0.75X3"

## (undated) DEVICE — SALIVA EJECTOR (BLUE)

## (undated) DEVICE — BIOPSY FORCEP COLD DISP

## (undated) DEVICE — BIOPSY FORCEP RADIAL JAW 4 STANDARD WITH NEEDLE

## (undated) DEVICE — DRSG 2X2

## (undated) DEVICE — CLAMP BX HOT RAD JAW 3